# Patient Record
Sex: MALE | Race: WHITE | NOT HISPANIC OR LATINO | Employment: OTHER | URBAN - METROPOLITAN AREA
[De-identification: names, ages, dates, MRNs, and addresses within clinical notes are randomized per-mention and may not be internally consistent; named-entity substitution may affect disease eponyms.]

---

## 2017-01-18 ENCOUNTER — HOSPITAL ENCOUNTER (OUTPATIENT)
Dept: ULTRASOUND IMAGING | Facility: CLINIC | Age: 77
Discharge: HOME | End: 2017-01-18
Attending: RADIOLOGY | Admitting: RADIOLOGY
Payer: COMMERCIAL

## 2017-01-18 DIAGNOSIS — I71.40 ABDOMINAL AORTIC ANEURYSM (H): ICD-10-CM

## 2017-01-18 PROCEDURE — 93978 VASCULAR STUDY: CPT

## 2017-01-23 DIAGNOSIS — E11.42 TYPE 2 DIABETES MELLITUS WITH DIABETIC POLYNEUROPATHY (H): Primary | ICD-10-CM

## 2017-01-23 DIAGNOSIS — E78.5 HYPERLIPIDEMIA WITH TARGET LDL LESS THAN 70: Primary | ICD-10-CM

## 2017-01-23 RX ORDER — ATORVASTATIN CALCIUM 80 MG/1
80 TABLET, FILM COATED ORAL DAILY
Qty: 90 TABLET | Refills: 1 | Status: SHIPPED | OUTPATIENT
Start: 2017-01-23 | End: 2017-03-23

## 2017-01-23 NOTE — TELEPHONE ENCOUNTER
atorvastatin     Last Written Prescription Date: 3/10/16  Last Fill Quantity: 90, # refills: 1  Last Office Visit with [facility], P or [facility] prescribing provider: 12/22/16   Next 5 appointments (look out 90 days)     Mar 22, 2017  8:40 AM   Office Visit with Beatriz Betancur MD   [facility] ([facility])    [address] [place] [address] [phone]                   CHOL      135   9/22/2016  HDL       56   9/22/2016  LDL       62   9/22/2016  TRIG       83   9/22/2016  CHOLHDLRATIO      2.4   9/17/2015

## 2017-01-23 NOTE — TELEPHONE ENCOUNTER
metformin         Last Written Prescription Date: 9/22/16  Last Fill Quantity: 180, # refills: 1  Last Office Visit with [facility], P or [facility] prescribing provider:  12/22/16   Next 5 appointments (look out 90 days)     Mar 22, 2017  8:40 AM   Office Visit with Beatriz Betancur MD   [facility] ([facility])    [address] [place] [address] [phone]                   BP Readings from Last 3 Encounters:   12/22/16 126/74   11/01/16 156/78   09/22/16 108/68     MICROL       15   3/10/2016  No results found for this basename: microalbumin  CREATININE   Date Value Ref Range Status   11/01/2016 1.00 0.66 - 1.25 mg/dL Final   ]  GFR ESTIMATE   Date Value Ref Range Status   11/01/2016 72 >60 mL/min/1.7m2 Final     Comment:     Non  GFR Calc   09/22/2016 >90  Non  GFR Calc   >60 mL/min/1.7m2 Final   09/17/2015 81 >60 mL/min/1.7m2 Final     Comment:     Non  GFR Calc     GFR ESTIMATE IF BLACK   Date Value Ref Range Status   11/01/2016 88 >60 mL/min/1.7m2 Final     Comment:      GFR Calc   09/22/2016 >90   GFR Calc   >60 mL/min/1.7m2 Final   09/17/2015 >90   GFR Calc   >60 mL/min/1.7m2 Final     CHOL      135   9/22/2016  HDL       56   9/22/2016  LDL       62   9/22/2016  TRIG       83   9/22/2016  CHOLHDLRATIO      2.4   9/17/2015  AST       14   9/22/2016  ALT       28   9/22/2016  A1C      5.2   9/22/2016  A1C      6.4   3/10/2016  A1C      6.6   9/17/2015  A1C      6.7   3/26/2015  A1C      6.5   10/6/2014  POTASSIUM   Date Value Ref Range Status   11/01/2016 4.5 3.4 - 5.3 mmol/L Final

## 2017-01-25 NOTE — TELEPHONE ENCOUNTER
metformin         Last Written Prescription Date: 9/22/16  Last Fill Quantity: 180, # refills: 1  Last Office Visit with [facility] or [facility] prescribing provider:  12/22/16   Next 5 appointments (look out 90 days)     Mar 22, 2017  8:40 AM   Office Visit with Beatriz Betancur MD   [facility]    [address] [facility] [address] [phone]            Apr 04, 2017  8:15 AM   Return Visit with Nadia Gutierrez MD   [facility] [place] ([facility])    [address]  [facility] [address] [phone]                   BP Readings from Last 3 Encounters:   12/22/16 126/74   11/01/16 156/78   09/22/16 108/68     MICROL       15   3/10/2016  No results found for this basename: microalbumin  CREATININE   Date Value Ref Range Status   11/01/2016 1.00 0.66 - 1.25 mg/dL Final   ]  GFR ESTIMATE   Date Value Ref Range Status   11/01/2016 72 >60 mL/min/1.7m2 Final     Comment:     Non  GFR Calc   09/22/2016 >90  Non  GFR Calc   >60 mL/min/1.7m2 Final   09/17/2015 81 >60 mL/min/1.7m2 Final     Comment:     Non  GFR Calc     GFR ESTIMATE IF BLACK   Date Value Ref Range Status   11/01/2016 88 >60 mL/min/1.7m2 Final     Comment:      GFR Calc   09/22/2016 >90   GFR Calc   >60 mL/min/1.7m2 Final   09/17/2015 >90   GFR Calc   >60 mL/min/1.7m2 Final     CHOL      135   9/22/2016  HDL       56   9/22/2016  LDL       62   9/22/2016  TRIG       83   9/22/2016  CHOLHDLRATIO      2.4   9/17/2015  AST       14   9/22/2016  ALT       28   9/22/2016  A1C      5.2   9/22/2016  A1C      6.4   3/10/2016  A1C      6.6   9/17/2015  A1C      6.7   3/26/2015  A1C      6.5   10/6/2014  POTASSIUM   Date Value Ref Range Status   11/01/2016 4.5 3.4 - 5.3 mmol/L Final

## 2017-01-26 NOTE — TELEPHONE ENCOUNTER
One refill only as patient due for diabetes follow up in March with PCP.    Office visit already scheduled.    KEY FariaN, RN

## 2017-02-27 ENCOUNTER — TELEPHONE (OUTPATIENT)
Dept: OTHER | Facility: CLINIC | Age: 77
End: 2017-02-27

## 2017-02-27 DIAGNOSIS — I71.40 ABDOMINAL AORTIC ANEURYSM (H): Primary | ICD-10-CM

## 2017-02-27 NOTE — TELEPHONE ENCOUNTER
Called patient with results, unfortunately was just notified that patient had the exam done in January.  Recommend annual follow up. Pt has no concerns at this time.  [facility] will call patient to schedule.  Meena Cheung RN  Exam Information   Exam Date Exam Time Accession # Performing Department Results    1/18/17  8:53 AM [id number] [facility]    [employer] Interactive Report and InfoRx   View the interactive report   PACS Images   Show images for US Aorta/Ivc/Iliac Duplex Complete   Study Result   ULTRASOUND AORTA/IVC/ILIAC DUPLEX COMPLETE January 18, 2017 8:53 AM      HISTORY: Follow-up endovascular abdominal aortic aneurysm repair  12/13/2007.     COMPARISON: 3/9/2015.     FINDINGS: Duplex ultrasound with waveform spectral analysis and color  flow imaging was performed.     Aortobiiliac endograft remains patent without evidence of stenosis.  Abdominal aortic aneurysm sac is thrombosed with maximal AP diameter  3.6 cm and transverse diameter of 4.1 cm. This compares to previous  maximum measured diameter and is relatively stable in size and within  normal limits of measurement variation. There is no ultrasound  evidence to suggest endoleak.         IMPRESSION: Widely patent stable appearing aortoiliac endograft.  Aneurysm sac is thrombosed with maximal diameter of 3.6 x 4.1 cm.  Annual follow-up recommended.     HEIDI GREEN MD

## 2017-03-03 ENCOUNTER — TRANSFERRED RECORDS (OUTPATIENT)
Dept: HEALTH INFORMATION MANAGEMENT | Facility: CLINIC | Age: 77
End: 2017-03-03

## 2017-03-23 ENCOUNTER — OFFICE VISIT (OUTPATIENT)
Dept: FAMILY MEDICINE | Facility: CLINIC | Age: 77
End: 2017-03-23
Payer: COMMERCIAL

## 2017-03-23 VITALS
DIASTOLIC BLOOD PRESSURE: 64 MMHG | HEIGHT: 73 IN | TEMPERATURE: 97.5 F | WEIGHT: 232.25 LBS | OXYGEN SATURATION: 98 % | HEART RATE: 66 BPM | SYSTOLIC BLOOD PRESSURE: 118 MMHG | BODY MASS INDEX: 30.78 KG/M2

## 2017-03-23 DIAGNOSIS — I25.5 ISCHEMIC CARDIOMYOPATHY: ICD-10-CM

## 2017-03-23 DIAGNOSIS — I25.10 ASCVD (ARTERIOSCLEROTIC CARDIOVASCULAR DISEASE): ICD-10-CM

## 2017-03-23 DIAGNOSIS — E78.5 HYPERLIPIDEMIA WITH TARGET LDL LESS THAN 70: ICD-10-CM

## 2017-03-23 DIAGNOSIS — E11.42 TYPE 2 DIABETES MELLITUS WITH DIABETIC POLYNEUROPATHY, WITHOUT LONG-TERM CURRENT USE OF INSULIN (H): ICD-10-CM

## 2017-03-23 DIAGNOSIS — I10 HYPERTENSION GOAL BP (BLOOD PRESSURE) < 140/90: ICD-10-CM

## 2017-03-23 LAB — HBA1C MFR BLD: 5.8 % (ref 4.3–6)

## 2017-03-23 PROCEDURE — 83036 HEMOGLOBIN GLYCOSYLATED A1C: CPT | Performed by: FAMILY MEDICINE

## 2017-03-23 PROCEDURE — 36415 COLL VENOUS BLD VENIPUNCTURE: CPT | Performed by: FAMILY MEDICINE

## 2017-03-23 PROCEDURE — 82043 UR ALBUMIN QUANTITATIVE: CPT | Performed by: FAMILY MEDICINE

## 2017-03-23 PROCEDURE — 99214 OFFICE O/P EST MOD 30 MIN: CPT | Performed by: FAMILY MEDICINE

## 2017-03-23 RX ORDER — ATORVASTATIN CALCIUM 80 MG/1
80 TABLET, FILM COATED ORAL DAILY
Qty: 90 TABLET | Refills: 1 | Status: SHIPPED | OUTPATIENT
Start: 2017-03-23 | End: 2017-09-26

## 2017-03-23 RX ORDER — METOPROLOL TARTRATE 50 MG
25 TABLET ORAL 2 TIMES DAILY
Qty: 90 TABLET | Refills: 3 | Status: SHIPPED | OUTPATIENT
Start: 2017-03-23 | End: 2018-04-01

## 2017-03-23 RX ORDER — LISINOPRIL 20 MG/1
20 TABLET ORAL DAILY
Qty: 90 TABLET | Refills: 3 | Status: SHIPPED | OUTPATIENT
Start: 2017-03-23 | End: 2018-04-29

## 2017-03-23 RX ORDER — NITROGLYCERIN 0.4 MG/1
0.4 TABLET SUBLINGUAL EVERY 5 MIN PRN
Qty: 25 TABLET | Refills: 1 | Status: SHIPPED | OUTPATIENT
Start: 2017-03-23 | End: 2018-05-02

## 2017-03-23 NOTE — MR AVS SNAPSHOT
After Visit Summary   3/23/2017    David Medina    MRN: [id number]           Patient Information     Date Of Birth          1940        Visit Information        Provider Department      3/23/2017 11:40 AM Beatriz Betancur MD [facility]        Today's Diagnoses     Type 2 diabetes mellitus with diabetic polyneuropathy, without long-term current use of insulin (H)        Hypertension goal BP (blood pressure) < 140/90        ASCVD (arteriosclerotic cardiovascular disease)        Ischemic cardiomyopathy        Hyperlipidemia with target LDL less than 70           Follow-ups after your visit        Follow-up notes from your care team     Return in about 6 months (around 9/23/2017).      Your next 10 appointments already scheduled     Apr 04, 2017  8:15 AM CDT   Return Visit with Nadia Gutierrez MD   [facility] [place] ([facility]    [address] [place] [address] [phone]              Who to contact     If you have questions or need follow up information about today's clinic visit or your schedule please contact [facility] directly at [phone].  Normal or non-critical lab and imaging results will be communicated to you by MyChart, letter or phone within 4 business days after the clinic has received the results. If you do not hear from us within 7 days, please contact the clinic through [email or web address] or phone. If you have a critical or abnormal lab result, we will notify you by phone as soon as possible.  Submit refill requests through [employer] or call your pharmacy and they will forward the refill request to us. Please allow 3 business days for your refill to be completed.          Additional Information About Your Visit        MyChart Information     [employer] lets you send messages to your doctor, view your test results, renew your prescriptions, schedule appointments and more. To sign up, go to  [email or web address] [email or web address] . Click on \"Log in\" on the left side of the screen, which will take you to the Welcome page. Then click on \"Sign up Now\" on the right side of the page.     You will be asked to enter the access code listed below, as well as some personal information. Please follow the directions to create your username and password.     Your access code is: [email or web address]  Expires: 2017 12:33 PM     Your access code will  in 90 days. If you need help or a new code, please call your [place] clinic or [phone].        Care EveryWhere ID     This is your Care EveryWhere ID. This could be used by other organizations to access your [place] medical records  [id number]        Your Vitals Were     Pulse Temperature Height Pulse Oximetry BMI (Body Mass Index)       66 97.5  F (36.4  C) (Oral) 6' 1\" (1.854 m) 98% 30.64 kg/m2        Blood Pressure from Last 3 Encounters:   17 118/64   16 126/74   16 156/78    Weight from Last 3 Encounters:   17 232 lb 4 oz (105.3 kg)   16 233 lb (105.7 kg)   16 229 lb (103.9 kg)              We Performed the Following     Albumin Random Urine Quantitative     Hemoglobin A1c          Today's Medication Changes          These changes are accurate as of: 3/23/17 12:33 PM.  If you have any questions, ask your nurse or doctor.               These medicines have changed or have updated prescriptions.        Dose/Directions    metoprolol 50 MG tablet   Commonly known as:  LOPRESSOR   This may have changed:    - medication strength  - additional instructions   Used for:  ASCVD (arteriosclerotic cardiovascular disease), Ischemic cardiomyopathy, Hypertension goal BP (blood pressure) < 140/90   Changed by:  Beatriz Betancur MD        Dose:  25 mg   Take 0.5 tablets (25 mg) by mouth 2 times daily   Quantity:  90 tablet   Refills:  3         Stop taking these medicines if you haven't already. Please contact your care team if you have questions.  "    senna-docusate 8.6-50 MG per tablet   Commonly known as:  SENOKOT-S;PERICOLACE   Stopped by:  Beatriz Betancur MD                Where to get your medicines      These medications were sent to [id number] - [place], [place] - [address]  [address], [place] [address]     Phone:  [phone]     atorvastatin 80 MG tablet    lisinopril 20 MG tablet    metFORMIN 500 MG tablet    metoprolol 50 MG tablet    nitroglycerin 0.4 MG sublingual tablet                Primary Care Provider Office Phone # Fax #    Beatriz Betancur [phone]       [facility] [address]  [facility] [address]        Thank you!     Thank you for choosing [facility]note 2[facility]  for your care. Our goal is always to provide you with excellent care. Hearing back from our patients is one way we can continue to improve our services. Please take a few minutes to complete the written survey that you may receive in the mail after your visit with us. Thank you!             Your Updated Medication List - Protect others around you: Learn how to safely use, store and throw away your medicines at www.disposemymeds.org.          This list is accurate as of: 3/23/17 12:33 PM.  Always use your most recent med list.                   Brand Name Dispense Instructions for use    aspirin EC 81 MG EC tablet     90 tablet    Take 1 tablet (81 mg) by mouth daily       atorvastatin 80 MG tablet    LIPITOR    90 tablet    Take 1 tablet (80 mg) by mouth daily       blood glucose monitoring meter device kit     1 kit    Use to test blood sugars FOUR times daily or as directed.   This order is for whatever meter patient's insurance will cover.       blood glucose monitoring test strip    no brand specified    200 each    Use to test blood sugars 1 time daily or as directed.  Please dispense strips compatible with new meter       Cyanocobalamin 1000 MCG Caps     100 capsule    Take 1 capsule by mouth daily        isosorbide mononitrate 30 MG 24 hr tablet    IMDUR    90 tablet    Take 1 tablet (30 mg) by mouth every morning       latanoprost 0.005 % ophthalmic solution    XALATAN     Place 1 drop into both eyes every evening       lisinopril 20 MG tablet    PRINIVIL/ZESTRIL    90 tablet    Take 1 tablet (20 mg) by mouth daily       metFORMIN 500 MG tablet    GLUCOPHAGE    180 tablet    Take 1 tablet (500 mg) by mouth 2 times daily (with meals)       metoprolol 50 MG tablet    LOPRESSOR    90 tablet    Take 0.5 tablets (25 mg) by mouth 2 times daily       multivitamin, therapeutic with minerals Tabs tablet      Take 1 tablet by mouth daily       nitroglycerin 0.4 MG sublingual tablet    NITROSTAT    25 tablet    Place 1 tablet (0.4 mg) under the tongue every 5 minutes as needed for chest pain Up to 3 doses max.

## 2017-03-23 NOTE — PROGRESS NOTES
SUBJECTIVE:                                                    David Medina is a 77 year old male who presents to clinic today for the following health issues:      Diabetes Follow-up    Patient is checking blood sugars: once daily.  Results are as follows:         am - 121    Diabetic concerns: None     Symptoms of hypoglycemia (low blood sugar): none     Paresthesias (numbness or burning in feet) or sores: No     Date of last diabetic eye exam: 2 months ago      Hypertension Follow-up      Outpatient blood pressures are not being checked.    Low Salt Diet: not monitoring salt       Amount of exercise or physical activity: 6-7 days/week for an average of 30-45 minutes - home treadmill, bike, and step machine    Problems taking medications regularly: No    Medication side effects: none    Diet: regular (no restrictions)      PROBLEMS TO ADD ON...none.       Problem list and histories reviewed & adjusted, as indicated.  Additional history: as documented    Patient Active Problem List   Diagnosis     Sciatica     Hyperlipidemia with target LDL less than 70     Ischemic cardiomyopathy     Allergic rhinitis     Cervical radiculopathy     Type 2 diabetes mellitus with diabetic polyneuropathy (H)     Hypertension goal BP (blood pressure) < 140/90     ASCVD (arteriosclerotic cardiovascular disease)     Erectile dysfunction     Lumbosacral plexopathy     NSTEMI (non-ST elevation myocardial infarction) (H)     Nonspecific reaction to tuberculin skin test without active tuberculosis     B12 deficiency     Spinal stenosis of thoracic region     Health Care Home     Diabetic neuropathy (H)     AAA (abdominal aortic aneurysm) without rupture (H)     Tubular adenoma of colon     Past Surgical History:   Procedure Laterality Date     AAA REPAIR  12/13/07    endovascular repair, Dr. Jacob ARCEO APPENDECTOMY  12/06    open     CLOSED RX TIBIA SHAFT FX  6/2013    RT prox tibial fracture     COLONOSCOPY  10/3/05    WNL, rpt  in 10 yrs, Dr. Vicente, [place] [facility]     COLONOSCOPY  9/23/15    advanced adenoma, rpt 3 yrs     coronary angiogram  2/28/11    diffuse disease with vasospasm     EMG  6/10/10    chronic active polyradiculopathy, mild generalized polyneuropathy, r/o lumbar stenosis     ENDOVAS REPAIR, INFRARENL ABDOM AORTIC ANEURYSM/DISSECT; [id number]/AORTO-UNIFEMORAL PROSTHESIS  6/05    one done,one to do     FLEXIBLE SIGMOIDOSCOPY  11/00    WNL     HC PTCA SINGLE VESSEL  6/05    LAD - 100% stenosis     HC UGI ENDOSCOPY, SIMPLE EXAM  8/99    erosive esophagitis, GERD, Dr. Salazar     HEART CATH, ANGIOPLASTY  10/3/14    EMEKA x 1  ostium of the RCA     LAMINECTOMY THORACIC THREE LEVELS  10/31/12    T10, T11, T12 with medial facetectomy     OPEN REDUCTION INTERNAL FIXATION HIP NAILING N/A 11/1/2016    Procedure: OPEN REDUCTION INTERNAL FIXATION HIP NAILING;  Surgeon: Jason Flowers MD;  Location: [facility] OR     SONO ABD RETROPERITNL  11/7/06    AAA 4.2 x 4.8, Dr. James's office     STRESS LEXISCAN TEST  10/5/12    small, fixed, apical defect/apical thinning     SURGICAL HISTORY OF -       tonsillectomy     TONSILLECTOMY  1940       Social History   Substance Use Topics     Smoking status: Former Smoker     Packs/day: 3.50     Years: 19.00     Types: Cigarettes     Quit date: 5/11/1980     Smokeless tobacco: Never Used     Alcohol use Yes      Comment: beer 1-2x/week     Family History   Problem Relation Age of Onset     Respiratory Father      TB     Other - See Comments Mother      FALL         BP Readings from Last 3 Encounters:   03/23/17 118/64   12/22/16 126/74   11/01/16 156/78    Wt Readings from Last 3 Encounters:   03/23/17 232 lb 4 oz (105.3 kg)   12/22/16 233 lb (105.7 kg)   11/01/16 229 lb (103.9 kg)             Reviewed and updated as needed this visit by clinical staff  Tobacco  Allergies  Meds  Problems  Med Hx  Surg Hx  Fam Hx  Soc Hx        Reviewed and updated as needed this visit by Provider  Allergies  Meds   "Problems         ROS:  RESP:  NEGATIVE for shortness of breath  CV:  NEGATIVE for chest pain    MS: NEGATIVE for further falls/injuries    OBJECTIVE:                                                    /64 (BP Location: Left arm, Patient Position: Chair, Cuff Size: Adult Large)  Pulse 66  Temp 97.5  F (36.4  C) (Oral)  Ht 6' 1\" (1.854 m)  Wt 232 lb 4 oz (105.3 kg)  SpO2 98%  BMI 30.64 kg/m2  Body mass index is 30.64 kg/(m^2).  GEN:  no apparent distress  LUNGS:  normal respiratory effort, and lungs clear to auscultation bilaterally - no rales, rhonchi or wheezes  CV: regular rate and rhythm, normal S1 S2, no S3 or S4 and no murmur, click or rub     Diagnostic Test Results:  Results for orders placed or performed in visit on 03/23/17   Hemoglobin A1c   Result Value Ref Range    Hemoglobin A1C 5.8 4.3 - 6.0 %        ASSESSMENT/PLAN:                                                        1. Type 2 diabetes mellitus with diabetic polyneuropathy, without long-term current use of insulin (H)  stable/well controlled   - Hemoglobin A1c  - Albumin Random Urine Quantitative  - metFORMIN (GLUCOPHAGE) 500 MG tablet; Take 1 tablet (500 mg) by mouth 2 times daily (with meals)  Dispense: 180 tablet; Refill: 1    2. Hypertension goal BP (blood pressure) < 140/90  stable/well controlled   - lisinopril (PRINIVIL/ZESTRIL) 20 MG tablet; Take 1 tablet (20 mg) by mouth daily  Dispense: 90 tablet; Refill: 3  - metoprolol (LOPRESSOR) 50 MG tablet; Take 0.5 tablets (25 mg) by mouth 2 times daily  Dispense: 90 tablet; Refill: 3    3. ASCVD (arteriosclerotic cardiovascular disease)  4. Ischemic cardiomyopathy  Has upcoming Follow-Up with Dr. Gutierrez/cardiology  - nitroglycerin (NITROSTAT) 0.4 MG sublingual tablet; Place 1 tablet (0.4 mg) under the tongue every 5 minutes as needed for chest pain Up to 3 doses max.  Dispense: 25 tablet; Refill: 1  - metoprolol (LOPRESSOR) 50 MG tablet; Take 0.5 tablets (25 mg) by mouth 2 times daily  " Dispense: 90 tablet; Refill: 3    5. Hyperlipidemia with target LDL less than 70  stable/well controlled   - atorvastatin (LIPITOR) 80 MG tablet; Take 1 tablet (80 mg) by mouth daily  Dispense: 90 tablet; Refill: 1    FUTURE APPOINTMENTS:       - Follow-up visit in 6 months.      Beatriz Betancur MD  [facility]

## 2017-03-23 NOTE — NURSING NOTE
"Chief Complaint   Patient presents with     RECHECK     3 month follow up       Initial /64 (BP Location: Left arm, Patient Position: Chair, Cuff Size: Adult Large)  Pulse 66  Temp 97.5  F (36.4  C) (Oral)  Ht 6' 1\" (1.854 m)  Wt 232 lb 4 oz (105.3 kg)  SpO2 98%  BMI 30.64 kg/m2 Estimated body mass index is 30.64 kg/(m^2) as calculated from the following:    Height as of this encounter: 6' 1\" (1.854 m).    Weight as of this encounter: 232 lb 4 oz (105.3 kg).  Medication Reconciliation: complete     Vicki Sahu MA      "

## 2017-03-23 NOTE — LETTER
[facility]   [address] [place] [address]  [phone]      March 27, 2017      David Medina  [address] [facility] [address]          Dear Rachel Medina,    The results of your recent lab tests were within normal limits. Enclosed is a copy of these results.  If you have any further questions or problems, please contact our office.    Results for orders placed or performed in visit on 03/23/17   Hemoglobin A1c   Result Value Ref Range    Hemoglobin A1C 5.8 4.3 - 6.0 %   Albumin Random Urine Quantitative   Result Value Ref Range    Creatinine Urine 89 mg/dL    Albumin Urine mg/L 6 mg/L    Albumin Urine mg/g Cr 6.18 0 - 17 mg/g Cr         Sincerely,      Beatriz Betancur MD/nr

## 2017-03-24 LAB
CREAT UR-MCNC: 89 MG/DL
MICROALBUMIN UR-MCNC: 6 MG/L
MICROALBUMIN/CREAT UR: 6.18 MG/G CR (ref 0–17)

## 2017-04-04 ENCOUNTER — OFFICE VISIT (OUTPATIENT)
Dept: CARDIOLOGY | Facility: CLINIC | Age: 77
End: 2017-04-04
Attending: INTERNAL MEDICINE
Payer: COMMERCIAL

## 2017-04-04 VITALS
SYSTOLIC BLOOD PRESSURE: 134 MMHG | DIASTOLIC BLOOD PRESSURE: 66 MMHG | HEIGHT: 74 IN | HEART RATE: 64 BPM | BODY MASS INDEX: 30.58 KG/M2 | WEIGHT: 238.3 LBS

## 2017-04-04 DIAGNOSIS — I21.4 NSTEMI (NON-ST ELEVATION MYOCARDIAL INFARCTION) (H): ICD-10-CM

## 2017-04-04 PROCEDURE — 99213 OFFICE O/P EST LOW 20 MIN: CPT | Performed by: INTERNAL MEDICINE
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
## 2017-04-04 NOTE — MR AVS SNAPSHOT
After Visit Summary   4/4/2017    David Medina    MRN: [id number]           Patient Information     Date Of Birth          1940        Visit Information        Provider Department      4/4/2017 8:15 AM Nadia Gutierrez MD [facility]        Today's Diagnoses     NSTEMI (non-ST elevation myocardial infarction)           Follow-ups after your visit        Additional Services     Follow-Up with Cardiologist                 Your next 10 appointments already scheduled     Sep 25, 2017  8:40 AM CDT   Office Visit with Beatriz Betancur MD   [facility] ([facility])    [address] [place] [id number] [phone]           Bring a current list of meds and any records pertaining to this visit.  For Physicals, please bring immunization records and any forms needing to be filled out.  Please arrive 10 minutes early to complete paperwork.              Future tests that were ordered for you today     Open Future Orders        Priority Expected Expires Ordered    Follow-Up with Cardiologist Routine 4/4/2018 8/17/2018 4/4/2017            Who to contact     If you have questions or need follow up information about today's clinic visit or your schedule please contact [facility] directly at [phone].  Normal or non-critical lab and imaging results will be communicated to you by MyChart, letter or phone within 4 business days after the clinic has received the results. If you do not hear from us within 7 days, please contact the clinic through MyChart or phone. If you have a critical or abnormal lab result, we will notify you by phone as soon as possible.  Submit refill requests through [employer] or call your pharmacy and they will forward the refill request to us. Please allow 3 business days for your refill to be completed.          Additional Information About Your Visit       "  MyChart Information     [employer] lets you send messages to your doctor, view your test results, renew your prescriptions, schedule appointments and more. To sign up, go to [email or web address] . Click on \"Log in\" on the left side of the screen, which will take you to the Welcome page. Then click on \"Sign up Now\" on the right side of the page.     You will be asked to enter the access code listed below, as well as some personal information. Please follow the directions to create your username and password.     Your access code is: [email or web address]  Expires: 2017 12:33 PM     Your access code will  in 90 days. If you need help or a new code, please call your [facility] clinic or [phone].        Care EveryWhere ID     This is your Care EveryWhere ID. This could be used by other organizations to access your [facility] medical records  [id number]        Your Vitals Were     Pulse Height BMI (Body Mass Index)             64 1.88 m (6' 2\") 30.6 kg/m2          Blood Pressure from Last 3 Encounters:   17 134/66   17 118/64   16 126/74    Weight from Last 3 Encounters:   17 108.1 kg (238 lb 4.8 oz)   17 105.3 kg (232 lb 4 oz)   16 105.7 kg (233 lb)              We Performed the Following     Follow-Up with Cardiologist        Primary Care Provider Office Phone # Fax #    Beatriz Betancur [phone]       [facility] [address]  [facility] [address]        Thank you!     Thank you for choosing [facility] [place]  for your care. Our goal is always to provide you with excellent care. Hearing back from our patients is one way we can continue to improve our services. Please take a few minutes to complete the written survey that you may receive in the mail after your visit with us. Thank you!             Your Updated Medication List - Protect others around you: Learn how to safely use, store and throw away your " medicines at www.disposemymeds.org.          This list is accurate as of: 4/4/17  9:02 AM.  Always use your most recent med list.                   Brand Name Dispense Instructions for use    aspirin EC 81 MG EC tablet     90 tablet    Take 1 tablet (81 mg) by mouth daily       atorvastatin 80 MG tablet    LIPITOR    90 tablet    Take 1 tablet (80 mg) by mouth daily       blood glucose monitoring meter device kit     1 kit    Use to test blood sugars FOUR times daily or as directed.   This order is for whatever meter patient's insurance will cover.       blood glucose monitoring test strip    no brand specified    200 each    Use to test blood sugars 1 time daily or as directed.  Please dispense strips compatible with new meter       Cyanocobalamin 1000 MCG Caps     100 capsule    Take 1 capsule by mouth daily       isosorbide mononitrate 30 MG 24 hr tablet    IMDUR    90 tablet    Take 1 tablet (30 mg) by mouth every morning       latanoprost 0.005 % ophthalmic solution    XALATAN     Place 1 drop into both eyes every evening       lisinopril 20 MG tablet    PRINIVIL/ZESTRIL    90 tablet    Take 1 tablet (20 mg) by mouth daily       metFORMIN 500 MG tablet    GLUCOPHAGE    180 tablet    Take 1 tablet (500 mg) by mouth 2 times daily (with meals)       metoprolol 50 MG tablet    LOPRESSOR    90 tablet    Take 0.5 tablets (25 mg) by mouth 2 times daily       multivitamin, therapeutic with minerals Tabs tablet      Take 1 tablet by mouth daily       nitroglycerin 0.4 MG sublingual tablet    NITROSTAT    25 tablet    Place 1 tablet (0.4 mg) under the tongue every 5 minutes as needed for chest pain Up to 3 doses max.

## 2017-04-05 NOTE — PROGRESS NOTES
HPI and Plan:   See dictation    Orders Placed This Encounter   Procedures     Follow-Up with Cardiologist       No orders of the defined types were placed in this encounter.      There are no discontinued medications.      Encounter Diagnosis   Name Primary?     NSTEMI (non-ST elevation myocardial infarction)        CURRENT MEDICATIONS:  Current Outpatient Prescriptions   Medication Sig Dispense Refill     lisinopril (PRINIVIL/ZESTRIL) 20 MG tablet Take 1 tablet (20 mg) by mouth daily 90 tablet 3     metFORMIN (GLUCOPHAGE) 500 MG tablet Take 1 tablet (500 mg) by mouth 2 times daily (with meals) 180 tablet 1     nitroglycerin (NITROSTAT) 0.4 MG sublingual tablet Place 1 tablet (0.4 mg) under the tongue every 5 minutes as needed for chest pain Up to 3 doses max. 25 tablet 1     atorvastatin (LIPITOR) 80 MG tablet Take 1 tablet (80 mg) by mouth daily 90 tablet 1     metoprolol (LOPRESSOR) 50 MG tablet Take 0.5 tablets (25 mg) by mouth 2 times daily 90 tablet 3     multivitamin, therapeutic with minerals (THERA-VIT-M) TABS Take 1 tablet by mouth daily       latanoprost (XALATAN) 0.005 % ophthalmic solution Place 1 drop into both eyes every evening       Cyanocobalamin 1000 MCG CAPS Take 1 capsule by mouth daily 100 capsule 3     blood glucose monitoring (NO BRAND SPECIFIED) test strip Use to test blood sugars 1 time daily or as directed.  Please dispense strips compatible with new meter 200 each 3     blood glucose monitoring ([employer] CONTOUR MONITOR) meter device kit Use to test blood sugars FOUR times daily or as directed.   This order is for whatever meter patient's insurance will cover. 1 kit 0     isosorbide mononitrate (IMDUR) 30 MG 24 hr tablet Take 1 tablet (30 mg) by mouth every morning 90 tablet 3     aspirin EC 81 MG tablet Take 1 tablet (81 mg) by mouth daily 90 tablet 3       ALLERGIES     Allergies   Allergen Reactions     No Known Allergies        PAST MEDICAL HISTORY:  Past Medical History:   Diagnosis  Date     AAA (abdominal aortic aneurysm) without rupture (H)     stent 2005, s/p endovascular repair 12/07     Allergic rhinitis      ASCVD (arteriosclerotic cardiovascular disease) 6/05    Cardiac cath Oct 2014: EMEKA to RCA, cath 2011: medical management, cath Aug 2005: EMEKA to OM; cath June 2005: EMEKA to LAD     B12 deficiency      Benign paroxysmal positional vertigo      Cervical radiculopathy 10/30/2008    Injected through [facility] 10/07     Closed fracture of patella 2/07    left     DM type 2 (diabetes mellitus, type 2) (H)      Hyperlipidemia LDL goal < 70      Hypertension goal BP (blood pressure) < 130/80      Ischemic cardiomyopathy 6/05    ischemic cardiomyopathy, EF 45%     Lumbosacral plexopathy     diabetic radiculoplexopathy causing proximal muscle weakness, Dr. 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 Villa (neuro) and Dr. Monterroso (neurosurg)     Thoracic or lumbosacral neuritis or radiculitis, unspecified      Tubular adenoma of colon     advanced adenoma (15 mm) 9/23/15 - rpt 3 yrs       PAST SURGICAL HISTORY:  Past Surgical History:   Procedure Laterality Date     AAA REPAIR  12/13/07    endovascular repair, Dr. Jacob ARCEO APPENDECTOMY  12/06    open     CLOSED RX TIBIA SHAFT FX  6/2013    RT prox tibial fracture     COLONOSCOPY  10/3/05    WNL, rpt in 10 yrs, Dr. Vicente, [place] [facility]     COLONOSCOPY  9/23/15    advanced adenoma, rpt 3 yrs     coronary angiogram  2/28/11    diffuse disease with vasospasm     EMG  6/10/10    chronic active polyradiculopathy, mild  generalized polyneuropathy, r/o lumbar stenosis     ENDOVAS REPAIR, INFRARENL ABDOM AORTIC ANEURYSM/DISSECT; [id number]/AORTO-UNIFEMORAL PROSTHESIS  6/05    one done,one to do     FLEXIBLE SIGMOIDOSCOPY  11/00    WNL     HC PTCA SINGLE VESSEL  6/05    LAD - 100% stenosis     HC UGI ENDOSCOPY, SIMPLE EXAM  8/99    erosive esophagitis, GERD, Dr. Salazar     HEART CATH, ANGIOPLASTY  10/3/14    EMEKA x 1  ostium of the RCA     LAMINECTOMY THORACIC THREE LEVELS  10/31/12    T10, T11, T12 with medial facetectomy     OPEN REDUCTION INTERNAL FIXATION HIP NAILING N/A 11/1/2016    Procedure: OPEN REDUCTION INTERNAL FIXATION HIP NAILING;  Surgeon: Jason Flowers MD;  Location: [facility] OR     SONO ABD RETROPERITNL  11/7/06    AAA 4.2 x 4.8, Dr. James's office     STRESS LEXISCAN TEST  10/5/12    small, fixed, apical defect/apical thinning     SURGICAL HISTORY OF -       tonsillectomy     TONSILLECTOMY  1940       FAMILY HISTORY:  Family History   Problem Relation Age of Onset     Respiratory Father      TB     Other - See Comments Mother      FALL       SOCIAL HISTORY:  Social History     Social History     Marital status:      Spouse name: Sarika     Number of children: 4     Years of education: N/A     Occupational History      Retired     Social History Main Topics     Smoking status: Former Smoker     Packs/day: 3.50     Years: 19.00     Types: Cigarettes     Quit date: 5/11/1980     Smokeless tobacco: Never Used     Alcohol use Yes      Comment: beer 1-2x/week     Drug use: No     Sexual activity: Yes     Partners: Female     Other Topics Concern     Parent/Sibling W/ Cabg, Mi Or Angioplasty Before 65f 55m? No     Caffeine Concern Yes     2 cups of coffee a day.       Sleep Concern No     Stress Concern No     Weight Concern No     Special Diet No     Exercise Yes     Cardiac rehab 1 x week, treadmill daily     Seat Belt Yes     Social History Narrative       Review of Systems:  Skin:  Negative       Eyes:   "Positive for glasses    ENT:  Negative      Respiratory:  Negative       Cardiovascular:  Negative      Gastroenterology: Negative      Genitourinary:  Positive for nocturia;urinary frequency    Musculoskeletal:  Positive for arthritis;joint pain;joint stiffness right pinky stiff, unable to straigthten   Neurologic:  Positive for numbness or tingling of hands;numbness or tingling of feet;incoordination (walks with cane) numbness in right hand/arm for \"years\"   Psychiatric:  Positive for sleep disturbances falls alseep easily, wakes up often r/t nocturia - difficult to get back to sleep   Heme/Lymph/Imm:  Negative      Endocrine:  Positive for diabetes      Physical Exam:  Vitals: /66  Pulse 64  Ht 1.88 m (6' 2\")  Wt 108.1 kg (238 lb 4.8 oz)  BMI 30.6 kg/m2    Constitutional:  cooperative, alert and oriented, well developed, well nourished, in no acute distress        Skin:  warm and dry to the touch        Head:  normocephalic        Eyes:  sclera white        ENT:           Neck:           Chest:  normal breath sounds, clear to auscultation, normal A-P diameter, normal symmetry, normal respiratory excursion, no use of accessory muscles          Cardiac: regular rhythm, normal S1/S2, no S3 or S4, apical impulse not displaced, no murmurs, gallops or rubs                  Abdomen:           Vascular:                                          Extremities and Back:  no edema;no deformities, clubbing, cyanosis, erythema observed              Neurological:  affect appropriate, oriented to time, person and place;no gross motor deficits   uses a cane          CC  Nadia Gutierrez MD   [facility]  [address]  [place] [address]                  "
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
## 2017-04-17 ENCOUNTER — TRANSFERRED RECORDS (OUTPATIENT)
Dept: HEALTH INFORMATION MANAGEMENT | Facility: CLINIC | Age: 77
End: 2017-04-17

## 2017-06-15 DIAGNOSIS — I25.10 ASCVD (ARTERIOSCLEROTIC CARDIOVASCULAR DISEASE): ICD-10-CM

## 2017-06-15 RX ORDER — ISOSORBIDE MONONITRATE 30 MG/1
30 TABLET, EXTENDED RELEASE ORAL EVERY MORNING
Qty: 90 TABLET | Refills: 3 | Status: SHIPPED | OUTPATIENT
Start: 2017-06-15 | End: 2018-07-03

## 2017-08-31 DIAGNOSIS — K21.9 GASTROESOPHAGEAL REFLUX DISEASE WITHOUT ESOPHAGITIS: ICD-10-CM

## 2017-09-01 NOTE — TELEPHONE ENCOUNTER
ranitidine (ZANTAC) 150 MG tablet (Discontinued) 180 tablet 3 3/10/2016 3/23/2017 No      Sig: Take 1 tablet (150 mg) by mouth 2 times daily     Class: E-Prescribe     Route: Oral     Reason for Discontinue: Stopped by Patient     Order: [id number]     E-Prescribing Status: Receipt confirmed by pharmacy (3/10/2016  9:24 AM CST)       Last Office Visit with [facility] or [facility] prescribing provider: 3/23/2017                                         Next 5 appointments (look out 90 days)     Sep 25, 2017  8:40 AM CDT   Office Visit with Beatriz Betancur MD   [facility]    [address] [place] [id number] [phone]

## 2017-09-24 NOTE — PROGRESS NOTES
SUBJECTIVE:  David Medina, a 77 year old male, is here to discuss the following issues:     DIABETES FOLLOW-UP   Diabetes Type 2.    Treatment plan: metformin   Medication side effects: None  Patient does check blood sugars and they have been running 120-150.    Symptoms of hypoglycemia (low blood sugar): none.    Paresthesias (numbness or burning in feet) or sores: Yes, chronic and progressive  Patient counting carbs: No.    Patient exercise: HEP for lower extremity weakness      HYPERLIPIDEMIA FOLLOW-UP    Patient with history of significant hyperlipidemia requiring prescription medication for management.      Current medication regimen includes atorvastatin.   Patient reports no problems or side effects with the medication, specifically no muscle aches.     He saw Dr. Gutierrze earlier this summer for follow-up of his CAD - no changes to his regimen and she doesn't plan to see him for another year.    He has recovered from his hip fracture last fall but still has significant gait instability due to his lumbar spinal stenosis and diabetic peripheral neuropathy.    Has had recent change in bowel habits - looser stools.  Wondering if he should repeat colonoscopy this year (is due next year for advanced adenoma found Sept 2015).      Problem list and histories reviewed & updated, as indicated.  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     B12 deficiency     Spinal stenosis of thoracic region     Health Care Home      "Diabetic neuropathy (H)     AAA (abdominal aortic aneurysm) without rupture (H)     Tubular adenoma of colon       BP Readings from Last 3 Encounters:   09/25/17 118/80   04/04/17 134/66   03/23/17 118/64    Wt Readings from Last 3 Encounters:   09/25/17 236 lb (107 kg)   04/04/17 238 lb 4.8 oz (108.1 kg)   03/23/17 232 lb 4 oz (105.3 kg)                  Recent Labs   Lab Test  09/25/17   0850  03/23/17   1141  11/01/16   1211  09/22/16   0821   09/17/15   0905   10/06/14   1433   09/22/14   0751   10/21/13   0904   A1C  6.2*  5.8   --   5.2   < >  6.6*   < >  6.5*   --    --    < >  6.2*   LDL   --    --    --   62   --   42   --    --    --   58   --   69   HDL   --    --    --   56   --   51   --    --    --   54   --   50   TRIG   --    --    --   83   --   135   --    --    --   127   --   164*   ALT   --    --    --   28   --   37   --   40   --   36   --   36   CR   --    --   1.00  0.82   --   0.90   --   0.98   < >  0.98   --   0.86   GFRESTIMATED   --    --   72  >90  Non  GFR Calc     --   81   --   74   < >  75   --   87   GFRESTBLACK   --    --   88  >90   GFR Calc     --   >90   GFR Calc     --   90   < >  >90   GFR Calc     --   >90   POTASSIUM   --    --   4.5  4.1   < >  4.2   --   4.5   < >  4.6   --   4.7   TSH   --    --    --    --    --   2.72   --    --    --    --    --   2.51    < > = values in this interval not displayed.            ROS:  RESP: NEGATIVE for shortness of breath  CV: NEGATIVE for chest pain    OBJECTIVE:  /80  Pulse 58  Temp 98  F (36.7  C) (Oral)  Resp 20  Ht 6' 2\" (1.88 m)  Wt 236 lb (107 kg)  SpO2 97%  BMI 30.3 kg/m2  GEN:  no apparent distress  LUNGS:  normal respiratory effort, and lungs clear to auscultation bilaterally - no rales, rhonchi or wheezes  CV: regular rate and rhythm, normal S1 S2, no S3 or S4 and no murmur, click or rub     ASSESSMENT/PLAN:  1. Type 2 diabetes mellitus with " diabetic polyneuropathy, without long-term current use of insulin (H)  stable/well controlled - continue metformin  - TSH with free T4 reflex  - Comprehensive metabolic panel  - Hemoglobin A1c  - Lipid panel reflex to direct LDL  - metFORMIN (GLUCOPHAGE) 500 MG tablet; Take 1 tablet (500 mg) by mouth 2 times daily (with meals)  Dispense: 180 tablet; Refill: 1  - blood glucose monitoring (NO BRAND SPECIFIED) test strip; Use to test blood sugars 1 time daily or as directed.  Please dispense strips compatible with meter  Dispense: 100 each; Refill: 3    2. Benign essential hypertension  stable/well controlled   - Comprehensive metabolic panel    3. Pure hypercholesterolemia  Continue high-intensity statin due to history of T2DM and CAD  - TSH with free T4 reflex  - Comprehensive metabolic panel  - Lipid panel reflex to direct LDL    4. B12 deficiency  - Vitamin B12  - Cyanocobalamin 1000 MCG CAPS; Take 1 capsule by mouth daily  Dispense: 100 capsule; Refill: 3    5. History of hip fracture  With ongoing gait instability for multiple reasons.  I think we should check DEXA and consider bisphosphonate.  - DX Hip/Pelvis/Spine; Future      6. Loose stools  7. Tubular adenoma of colon  History of advanced adenoma 2 years ago and now with change in bowel habits.  Referred to repeat colonoscopy.  - GASTROENTEROLOGY ADULT REF PROCEDURE ONLY      8. Need for prophylactic vaccination and inoculation against influenza  - Vaccine Administration, Initial [88704]  - FLU VACCINE, INCREASED ANTIGEN, PRESV FREE, AGE 65+ [76911]       Return to Clinic in 6 months        Beatriz Betancur MD   [facility]

## 2017-09-25 ENCOUNTER — TELEPHONE (OUTPATIENT)
Dept: FAMILY MEDICINE | Facility: CLINIC | Age: 77
End: 2017-09-25

## 2017-09-25 ENCOUNTER — OFFICE VISIT (OUTPATIENT)
Dept: FAMILY MEDICINE | Facility: CLINIC | Age: 77
End: 2017-09-25
Payer: COMMERCIAL

## 2017-09-25 VITALS
RESPIRATION RATE: 20 BRPM | TEMPERATURE: 98 F | OXYGEN SATURATION: 97 % | HEART RATE: 58 BPM | HEIGHT: 74 IN | DIASTOLIC BLOOD PRESSURE: 80 MMHG | SYSTOLIC BLOOD PRESSURE: 118 MMHG | BODY MASS INDEX: 30.29 KG/M2 | WEIGHT: 236 LBS

## 2017-09-25 DIAGNOSIS — D12.6 TUBULAR ADENOMA OF COLON: ICD-10-CM

## 2017-09-25 DIAGNOSIS — E11.42 TYPE 2 DIABETES MELLITUS WITH DIABETIC POLYNEUROPATHY, WITHOUT LONG-TERM CURRENT USE OF INSULIN (H): Primary | ICD-10-CM

## 2017-09-25 DIAGNOSIS — Z23 NEED FOR PROPHYLACTIC VACCINATION AND INOCULATION AGAINST INFLUENZA: ICD-10-CM

## 2017-09-25 DIAGNOSIS — R19.5 LOOSE STOOLS: ICD-10-CM

## 2017-09-25 DIAGNOSIS — E78.00 PURE HYPERCHOLESTEROLEMIA: ICD-10-CM

## 2017-09-25 DIAGNOSIS — I10 BENIGN ESSENTIAL HYPERTENSION: ICD-10-CM

## 2017-09-25 DIAGNOSIS — E53.8 B12 DEFICIENCY: ICD-10-CM

## 2017-09-25 DIAGNOSIS — Z87.81 HISTORY OF HIP FRACTURE: ICD-10-CM

## 2017-09-25 DIAGNOSIS — E78.5 HYPERLIPIDEMIA WITH TARGET LDL LESS THAN 70: ICD-10-CM

## 2017-09-25 LAB
ALBUMIN SERPL-MCNC: 4.1 G/DL (ref 3.4–5)
ALP SERPL-CCNC: 70 U/L (ref 40–150)
ALT SERPL W P-5'-P-CCNC: 38 U/L (ref 0–70)
ANION GAP SERPL CALCULATED.3IONS-SCNC: 9 MMOL/L (ref 3–14)
AST SERPL W P-5'-P-CCNC: 23 U/L (ref 0–45)
BILIRUB SERPL-MCNC: 1 MG/DL (ref 0.2–1.3)
BUN SERPL-MCNC: 33 MG/DL (ref 7–30)
CALCIUM SERPL-MCNC: 9.4 MG/DL (ref 8.5–10.1)
CHLORIDE SERPL-SCNC: 104 MMOL/L (ref 94–109)
CHOLEST SERPL-MCNC: 145 MG/DL
CO2 SERPL-SCNC: 23 MMOL/L (ref 20–32)
CREAT SERPL-MCNC: 1.07 MG/DL (ref 0.66–1.25)
GFR SERPL CREATININE-BSD FRML MDRD: 67 ML/MIN/1.7M2
GLUCOSE SERPL-MCNC: 142 MG/DL (ref 70–99)
HBA1C MFR BLD: 6.2 % (ref 4.3–6)
HDLC SERPL-MCNC: 56 MG/DL
LDLC SERPL CALC-MCNC: 66 MG/DL
NONHDLC SERPL-MCNC: 89 MG/DL
POTASSIUM SERPL-SCNC: 4.4 MMOL/L (ref 3.4–5.3)
PROT SERPL-MCNC: 7.3 G/DL (ref 6.8–8.8)
SODIUM SERPL-SCNC: 136 MMOL/L (ref 133–144)
TRIGL SERPL-MCNC: 114 MG/DL
TSH SERPL DL<=0.005 MIU/L-ACNC: 2.26 MU/L (ref 0.4–4)
VIT B12 SERPL-MCNC: 859 PG/ML (ref 193–986)

## 2017-09-25 PROCEDURE — 99214 OFFICE O/P EST MOD 30 MIN: CPT | Mod: 25 | Performed by: FAMILY MEDICINE

## 2017-09-25 PROCEDURE — 83036 HEMOGLOBIN GLYCOSYLATED A1C: CPT | Performed by: FAMILY MEDICINE

## 2017-09-25 PROCEDURE — 90662 IIV NO PRSV INCREASED AG IM: CPT | Performed by: FAMILY MEDICINE

## 2017-09-25 PROCEDURE — 84443 ASSAY THYROID STIM HORMONE: CPT | Performed by: FAMILY MEDICINE

## 2017-09-25 PROCEDURE — 36415 COLL VENOUS BLD VENIPUNCTURE: CPT | Performed by: FAMILY MEDICINE

## 2017-09-25 PROCEDURE — 80061 LIPID PANEL: CPT | Performed by: FAMILY MEDICINE

## 2017-09-25 PROCEDURE — 82607 VITAMIN B-12: CPT | Performed by: FAMILY MEDICINE

## 2017-09-25 PROCEDURE — 99207 C PAF COMPLETED  NO CHARGE: CPT | Performed by: FAMILY MEDICINE

## 2017-09-25 PROCEDURE — G0008 ADMIN INFLUENZA VIRUS VAC: HCPCS | Performed by: FAMILY MEDICINE

## 2017-09-25 PROCEDURE — 80053 COMPREHEN METABOLIC PANEL: CPT | Performed by: FAMILY MEDICINE

## 2017-09-25 NOTE — TELEPHONE ENCOUNTER
Writer faxed referral to [facility] 9/25/2017 3:11 PM    Return call to patient and discussed referral    Closing encounter - no further actions needed at this time    Uriah Rodriguez RN

## 2017-09-25 NOTE — NURSING NOTE
"Chief Complaint   Patient presents with     Diabetes       Initial /80  Pulse 58  Temp 98  F (36.7  C) (Oral)  Resp 20  Ht 6' 2\" (1.88 m)  Wt 236 lb (107 kg)  SpO2 97%  BMI 30.3 kg/m2 Estimated body mass index is 30.3 kg/(m^2) as calculated from the following:    Height as of this encounter: 6' 2\" (1.88 m).    Weight as of this encounter: 236 lb (107 kg).  Medication Reconciliation: complete   Andie Galaviz MA      "

## 2017-09-25 NOTE — MR AVS SNAPSHOT
"              After Visit Summary   9/25/2017    David Medina    MRN: [id number]           Patient Information     Date Of Birth          1940        Visit Information        Provider Department      9/25/2017 8:40 AM Beatriz Betancur MD [facility]        Today's Diagnoses     Type 2 diabetes mellitus with diabetic polyneuropathy, without long-term current use of insulin (H)    -  1    Benign essential hypertension        Pure hypercholesterolemia        B12 deficiency        History of hip fracture        Need for prophylactic vaccination and inoculation against influenza           Follow-ups after your visit        Follow-up notes from your care team     Return in about 6 months (around 3/25/2018).      Future tests that were ordered for you today     Open Future Orders        Priority Expected Expires Ordered    DX Hip/Pelvis/Spine Routine  9/25/2018 9/25/2017            Who to contact     If you have questions or need follow up information about today's clinic visit or your schedule please contact [facility] directly at [phone].  Normal or non-critical lab and imaging results will be communicated to you by Qraniohart, letter or phone within 4 business days after the clinic has received the results. If you do not hear from us within 7 days, please contact the clinic through Greystripet or phone. If you have a critical or abnormal lab result, we will notify you by phone as soon as possible.  Submit refill requests through Backchat or call your pharmacy and they will forward the refill request to us. Please allow 3 business days for your refill to be completed.          Additional Information About Your Visit        MyChart Information     Backchat lets you send messages to your doctor, view your test results, renew your prescriptions, schedule appointments and more. To sign up, go to [email or web address] . Click on \"Log in\" on the left side of the screen, which will take " "you to the Welcome page. Then click on \"Sign up Now\" on the right side of the page.     You will be asked to enter the access code listed below, as well as some personal information. Please follow the directions to create your username and password.     Your access code is: [email or web address]  Expires: 2017  9:20 AM     Your access code will  in 90 days. If you need help or a new code, please call your [place] clinic or [phone].        Care EveryWhere ID     This is your Care EveryWhere ID. This could be used by other organizations to access your [place] medical records  [id number]        Your Vitals Were     Pulse Temperature Respirations Height Pulse Oximetry BMI (Body Mass Index)    58 98  F (36.7  C) (Oral) 20 6' 2\" (1.88 m) 97% 30.3 kg/m2       Blood Pressure from Last 3 Encounters:   17 118/80   17 134/66   17 118/64    Weight from Last 3 Encounters:   17 236 lb (107 kg)   17 238 lb 4.8 oz (108.1 kg)   17 232 lb 4 oz (105.3 kg)              We Performed the Following     Comprehensive metabolic panel     Hemoglobin A1c     Lipid panel reflex to direct LDL     PAF COMPLETED     TSH with free T4 reflex     Vitamin B12          Today's Medication Changes          These changes are accurate as of: 17  9:20 AM.  If you have any questions, ask your nurse or doctor.               These medicines have changed or have updated prescriptions.        Dose/Directions    blood glucose monitoring test strip   Commonly known as:  no brand specified   This may have changed:  additional instructions   Used for:  Type 2 diabetes mellitus with diabetic polyneuropathy, without long-term current use of insulin (H)   Changed by:  Beatriz Betancur MD        Use to test blood sugars 1 time daily or as directed.  Please dispense strips compatible with meter   Quantity:  100 each   Refills:  3            Where to get your medicines      These medications were sent to [id number] - [place], [place] - [address]  [address], [facility] [address]     Phone:  [phone]     blood glucose monitoring test strip    Cyanocobalamin 1000 MCG Caps    metFORMIN 500 MG tablet                Primary Care Provider Office Phone # Fax #    Beatriz Betancur [phone]       [address]  [facility] [address]        Equal Access to Services     AYUSH OLSON : Hadii aad ku hadasho Soomaali, waaxda luqadaha, qaybta kaalmada adeegyada, waxay idiin hayaan adeeg janemelinanorberto lashira . So [facility] [phone].    ATENCIÓN: Si darci boyer, tiene a delgadillo disposición servicios gratuitos de asistencia lingüística. [facility] [phone].    We comply with applicable federal civil rights laws and [place] laws. We do not discriminate on the basis of race, color, national origin, age, disability sex, sexual orientation or gender identity.            Thank you!     Thank you for choosing [facility]  for your care. Our goal is always to provide you with excellent care. Hearing back from our patients is one way we can continue to improve our services. Please take a few minutes to complete the written survey that you may receive in the mail after your visit with us. Thank you!             Your Updated Medication List - Protect others around you: Learn how to safely use, store and throw away your medicines at www.disposemymeds.org.          This list is accurate as of: 9/25/17  9:20 AM.  Always use your most recent med list.                   Brand Name Dispense Instructions for use Diagnosis    aspirin EC 81 MG EC tablet     90 tablet    Take 1 tablet (81 mg) by mouth daily    CAD (coronary artery disease)       atorvastatin 80 MG tablet    LIPITOR    90 tablet    Take 1 tablet (80 mg) by mouth daily    Hyperlipidemia with target LDL less than 70       blood glucose monitoring meter device kit     1 kit    Use to test blood sugars FOUR times daily or as directed.   This order  is for whatever meter patient's insurance will cover.    Type 2 diabetes mellitus with diabetic polyneuropathy (H)       blood glucose monitoring test strip    no brand specified    100 each    Use to test blood sugars 1 time daily or as directed.  Please dispense strips compatible with meter    Type 2 diabetes mellitus with diabetic polyneuropathy, without long-term current use of insulin (H)       Cyanocobalamin 1000 MCG Caps     100 capsule    Take 1 capsule by mouth daily    B12 deficiency       isosorbide mononitrate 30 MG 24 hr tablet    IMDUR    90 tablet    Take 1 tablet (30 mg) by mouth every morning    ASCVD (arteriosclerotic cardiovascular disease)       latanoprost 0.005 % ophthalmic solution    XALATAN     Place 1 drop into both eyes every evening        lisinopril 20 MG tablet    PRINIVIL/ZESTRIL    90 tablet    Take 1 tablet (20 mg) by mouth daily    Hypertension goal BP (blood pressure) < 140/90       metFORMIN 500 MG tablet    GLUCOPHAGE    180 tablet    Take 1 tablet (500 mg) by mouth 2 times daily (with meals)    Type 2 diabetes mellitus with diabetic polyneuropathy, without long-term current use of insulin (H)       metoprolol 50 MG tablet    LOPRESSOR    90 tablet    Take 0.5 tablets (25 mg) by mouth 2 times daily    ASCVD (arteriosclerotic cardiovascular disease), Ischemic cardiomyopathy, Hypertension goal BP (blood pressure) < 140/90       multivitamin, therapeutic with minerals Tabs tablet      Take 1 tablet by mouth daily        nitroGLYcerin 0.4 MG sublingual tablet    NITROSTAT    25 tablet    Place 1 tablet (0.4 mg) under the tongue every 5 minutes as needed for chest pain Up to 3 doses max.    ASCVD (arteriosclerotic cardiovascular disease), Ischemic cardiomyopathy       ranitidine 150 MG tablet    ZANTAC    180 tablet    TAKE ONE TABLET BY MOUTH TWO TIMES DAILY    Gastroesophageal reflux disease without esophagitis

## 2017-09-25 NOTE — TELEPHONE ENCOUNTER
Reason for Call:  Other referral    Detailed comments: Patient states he called to schedule an appointment with Gastroenterology however they state they have not received the referral. Please send so patient can move forward with scheduling. Informed patient they typically call from the facility for scheduling once they process the order. Thanks!    Phone Number Patient can be reached at: Home number on file [phone] (home)    Best Time: Any    Can we leave a detailed message on this number? YES    Call taken on 9/25/2017 at 11:30 AM by Dasha Rayo

## 2017-09-25 NOTE — LETTER
September 27, 2017      David Medina  [address] [facility] [address]        Dear ,    We are writing to inform you of your test results.    It was good to see you.  Your results are all excellent!    Resulted Orders   TSH with free T4 reflex   Result Value Ref Range    TSH 2.26 0.40 - 4.00 mU/L   Comprehensive metabolic panel   Result Value Ref Range    Sodium 136 133 - 144 mmol/L    Potassium 4.4 3.4 - 5.3 mmol/L    Chloride 104 94 - 109 mmol/L    Carbon Dioxide 23 20 - 32 mmol/L    Anion Gap 9 3 - 14 mmol/L    Glucose 142 (H) 70 - 99 mg/dL    Urea Nitrogen 33 (H) 7 - 30 mg/dL    Creatinine 1.07 0.66 - 1.25 mg/dL    GFR Estimate 67 >60 mL/min/1.7m2      Comment:      Non  GFR Calc    GFR Estimate If Black 81 >60 mL/min/1.7m2      Comment:       GFR Calc    Calcium 9.4 8.5 - 10.1 mg/dL    Bilirubin Total 1.0 0.2 - 1.3 mg/dL    Albumin 4.1 3.4 - 5.0 g/dL    Protein Total 7.3 6.8 - 8.8 g/dL    Alkaline Phosphatase 70 40 - 150 U/L    ALT 38 0 - 70 U/L    AST 23 0 - 45 U/L   Hemoglobin A1c   Result Value Ref Range    Hemoglobin A1C 6.2 (H) 4.3 - 6.0 %   Lipid panel reflex to direct LDL   Result Value Ref Range    Cholesterol 145 <200 mg/dL    Triglycerides 114 <150 mg/dL    HDL Cholesterol 56 >39 mg/dL    LDL Cholesterol Calculated 66 <100 mg/dL      Comment:      Desirable:       <100 mg/dl    Non HDL Cholesterol 89 <130 mg/dL   Vitamin B12   Result Value Ref Range    Vitamin B12 859 193 - 986 pg/mL       If you have any questions or concerns, please call the clinic at the number listed above.       Sincerely,        Beatriz Betancur MD/nr

## 2017-09-25 NOTE — PROGRESS NOTES
Injectable Influenza Immunization Documentation    1.  Is the person to be vaccinated sick today?   No    2. Does the person to be vaccinated have an allergy to a component   of the vaccine?   No    3. Has the person to be vaccinated ever had a serious reaction   to influenza vaccine in the past?   No    4. Has the person to be vaccinated ever had Guillain-Barré syndrome?   No    Form completed by Andie Galaviz MA    Prior to injection verified patient identity using patient's name and date of birth.

## 2017-09-26 ENCOUNTER — RADIANT APPOINTMENT (OUTPATIENT)
Dept: BONE DENSITY | Facility: CLINIC | Age: 77
End: 2017-09-26
Attending: FAMILY MEDICINE
Payer: COMMERCIAL

## 2017-09-26 DIAGNOSIS — Z87.81 HISTORY OF HIP FRACTURE: ICD-10-CM

## 2017-09-26 PROCEDURE — 77085 DXA BONE DENSITY AXL VRT FX: CPT | Performed by: INTERNAL MEDICINE

## 2017-09-26 PROCEDURE — 77081 DXA BONE DENSITY APPENDICULR: CPT | Mod: 59 | Performed by: INTERNAL MEDICINE

## 2017-09-26 RX ORDER — ATORVASTATIN CALCIUM 80 MG/1
80 TABLET, FILM COATED ORAL DAILY
Qty: 90 TABLET | Refills: 3 | Status: SHIPPED | OUTPATIENT
Start: 2017-09-26 | End: 2018-11-04

## 2017-09-26 NOTE — LETTER
2017      David Medina  [address] [facility] [address]        Dear ,    We are writing to inform you of your test results.    The results of your recent bone density scan were normal.      Try to get 3 servings of dairy products in your diet per day and take 1000 international units of Vitamin D per day.  (Each serving of dairy in your diet is about 300 mg calcium.  If you do not routinely get at least 2-3 servings of dairy daily you may need to take calcium supplements.)    Excercise daily to keep your bones strong. Thirty minutes of moderate walking or other weight-bearing activity is recommended at least 5 times per week.    Avoid smoking as this can increase your risk for decreased bone density (osteopenia and osteoporosis).  If you'd like help with smoking cessation please let me know.      If you are currently taking medication to prevent bone loss, please make an appointment to see me to discuss the need for continued medication therapy.     If you have any questions or concerns, please call the clinic.     Resulted Orders   DX Hip/Pelvis/Spine w Lat Fraction Dora    Narrative    BONE DENSITOMETRY  [place] [address] [place] [address]  2017      PATIENT: David Medina  CHART: [id number]   :  1940  AGE:  77 year old  SEX:  male   REFERRING PROVIDER:  Beatriz Betancur MD        PROCEDURE:  Bone density scanning was performed using DXA technology of   the lumbar spine and hip.  Scanning was performed on a Lunar Prodigy   scanner.  Reporting is completed in the form of a T-score.  The T-score   represents the standard deviation from peak bone mass based on a young   healthy adult.     REFERENCE T-SCORES:       Normal                -1.0 and greater                                 Osteopenia         Between -1.0 and -2.5                                             Osteoporosis     -2.5 and less                                 "       RISK FACTORS:  Height loss of 1.75 inches, Fracture of Knee age 74 and hip   age 76  CURRENT TREATMENT:  none listed     FINDINGS:               Lumbar Spine (L1-L4)      T-score:  2.4               Left Femoral Neck           T-score:  -0.6               Right Femoral Neck         T-score:  NA metal               Forearm (radius 33%)      T-score:  -0.1                            Lumbar (L1-L4) BMD: 1.538                                                             Total Hip Mean BMD: 1.059                LATERAL VERTEBRAL ASSESSMENT  Procedure:  Vertebral fracture assessment was performed in the lateral   decubitus position using a Lunar Prodigy  densitometer.  Indications for VFA: T-score of -1.0 or worse and height loss > 1.5\"  Confounding factors for VFA: None.  The LVA scan is interpretable from T7   to L4.    VFA Findings: Using the semi-quantitative analysis of David there was   evidence of no spinal deformity  VFA Impression: David Medina has no vertebral fractures identified on   the VFA.        IMPRESSION  Normal bone mineral density study  Recommendations include ensuring adequate daily Calcium and Vitamin D   intake      Nathan Falcon MD      If you have any questions or concerns, please call the clinic at the number listed above.     Sincerely,    Beatriz Betancur MD/nr          "

## 2017-09-26 NOTE — PROGRESS NOTES
Please send results letter:  It was good to see you.  Your results are all excellent!  Beatriz Betancur MD

## 2017-10-09 NOTE — PROGRESS NOTES
Please send results letter:  The results of your recent bone density scan were normal.      Try to get 3 servings of dairy products in your diet per day and take 1000 international units of Vitamin D per day.  (Each serving of dairy in your diet is about 300 mg calcium.  If you do not routinely get at least 2-3 servings of dairy daily you may need to take calcium supplements.)    Excercise daily to keep your bones strong. Thirty minutes of moderate walking or other weight-bearing activity is recommended at least 5 times per week.    Avoid smoking as this can increase your risk for decreased bone density (osteopenia and osteoporosis).  If you'd like help with smoking cessation please let me know.      If you are currently taking medication to prevent bone loss, please make an appointment to see me to discuss the need for continued medication therapy.     If you have any questions or concerns, please call the clinic.     Beatriz Betancur MD

## 2017-10-16 ENCOUNTER — OFFICE VISIT (OUTPATIENT)
Dept: FAMILY MEDICINE | Facility: CLINIC | Age: 77
End: 2017-10-16
Payer: COMMERCIAL

## 2017-10-16 VITALS
HEIGHT: 74 IN | WEIGHT: 245.25 LBS | SYSTOLIC BLOOD PRESSURE: 121 MMHG | BODY MASS INDEX: 31.47 KG/M2 | TEMPERATURE: 98.1 F | HEART RATE: 54 BPM | DIASTOLIC BLOOD PRESSURE: 73 MMHG | RESPIRATION RATE: 16 BRPM

## 2017-10-16 DIAGNOSIS — I25.5 ISCHEMIC CARDIOMYOPATHY: ICD-10-CM

## 2017-10-16 DIAGNOSIS — H25.9 AGE-RELATED CATARACT OF BOTH EYES, UNSPECIFIED AGE-RELATED CATARACT TYPE: ICD-10-CM

## 2017-10-16 DIAGNOSIS — I71.40 AAA (ABDOMINAL AORTIC ANEURYSM) WITHOUT RUPTURE (H): ICD-10-CM

## 2017-10-16 DIAGNOSIS — Z01.818 PREOP GENERAL PHYSICAL EXAM: Primary | ICD-10-CM

## 2017-10-16 DIAGNOSIS — M48.04 SPINAL STENOSIS OF THORACIC REGION: ICD-10-CM

## 2017-10-16 DIAGNOSIS — I25.10 CORONARY ARTERY DISEASE INVOLVING NATIVE CORONARY ARTERY OF NATIVE HEART WITHOUT ANGINA PECTORIS: ICD-10-CM

## 2017-10-16 DIAGNOSIS — I10 BENIGN ESSENTIAL HYPERTENSION: ICD-10-CM

## 2017-10-16 PROCEDURE — 93000 ELECTROCARDIOGRAM COMPLETE: CPT | Performed by: FAMILY MEDICINE

## 2017-10-16 PROCEDURE — 99215 OFFICE O/P EST HI 40 MIN: CPT | Performed by: FAMILY MEDICINE

## 2017-10-16 NOTE — NURSING NOTE
"Chief Complaint   Patient presents with     Pre-Op Exam       Initial /73 (BP Location: Right arm, Patient Position: Chair, Cuff Size: Adult Large)  Pulse 54  Temp 98.1  F (36.7  C) (Oral)  Resp 16  Ht 6' 2\" (1.88 m)  Wt 245 lb 4 oz (111.2 kg)  BMI 31.49 kg/m2 Estimated body mass index is 31.49 kg/(m^2) as calculated from the following:    Height as of this encounter: 6' 2\" (1.88 m).    Weight as of this encounter: 245 lb 4 oz (111.2 kg).  Medication Reconciliation: complete   Vicki Sahu MA      "

## 2017-10-16 NOTE — PATIENT INSTRUCTIONS
Clarify with your eye doctor whether you should hold the aspirin at all prior to cataract surgery.  Most eye surgeons do not require a hold of aspirin for cataract surgery.    On the morning of surgery take all prescription medications except metformin with a small sip of water.  Hold the morning metformin.  Also hold your vitamins on the day of surgery.        Before Your Surgery      Call your surgeon if there is any change in your health. This includes signs of a cold or flu (such as a sore throat, runny nose, cough, rash or fever).    Do not smoke, drink alcohol or take over the counter medicine (unless your surgeon or primary care doctor tells you to) for the 24 hours before and after surgery.    If you take prescribed drugs: Follow your doctor s orders about which medicines to take and which to stop until after surgery.    Eating and drinking prior to surgery: follow the instructions from your surgeon    Take a shower or bath the night before surgery. Use the soap your surgeon gave you to gently clean your skin. If you do not have soap from your surgeon, use your regular soap. Do not shave or scrub the surgery site.  Wear clean pajamas and have clean sheets on your bed.

## 2017-10-16 NOTE — MR AVS SNAPSHOT
After Visit Summary   10/16/2017    David Medina    MRN: [id number]           Patient Information     Date Of Birth          1940        Visit Information        Provider Department      10/16/2017 11:20 AM Beatriz Betancur MD [facility]        Today's Diagnoses     Preop general physical exam    -  1    Age-related cataract of both eyes, unspecified age-related cataract type        Coronary artery disease involving native coronary artery of native heart without angina pectoris        AAA (abdominal aortic aneurysm) without rupture (H)        Spinal stenosis of thoracic region        Ischemic cardiomyopathy        Benign essential hypertension          Care Instructions    Clarify with your eye doctor whether you should hold the aspirin at all prior to cataract surgery.  Most eye surgeons do not require a hold of aspirin for cataract surgery.    On the morning of surgery take all prescription medications except metformin with a small sip of water.  Hold the morning metformin.  Also hold your vitamins on the day of surgery.        Before Your Surgery      Call your surgeon if there is any change in your health. This includes signs of a cold or flu (such as a sore throat, runny nose, cough, rash or fever).    Do not smoke, drink alcohol or take over the counter medicine (unless your surgeon or primary care doctor tells you to) for the 24 hours before and after surgery.    If you take prescribed drugs: Follow your doctor s orders about which medicines to take and which to stop until after surgery.    Eating and drinking prior to surgery: follow the instructions from your surgeon    Take a shower or bath the night before surgery. Use the soap your surgeon gave you to gently clean your skin. If you do not have soap from your surgeon, use your regular soap. Do not shave or scrub the surgery site.  Wear clean pajamas and have clean sheets on your bed.           Follow-ups after your  "visit        Your next 10 appointments already scheduled     Mar 26, 2018  8:40 AM CDT   Office Visit with Beatriz Betancur MD   [facility] ([facility])    [address] [place] [id number] [phone]           Bring a current list of meds and any records pertaining to this visit. For Physicals, please bring immunization records and any forms needing to be filled out. Please arrive 10 minutes early to complete paperwork.              Who to contact     If you have questions or need follow up information about today's clinic visit or your schedule please contact [facility] directly at [phone].  Normal or non-critical lab and imaging results will be communicated to you by MyChart, letter or phone within 4 business days after the clinic has received the results. If you do not hear from us within 7 days, please contact the clinic through [employer] or phone. If you have a critical or abnormal lab result, we will notify you by phone as soon as possible.  Submit refill requests through [employer] or call your pharmacy and they will forward the refill request to us. Please allow 3 business days for your refill to be completed.          Additional Information About Your Visit        [employer] Information     [employer] lets you send messages to your doctor, view your test results, renew your prescriptions, schedule appointments and more. To sign up, go to [email or web address] . Click on \"Log in\" on the left side of the screen, which will take you to the Welcome page. Then click on \"Sign up Now\" on the right side of the page.     You will be asked to enter the access code listed below, as well as some personal information. Please follow the directions to create your username and password.     Your access code is: [email or web address]  Expires: 2017  9:20 AM     Your access code will  in 90 days. If you need help or a new code, please call your [facility] " "or [phone].        Care EveryWhere ID     This is your Care EveryWhere ID. This could be used by other organizations to access your [facility] medical records  [id number]        Your Vitals Were     Pulse Temperature Respirations Height BMI (Body Mass Index)       54 98.1  F (36.7  C) (Oral) 16 6' 2\" (1.88 m) 31.49 kg/m2        Blood Pressure from Last 3 Encounters:   10/16/17 121/73   09/25/17 118/80   04/04/17 134/66    Weight from Last 3 Encounters:   10/16/17 245 lb 4 oz (111.2 kg)   09/25/17 236 lb (107 kg)   04/04/17 238 lb 4.8 oz (108.1 kg)              Today, you had the following     No orders found for display       Primary Care Provider Office Phone # Fax #    Beatriz Betancur [phone]       [address]  [facility] [address]        Equal Access to Services     [facility] : Hadii aad ku hadasho Soomaali, waaxda luqadaha, qaybta kaalmada adeegyada, waxay baoin hayharsh bower . So [facility] [phone].    ATENCIÓN: Si habla español, tiene a delgadillo disposición servicios gratuitos de asistencia lingüística. [facility] [phone].    We comply with applicable federal civil rights laws and [place] laws. We do not discriminate on the basis of race, color, national origin, age, disability, sex, sexual orientation, or gender identity.            Thank you!     Thank you for choosing [facility]  for your care. Our goal is always to provide you with excellent care. Hearing back from our patients is one way we can continue to improve our services. Please take a few minutes to complete the written survey that you may receive in the mail after your visit with us. Thank you!             Your Updated Medication List - Protect others around you: Learn how to safely use, store and throw away your medicines at www.disposemymeds.org.          This list is accurate as of: 10/16/17 12:01 PM.  Always use your most recent med list.                   Brand Name Dispense " Instructions for use Diagnosis    aspirin EC 81 MG EC tablet     90 tablet    Take 1 tablet (81 mg) by mouth daily    CAD (coronary artery disease)       atorvastatin 80 MG tablet    LIPITOR    90 tablet    Take 1 tablet (80 mg) by mouth daily    Hyperlipidemia with target LDL less than 70       blood glucose monitoring meter device kit     1 kit    Use to test blood sugars FOUR times daily or as directed.   This order is for whatever meter patient's insurance will cover.    Type 2 diabetes mellitus with diabetic polyneuropathy (H)       blood glucose monitoring test strip    no brand specified    100 each    Use to test blood sugars 1 time daily or as directed.  Please dispense strips compatible with meter    Type 2 diabetes mellitus with diabetic polyneuropathy, without long-term current use of insulin (H)       Cyanocobalamin 1000 MCG Caps     100 capsule    Take 1 capsule by mouth daily    B12 deficiency       isosorbide mononitrate 30 MG 24 hr tablet    IMDUR    90 tablet    Take 1 tablet (30 mg) by mouth every morning    ASCVD (arteriosclerotic cardiovascular disease)       latanoprost 0.005 % ophthalmic solution    XALATAN     Place 1 drop into both eyes every evening        lisinopril 20 MG tablet    PRINIVIL/ZESTRIL    90 tablet    Take 1 tablet (20 mg) by mouth daily    Hypertension goal BP (blood pressure) < 140/90       metFORMIN 500 MG tablet    GLUCOPHAGE    180 tablet    Take 1 tablet (500 mg) by mouth 2 times daily (with meals)    Type 2 diabetes mellitus with diabetic polyneuropathy, without long-term current use of insulin (H)       metoprolol 50 MG tablet    LOPRESSOR    90 tablet    Take 0.5 tablets (25 mg) by mouth 2 times daily    ASCVD (arteriosclerotic cardiovascular disease), Ischemic cardiomyopathy, Hypertension goal BP (blood pressure) < 140/90       multivitamin, therapeutic with minerals Tabs tablet      Take 1 tablet by mouth daily        nitroGLYcerin 0.4 MG sublingual tablet     NITROSTAT    25 tablet    Place 1 tablet (0.4 mg) under the tongue every 5 minutes as needed for chest pain Up to 3 doses max.    ASCVD (arteriosclerotic cardiovascular disease), Ischemic cardiomyopathy       ranitidine 150 MG tablet    ZANTAC    180 tablet    TAKE ONE TABLET BY MOUTH TWO TIMES DAILY    Gastroesophageal reflux disease without esophagitis

## 2017-10-16 NOTE — PROGRESS NOTES
[facility]  [address] [place] [id number] [phone]  Dept: [phone]    PRE-OP EVALUATION:  Today's date: 10/16/2017    David Medina (: 1940) presents for pre-operative evaluation assessment as requested by Dr. Bustos.  He requires evaluation and anesthesia risk assessment prior to undergoing surgery/procedure for treatment of cataract Left eye/Right eye.  Proposed procedure: Cataract Extraction.    Date of Surgery/ Procedure: 10/23/17 - left (right eye a week later)   Time of Surgery/ Procedure:8:45am  [facility]/Surgical Facility: [facility] ([facility])  Fax number for surgical facility: [phone]  Primary Physician: Beatriz Betancur  Type of Anesthesia Anticipated: IV,sedations    Patient has a Health Care Directive or Living Will:  NO    1. YES - DO YOU HAVE A HISTORY OF HEART ATTACK, STROKE, STENT, BYPASS OR SURGERY ON AN ARTERY IN THE HEAD, NECK, HEART OR LEG? Coronary stents   2. NO - Do you ever have any pain or discomfort in your chest?  3. NO - Do you have a history of  Heart Failure?  4. NO - Are you troubled by shortness of breath when: walking on the level, up a slight hill or at night?  5. NO - Do you currently have a cold, bronchitis or other respiratory infection?  6. NO - Do you have a cough, shortness of breath or wheezing?  7. NO - Do you sometimes get pains in the calves of your legs when you walk?  8. NO - Do you or anyone in your family have previous history of blood clots?  9. NO - Do you or does anyone in your family have a serious bleeding problem such as prolonged bleeding following surgeries or cuts?  10. NO - Have you ever had problems with anemia or been told to take iron pills?  11. NO - Have you had any abnormal blood loss such as black, tarry or bloody stools, or abnormal vaginal bleeding?  12. NO - Have you ever had a blood transfusion?  13. NO - Have you or any of your relatives ever had problems with  anesthesia?  14. NO - Do you have sleep apnea, excessive snoring or daytime drowsiness?  15. NO - Do you have any prosthetic heart valves?  16. NO - Do you have prosthetic joints?  17. NO - Is there any chance that you may be pregnant?        HPI:                                                      Brief HPI related to upcoming procedure: needing routine cataract surgery      See problem list for active medical problems.  Problems all longstanding and stable, except as noted/documented.  See ROS for pertinent symptoms related to these conditions.           DIABETES - Patient has a longstanding history of DiabetesType Type II . Patient is being treated with oral agents and denies significant side effects. Control has been good. Complicating factors include but are not limited to: cardiac and neuropathy.         HYPERLIPIDEMIA - Patient has a long history of significant Hyperlipidemia requiring medication for treatment with recent good control. Patient reports no problems or side effects with the medication.           CAD - Patient has a longstanding history of mod-severe CAD. Patient denies recent chest pain or NTG use, denies exercise induced dyspnea or PND. Last Stress test 9/2014.  Last cath 10/2014.  Last cardiology visit (Dr. Gutierrez) 6/2017.    SPINAL STENOSIS - Longstanding history of spinal stenosis s/p with lower extremity weakness (especially of the proximal muscles) and associated gait instability.       MEDICAL HISTORY:                                                    Patient Active Problem List    Diagnosis Date Noted     Coronary artery disease involving native coronary artery of native heart without angina pectoris      Priority: High     Cardiac cath Oct 2014: EMEKA to RCA.  Plavix x 1 year  cath 2011: medical management  cath Aug 2005: EMEKA to OM  cath June 2005: EMEKA to LAD         Type 2 diabetes mellitus with diabetic polyneuropathy (H) 05/02/2010     Priority: High     Tubular adenoma of colon       Priority: Medium     advanced adenoma (15 mm) 9/23/15 - rpt 3 yrs       AAA (abdominal aortic aneurysm) without rupture (H)      Priority: Medium     stent 2005, s/p endovascular repair 12/07       Diabetic neuropathy (H) 06/20/2014     Priority: Medium     Health Care Home 11/02/2012     Priority: Medium     Pily Rivers RN-BSN, [facility]  [phone].  FPA / [facility]      DX V65.8 REPLACED WITH [id number] [facility] C[facility] (04/08/2013)       Spinal stenosis of thoracic region      Priority: Medium     T10-T12, Dr. Villa (neuro) and Dr. Monterroso (neurosurg)  S/p decompressive thoracic laminectomy (T10-12) with medial facetectomy 10/31/12       B12 deficiency      Priority: Medium     Nonspecific reaction to tuberculin skin test without active tuberculosis      Priority: Medium     + PPD as a child, dad with TB       NSTEMI (non-ST elevation myocardial infarction) (H)      Priority: Medium     2/11 - diffuse stenoses not amenable to stenting, decision to pursue medical management       Lumbosacral plexopathy      Priority: Medium     diabetic radiculoplexopathy causing proximal muscle weakness, Dr. Pardo       Erectile dysfunction 09/26/2010     Priority: Medium     Cervical radiculopathy 10/30/2008     Priority: Medium     Injected through [facility] 10/07       Ischemic cardiomyopathy      Priority: Medium     ischemic cardiomyopathy, EF 45%       Allergic rhinitis      Priority: Medium     Sciatica 09/29/2005     Priority: Medium     Benign essential hypertension 06/09/2005     Priority: Medium     Problem list name updated by automated process. Provider to review       Pure hypercholesterolemia 06/09/2005     Priority: Medium      Past Medical History:   Diagnosis Date     AAA (abdominal aortic aneurysm) without rupture (H)     stent 2005, s/p endovascular repair 12/07     Allergic rhinitis      ASCVD (arteriosclerotic cardiovascular disease) 6/05    Cardiac cath Oct 2014: EMEKA to RCA, cath  2011: medical management, cath Aug 2005: EMEKA to OM; cath June 2005: EMEKA to LAD     B12 deficiency      Benign paroxysmal positional vertigo      Cervical radiculopathy 10/30/2008    Injected through [facility] 10/07     Closed fracture of patella 2/07    left     DM type 2 (diabetes mellitus, type 2) (H)      Hyperlipidemia LDL goal < 70      Hypertension goal BP (blood pressure) < 130/80      Ischemic cardiomyopathy 6/05    ischemic cardiomyopathy, EF 45%     Lumbosacral plexopathy     diabetic radiculoplexopathy causing proximal muscle weakness, Dr. 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 Villa (neuro) and Dr. 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, dad with TB     Tubular adenoma of colon     advanced adenoma (15 mm) 9/23/15 - rpt 3 yrs     Past Surgical History:   Procedure Laterality Date     AAA REPAIR  12/13/07    endovascular repair, Dr. Jacob ARCEO APPENDECTOMY  12/06    open     CLOSED RX TIBIA SHAFT FX  6/2013    RT prox tibial fracture     COLONOSCOPY  10/3/05    WNL, rpt in 10 yrs, Dr. Vicente, [facility]     COLONOSCOPY  9/23/15    advanced adenoma, rpt 3 yrs     coronary angiogram  2/28/11    diffuse disease with vasospasm     EMG  6/10/10    chronic active polyradiculopathy, mild generalized polyneuropathy, r/o lumbar stenosis     ENDOVAS REPAIR, INFRARENL ABDOM AORTIC ANEURYSM/DISSECT; [id number]/AORTO-UNIFEMORAL PROSTHESIS  6/05    one done,one to do     FLEXIBLE SIGMOIDOSCOPY  11/00    WNL     HC PTCA SINGLE VESSEL  6/05    LAD - 100% stenosis     [facility] UGI ENDOSCOPY, SIMPLE  EXAM  8/99    erosive esophagitis, GERD, Dr. Salazar     HEART CATH, ANGIOPLASTY  10/3/14    EMEKA x 1  ostium of the RCA     LAMINECTOMY THORACIC THREE LEVELS  10/31/12    T10, T11, T12 with medial facetectomy     OPEN REDUCTION INTERNAL FIXATION HIP NAILING N/A 11/1/2016    Procedure: OPEN REDUCTION INTERNAL FIXATION HIP NAILING;  Surgeon: Jason Flowers MD;  Location: [facility] OR     SONO ABD RETROPERITNL  11/7/06    AAA 4.2 x 4.8, Dr. James's office     STRESS LEXISCAN TEST  10/5/12    small, fixed, apical defect/apical thinning     SURGICAL HISTORY OF -       tonsillectomy     TONSILLECTOMY  1940     Current Outpatient Prescriptions   Medication Sig Dispense Refill     atorvastatin (LIPITOR) 80 MG tablet Take 1 tablet (80 mg) by mouth daily 90 tablet 3     metFORMIN (GLUCOPHAGE) 500 MG tablet Take 1 tablet (500 mg) by mouth 2 times daily (with meals) 180 tablet 1     Cyanocobalamin 1000 MCG CAPS Take 1 capsule by mouth daily 100 capsule 3     blood glucose monitoring (NO BRAND SPECIFIED) test strip Use to test blood sugars 1 time daily or as directed.  Please dispense strips compatible with meter 100 each 3     ranitidine (ZANTAC) 150 MG tablet TAKE ONE TABLET BY MOUTH TWO TIMES DAILY 180 tablet 2     isosorbide mononitrate (IMDUR) 30 MG 24 hr tablet Take 1 tablet (30 mg) by mouth every morning 90 tablet 3     lisinopril (PRINIVIL/ZESTRIL) 20 MG tablet Take 1 tablet (20 mg) by mouth daily 90 tablet 3     nitroglycerin (NITROSTAT) 0.4 MG sublingual tablet Place 1 tablet (0.4 mg) under the tongue every 5 minutes as needed for chest pain Up to 3 doses max. 25 tablet 1     metoprolol (LOPRESSOR) 50 MG tablet Take 0.5 tablets (25 mg) by mouth 2 times daily 90 tablet 3     multivitamin, therapeutic with minerals (THERA-VIT-M) TABS Take 1 tablet by mouth daily       latanoprost (XALATAN) 0.005 % ophthalmic solution Place 1 drop into both eyes every evening       blood glucose monitoring (ooma CONTOUR MONITOR) meter  "device kit Use to test blood sugars FOUR times daily or as directed.   This order is for whatever meter patient's insurance will cover. 1 kit 0     aspirin EC 81 MG tablet Take 1 tablet (81 mg) by mouth daily 90 tablet 3     OTC products: None, except as noted above    Allergies   Allergen Reactions     No Known Allergies       Latex Allergy: NO    Social History   Substance Use Topics     Smoking status: Former Smoker     Packs/day: 3.50     Years: 19.00     Types: Cigarettes     Quit date: 5/11/1980     Smokeless tobacco: Never Used     Alcohol use Yes      Comment: beer 1-2x/week     History   Drug Use No       REVIEW OF SYSTEMS:                                                    ROS:  CONST: NEGATIVE for fevers/chills/sweats, unexplained weight loss/gain, and fatigue  EYES: NEGATIVE for change in vision  ENT: NEGATIVE for difficulty hearing/tinnitus, and problems with teeth/gums  BREAST: NEGATIVE for breast lump/discharge  CV: NEGATIVE for chest pain/discomfort, leg pain with exercise, and palpitations  RESP: NEGATIVE for cough/wheeze, and difficulty breathing  GI: NEGATIVE for abdominal pain, blood in bowel movement, and nausea/vomiting/diarrhea  : NEGATIVE for nighttime urination, leaking urine, unusual vaginal bleeding, penile/vaginal discharge, and concerns about sexual function  MS: NEGATIVE for muscle/joint pain  SKIN: NEGATIVE for rash or mole change  NEURO: NEGATIVE for headache, dizziness/lightheadedness, numbness, memory loss, and loss of coordination  PSYCH: NEGATIVE for anxiety/stress, problems with sleep, and depression  HEME: NEGATIVE for unexplained lumps, and easy bruising/bleeding  ENDO: NEGATIVE for excessive thirst or urination  ALL: NEGATIVE for hay fever/allergies     EXAM:                                                    /73 (BP Location: Right arm, Patient Position: Chair, Cuff Size: Adult Large)  Pulse 54  Temp 98.1  F (36.7  C) (Oral)  Resp 16  Ht 6' 2\" (1.88 m)  Wt 245 lb " 4 oz (111.2 kg)  BMI 31.49 kg/m2  GEN:  no apparent distress  EYES: PERRL, conjunctivae and sclerae clear   ENT: external ears and nose without lesions or scars, TM's and canals clear bilaterally, oropharynx clear with moist mucus membranes and normal landmarks and lips, teeth, and gums with no abnormalities noted   NECK:  Supple without adenopathy, mass, or thyromegaly   LUNGS:  normal respiratory effort, and lungs clear to auscultation bilaterally - no rales, rhonchi or wheezes  CV: regular rate and rhythm, normal S1 S2, no S3 or S4, no murmur, click or rub and bilateral lower extremities without edema   ABD:  soft, nontender, no mass, no hepatosplenomegaly, no hernias  SKIN:  normal to inspection and palpation, no rashes or abnormal-appearing lesions   PSYCH: mood/affect appear normal/bright     DIAGNOSTICS:                                                    No labs required for low risk surgery (cataract, skin procedure, breast biopsy, etc)  EKG: sinus bradycardia, RC, left axis, normal intervals, no acute ST/T changes c/w ischemia, no LVH by voltage criteria by my interpretation     Recent Labs   Lab Test  09/25/17   0850  03/23/17   1141  11/01/16   1211  09/22/16   0821  05/10/16   1107   10/03/14   0701   10/21/13   0904   02/26/11   0905   HGB   --    --   13.2*   --   14.6   --   14.6   --   14.0   < >  16.5   PLT   --    --    --    --    --    --   181   --   194   < >  159   INR   --    --    --    --    --    --   0.95   --    --    --   0.96   NA  136   --    --   138   --    < >  136   < >  136   < >  136   POTASSIUM  4.4   --   4.5  4.1  4.4   < >  4.0   < >  4.7   < >  4.1   CR  1.07   --   1.00  0.82   --    < >  0.89   < >  0.86   < >  1.07   A1C  6.2*  5.8   --   5.2   --    < >   --    < >  6.2*   < >   --     < > = values in this interval not displayed.        IMPRESSION:                                                    Reason for surgery/procedure: cataracts  Diagnosis/reason for consult:  preoperative assessment    The proposed surgical procedure is considered LOW risk.    REVISED CARDIAC RISK INDEX  The patient has the following serious cardiovascular risks for perioperative complications such as (MI, PE, VFib and 3  AV Block):  Coronary Artery Disease (MI, positive stress test, angina, Qs on EKG)  INTERPRETATION: 1 risks: Class II (low risk - 0.9% complication rate)    The patient has the following additional risks for perioperative complications:  No identified additional risks      ICD-10-CM    1. Preop general physical exam Z01.818 EKG 12-lead complete w/read - Clinics   2. Age-related cataract of both eyes, unspecified age-related cataract type H25.9    3. Coronary artery disease involving native coronary artery of native heart without angina pectoris I25.10 EKG 12-lead complete w/read - Clinics   4. AAA (abdominal aortic aneurysm) without rupture (H) I71.4    5. Spinal stenosis of thoracic region M48.04    6. Ischemic cardiomyopathy I25.5    7. Benign essential hypertension I10        RECOMMENDATIONS:                                                        --Patient is to take all scheduled medications on the day of surgery EXCEPT for modifications listed below.    Diabetes Medication Use  -----Hold usual  oral diabetic meds (e.g. Metformin, Actos, Glipizide) while NPO.         APPROVAL GIVEN to proceed with proposed procedure, without further diagnostic evaluation       Signed Electronically by: Beatriz Betancur MD    Copy of this evaluation report is provided to requesting physician.    [facility] Preop Guidelines    Patient Instructions   Clarify with your eye doctor whether you should hold the aspirin at all prior to cataract surgery.  Most eye surgeons do not require a hold of aspirin for cataract surgery.    On the morning of surgery take all prescription medications except metformin with a small sip of water.  Hold the morning metformin.  Also hold your vitamins on the day of  surgery.        Before Your Surgery      Call your surgeon if there is any change in your health. This includes signs of a cold or flu (such as a sore throat, runny nose, cough, rash or fever).    Do not smoke, drink alcohol or take over the counter medicine (unless your surgeon or primary care doctor tells you to) for the 24 hours before and after surgery.    If you take prescribed drugs: Follow your doctor s orders about which medicines to take and which to stop until after surgery.    Eating and drinking prior to surgery: follow the instructions from your surgeon    Take a shower or bath the night before surgery. Use the soap your surgeon gave you to gently clean your skin. If you do not have soap from your surgeon, use your regular soap. Do not shave or scrub the surgery site.  Wear clean pajamas and have clean sheets on your bed.

## 2017-11-09 ENCOUNTER — TRANSFERRED RECORDS (OUTPATIENT)
Dept: HEALTH INFORMATION MANAGEMENT | Facility: CLINIC | Age: 77
End: 2017-11-09

## 2018-02-07 ENCOUNTER — HOSPITAL ENCOUNTER (OUTPATIENT)
Dept: ULTRASOUND IMAGING | Facility: CLINIC | Age: 78
Discharge: HOME | End: 2018-02-07
Attending: RADIOLOGY | Admitting: RADIOLOGY
Payer: COMMERCIAL

## 2018-02-07 ENCOUNTER — TELEPHONE (OUTPATIENT)
Dept: OTHER | Facility: CLINIC | Age: 78
End: 2018-02-07

## 2018-02-07 DIAGNOSIS — I71.40 ABDOMINAL AORTIC ANEURYSM (H): Primary | ICD-10-CM

## 2018-02-07 DIAGNOSIS — I71.40 ABDOMINAL AORTIC ANEURYSM (H): ICD-10-CM

## 2018-02-07 PROCEDURE — 93979 VASCULAR STUDY: CPT | Mod: TC

## 2018-02-07 NOTE — TELEPHONE ENCOUNTER
Pt returned call.  Notified him of results.  Recommend 2 year f/u.  Pt agreed to plan.  Meena Cheung RN  IR nurse clinician  [phone]  ULTRASOUND  OF THE ABDOMINAL AORTA  2/7/2018 8:22 AM      HISTORY: Endovascular repair of an abdominal aortic aneurysm on  12/13/2007.     COMPARISON: Ultrasound dated 1/18/2017     FINDINGS:  Again identified is an abdominal aortic endograft. Maximum  diameter of the excluded aneurysm sac is approximately 3.4 x 3.8 cm  versus 3.6 x 4.1 cm on the previous exam. The main body and limbs are  patent without significant stenoses. There is no evidence for an  endoleak.            IMPRESSION:  Stable abdominal aortic endograft. No significant change  in size of excluded aneurysm sac. No evidence for an endoleak. Suggest  annual follow-up.       JO CORONA MD

## 2018-03-07 ENCOUNTER — OFFICE VISIT (OUTPATIENT)
Dept: FAMILY MEDICINE | Facility: CLINIC | Age: 78
End: 2018-03-07
Payer: COMMERCIAL

## 2018-03-07 ENCOUNTER — RADIANT APPOINTMENT (OUTPATIENT)
Dept: GENERAL RADIOLOGY | Facility: CLINIC | Age: 78
End: 2018-03-07
Attending: NURSE PRACTITIONER
Payer: COMMERCIAL

## 2018-03-07 VITALS
RESPIRATION RATE: 12 BRPM | OXYGEN SATURATION: 98 % | TEMPERATURE: 97.4 F | SYSTOLIC BLOOD PRESSURE: 113 MMHG | WEIGHT: 250.5 LBS | DIASTOLIC BLOOD PRESSURE: 69 MMHG | HEART RATE: 75 BPM | BODY MASS INDEX: 32.16 KG/M2

## 2018-03-07 DIAGNOSIS — M25.521 RIGHT ELBOW PAIN: Primary | ICD-10-CM

## 2018-03-07 DIAGNOSIS — M25.521 RIGHT ELBOW PAIN: ICD-10-CM

## 2018-03-07 PROCEDURE — 73080 X-RAY EXAM OF ELBOW: CPT | Mod: RT

## 2018-03-07 PROCEDURE — 99213 OFFICE O/P EST LOW 20 MIN: CPT | Performed by: NURSE PRACTITIONER

## 2018-03-07 NOTE — MR AVS SNAPSHOT
After Visit Summary   3/7/2018    David Medina    MRN: [id number]           Patient Information     Date Of Birth          1940        Visit Information        Provider Department      3/7/2018 2:20 PM Renetta Zuniga APRN CNP [facility]        Today's Diagnoses     Right elbow pain    -  1       Follow-ups after your visit        Your next 10 appointments already scheduled     Mar 26, 2018  8:40 AM CDT   Office Visit with Beatriz Betancur MD   [facility] ([facility])    [address] [place] [address] [phone]           Bring a current list of meds and any records pertaining to this visit. For Physicals, please bring immunization records and any forms needing to be filled out. Please arrive 10 minutes early to complete paperwork.            May 02, 2018  7:45 AM CDT   Return Visit with Nadia Gutierrez MD   [facility] ([facility])    [address] [address] [phone]              Who to contact     If you have questions or need follow up information about today's clinic visit or your schedule please contact [facility] directly at [phone].  Normal or non-critical lab and imaging results will be communicated to you by VocalZoomhart, letter or phone within 4 business days after the clinic has received the results. If you do not hear from us within 7 days, please contact the clinic through VocalZoomhart or phone. If you have a critical or abnormal lab result, we will notify you by phone as soon as possible.  Submit refill requests through MOWGLI or call your pharmacy and they will forward the refill request to us. Please allow 3 business days for your refill to be completed.          Additional Information About Your Visit        VocalZoomharLiveLeaf Information     MOWGLI lets you send messages to your doctor, view your test results, renew  "your prescriptions, schedule appointments and more. To sign up, go to [email or web address] . Click on \"Log in\" on the left side of the screen, which will take you to the Welcome page. Then click on \"Sign up Now\" on the right side of the page.     You will be asked to enter the access code listed below, as well as some personal information. Please follow the directions to create your username and password.     Your access code is: [email or web address]  Expires: 2018  3:49 PM     Your access code will  in 90 days. If you need help or a new code, please call your [place] clinic or [phone].        Care EveryWhere ID     This is your Care EveryWhere ID. This could be used by other organizations to access your [place] medical records  [id number]        Your Vitals Were     Pulse Temperature Respirations Pulse Oximetry BMI (Body Mass Index)       75 97.4  F (36.3  C) (Oral) 12 98% 32.16 kg/m2        Blood Pressure from Last 3 Encounters:   18 113/69   10/16/17 121/73   17 118/80    Weight from Last 3 Encounters:   18 250 lb 8 oz (113.6 kg)   10/16/17 245 lb 4 oz (111.2 kg)   17 236 lb (107 kg)              Today, you had the following     No orders found for display       Primary Care Provider Office Phone # Fax #    Beatriz Betancur [phone]       [address]  [facility] [address]        Equal Access to Services     [facility]: Hadii aleksandra grey hadasho Someghan, waaxda luqadaha, qaybta kaalmada soledad monroe . So [facility] [phone].    ATENCIÓN: Si habla español, tiene a delgadillo disposición servicios gratuitos de asistencia lingüística. Llame al [phone].    We comply with applicable federal civil rights laws and [place] laws. We do not discriminate on the basis of race, color, national origin, age, disability, sex, sexual orientation, or gender identity.            Thank you!     Thank you for choosing [facility]  for your care. Our goal is always to provide you with excellent care. Hearing back from our patients is one way we can continue to improve our services. Please take a few minutes to complete the written survey that you may receive in the mail after your visit with us. Thank you!             Your Updated Medication List - Protect others around you: Learn how to safely use, store and throw away your medicines at www.disposemymeds.org.          This list is accurate as of 3/7/18  3:49 PM.  Always use your most recent med list.                   Brand Name Dispense Instructions for use Diagnosis    aspirin EC 81 MG EC tablet     90 tablet    Take 1 tablet (81 mg) by mouth daily    CAD (coronary artery disease)       atorvastatin 80 MG tablet    LIPITOR    90 tablet    Take 1 tablet (80 mg) by mouth daily    Hyperlipidemia with target LDL less than 70       blood glucose monitoring meter device kit     1 kit    Use to test blood sugars FOUR times daily or as directed.   This order is for whatever meter patient's insurance will cover.    Type 2 diabetes mellitus with diabetic polyneuropathy (H)       blood glucose monitoring test strip    no brand specified    100 each    Use to test blood sugars 1 time daily or as directed.  Please dispense strips compatible with meter    Type 2 diabetes mellitus with diabetic polyneuropathy, without long-term current use of insulin (H)       Cyanocobalamin 1000 MCG Caps     100 capsule    Take 1 capsule by mouth daily    B12 deficiency       isosorbide mononitrate 30 MG 24 hr tablet    IMDUR    90 tablet    Take 1 tablet (30 mg) by mouth every morning    ASCVD (arteriosclerotic cardiovascular disease)       latanoprost 0.005 % ophthalmic solution    XALATAN     Place 1 drop into both eyes every evening        lisinopril 20 MG tablet    PRINIVIL/ZESTRIL    90 tablet    Take 1 tablet (20 mg) by mouth daily    Hypertension goal BP (blood pressure) < 140/90       metFORMIN 500 MG  tablet    GLUCOPHAGE    180 tablet    Take 1 tablet (500 mg) by mouth 2 times daily (with meals)    Type 2 diabetes mellitus with diabetic polyneuropathy, without long-term current use of insulin (H)       metoprolol tartrate 50 MG tablet    LOPRESSOR    90 tablet    Take 0.5 tablets (25 mg) by mouth 2 times daily    ASCVD (arteriosclerotic cardiovascular disease), Ischemic cardiomyopathy, Hypertension goal BP (blood pressure) < 140/90       multivitamin, therapeutic with minerals Tabs tablet      Take 1 tablet by mouth daily        nitroGLYcerin 0.4 MG sublingual tablet    NITROSTAT    25 tablet    Place 1 tablet (0.4 mg) under the tongue every 5 minutes as needed for chest pain Up to 3 doses max.    ASCVD (arteriosclerotic cardiovascular disease), Ischemic cardiomyopathy       ranitidine 150 MG tablet    ZANTAC    180 tablet    TAKE ONE TABLET BY MOUTH TWO TIMES DAILY    Gastroesophageal reflux disease without esophagitis

## 2018-03-07 NOTE — PROGRESS NOTES
SUBJECTIVE:   David Medina is a 78 year old male who presents to clinic today for the following health issues:      Musculoskeletal problem/pain      Duration: 2 days    Description  Location: right elbow    Intensity:  moderate    Accompanying signs and symptoms: swelling/ lump    History  Previous similar problem: no   Previous evaluation:  none    Precipitating or alleviating factors:  Trauma or overuse: YES- fell against a wall, hit elbow  Aggravating factors include: movement    Therapies tried and outcome: rest/inactivity      Swelling and pain of right elbow since injury.  He was walking upstairs and fell against handrail.  Pain with movement, no pain at rest.  swelling to medial aspect.  No limited range of motion.  Prior injury several months ago, unsure what cause was, likely fell.      Does have numbness to ulnar aspect of lower arm which is ongoing issue for several years.      Hx of DM2, A1C 6.2% 9/2017.  Hx CAD s/p angioplasty 2014.    Hx of frequent falls, has been seen at [facility] center, [facility].  Nerves are dead.  Just has to live with it.      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 Jacob ARCEO  APPENDECTOMY  12/06    open     CLOSED RX TIBIA SHAFT FX  6/2013    RT prox tibial fracture     COLONOSCOPY  10/3/05    WNL, rpt in 10 yrs, Dr. Vicente, [place] [facility]     COLONOSCOPY  9/23/15    advanced adenoma, rpt 3 yrs     coronary angiogram  2/28/11    diffuse disease with vasospasm     EMG  6/10/10    chronic active polyradiculopathy, mild generalized polyneuropathy, r/o lumbar stenosis     ENDOVAS REPAIR, INFRARENL ABDOM AORTIC ANEURYSM/DISSECT; [id number]-OTI-[id number]/AORTO-UNIFEMORAL PROSTHESIS  6/05    one done,one to do     FLEXIBLE SIGMOIDOSCOPY  11/00    WNL     HC PTCA SINGLE VESSEL  6/05    LAD - 100% stenosis     HC UGI ENDOSCOPY, SIMPLE EXAM  8/99    erosive esophagitis, GERD, Dr. Salazar     HEART CATH, ANGIOPLASTY  10/3/14    EMEKA x 1  ostium of the RCA     LAMINECTOMY THORACIC THREE LEVELS  10/31/12    T10, T11, T12 with medial facetectomy     OPEN REDUCTION INTERNAL FIXATION HIP NAILING N/A 11/1/2016    Procedure: OPEN REDUCTION INTERNAL FIXATION HIP NAILING;  Surgeon: Jason Flowers MD;  Location: [facility] OR     SONO ABD RETROPERITNL  11/7/06    AAA 4.2 x 4.8, Dr. James's office     STRESS LEXISCAN TEST  10/5/12    small, fixed, apical defect/apical thinning     SURGICAL HISTORY OF -       tonsillectomy     TONSILLECTOMY  1940       Social History   Substance Use Topics     Smoking status: Former Smoker     Packs/day: 3.50     Years: 19.00     Types: Cigarettes     Quit date: 5/11/1980     Smokeless tobacco: Never Used     Alcohol use Yes      Comment: beer 1-2x/week     Family History   Problem Relation Age of Onset     Respiratory Father      TB     Other - See Comments Mother      FALL         Current Outpatient Prescriptions   Medication Sig Dispense Refill     atorvastatin (LIPITOR) 80 MG tablet Take 1 tablet (80 mg) by mouth daily 90 tablet 3     metFORMIN (GLUCOPHAGE) 500 MG tablet Take 1 tablet (500 mg) by mouth 2 times daily (with meals) 180 tablet 1     Cyanocobalamin 1000 MCG CAPS Take 1 capsule by  mouth daily 100 capsule 3     blood glucose monitoring (NO BRAND SPECIFIED) test strip Use to test blood sugars 1 time daily or as directed.  Please dispense strips compatible with meter 100 each 3     ranitidine (ZANTAC) 150 MG tablet TAKE ONE TABLET BY MOUTH TWO TIMES DAILY 180 tablet 2     isosorbide mononitrate (IMDUR) 30 MG 24 hr tablet Take 1 tablet (30 mg) by mouth every morning 90 tablet 3     lisinopril (PRINIVIL/ZESTRIL) 20 MG tablet Take 1 tablet (20 mg) by mouth daily 90 tablet 3     nitroglycerin (NITROSTAT) 0.4 MG sublingual tablet Place 1 tablet (0.4 mg) under the tongue every 5 minutes as needed for chest pain Up to 3 doses max. 25 tablet 1     metoprolol (LOPRESSOR) 50 MG tablet Take 0.5 tablets (25 mg) by mouth 2 times daily 90 tablet 3     multivitamin, therapeutic with minerals (THERA-VIT-M) TABS Take 1 tablet by mouth daily       latanoprost (XALATAN) 0.005 % ophthalmic solution Place 1 drop into both eyes every evening       blood glucose monitoring ([employer] CONTOUR MONITOR) meter device kit Use to test blood sugars FOUR times daily or as directed.   This order is for whatever meter patient's insurance will cover. 1 kit 0     aspirin EC 81 MG tablet Take 1 tablet (81 mg) by mouth daily 90 tablet 3       ROS:  MSK, Neuro as above, otherwise negative       OBJECTIVE:                                                    /69 (BP Location: Left arm, Patient Position: Chair, Cuff Size: Adult Large)  Pulse 75  Temp 97.4  F (36.3  C) (Oral)  Resp 12  Wt 250 lb 8 oz (113.6 kg)  SpO2 98%  BMI 32.16 kg/m2   GENERAL APPEARANCE: healthy, alert and no distress  EYES: Eyes grossly normal to inspection and conjunctivae and sclerae normal  RESP: respirations nonlabored   ORTHO: RIGHT Elbow Exam: Inspection: swelling to medial aspect of elbow  Tender: medial epicondyle  Non-tender: lateral epicondyle and olecranon bursa  Range of Motion: all normal    PSYCH: mentation appears normal and affect  normal/bright     Xray right elbow with significant arthritic changes, no acute fracture noted.       ASSESSMENT/PLAN:                                                    (M25.521) Right elbow pain  (primary encounter diagnosis)  Comment: xray neg for fracture on my interpretation, final radiology pending  Plan: CANCELED: XR Elbow Right 2 Views        Recommend tylenol, ice, offered sling but pt declines.  Follow up if not improving in 1 week         See Patient Instructions    Renetta Zuniga, CNP  [facility]    There are no Patient Instructions on file for this visit.

## 2018-03-07 NOTE — LETTER
March 8, 2018      David Medina  [address] [facility] [address]        Dear ,    We are writing to inform you of your test results.    The radiologist agrees no fracture of the elbow.    Resulted Orders   XR Elbow Right G/E 3 Views    Narrative    ELBOW THREE VIEWS RIGHT  3/7/2018 2:52 PM     HISTORY: Right elbow pain    COMPARISON: None.    FINDINGS: There are marked degenerative changes of the elbow with  marginal osteophytic spurring. No posterior fat-pad is seen. No  convincing anterior sail sign is seen. No evidence of acute fracture  or dislocation. There are no worrisome bony lesions.      Impression    IMPRESSION: Marked degenerative changes in the elbow. No acute osseous  abnormality demonstrated.    TING CARRILLO MD       If you have any questions or concerns, please call the clinic at the number listed above.     Sincerely,    GIANNA Uriarte CNP/nr

## 2018-03-08 NOTE — PROGRESS NOTES
Please send out result letter with the following note, thanks.    The radiologist agrees no fracture of the elbow.    -Renetta Zuniga, CNP

## 2018-03-27 ENCOUNTER — TRANSFERRED RECORDS (OUTPATIENT)
Dept: HEALTH INFORMATION MANAGEMENT | Facility: CLINIC | Age: 78
End: 2018-03-27

## 2018-04-01 DIAGNOSIS — I25.10 ASCVD (ARTERIOSCLEROTIC CARDIOVASCULAR DISEASE): ICD-10-CM

## 2018-04-01 DIAGNOSIS — I10 HYPERTENSION GOAL BP (BLOOD PRESSURE) < 140/90: ICD-10-CM

## 2018-04-01 DIAGNOSIS — I25.5 ISCHEMIC CARDIOMYOPATHY: ICD-10-CM

## 2018-04-02 NOTE — TELEPHONE ENCOUNTER
"Requested Prescriptions   Pending Prescriptions Disp Refills     metoprolol tartrate (LOPRESSOR) 50 MG tablet [Pharmacy Med Name: Metoprolol Tartrate Oral Tablet 50 MG]  Last Written Prescription Date:  3/23/2017  Last Fill Quantity: 90 tablet,  # refills: 3   Last Office Visit: 3/26/2018   Future Office Visit:    Next 5 appointments (look out 90 days)     May 02, 2018  7:45 AM CDT   Return Visit with Nadia Gutierrez MD   [facility] ([facility])    [address] [place] [id number] [phone] OPT 2                90 tablet 2     Sig: take one-half tablet by mouth twice daily    Beta-Blockers Protocol Passed    4/1/2018  9:11 PM       Passed - Blood pressure under 140/90 in past 12 months    BP Readings from Last 3 Encounters:   03/07/18 113/69   10/16/17 121/73   09/25/17 118/80          Passed - Patient is age 6 or older       Passed - Recent (12 mo) or future (30 days) visit within the authorizing provider's specialty    Patient had office visit in the last 12 months or has a visit in the next 30 days with authorizing provider or within the authorizing provider's specialty.  See \"Patient Info\" tab in inbasket, or \"Choose Columns\" in Meds & Orders section of the refill encounter.              "

## 2018-04-05 RX ORDER — METOPROLOL TARTRATE 50 MG
TABLET ORAL
Qty: 90 TABLET | Refills: 3 | Status: SHIPPED | OUTPATIENT
Start: 2018-04-05 | End: 2019-04-08

## 2018-04-29 DIAGNOSIS — I10 HYPERTENSION GOAL BP (BLOOD PRESSURE) < 140/90: ICD-10-CM

## 2018-04-30 NOTE — TELEPHONE ENCOUNTER
"Requested Prescriptions   Pending Prescriptions Disp Refills     lisinopril (PRINIVIL/ZESTRIL) 20 MG tablet [Pharmacy Med Name: Lisinopril Oral Tablet 20 MG]  Last Written Prescription Date:  3/23/2017  Last Fill Quantity: 90 tablet,  # refills: 3   Last Office Visit: 3/7/2018   Future Office Visit:    Next 5 appointments (look out 90 days)     May 02, 2018  7:45 AM CDT   Return Visit with Nadia Gutierrez MD   [facility] ([facility])    [address] [place] [address] [phone] OPT 2                90 tablet 2     Sig: TAKE ONE TABLET BY MOUTH ONE TIME DAILY    ACE Inhibitors (Including Combos) Protocol Passed    4/29/2018 10:03 PM       Passed - Blood pressure under 140/90 in past 12 months    BP Readings from Last 3 Encounters:   03/07/18 113/69   10/16/17 121/73   09/25/17 118/80          Passed - Recent (12 mo) or future (30 days) visit within the authorizing provider's specialty    Patient had office visit in the last 12 months or has a visit in the next 30 days with authorizing provider or within the authorizing provider's specialty.  See \"Patient Info\" tab in inbasket, or \"Choose Columns\" in Meds & Orders section of the refill encounter.           Passed - Patient is age 18 or older       Passed - Normal serum creatinine on file in past 12 months    Recent Labs   Lab Test  09/25/17   0850   CR  1.07          Passed - Normal serum potassium on file in past 12 months    Recent Labs   Lab Test  09/25/17   0850   POTASSIUM  4.4               "

## 2018-05-02 ENCOUNTER — OFFICE VISIT (OUTPATIENT)
Dept: CARDIOLOGY | Facility: CLINIC | Age: 78
End: 2018-05-02
Attending: INTERNAL MEDICINE
Payer: COMMERCIAL

## 2018-05-02 VITALS
BODY MASS INDEX: 31.83 KG/M2 | HEIGHT: 74 IN | HEART RATE: 62 BPM | SYSTOLIC BLOOD PRESSURE: 123 MMHG | DIASTOLIC BLOOD PRESSURE: 78 MMHG | WEIGHT: 248 LBS

## 2018-05-02 DIAGNOSIS — I25.10 ASCVD (ARTERIOSCLEROTIC CARDIOVASCULAR DISEASE): ICD-10-CM

## 2018-05-02 DIAGNOSIS — R07.9 ACUTE CHEST PAIN: Primary | ICD-10-CM

## 2018-05-02 DIAGNOSIS — I21.4 NSTEMI (NON-ST ELEVATION MYOCARDIAL INFARCTION) (H): ICD-10-CM

## 2018-05-02 DIAGNOSIS — I25.5 ISCHEMIC CARDIOMYOPATHY: ICD-10-CM

## 2018-05-02 PROCEDURE — 99213 OFFICE O/P EST LOW 20 MIN: CPT | Performed by: INTERNAL MEDICINE

## 2018-05-02 RX ORDER — NITROGLYCERIN 0.4 MG/1
0.4 TABLET SUBLINGUAL EVERY 5 MIN PRN
Qty: 25 TABLET | Refills: 3 | Status: SHIPPED | OUTPATIENT
Start: 2018-05-02 | End: 2019-05-20

## 2018-05-02 NOTE — MR AVS SNAPSHOT
"              After Visit Summary   2018    David Medina    MRN: [id number]           Patient Information     Date Of Birth          1940        Visit Information        Provider Department      2018 7:45 AM Nadia Gutierrez MD [facility]        Today's Diagnoses     NSTEMI (non-ST elevation myocardial infarction)           Follow-ups after your visit        Who to contact     If you have questions or need follow up information about today's clinic visit or your schedule please contact [facility] directly at [phone].  Normal or non-critical lab and imaging results will be communicated to you by Skipjumphart, letter or phone within 4 business days after the clinic has received the results. If you do not hear from us within 7 days, please contact the clinic through Skipjumphart or phone. If you have a critical or abnormal lab result, we will notify you by phone as soon as possible.  Submit refill requests through [email or web address] or call your pharmacy and they will forward the refill request to us. Please allow 3 business days for your refill to be completed.          Additional Information About Your Visit        MyChart Information     [email or web address] lets you send messages to your doctor, view your test results, renew your prescriptions, schedule appointments and more. To sign up, go to [email or web address] . Click on \"Log in\" on the left side of the screen, which will take you to the Welcome page. Then click on \"Sign up Now\" on the right side of the page.     You will be asked to enter the access code listed below, as well as some personal information. Please follow the directions to create your username and password.     Your access code is: [email or web address]  Expires: 2018  4:49 PM     Your access code will  in 90 days. If you need help or a new code, please call your [place] clinic or [phone].        Care EveryWhere ID     " "This is your Care EveryWhere ID. This could be used by other organizations to access your [place] medical records  [id number]        Your Vitals Were     Pulse Height BMI (Body Mass Index)             62 1.88 m (6' 2\") 31.84 kg/m2          Blood Pressure from Last 3 Encounters:   05/02/18 123/78   03/07/18 113/69   10/16/17 121/73    Weight from Last 3 Encounters:   05/02/18 112.5 kg (248 lb)   03/07/18 113.6 kg (250 lb 8 oz)   10/16/17 111.2 kg (245 lb 4 oz)              We Performed the Following     Follow-Up with Cardiologist        Primary Care Provider Office Phone # Fax #    Beatriz Betancur [phone] [address]  [facility] [address]        Equal Access to Services     [facility] : Hadii aad ku hadasho Someghan, waaxda luqadaha, qaybta kaalmada namratayarafia, soledad bower . So [facility] [phone].    ATENCIÓN: Si habla español, tiene a delgadillo disposición servicios gratuitos de asistencia lingüística. Anabelle al [phone].    We comply with applicable federal civil rights laws and [place] laws. We do not discriminate on the basis of race, color, national origin, age, disability, sex, sexual orientation, or gender identity.            Thank you!     Thank you for choosing [facility]  for your care. Our goal is always to provide you with excellent care. Hearing back from our patients is one way we can continue to improve our services. Please take a few minutes to complete the written survey that you may receive in the mail after your visit with us. Thank you!             Your Updated Medication List - Protect others around you: Learn how to safely use, store and throw away your medicines at www.disposemymeds.org.          This list is accurate as of 5/2/18  8:33 AM.  Always use your most recent med list.                   Brand Name Dispense Instructions for use Diagnosis    aspirin EC 81 MG EC tablet     90 tablet    " Take 1 tablet (81 mg) by mouth daily    CAD (coronary artery disease)       atorvastatin 80 MG tablet    LIPITOR    90 tablet    Take 1 tablet (80 mg) by mouth daily    Hyperlipidemia with target LDL less than 70       blood glucose monitoring meter device kit     1 kit    Use to test blood sugars FOUR times daily or as directed.   This order is for whatever meter patient's insurance will cover.    Type 2 diabetes mellitus with diabetic polyneuropathy (H)       blood glucose monitoring test strip    no brand specified    100 each    Use to test blood sugars 1 time daily or as directed.  Please dispense strips compatible with meter    Type 2 diabetes mellitus with diabetic polyneuropathy, without long-term current use of insulin (H)       Cyanocobalamin 1000 MCG Caps     100 capsule    Take 1 capsule by mouth daily    B12 deficiency       isosorbide mononitrate 30 MG 24 hr tablet    IMDUR    90 tablet    Take 1 tablet (30 mg) by mouth every morning    ASCVD (arteriosclerotic cardiovascular disease)       latanoprost 0.005 % ophthalmic solution    XALATAN     Place 1 drop into both eyes every evening        lisinopril 20 MG tablet    PRINIVIL/ZESTRIL    90 tablet    Take 1 tablet (20 mg) by mouth daily    Hypertension goal BP (blood pressure) < 140/90       metFORMIN 500 MG tablet    GLUCOPHAGE    180 tablet    Take 1 tablet (500 mg) by mouth 2 times daily (with meals)    Type 2 diabetes mellitus with diabetic polyneuropathy, without long-term current use of insulin (H)       metoprolol tartrate 50 MG tablet    LOPRESSOR    90 tablet    take one-half tablet by mouth twice daily    ASCVD (arteriosclerotic cardiovascular disease), Ischemic cardiomyopathy, Hypertension goal BP (blood pressure) < 140/90       multivitamin, therapeutic with minerals Tabs tablet      Take 1 tablet by mouth daily        nitroGLYcerin 0.4 MG sublingual tablet    NITROSTAT    25 tablet    Place 1 tablet (0.4 mg) under the tongue every 5  minutes as needed for chest pain Up to 3 doses max.    ASCVD (arteriosclerotic cardiovascular disease), Ischemic cardiomyopathy       ranitidine 150 MG tablet    ZANTAC    180 tablet    TAKE ONE TABLET BY MOUTH TWO TIMES DAILY    Gastroesophageal reflux disease without esophagitis

## 2018-05-02 NOTE — PROGRESS NOTES
"HPI and Plan:     Mr. David Medina was seen in followup at [facility] in [place].  He is followed for his history of coronary disease and at 78 years of age, he is doing well with the continued exception of his chronic back pain.      Mr. Medina initially denied any chest, neck, arm or upper back discomfort.  Then he remembered 2 weeks ago having some transient chest tightness and dyspnea on the treadmill.  It has not recurred.    He continues to experience lower back discomfort and is walking with a cane but getting around.  Last year, he fell and broke his hip on a cruise, completing the cruise on a cycle and having hip surgery in the [facility].      His most recent intervention was  2014 years ago when Dr. Barger performed an intervention on the right coronary artery with stent placement.  That procedure was preceded by exertional angina and his exertional tolerance has improved markedly since that time.      His blood pressure is controlled, he is on good risk factor intervention and his last lipids done about 7 months ago indicated an LDL of 66, [id number] and HDL 56 mg/dL.  He continues on atorvastatin 80 mg daily, ACE inhibition, beta blockade and low-dose aspirin.  He has normal left ventricular systolic performance.  He also continues on a low dose of isosorbide mononitrate.      PHYSICAL EXAMINATION:   GENERAL:  This is a man in no apparent distress.  He was alert and oriented to person, place and time.   VITAL SIGNS:Blood pressure 123/78, pulse 62, height 1.88 m (6' 2\"), weight 112.5 kg (248 lb).   and the respiratory rate was 14-18 per minute.   CHEST:  Clear to auscultation.   CARDIAC:  On cardiac auscultation, there was an S1 and S2 without extra sounds or a murmur.  The rhythm was regular.      ASSESSMENT/PLAN:    Mr. Medina is overall doing  well from a Cardiology standpoint.  He h asymptomatic and his risk factors are under good control.  As I explained to him, the episode with " his hip fracture during the 2-week cruise was certainly a good physiologic stressor of his heart and he did well.      I have recommended a return at 1 year unless he has earlier problems.        Orders Placed This Encounter   Procedures     NM Lexiscan stress test (nuc card)       Orders Placed This Encounter   Medications     nitroGLYcerin (NITROSTAT) 0.4 MG sublingual tablet     Sig: Place 1 tablet (0.4 mg) under the tongue every 5 minutes as needed for chest pain Up to 3 doses max.     Dispense:  25 tablet     Refill:  3       Medications Discontinued During This Encounter   Medication Reason     nitroglycerin (NITROSTAT) 0.4 MG sublingual tablet Reorder         Encounter Diagnoses   Name Primary?     NSTEMI (non-ST elevation myocardial infarction)      Acute chest pain Yes     ASCVD (arteriosclerotic cardiovascular disease)      Ischemic cardiomyopathy        CURRENT MEDICATIONS:  Current Outpatient Prescriptions   Medication Sig Dispense Refill     aspirin EC 81 MG tablet Take 1 tablet (81 mg) by mouth daily 90 tablet 3     atorvastatin (LIPITOR) 80 MG tablet Take 1 tablet (80 mg) by mouth daily 90 tablet 3     blood glucose monitoring ([employer] CONTOUR MONITOR) meter device kit Use to test blood sugars FOUR times daily or as directed.   This order is for whatever meter patient's insurance will cover. 1 kit 0     blood glucose monitoring (NO BRAND SPECIFIED) test strip Use to test blood sugars 1 time daily or as directed.  Please dispense strips compatible with meter 100 each 3     Cyanocobalamin 1000 MCG CAPS Take 1 capsule by mouth daily 100 capsule 3     isosorbide mononitrate (IMDUR) 30 MG 24 hr tablet Take 1 tablet (30 mg) by mouth every morning 90 tablet 3     latanoprost (XALATAN) 0.005 % ophthalmic solution Place 1 drop into both eyes every evening       lisinopril (PRINIVIL/ZESTRIL) 20 MG tablet Take 1 tablet (20 mg) by mouth daily 90 tablet 3     metFORMIN (GLUCOPHAGE) 500 MG tablet Take 1 tablet (500  mg) by mouth 2 times daily (with meals) 180 tablet 1     metoprolol tartrate (LOPRESSOR) 50 MG tablet take one-half tablet by mouth twice daily 90 tablet 3     multivitamin, therapeutic with minerals (THERA-VIT-M) TABS Take 1 tablet by mouth daily       nitroGLYcerin (NITROSTAT) 0.4 MG sublingual tablet Place 1 tablet (0.4 mg) under the tongue every 5 minutes as needed for chest pain Up to 3 doses max. 25 tablet 3     ranitidine (ZANTAC) 150 MG tablet TAKE ONE TABLET BY MOUTH TWO TIMES DAILY 180 tablet 2     [DISCONTINUED] nitroglycerin (NITROSTAT) 0.4 MG sublingual tablet Place 1 tablet (0.4 mg) under the tongue every 5 minutes as needed for chest pain Up to 3 doses max. 25 tablet 1       ALLERGIES     Allergies   Allergen Reactions     No Known Allergies        PAST MEDICAL HISTORY:  Past Medical History:   Diagnosis Date     AAA (abdominal aortic aneurysm) without rupture (H)     stent 2005, s/p endovascular repair 12/07     Allergic rhinitis      ASCVD (arteriosclerotic cardiovascular disease) 6/05    Cardiac cath Oct 2014: EMEKA to RCA, cath 2011: medical management, cath Aug 2005: EMEKA to OM; cath June 2005: EMEKA to LAD     B12 deficiency      Benign paroxysmal positional vertigo      Cervical radiculopathy 10/30/2008    Injected through [facility] 10/07     Closed fracture of patella 2/07    left     DM type 2 (diabetes mellitus, type 2) (H)      Hyperlipidemia LDL goal < 70      Hypertension goal BP (blood pressure) < 130/80      Ischemic cardiomyopathy 6/05    ischemic cardiomyopathy, EF 45%     Lumbosacral plexopathy     diabetic radiculoplexopathy causing proximal muscle weakness, Dr. Pardo     Major depression 5/10/2006     NSTEMI (non-ST elevation myocardial infarction) (H) 2/11    diffuse stenoses not amenable to stenting, decision to pursue medical management     Other specified congenital anomaly of genital organs     absent left testis (has had workup for undescended testis - none found)     Spinal  stenosis of thoracic region     T10-T12, Dr. Villa (neuro) and Dr. 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 Jacob ARCEO APPENDECTOMY  12/06    open     CLOSED RX TIBIA SHAFT FX  6/2013    RT prox tibial fracture     COLONOSCOPY  10/3/05    WNL, rpt in 10 yrs, Dr. Vicente, [facility]     COLONOSCOPY  9/23/15    advanced adenoma, rpt 3 yrs     coronary angiogram  2/28/11    diffuse disease with vasospasm     EMG  6/10/10    chronic active polyradiculopathy, mild generalized polyneuropathy, r/o lumbar stenosis     ENDOVAS REPAIR, INFRARENL ABDOM AORTIC ANEURYSM/DISSECT; [id number]/AORTO-UNIFEMORAL PROSTHESIS  6/05    one done,one to do     FLEXIBLE SIGMOIDOSCOPY  11/00    WNL     HC PTCA SINGLE VESSEL  6/05    LAD - 100% stenosis     HC UGI ENDOSCOPY, SIMPLE EXAM  8/99    erosive esophagitis, GERD, Dr. Salazar     HEART CATH, ANGIOPLASTY  10/3/14    EMEKA x 1  ostium of the RCA     LAMINECTOMY THORACIC THREE LEVELS  10/31/12    T10, T11, T12 with medial facetectomy     OPEN REDUCTION INTERNAL FIXATION HIP NAILING N/A 11/1/2016    Procedure: OPEN REDUCTION INTERNAL FIXATION HIP NAILING;  Surgeon: Jason Flowers MD;  Location: [facility] OR     SONO ABD RETROPERITNL  11/7/06    AAA 4.2 x 4.8, Dr. James's office     STRESS LEXISCAN TEST  10/5/12    small, fixed, apical defect/apical thinning     SURGICAL HISTORY OF -       tonsillectomy     TONSILLECTOMY  1939       FAMILY HISTORY:  Family History   Problem Relation Age of Onset     Respiratory Father      TB     Other - See Comments Mother      FALL       SOCIAL HISTORY:  Social History     Social History     Marital status:      Spouse name: Sarika      "Number of children: 4     Years of education: N/A     Occupational History      Retired     Social History Main Topics     Smoking status: Former Smoker     Packs/day: 3.50     Years: 19.00     Types: Cigarettes     Quit date: 5/11/1980     Smokeless tobacco: Never Used     Alcohol use Yes      Comment: beer 1-2x/week     Drug use: No     Sexual activity: Yes     Partners: Female     Other Topics Concern     Parent/Sibling W/ Cabg, Mi Or Angioplasty Before 65f 55m? No     Caffeine Concern Yes     2 cups of coffee a day.       Sleep Concern No     Stress Concern No     Weight Concern No     Special Diet No     Exercise Yes     Cardiac rehab 1 x week, treadmill daily     Seat Belt Yes     Social History Narrative       Review of Systems:  Skin:  Negative       Eyes:  Positive for glasses    ENT:  Negative      Respiratory:  Negative       Cardiovascular:  Negative      Gastroenterology: Negative      Genitourinary:  Positive for nocturia;urinary frequency    Musculoskeletal:  Positive for arthritis;joint pain;joint stiffness right pinky stiff, unable to straigthten   Neurologic:  Positive for numbness or tingling of hands;numbness or tingling of feet;incoordination (walks with cane) numbness in right hand/arm for \"years\"   Psychiatric:  Positive for sleep disturbances falls alseep easily, wakes up often r/t nocturia - difficult to get back to sleep   Heme/Lymph/Imm:  Negative      Endocrine:  Positive for diabetes      Physical Exam:  Vitals: /78  Pulse 62  Ht 1.88 m (6' 2\")  Wt 112.5 kg (248 lb)  BMI 31.84 kg/m2    Constitutional:  cooperative, alert and oriented, well developed, well nourished, in no acute distress        Skin:  warm and dry to the touch          Head:  normocephalic        Eyes:  sclera white        Lymph:      ENT:           Neck:           Respiratory:  normal breath sounds, clear to auscultation, normal A-P diameter, normal symmetry, normal respiratory excursion, no use of accessory " muscles         Cardiac: regular rhythm, normal S1/S2, no S3 or S4, apical impulse not displaced, no murmurs, gallops or rubs                                                         GI:           Extremities and Muscular Skeletal:                 Neurological:  affect appropriate   uses a cane, moves slowly supporting back    Psych:  Alert and Oriented x 3;affect appropriate, oriented to time, person and place          CC  Nadia Gutierrez MD  [address]  [place] [id number]

## 2018-05-02 NOTE — LETTER
"5/2/2018    Beatriz Betancur MD  [address]  [facility] [address]    RE: David Medina       Dear Colleague,    I had the pleasure of seeing David Medina in the [facility].    HPI and Plan:     Mr. David Medina was seen in followup at [facility] in [place].  He is followed for his history of coronary disease and at 78 years of age, he is doing well with the continued exception of his chronic back pain.      Mr. Medina initially denied any chest, neck, arm or upper back discomfort.  Then he remembered 2 weeks ago having some transient chest tightness and dyspnea on the treadmill.  It has not recurred.    He continues to experience lower back discomfort and is walking with a cane but getting around.  Last year, he fell and broke his hip on a cruise, completing the cruise on a cycle and having hip surgery in the [facility].      His most recent intervention was  2014 years ago when Dr. Barger performed an intervention on the right coronary artery with stent placement.  That procedure was preceded by exertional angina and his exertional tolerance has improved markedly since that time.      His blood pressure is controlled, he is on good risk factor intervention and his last lipids done about 7 months ago indicated an LDL of 66, [id number] and HDL 56 mg/dL.  He continues on atorvastatin 80 mg daily, ACE inhibition, beta blockade and low-dose aspirin.  He has normal left ventricular systolic performance.  He also continues on a low dose of isosorbide mononitrate.      PHYSICAL EXAMINATION:   GENERAL:  This is a man in no apparent distress.  He was alert and oriented to person, place and time.   VITAL SIGNS:Blood pressure 123/78, pulse 62, height 1.88 m (6' 2\"), weight 112.5 kg (248 lb).   and the respiratory rate was 14-18 per minute.   CHEST:  Clear to auscultation.   CARDIAC:  On cardiac auscultation, there was an S1 and S2 without extra sounds or a murmur.  " The rhythm was regular.      ASSESSMENT/PLAN:    Mr. Medina is overall doing  well from a Cardiology standpoint.  He h asymptomatic and his risk factors are under good control.  As I explained to him, the episode with his hip fracture during the 2-week cruise was certainly a good physiologic stressor of his heart and he did well.      I have recommended a return at 1 year unless he has earlier problems.        Orders Placed This Encounter   Procedures     NM Lexiscan stress test (nuc card)       Orders Placed This Encounter   Medications     nitroGLYcerin (NITROSTAT) 0.4 MG sublingual tablet     Sig: Place 1 tablet (0.4 mg) under the tongue every 5 minutes as needed for chest pain Up to 3 doses max.     Dispense:  25 tablet     Refill:  3       Medications Discontinued During This Encounter   Medication Reason     nitroglycerin (NITROSTAT) 0.4 MG sublingual tablet Reorder         Encounter Diagnoses   Name Primary?     NSTEMI (non-ST elevation myocardial infarction)      Acute chest pain Yes     ASCVD (arteriosclerotic cardiovascular disease)      Ischemic cardiomyopathy        CURRENT MEDICATIONS:  Current Outpatient Prescriptions   Medication Sig Dispense Refill     aspirin EC 81 MG tablet Take 1 tablet (81 mg) by mouth daily 90 tablet 3     atorvastatin (LIPITOR) 80 MG tablet Take 1 tablet (80 mg) by mouth daily 90 tablet 3     blood glucose monitoring ([employer] CONTOUR MONITOR) meter device kit Use to test blood sugars FOUR times daily or as directed.   This order is for whatever meter patient's insurance will cover. 1 kit 0     blood glucose monitoring (NO BRAND SPECIFIED) test strip Use to test blood sugars 1 time daily or as directed.  Please dispense strips compatible with meter 100 each 3     Cyanocobalamin 1000 MCG CAPS Take 1 capsule by mouth daily 100 capsule 3     isosorbide mononitrate (IMDUR) 30 MG 24 hr tablet Take 1 tablet (30 mg) by mouth every morning 90 tablet 3     latanoprost (XALATAN) 0.005 %  ophthalmic solution Place 1 drop into both eyes every evening       lisinopril (PRINIVIL/ZESTRIL) 20 MG tablet Take 1 tablet (20 mg) by mouth daily 90 tablet 3     metFORMIN (GLUCOPHAGE) 500 MG tablet Take 1 tablet (500 mg) by mouth 2 times daily (with meals) 180 tablet 1     metoprolol tartrate (LOPRESSOR) 50 MG tablet take one-half tablet by mouth twice daily 90 tablet 3     multivitamin, therapeutic with minerals (THERA-VIT-M) TABS Take 1 tablet by mouth daily       nitroGLYcerin (NITROSTAT) 0.4 MG sublingual tablet Place 1 tablet (0.4 mg) under the tongue every 5 minutes as needed for chest pain Up to 3 doses max. 25 tablet 3     ranitidine (ZANTAC) 150 MG tablet TAKE ONE TABLET BY MOUTH TWO TIMES DAILY 180 tablet 2     [DISCONTINUED] nitroglycerin (NITROSTAT) 0.4 MG sublingual tablet Place 1 tablet (0.4 mg) under the tongue every 5 minutes as needed for chest pain Up to 3 doses max. 25 tablet 1       ALLERGIES     Allergies   Allergen Reactions     No Known Allergies        PAST MEDICAL HISTORY:  Past Medical History:   Diagnosis Date     AAA (abdominal aortic aneurysm) without rupture (H)     stent 2005, s/p endovascular repair 12/07     Allergic rhinitis      ASCVD (arteriosclerotic cardiovascular disease) 6/05    Cardiac cath Oct 2014: EMEKA to RCA, cath 2011: medical management, cath Aug 2005: EMEKA to OM; cath June 2005: EMEKA to LAD     B12 deficiency      Benign paroxysmal positional vertigo      Cervical radiculopathy 10/30/2008    Injected through [facility] 10/07     Closed fracture of patella 2/07    left     DM type 2 (diabetes mellitus, type 2) (H)      Hyperlipidemia LDL goal < 70      Hypertension goal BP (blood pressure) < 130/80      Ischemic cardiomyopathy 6/05    ischemic cardiomyopathy, EF 45%     Lumbosacral plexopathy     diabetic radiculoplexopathy causing proximal muscle weakness, Dr. 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 Villa (neuro) and Dr. 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 Jacob ARCEO APPENDECTOMY  12/06    open     CLOSED RX TIBIA SHAFT FX  6/2013    RT prox tibial fracture     COLONOSCOPY  10/3/05    WNL, rpt in 10 yrs, Dr. Vicente, [facility]     COLONOSCOPY  9/23/15    advanced adenoma, rpt 3 yrs     coronary angiogram  2/28/11    diffuse disease with vasospasm     EMG  6/10/10    chronic active polyradiculopathy, mild generalized polyneuropathy, r/o lumbar stenosis     ENDOVAS REPAIR, INFRARENL ABDOM AORTIC ANEURYSM/DISSECT; [id number]/AORTO-UNIFEMORAL PROSTHESIS  6/05    one done,one to do     FLEXIBLE SIGMOIDOSCOPY  11/00    WNL     HC PTCA SINGLE VESSEL  6/05    LAD - 100% stenosis     HC UGI ENDOSCOPY, SIMPLE EXAM  8/99    erosive esophagitis, GERD, Dr. Salazar     HEART CATH, ANGIOPLASTY  10/3/14    EMEKA x 1  ostium of the RCA     LAMINECTOMY THORACIC THREE LEVELS  10/31/12    T10, T11, T12 with medial facetectomy     OPEN REDUCTION INTERNAL FIXATION HIP NAILING N/A 11/1/2016    Procedure: OPEN REDUCTION INTERNAL FIXATION HIP NAILING;  Surgeon: Jason Flowers MD;  Location:  OR     SONO ABD RETROPERITNL  11/7/06    AAA 4.2 x 4.8, Dr. James's office     STRESS LEXISCAN TEST  10/5/12    small, fixed, apical defect/apical thinning     SURGICAL HISTORY OF -       tonsillectomy     TONSILLECTOMY  1939       FAMILY HISTORY:  Family  "History   Problem Relation Age of Onset     Respiratory Father      TB     Other - See Comments Mother      FALL       SOCIAL HISTORY:  Social History     Social History     Marital status:      Spouse name: Sarika     Number of children: 4     Years of education: N/A     Occupational History      Retired     Social History Main Topics     Smoking status: Former Smoker     Packs/day: 3.50     Years: 19.00     Types: Cigarettes     Quit date: 5/11/1980     Smokeless tobacco: Never Used     Alcohol use Yes      Comment: beer 1-2x/week     Drug use: No     Sexual activity: Yes     Partners: Female     Other Topics Concern     Parent/Sibling W/ Cabg, Mi Or Angioplasty Before 65f 55m? No     Caffeine Concern Yes     2 cups of coffee a day.       Sleep Concern No     Stress Concern No     Weight Concern No     Special Diet No     Exercise Yes     Cardiac rehab 1 x week, treadmill daily     Seat Belt Yes     Social History Narrative       Review of Systems:  Skin:  Negative       Eyes:  Positive for glasses    ENT:  Negative      Respiratory:  Negative       Cardiovascular:  Negative      Gastroenterology: Negative      Genitourinary:  Positive for nocturia;urinary frequency    Musculoskeletal:  Positive for arthritis;joint pain;joint stiffness right pinky stiff, unable to straigthten   Neurologic:  Positive for numbness or tingling of hands;numbness or tingling of feet;incoordination (walks with cane) numbness in right hand/arm for \"years\"   Psychiatric:  Positive for sleep disturbances falls alseep easily, wakes up often r/t nocturia - difficult to get back to sleep   Heme/Lymph/Imm:  Negative      Endocrine:  Positive for diabetes      Physical Exam:  Vitals: /78  Pulse 62  Ht 1.88 m (6' 2\")  Wt 112.5 kg (248 lb)  BMI 31.84 kg/m2    Constitutional:  cooperative, alert and oriented, well developed, well nourished, in no acute distress        Skin:  warm and dry to the touch          Head:  normocephalic "        Eyes:  sclera white        Lymph:      ENT:           Neck:           Respiratory:  normal breath sounds, clear to auscultation, normal A-P diameter, normal symmetry, normal respiratory excursion, no use of accessory muscles         Cardiac: regular rhythm, normal S1/S2, no S3 or S4, apical impulse not displaced, no murmurs, gallops or rubs                                                         GI:           Extremities and Muscular Skeletal:                 Neurological:  affect appropriate   uses a cane, moves slowly supporting back    Psych:  Alert and Oriented x 3;affect appropriate, oriented to time, person and place        Thank you for allowing me to participate in the care of your patient.    Sincerely,     Nadia Gutierrez MD     [facility]

## 2018-05-03 RX ORDER — LISINOPRIL 20 MG/1
TABLET ORAL
Qty: 90 TABLET | Refills: 1 | Status: SHIPPED | OUTPATIENT
Start: 2018-05-03 | End: 2018-11-18

## 2018-05-03 NOTE — TELEPHONE ENCOUNTER
",    Please call pt. His BP med was refilled but he needs f/u diabetic exam with Dr Betancur . He \"no showed\" his appt on 3/26 with her            Thank you,  Cesilia Key RN    "

## 2018-05-06 NOTE — PROGRESS NOTES
SUBJECTIVE:  David Medina, a 78 year old male, is here to discuss the following issues:     DIABETES FOLLOW-UP   Diabetes Type 2.    Treatment plan: metformin   Medication side effects: None  Patient does check blood sugars and they have been running 150.    Symptoms of hypoglycemia (low blood sugar): dizzy.    Patient exercise: 6-7 days/week for an average of 15-30 minutes - walking and biking     HYPERTENSION FOLLOW-UP    Current medication regimen includes lisinopril 20 mg, metoprolol 25 mg BID   Patient reports no problems or side effects with the medication.  Patient does not check blood pressure outside of clinic.       Continues to have concern about leg weakness.  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  They were not able to offer any further treatment.    He has done Physical Therapy in the past but it has been several years.    Problem list and histories reviewed & updated, as indicated.  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     B12 deficiency     Spinal stenosis of thoracic region     Health Care Home     Diabetic neuropathy (H)     AAA (abdominal aortic aneurysm) without rupture (H)     Tubular adenoma of colon       BP Readings from Last 3 Encounters:   05/10/18 116/80   05/02/18 123/78   03/07/18 113/69    Wt Readings from Last 3 Encounters:   05/10/18 253 lb (114.8 kg)    05/02/18 248 lb (112.5 kg)   03/07/18 250 lb 8 oz (113.6 kg)            Recent Labs   Lab Test  05/10/18   1005  09/25/17   0850  03/23/17   1141  11/01/16   1211  09/22/16   0821   09/17/15   0905   A1C  6.7*  6.2*  5.8   --   5.2   < >  6.6*   LDL   --   66   --    --   62   --   42   HDL   --   56   --    --   56   --   51   TRIG   --   114   --    --   83   --   135   ALT   --   38   --    --   28   --   37   CR   --   1.07   --   1.00  0.82   --   0.90   GFRESTIMATED   --   67   --   72  >90  Non  GFR Calc     --   81   GFRESTBLACK   --   81   --   88  >90   GFR Calc     --   >90   GFR Calc     POTASSIUM   --   4.4   --   4.5  4.1   < >  4.2   TSH   --   2.26   --    --    --    --   2.72    < > = values in this interval not displayed.        ROS:  RESP: NEGATIVE for shortness of breath  CV: POSITIVE for chest pain - one episode while on treadmill.  Dr. Gutierrez has ordered stress test.        OBJECTIVE:    /80  Pulse 53  Temp 98  F (36.7  C) (Oral)  Resp 16  Wt 253 lb (114.8 kg)  SpO2 98%  BMI 32.48 kg/m2  GEN:  no apparent distress  EYES: PERRL, conjunctivae and sclerae clear   SKIN: Clear. No significant rash, abnormal pigmentation or lesions   LUNGS:  normal respiratory effort, and lungs clear to auscultation bilaterally - no rales, rhonchi or wheezes  CV: regular rate and rhythm, normal S1 S2, no S3 or S4 and no murmur, click or rub     ASSESSMENT/PLAN:  1. Type 2 diabetes mellitus with diabetic polyneuropathy, without long-term current use of insulin (H)  stable/well controlled   - Hemoglobin A1c  - Albumin Random Urine Quantitative with Creat Ratio  - metFORMIN (GLUCOPHAGE) 500 MG tablet; Take 1 tablet (500 mg) by mouth 2 times daily (with meals)  Dispense: 180 tablet; Refill: 1    2. Benign essential hypertension  stable/well controlled     3. Weakness of both lower extremities  4. Multifactorial gait disorder  I encouraged him to try another  course of Physical Therapy.    - [facility], HAND, AND CHIROPRACTIC REFERRAL     Return to Clinic in 6 months.    Patient Instructions   Check with your insurance on the coverage of Shingrix (new shingles vaccine).         Beatriz Betancur MD   [facility]

## 2018-05-10 ENCOUNTER — OFFICE VISIT (OUTPATIENT)
Dept: FAMILY MEDICINE | Facility: CLINIC | Age: 78
End: 2018-05-10
Payer: COMMERCIAL

## 2018-05-10 VITALS
TEMPERATURE: 98 F | RESPIRATION RATE: 16 BRPM | HEART RATE: 53 BPM | SYSTOLIC BLOOD PRESSURE: 116 MMHG | WEIGHT: 253 LBS | BODY MASS INDEX: 32.48 KG/M2 | OXYGEN SATURATION: 98 % | DIASTOLIC BLOOD PRESSURE: 80 MMHG

## 2018-05-10 DIAGNOSIS — R26.89 MULTIFACTORIAL GAIT DISORDER: ICD-10-CM

## 2018-05-10 DIAGNOSIS — E11.42 TYPE 2 DIABETES MELLITUS WITH DIABETIC POLYNEUROPATHY, WITHOUT LONG-TERM CURRENT USE OF INSULIN (H): Primary | ICD-10-CM

## 2018-05-10 DIAGNOSIS — I10 BENIGN ESSENTIAL HYPERTENSION: ICD-10-CM

## 2018-05-10 DIAGNOSIS — R29.898 WEAKNESS OF BOTH LOWER EXTREMITIES: ICD-10-CM

## 2018-05-10 LAB
CREAT UR-MCNC: 158 MG/DL
HBA1C MFR BLD: 6.7 % (ref 0–5.6)
MICROALBUMIN UR-MCNC: 15 MG/L
MICROALBUMIN/CREAT UR: 9.43 MG/G CR (ref 0–17)

## 2018-05-10 PROCEDURE — 99207 C PAF COMPLETED  NO CHARGE: CPT | Performed by: FAMILY MEDICINE

## 2018-05-10 PROCEDURE — 83036 HEMOGLOBIN GLYCOSYLATED A1C: CPT | Performed by: FAMILY MEDICINE

## 2018-05-10 PROCEDURE — 99214 OFFICE O/P EST MOD 30 MIN: CPT | Performed by: FAMILY MEDICINE

## 2018-05-10 PROCEDURE — 82043 UR ALBUMIN QUANTITATIVE: CPT | Performed by: FAMILY MEDICINE

## 2018-05-10 PROCEDURE — 36415 COLL VENOUS BLD VENIPUNCTURE: CPT | Performed by: FAMILY MEDICINE

## 2018-05-10 NOTE — LETTER
May 11, 2018      David ECHEVERRIA Carol  [address] [facility] [address]        Dear ,    We are writing to inform you of your test results.    Your Hemoglobin A1C (a test assessing overall blood sugar control for the last 3 months) and your urine albumin (protein) level look good!    Resulted Orders   Hemoglobin A1c   Result Value Ref Range    Hemoglobin A1C 6.7 (H) 0 - 5.6 %      Comment:      Normal <5.7% Prediabetes 5.7-6.4%  Diabetes 6.5% or higher - adopted from ADA   consensus guidelines.     Albumin Random Urine Quantitative with Creat Ratio   Result Value Ref Range    Creatinine Urine 158 mg/dL    Albumin Urine mg/L 15 mg/L    Albumin Urine mg/g Cr 9.43 0 - 17 mg/g Cr       If you have any questions or concerns, please call the clinic at the number listed above.       Sincerely,        Beatriz Betancur MD/nr

## 2018-05-10 NOTE — MR AVS SNAPSHOT
After Visit Summary   5/10/2018    David Medina    MRN: [id number]           Patient Information     Date Of Birth          1940        Visit Information        Provider Department      5/10/2018 10:00 AM Beatriz Betancur MD [facility]        Today's Diagnoses     Type 2 diabetes mellitus with diabetic polyneuropathy, without long-term current use of insulin (H)    -  1    Benign essential hypertension        Weakness of both lower extremities        Multifactorial gait disorder          Care Instructions    Check with your insurance on the coverage of Shingrix (new shingles vaccine).             Follow-ups after your visit        Additional Services     [facility], AND CHIR[facility]RACTIC REFERRAL       **This order will print in the [facility] Scheduling Office**    Physical Therapy, Hand Therapy and Chiropractic Care are available through:    *[facility]  *[facility]  *[facility]    Call one number to schedule at any of the above locations: [phone].    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  Call member services at your health plan with any benefit or coverage questions.      Please bring the following to your appointment:    *Your personal calendar for scheduling future appointments  *Comfortable clothing                  Follow-up notes from your care team     Return in about 6 months (around 11/10/2018).      Your next 10  appointments already scheduled     Jun 05, 2018  8:00 AM CDT   NM SH CV MPI WITH JOAN 1 DAY with SCINM1   [facility] CV Nuclear Medicine (Cardiovascular Imaging at [facility])    [address]  [address]  [place] [address] [phone]           For a ONE day exam: Allow 3-4 hours for test. For a TWO day exam: Allow 2 hours PER day for test.  You may need to stop some medicines before the test. Follow your doctor s orders. - If you take a beta blocker: Follow your doctor s specific instructions on taking it prior to and on the day of your exam. - If you take Aggrenox or dipyridamole (Persantine, Permole), stop taking it 48 hours before your test. - If you take Viagra, Cialis or Levitra, stop taking it 48 hours before your test. - If you take theophylline or aminophylline, stop taking it 12 hours before your test.  For patients with diabetes: - If you take insulin, call your diabetes care team. Ask if you should take a 1/2 dose the morning of your test. - If you take diabetes medicine by mouth, don t take it on the morning of your test. Bring it with you to take after the test. (If you have questions, call your diabetes care team.)  Do not take nitrates on the day of your test. Do not wear your Nitro-Patch.  Stop all caffeine 12 hours before the test. This includes coffee, tea, soda pop, chocolate and certain medicines (such as Anacin, Excedrin and NoDoz). Also avoid decaf coffee and tea, as these contain small amounts of caffeine.  No alcohol, smoking or other tobacco for 12 hours before the test.  Stop eating 3 hours before the test. You may drink water.  Please wear a loose two-piece outfit. If you will have an exercise test, bring rubber-soled walking shoes.  When you arrive, please tell us if you: - Have diabetes - Are breastfeeding - May be pregnant - Have a pacemaker of ICD (implantable defibrillator).  Please call your Imaging Department at your exam site with any  "questions.              Who to contact     If you have questions or need follow up information about today's clinic visit or your schedule please contact [facility] directly at [phone].  Normal or non-critical lab and imaging results will be communicated to you by MyChart, letter or phone within 4 business days after the clinic has received the results. If you do not hear from us within 7 days, please contact the clinic through MyChart or phone. If you have a critical or abnormal lab result, we will notify you by phone as soon as possible.  Submit refill requests through [employer] or call your pharmacy and they will forward the refill request to us. Please allow 3 business days for your refill to be completed.          Additional Information About Your Visit        [employer] Information     [employer] lets you send messages to your doctor, view your test results, renew your prescriptions, schedule appointments and more. To sign up, go to [email or web address] . Click on \"Log in\" on the left side of the screen, which will take you to the Welcome page. Then click on \"Sign up Now\" on the right side of the page.     You will be asked to enter the access code listed below, as well as some personal information. Please follow the directions to create your username and password.     Your access code is: [email or web address]  Expires: 2018  4:49 PM     Your access code will  in 90 days. If you need help or a new code, please call your [facility] clinic or [phone].        Care EveryWhere ID     This is your Care EveryWhere ID. This could be used by other organizations to access your [facility] medical records  [id number]        Your Vitals Were     Pulse Temperature Respirations Pulse Oximetry BMI (Body Mass Index)       53 98  F (36.7  C) (Oral) 16 98% 32.48 kg/m2        Blood Pressure from Last 3 Encounters:   05/10/18 144/80   18 123/78   18 113/69    Weight from Last 3 Encounters: "   05/10/18 253 lb (114.8 kg)   05/02/18 248 lb (112.5 kg)   03/07/18 250 lb 8 oz (113.6 kg)              We Performed the Following     Albumin Random Urine Quantitative with Creat Ratio     Hemoglobin A1c     [facility] HAND, AND CHIROPRACTIC REFERRAL        Primary Care Provider Office Phone # Fax #    Beatriz Betancur [phone]       [address]  [facility] [address]        Equal Access to Services     AYUSH OLSON : Hadii aad ku hadasho Soomaali, waaxda luqadaha, qaybta kaalmada adeegyada, waxay idiin hayaan adeeg janearanorberto lashira . So [facility] [phone].    ATENCIÓN: Si habla español, tiene a delgadillo disposición servicios gratuitos de asistencia lingüística. [facility] [phone].    We comply with applicable federal civil rights laws and [place] laws. We do not discriminate on the basis of race, color, national origin, age, disability, sex, sexual orientation, or gender identity.            Thank you!     Thank you for choosing [facility]  for your care. Our goal is always to provide you with excellent care. Hearing back from our patients is one way we can continue to improve our services. Please take a few minutes to complete the written survey that you may receive in the mail after your visit with us. Thank you!             Your Updated Medication List - Protect others around you: Learn how to safely use, store and throw away your medicines at www.disposemymeds.org.          This list is accurate as of 5/10/18 10:58 AM.  Always use your most recent med list.                   Brand Name Dispense Instructions for use Diagnosis    aspirin 81 MG EC tablet     90 tablet    Take 1 tablet (81 mg) by mouth daily    CAD (coronary artery disease)       atorvastatin 80 MG tablet    LIPITOR    90 tablet    Take 1 tablet (80 mg) by mouth daily    Hyperlipidemia with target LDL less than 70       blood glucose monitoring meter device kit     1 kit    Use to test blood sugars FOUR times  daily or as directed.   This order is for whatever meter patient's insurance will cover.    Type 2 diabetes mellitus with diabetic polyneuropathy (H)       blood glucose monitoring test strip    no brand specified    100 each    Use to test blood sugars 1 time daily or as directed.  Please dispense strips compatible with meter    Type 2 diabetes mellitus with diabetic polyneuropathy, without long-term current use of insulin (H)       Cyanocobalamin 1000 MCG Caps     100 capsule    Take 1 capsule by mouth daily    B12 deficiency       isosorbide mononitrate 30 MG 24 hr tablet    IMDUR    90 tablet    Take 1 tablet (30 mg) by mouth every morning    ASCVD (arteriosclerotic cardiovascular disease)       latanoprost 0.005 % ophthalmic solution    XALATAN     Place 1 drop into both eyes every evening        lisinopril 20 MG tablet    PRINIVIL/ZESTRIL    90 tablet    TAKE ONE TABLET BY MOUTH ONE TIME DAILY    Hypertension goal BP (blood pressure) < 140/90       metFORMIN 500 MG tablet    GLUCOPHAGE    180 tablet    Take 1 tablet (500 mg) by mouth 2 times daily (with meals)    Type 2 diabetes mellitus with diabetic polyneuropathy, without long-term current use of insulin (H)       metoprolol tartrate 50 MG tablet    LOPRESSOR    90 tablet    take one-half tablet by mouth twice daily    ASCVD (arteriosclerotic cardiovascular disease), Ischemic cardiomyopathy, Hypertension goal BP (blood pressure) < 140/90       multivitamin, therapeutic with minerals Tabs tablet      Take 1 tablet by mouth daily        nitroGLYcerin 0.4 MG sublingual tablet    NITROSTAT    25 tablet    Place 1 tablet (0.4 mg) under the tongue every 5 minutes as needed for chest pain Up to 3 doses max.    ASCVD (arteriosclerotic cardiovascular disease), Ischemic cardiomyopathy       ranitidine 150 MG tablet    ZANTAC    180 tablet    TAKE ONE TABLET BY MOUTH TWO TIMES DAILY    Gastroesophageal reflux disease without esophagitis

## 2018-05-11 NOTE — PROGRESS NOTES
Please send results letter:  Your Hemoglobin A1C (a test assessing overall blood sugar control for the last 3 months) and your urine albumin (protein) level look good!  Beatriz Betancur MD

## 2018-06-05 ENCOUNTER — TELEPHONE (OUTPATIENT)
Dept: CARDIOLOGY | Facility: CLINIC | Age: 78
End: 2018-06-05

## 2018-06-05 ENCOUNTER — HOSPITAL ENCOUNTER (OUTPATIENT)
Dept: CARDIOLOGY | Facility: CLINIC | Age: 78
Discharge: HOME | End: 2018-06-05
Attending: INTERNAL MEDICINE | Admitting: INTERNAL MEDICINE
Payer: COMMERCIAL

## 2018-06-05 VITALS — SYSTOLIC BLOOD PRESSURE: 128 MMHG | HEART RATE: 58 BPM | DIASTOLIC BLOOD PRESSURE: 68 MMHG

## 2018-06-05 DIAGNOSIS — I25.10 CORONARY ARTERY DISEASE INVOLVING NATIVE CORONARY ARTERY OF NATIVE HEART WITHOUT ANGINA PECTORIS: Primary | ICD-10-CM

## 2018-06-05 DIAGNOSIS — R07.9 ACUTE CHEST PAIN: ICD-10-CM

## 2018-06-05 PROCEDURE — A9502 TC99M TETROFOSMIN: HCPCS | Performed by: INTERNAL MEDICINE

## 2018-06-05 PROCEDURE — 78452 HT MUSCLE IMAGE SPECT MULT: CPT | Mod: 26 | Performed by: INTERNAL MEDICINE

## 2018-06-05 PROCEDURE — 34300033 ZZH RX 343: Performed by: INTERNAL MEDICINE

## 2018-06-05 PROCEDURE — 25000128 H RX IP 250 OP 636: Performed by: INTERNAL MEDICINE

## 2018-06-05 PROCEDURE — 93017 CV STRESS TEST TRACING ONLY: CPT

## 2018-06-05 PROCEDURE — 93018 CV STRESS TEST I&R ONLY: CPT | Performed by: INTERNAL MEDICINE

## 2018-06-05 PROCEDURE — 93016 CV STRESS TEST SUPVJ ONLY: CPT | Performed by: INTERNAL MEDICINE

## 2018-06-05 RX ORDER — AMINOPHYLLINE 25 MG/ML
50-100 INJECTION, SOLUTION INTRAVENOUS
Status: DISCONTINUED | OUTPATIENT
Start: 2018-06-05 | End: 2018-06-06 | Stop reason: HOSPADM

## 2018-06-05 RX ORDER — CAFFEINE 200 MG
200 TABLET ORAL
Status: DISCONTINUED | OUTPATIENT
Start: 2018-06-05 | End: 2018-06-06 | Stop reason: HOSPADM

## 2018-06-05 RX ORDER — ALBUTEROL SULFATE 90 UG/1
2 AEROSOL, METERED RESPIRATORY (INHALATION) EVERY 5 MIN PRN
Status: DISCONTINUED | OUTPATIENT
Start: 2018-06-05 | End: 2018-06-06 | Stop reason: HOSPADM

## 2018-06-05 RX ORDER — CAFFEINE CITRATE 20 MG/ML
60 SOLUTION INTRAVENOUS
Status: DISCONTINUED | OUTPATIENT
Start: 2018-06-05 | End: 2018-06-06 | Stop reason: HOSPADM

## 2018-06-05 RX ORDER — REGADENOSON 0.08 MG/ML
0.4 INJECTION, SOLUTION INTRAVENOUS ONCE
Status: COMPLETED | OUTPATIENT
Start: 2018-06-05 | End: 2018-06-05

## 2018-06-05 RX ORDER — ACYCLOVIR 200 MG/1
0-1 CAPSULE ORAL
Status: DISCONTINUED | OUTPATIENT
Start: 2018-06-05 | End: 2018-06-06 | Stop reason: HOSPADM

## 2018-06-05 RX ADMIN — TETROFOSMIN 28.3 MCI.: 1.38 INJECTION, POWDER, LYOPHILIZED, FOR SOLUTION INTRAVENOUS at 09:44

## 2018-06-05 RX ADMIN — TETROFOSMIN 9.14 MCI.: 1.38 INJECTION, POWDER, LYOPHILIZED, FOR SOLUTION INTRAVENOUS at 08:22

## 2018-06-05 RX ADMIN — REGADENOSON 0.4 MG: 0.08 INJECTION, SOLUTION INTRAVENOUS at 09:37

## 2018-06-05 NOTE — TELEPHONE ENCOUNTER
Nuclear stress test 6-5-18 Per OV 5-2-18 with Dr. Gutierrez - Mr. Medina initially denied any chest, neck, arm or upper back discomfort.  Then he remembered 2 weeks ago having some transient chest tightness and dyspnea on the treadmill.  It has not recurred.  1.  Myocardial perfusion imaging using single isotope technique  demonstrated small to medium size partially reversible inferior defect  consistent with mild ischemia in the mid to basal inferior portion in  the RCA distribution. A small basal nontransmural scar is not entirely  excluded. Presence of visceral tracer artifact decreases specificity.  2. Gated images demonstrated normal size left ventricle with mild  basal inferior hypokinesis.  The left ventricular systolic function is  48%, mildly reduced.  3. Compared to the prior study from 2014, this study again reveals a  partially reversible inferior defect although slightly more reversible  than prior study . EF slightly lower compared to 2014.  [place]: Dr. Clark.   Dr. Matt lin.

## 2018-06-09 NOTE — TELEPHONE ENCOUNTER
Recommend LITZY follow-up:  1. If he has had or in future has recurrent exertional or rest angina, recommend coronary angiogram.  2. If he is asymptomatic, he should understand that placing a stent if the RCA has re-stenosed will not prevent a heart attack but if he has more symptoms, the situation would be changed and then coronary angiogram recommended.  3. Recommend echo to assess LV function.  CD

## 2018-06-11 NOTE — TELEPHONE ENCOUNTER
Order placed for LITZY visit and echo.   Spoke with patient to review Dr. Gutierrez's recommendation for echo and LITZY visit to discuss results of echo and nuclear study. Reviewed with patient to call the clinic if any further episodes of chest discomfort. Patient verbalized understanding and agreed with plan.  Message to scheduling to contact patient to set up appointments.

## 2018-06-12 DIAGNOSIS — K21.9 GASTROESOPHAGEAL REFLUX DISEASE WITHOUT ESOPHAGITIS: ICD-10-CM

## 2018-06-13 NOTE — TELEPHONE ENCOUNTER
"Requested Prescriptions   Pending Prescriptions Disp Refills     ranitidine (ZANTAC) 150 MG tablet [Pharmacy Med Name: RaNITidine HCl Oral Tablet 150 MG]  Last Written Prescription Date:  9/5/17  Last Fill Quantity: 180,  # refills: 2   Last office visit: 5/10/2018 with prescribing provider:     Future Office Visit:   Next 5 appointments (look out 90 days)     Jul 19, 2018  8:00 AM CDT   Return Visit with Chuck Ayers PA-C   [facility] ([facility])    [address] [place] [id number] [phone] OPT 2                180 tablet 1     Sig: TAKE ONE TABLET BY MOUTH TWO TIMES DAILY    H2 Blockers Protocol Passed    6/12/2018  7:00 PM       Passed - Patient is age 12 or older       Passed - Recent (12 mo) or future (30 days) visit within the authorizing provider's specialty    Patient had office visit in the last 12 months or has a visit in the next 30 days with authorizing provider or within the authorizing provider's specialty.  See \"Patient Info\" tab in inbasket, or \"Choose Columns\" in Meds & Orders section of the refill encounter.            "

## 2018-06-14 NOTE — TELEPHONE ENCOUNTER
Prescription approved per [facility] Refill Protocol.    Signed Prescriptions:                        Disp   Refills    ranitidine (ZANTAC) 150 MG tablet          180 ta*3        Sig: TAKE ONE TABLET BY MOUTH TWO TIMES DAILY  Authorizing Provider: BLANCA KING  Ordering User: COURTNEY CHUNG      Closing encounter - no further actions needed at this time    Courtney Chung RN

## 2018-06-21 ENCOUNTER — HOSPITAL ENCOUNTER (OUTPATIENT)
Dept: CARDIOLOGY | Facility: CLINIC | Age: 78
Discharge: HOME | End: 2018-06-21
Attending: INTERNAL MEDICINE | Admitting: INTERNAL MEDICINE
Payer: COMMERCIAL

## 2018-06-21 DIAGNOSIS — I25.10 CORONARY ARTERY DISEASE INVOLVING NATIVE CORONARY ARTERY OF NATIVE HEART WITHOUT ANGINA PECTORIS: ICD-10-CM

## 2018-06-21 PROCEDURE — 93306 TTE W/DOPPLER COMPLETE: CPT | Mod: 26 | Performed by: INTERNAL MEDICINE

## 2018-06-21 PROCEDURE — 25500064 ZZH RX 255 OP 636: Performed by: INTERNAL MEDICINE

## 2018-06-21 PROCEDURE — 93306 TTE W/DOPPLER COMPLETE: CPT

## 2018-06-21 RX ADMIN — HUMAN ALBUMIN MICROSPHERES AND PERFLUTREN 9 ML: 10; .22 INJECTION, SOLUTION INTRAVENOUS at 09:45

## 2018-07-03 DIAGNOSIS — I25.10 ASCVD (ARTERIOSCLEROTIC CARDIOVASCULAR DISEASE): ICD-10-CM

## 2018-07-03 RX ORDER — ISOSORBIDE MONONITRATE 30 MG/1
30 TABLET, EXTENDED RELEASE ORAL EVERY MORNING
Qty: 90 TABLET | Refills: 3 | Status: SHIPPED | OUTPATIENT
Start: 2018-07-03 | End: 2019-05-20

## 2018-07-19 ENCOUNTER — OFFICE VISIT (OUTPATIENT)
Dept: CARDIOLOGY | Facility: CLINIC | Age: 78
End: 2018-07-19
Attending: INTERNAL MEDICINE
Payer: COMMERCIAL

## 2018-07-19 VITALS
HEART RATE: 72 BPM | DIASTOLIC BLOOD PRESSURE: 76 MMHG | BODY MASS INDEX: 32.47 KG/M2 | WEIGHT: 253 LBS | HEIGHT: 74 IN | SYSTOLIC BLOOD PRESSURE: 130 MMHG

## 2018-07-19 DIAGNOSIS — I77.810 AORTIC ROOT DILATATION (H): ICD-10-CM

## 2018-07-19 DIAGNOSIS — I10 BENIGN ESSENTIAL HYPERTENSION: ICD-10-CM

## 2018-07-19 DIAGNOSIS — I71.40 AAA (ABDOMINAL AORTIC ANEURYSM) WITHOUT RUPTURE (H): ICD-10-CM

## 2018-07-19 DIAGNOSIS — E78.5 HYPERLIPIDEMIA LDL GOAL <70: ICD-10-CM

## 2018-07-19 DIAGNOSIS — I25.10 CORONARY ARTERY DISEASE INVOLVING NATIVE CORONARY ARTERY OF NATIVE HEART WITHOUT ANGINA PECTORIS: Primary | ICD-10-CM

## 2018-07-19 PROCEDURE — 99214 OFFICE O/P EST MOD 30 MIN: CPT | Performed by: PHYSICIAN ASSISTANT

## 2018-07-19 NOTE — LETTER
7/19/2018    Beatriz Betancur MD  [address]  [facility] [address]    RE: David Medina       Dear Colleague,    I had the pleasure of seeing David Medina in the [facility].    Primary Cardiologist: Dr. Gutierrez    History of Present Illness:   This is a pleasant 78 year old male who presents to cardiology clinic for discussion of his recent nuclear stress test and echocardiogram.     His past medical history is notable for CAD (RCA stenting in 2014 and OM and LAD stenting in 2005), HTN, HLD, chronic back pain, and osteoarthritis.     He saw Dr. Gutierrez recently for routine follow up at which time he described one episode of chest tightness after walking on treadmill for 15-20 min. This only occurred once and he had no other symptoms. He was set up for nuclear stress test which showed mild ischemia in the RCA territory. This was reviewed by Dr. Gutierrez who recommended coronary angiogram if patient continued to have chest discomfort. He was also set up for echocardiogram which showed preserved LVEF with inferior hypokinesis similar to previous study in 2016.     He returns to clinic, stating that he is overall doing well. He denies any recurrent chest discomfort. He continues to be active with daily exercises such as walking or biking. He denies SOB, nausea, dizziness, or palpitations. At this time he is very reluctant to proceed with coronary angiogram as he feels fine. He prefers to continue to monitor for any recurrence. He post have nitroglycerin tablets with him.     Assessment and Plan:   This is a pleasant 78 year old male who presents to cardiology clinic for discussion of his recent nuclear stress test and echocardiogram.     His past medical history is notable for CAD (RCA stenting in 2014 and OM and LAD stenting in 2005), HTN, HLD, chronic back pain, and osteoarthritis.     His nuclear stress test does show mild ischemia in the inferior area. His echocardiogram showed  preserved LVEF with unchanged RWMA in the inferior region. Dr. Gutierrez recommended proceeding with coronary angiogram if he continued to have symptoms. He denies any recurrence. He prefers to monitor further. He is aware of the risks.     We did go over all of the risks and benefits for coronary angiogram. This includes but is not limited to death, heart attack, stroke, blood clots, bleeding including the need for blood transfusion and the risk thereof, allergic reaction to x-ray dye, arrhythmia necessitating cardioversion, dye nephropathy.  We talked about intracoronary stenting and the risks thereof and bypass surgery. He verbalized understanding and will contact us if he has any recurrent chest tightness.     No history of bleeding problems or current bleeding, and no scheduled surgeries or procedures in the next year. Patient understands and wishes to proceed with it.    Thank you for allowing me to participate in the care of this patient today.    This note was completed in part using Dragon voice recognition software. Although reviewed after completion, some word and grammatical errors may occur.    Orders this Visit:  No orders of the defined types were placed in this encounter.    No orders of the defined types were placed in this encounter.    There are no discontinued medications.      Encounter Diagnoses   Name Primary?     Coronary artery disease involving native coronary artery of native heart without angina pectoris      Aortic root dilatation (H) Yes     AAA (abdominal aortic aneurysm) without rupture (H)        CURRENT MEDICATIONS:  Current Outpatient Prescriptions   Medication Sig Dispense Refill     aspirin EC 81 MG tablet Take 1 tablet (81 mg) by mouth daily 90 tablet 3     atorvastatin (LIPITOR) 80 MG tablet Take 1 tablet (80 mg) by mouth daily 90 tablet 3     Cyanocobalamin 1000 MCG CAPS Take 1 capsule by mouth daily 100 capsule 3     isosorbide mononitrate (IMDUR) 30 MG 24 hr tablet Take 1 tablet  (30 mg) by mouth every morning 90 tablet 3     latanoprost (XALATAN) 0.005 % ophthalmic solution Place 1 drop into both eyes every evening       lisinopril (PRINIVIL/ZESTRIL) 20 MG tablet TAKE ONE TABLET BY MOUTH ONE TIME DAILY  90 tablet 1     metFORMIN (GLUCOPHAGE) 500 MG tablet Take 1 tablet (500 mg) by mouth 2 times daily (with meals) 180 tablet 1     metoprolol tartrate (LOPRESSOR) 50 MG tablet take one-half tablet by mouth twice daily 90 tablet 3     multivitamin, therapeutic with minerals (THERA-VIT-M) TABS Take 1 tablet by mouth daily       nitroGLYcerin (NITROSTAT) 0.4 MG sublingual tablet Place 1 tablet (0.4 mg) under the tongue every 5 minutes as needed for chest pain Up to 3 doses max. 25 tablet 3     ranitidine (ZANTAC) 150 MG tablet TAKE ONE TABLET BY MOUTH TWO TIMES DAILY 180 tablet 3     blood glucose monitoring ([employer] CONTOUR MONITOR) meter device kit Use to test blood sugars FOUR times daily or as directed.   This order is for whatever meter patient's insurance will cover. 1 kit 0     blood glucose monitoring (NO BRAND SPECIFIED) test strip Use to test blood sugars 1 time daily or as directed.  Please dispense strips compatible with meter 100 each 3       ALLERGIES     Allergies   Allergen Reactions     No Known Allergies        PAST MEDICAL HISTORY:  Past Medical History:   Diagnosis Date     AAA (abdominal aortic aneurysm) without rupture (H)     stent 2005, s/p endovascular repair 12/07     Allergic rhinitis      Aortic root dilatation (H)      ASCVD (arteriosclerotic cardiovascular disease) 6/05    Cardiac cath Oct 2014: EMEKA to RCA, cath 2011: medical management, cath Aug 2005: EMEKA to OM; cath June 2005: EMEKA to LAD     B12 deficiency      Benign paroxysmal positional vertigo      Cervical radiculopathy 10/30/2008    Injected through [facility] 10/07     Closed fracture of patella 2/07    left     DM type 2 (diabetes mellitus, type 2) (H)      Hyperlipidemia LDL goal < 70      Hypertension goal  BP (blood pressure) < 130/80      Ischemic cardiomyopathy 6/05    ischemic cardiomyopathy, EF 45%     Lumbosacral plexopathy     diabetic radiculoplexopathy causing proximal muscle weakness, Dr. 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 Villa (neuro) and Dr. 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 Jacob ARCEO APPENDECTOMY  12/06    open     CLOSED RX TIBIA SHAFT FX  6/2013    RT prox tibial fracture     COLONOSCOPY  10/3/05    WNL, rpt in 10 yrs, Dr. Vicente, [place] [facility]     COLONOSCOPY  9/23/15    advanced adenoma, rpt 3 yrs     coronary angiogram  2/28/11    diffuse disease with vasospasm     EMG  6/10/10    chronic active polyradiculopathy, mild generalized polyneuropathy, r/o lumbar stenosis     ENDOVAS REPAIR, INFRARENL ABDOM AORTIC ANEURYSM/DISSECT; [id number]/AORTO-UNIFEMORAL PROSTHESIS  6/05    one done,one to do     FLEXIBLE SIGMOIDOSCOPY  11/00    WNL     HC PTCA SINGLE VESSEL  6/05    LAD - 100% stenosis     HC UGI ENDOSCOPY, SIMPLE EXAM  8/99    erosive esophagitis, GERD, Dr. Salazar     HEART CATH, ANGIOPLASTY  10/3/14    EMEKA x 1  ostium of the RCA     LAMINECTOMY THORACIC THREE LEVELS  10/31/12    T10, T11, T12 with medial facetectomy     OPEN REDUCTION INTERNAL FIXATION HIP NAILING N/A 11/1/2016    Procedure: OPEN REDUCTION INTERNAL FIXATION HIP NAILING;  Surgeon:  "Jason Flowers MD;  Location:  OR     [facility] ABD RETROPERITNL  11/7/06    AAA 4.2 x 4.8, Dr. James's office     STRESS LEXISCAN TEST  10/5/12    small, fixed, apical defect/apical thinning     SURGICAL HISTORY OF -       tonsillectomy     TONSILLECTOMY  1939       FAMILY HISTORY:  Family History   Problem Relation Age of Onset     Respiratory Father      TB     Other - See Comments Mother      FALL       SOCIAL HISTORY:  Social History     Social History     Marital status:      Spouse name: Sarika     Number of children: 4     Years of education: N/A     Occupational History      Retired     Social History Main Topics     Smoking status: Former Smoker     Packs/day: 3.50     Years: 19.00     Types: Cigarettes     Quit date: 5/11/1980     Smokeless tobacco: Never Used     Alcohol use Yes      Comment: beer 1-2x/week     Drug use: No     Sexual activity: Yes     Partners: Female     Other Topics Concern     Parent/Sibling W/ Cabg, Mi Or Angioplasty Before 65f 55m? No     Caffeine Concern Yes     2 cups of coffee a day.       Sleep Concern No     Stress Concern No     Weight Concern No     Special Diet No     Exercise Yes     Cardiac rehab 1 x week, treadmill daily     Seat Belt Yes     Social History Narrative       Review of Systems:  Skin:  Negative     Eyes:  Positive for glasses  ENT:  Negative    Respiratory:  Negative    Cardiovascular:  Negative    Gastroenterology: Positive for heartburn  Genitourinary:  not assessed    Musculoskeletal:  Positive for arthritis;joint pain;joint stiffness  Neurologic:  Positive for numbness or tingling of hands;numbness or tingling of feet;incoordination  Psychiatric:  Positive for sleep disturbances  Heme/Lymph/Imm:  Negative    Endocrine:  Positive for diabetes    Physical Exam:  Vitals: /76  Pulse 72  Ht 1.88 m (6' 2\")  Wt 114.8 kg (253 lb)  BMI 32.48 kg/m2     GEN:  NAD  NECK: No JVD  C/V:  Regular rate and rhythm, no murmur, rub or gallop.  RESP: " Clear to auscultation bilaterally without wheezing, rales, or rhonchi.  GI: Abdomen soft, nontender, nondistended. No HSM appreciated.   EXTREM: No LE edema.   NEURO: Alert and oriented, cooperative. No obvious focal deficits.   PSYCH: Normal affect.  SKIN: Warm and dry.       Recent Lab Results:  LIPID RESULTS:  Lab Results   Component Value Date    CHOL 145 09/25/2017    HDL 56 09/25/2017    LDL 66 09/25/2017    TRIG 114 09/25/2017    CHOLHDLRATIO 2.4 09/17/2015       LIVER ENZYME RESULTS:  Lab Results   Component Value Date    AST 23 09/25/2017    ALT 38 09/25/2017       CBC RESULTS:  Lab Results   Component Value Date    WBC 7.5 10/03/2014    RBC 4.56 10/03/2014    HGB 13.2 (L) 11/01/2016    HCT 42.0 10/03/2014    MCV 92 10/03/2014    MCH 32.0 10/03/2014    MCHC 34.8 10/03/2014    RDW 13.7 10/03/2014     10/03/2014       BMP RESULTS:  Lab Results   Component Value Date     09/25/2017    POTASSIUM 4.4 09/25/2017    CHLORIDE 104 09/25/2017    CO2 23 09/25/2017    ANIONGAP 9 09/25/2017     (H) 09/25/2017    BUN 33 (H) 09/25/2017    CR 1.07 09/25/2017    GFRESTIMATED 67 09/25/2017    GFRESTBLACK 81 09/25/2017    GENEVIEVE 9.4 09/25/2017        A1C RESULTS:  Lab Results   Component Value Date    A1C 6.7 (H) 05/10/2018       INR RESULTS:  Lab Results   Component Value Date    INR 0.95 10/03/2014    INR 0.96 02/26/2011             Thank you for allowing me to participate in the care of your patient.    Sincerely,     Chuck Ayers PA-C     [facility]

## 2018-07-19 NOTE — PROGRESS NOTES
Primary Cardiologist: Dr. Gutierrez    History of Present Illness:   This is a pleasant 78 year old male who presents to cardiology clinic for discussion of his recent nuclear stress test and echocardiogram.     His past medical history is notable for CAD (RCA stenting in 2014 and OM and LAD stenting in 2005), HTN, HLD, chronic back pain, and osteoarthritis.     He saw Dr. Gutierrez recently for routine follow up at which time he described one episode of chest tightness after walking on treadmill for 15-20 min. This only occurred once and he had no other symptoms. He was set up for nuclear stress test which showed mild ischemia in the RCA territory. This was reviewed by Dr. Gutierrez who recommended coronary angiogram if patient continued to have chest discomfort. He was also set up for echocardiogram which showed preserved LVEF with inferior hypokinesis similar to previous study in 2016.     He returns to clinic, stating that he is overall doing well. He denies any recurrent chest discomfort. He continues to be active with daily exercises such as walking or biking. He denies SOB, nausea, dizziness, or palpitations. At this time he is very reluctant to proceed with coronary angiogram as he feels fine. He prefers to continue to monitor for any recurrence. He post have nitroglycerin tablets with him.     Assessment and Plan:   This is a pleasant 78 year old male who presents to cardiology clinic for discussion of his recent nuclear stress test and echocardiogram.     His past medical history is notable for CAD (RCA stenting in 2014 and OM and LAD stenting in 2005), HTN, HLD, chronic back pain, and osteoarthritis.     His nuclear stress test does show mild ischemia in the inferior area. His echocardiogram showed preserved LVEF with unchanged RWMA in the inferior region. Dr. Gutierrez recommended proceeding with coronary angiogram if he continued to have symptoms. He denies any recurrence. He prefers to monitor further. He is aware of the  risks.     We did go over all of the risks and benefits for coronary angiogram. This includes but is not limited to death, heart attack, stroke, blood clots, bleeding including the need for blood transfusion and the risk thereof, allergic reaction to x-ray dye, arrhythmia necessitating cardioversion, dye nephropathy.  We talked about intracoronary stenting and the risks thereof and bypass surgery. He verbalized understanding and will contact us if he has any recurrent chest tightness.     No history of bleeding problems or current bleeding, and no scheduled surgeries or procedures in the next year. Patient understands and wishes to proceed with it.    Thank you for allowing me to participate in the care of this patient today.    This note was completed in part using Dragon voice recognition software. Although reviewed after completion, some word and grammatical errors may occur.    Orders this Visit:  No orders of the defined types were placed in this encounter.    No orders of the defined types were placed in this encounter.    There are no discontinued medications.      Encounter Diagnoses   Name Primary?     Coronary artery disease involving native coronary artery of native heart without angina pectoris      Aortic root dilatation (H) Yes     AAA (abdominal aortic aneurysm) without rupture (H)        CURRENT MEDICATIONS:  Current Outpatient Prescriptions   Medication Sig Dispense Refill     aspirin EC 81 MG tablet Take 1 tablet (81 mg) by mouth daily 90 tablet 3     atorvastatin (LIPITOR) 80 MG tablet Take 1 tablet (80 mg) by mouth daily 90 tablet 3     Cyanocobalamin 1000 MCG CAPS Take 1 capsule by mouth daily 100 capsule 3     isosorbide mononitrate (IMDUR) 30 MG 24 hr tablet Take 1 tablet (30 mg) by mouth every morning 90 tablet 3     latanoprost (XALATAN) 0.005 % ophthalmic solution Place 1 drop into both eyes every evening       lisinopril (PRINIVIL/ZESTRIL) 20 MG tablet TAKE ONE TABLET BY MOUTH ONE TIME  DAILY  90 tablet 1     metFORMIN (GLUCOPHAGE) 500 MG tablet Take 1 tablet (500 mg) by mouth 2 times daily (with meals) 180 tablet 1     metoprolol tartrate (LOPRESSOR) 50 MG tablet take one-half tablet by mouth twice daily 90 tablet 3     multivitamin, therapeutic with minerals (THERA-VIT-M) TABS Take 1 tablet by mouth daily       nitroGLYcerin (NITROSTAT) 0.4 MG sublingual tablet Place 1 tablet (0.4 mg) under the tongue every 5 minutes as needed for chest pain Up to 3 doses max. 25 tablet 3     ranitidine (ZANTAC) 150 MG tablet TAKE ONE TABLET BY MOUTH TWO TIMES DAILY 180 tablet 3     blood glucose monitoring ([employer] CONTOUR MONITOR) meter device kit Use to test blood sugars FOUR times daily or as directed.   This order is for whatever meter patient's insurance will cover. 1 kit 0     blood glucose monitoring (NO BRAND SPECIFIED) test strip Use to test blood sugars 1 time daily or as directed.  Please dispense strips compatible with meter 100 each 3       ALLERGIES     Allergies   Allergen Reactions     No Known Allergies        PAST MEDICAL HISTORY:  Past Medical History:   Diagnosis Date     AAA (abdominal aortic aneurysm) without rupture (H)     stent 2005, s/p endovascular repair 12/07     Allergic rhinitis      Aortic root dilatation (H)      ASCVD (arteriosclerotic cardiovascular disease) 6/05    Cardiac cath Oct 2014: EMEKA to RCA, cath 2011: medical management, cath Aug 2005: EMEKA to OM; cath June 2005: EMEKA to LAD     B12 deficiency      Benign paroxysmal positional vertigo      Cervical radiculopathy 10/30/2008    Injected through [facility] 10/07     Closed fracture of patella 2/07    left     DM type 2 (diabetes mellitus, type 2) (H)      Hyperlipidemia LDL goal < 70      Hypertension goal BP (blood pressure) < 130/80      Ischemic cardiomyopathy 6/05    ischemic cardiomyopathy, EF 45%     Lumbosacral plexopathy     diabetic radiculoplexopathy causing proximal muscle weakness, Dr. Edvin Martinez  depression 5/10/2006     NSTEMI (non-ST elevation myocardial infarction) (H) 2/11    diffuse stenoses not amenable to stenting, decision to pursue medical management     Other specified congenital anomaly of genital organs     absent left testis (has had workup for undescended testis - none found)     Spinal stenosis of thoracic region     T10-T12, Dr. Villa (neuro) and Dr. 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 Jacob ARCEO APPENDECTOMY  12/06    open     CLOSED RX TIBIA SHAFT FX  6/2013    RT prox tibial fracture     COLONOSCOPY  10/3/05    WNL, rpt in 10 yrs, Dr. Vicente, [place] [facility]     COLONOSCOPY  9/23/15    advanced adenoma, rpt 3 yrs     coronary angiogram  2/28/11    diffuse disease with vasospasm     EMG  6/10/10    chronic active polyradiculopathy, mild generalized polyneuropathy, r/o lumbar stenosis     ENDOVAS REPAIR, INFRARENL ABDOM AORTIC ANEURYSM/DISSECT; [id number]/AORTO-UNIFEMORAL PROSTHESIS  6/05    one done,one to do     FLEXIBLE SIGMOIDOSCOPY  11/00    WNL     HC PTCA SINGLE VESSEL  6/05    LAD - 100% stenosis     HC UGI ENDOSCOPY, SIMPLE EXAM  8/99    erosive esophagitis, GERD, Dr. Salazar     HEART CATH, ANGIOPLASTY  10/3/14    EMEKA x 1  ostium of the RCA     LAMINECTOMY THORACIC THREE LEVELS  10/31/12    T10, T11, T12 with medial facetectomy     OPEN REDUCTION INTERNAL FIXATION HIP NAILING N/A 11/1/2016    Procedure: OPEN REDUCTION INTERNAL FIXATION HIP NAILING;  Surgeon: Jason Flowers MD;  Location: [facility] OR     [facility] ABD RETROPERITNL  11/7/06    AAA 4.2 x 4.8, Dr. James's office     STRESS LEXISCAN TEST  10/5/12    small, fixed, apical defect/apical thinning     SURGICAL  "HISTORY OF -       tonsillectomy     TONSILLECTOMY  1939       FAMILY HISTORY:  Family History   Problem Relation Age of Onset     Respiratory Father      TB     Other - See Comments Mother      FALL       SOCIAL HISTORY:  Social History     Social History     Marital status:      Spouse name: Sarika     Number of children: 4     Years of education: N/A     Occupational History      Retired     Social History Main Topics     Smoking status: Former Smoker     Packs/day: 3.50     Years: 19.00     Types: Cigarettes     Quit date: 5/11/1980     Smokeless tobacco: Never Used     Alcohol use Yes      Comment: beer 1-2x/week     Drug use: No     Sexual activity: Yes     Partners: Female     Other Topics Concern     Parent/Sibling W/ Cabg, Mi Or Angioplasty Before 65f 55m? No     Caffeine Concern Yes     2 cups of coffee a day.       Sleep Concern No     Stress Concern No     Weight Concern No     Special Diet No     Exercise Yes     Cardiac rehab 1 x week, treadmill daily     Seat Belt Yes     Social History Narrative       Review of Systems:  Skin:  Negative     Eyes:  Positive for glasses  ENT:  Negative    Respiratory:  Negative    Cardiovascular:  Negative    Gastroenterology: Positive for heartburn  Genitourinary:  not assessed    Musculoskeletal:  Positive for arthritis;joint pain;joint stiffness  Neurologic:  Positive for numbness or tingling of hands;numbness or tingling of feet;incoordination  Psychiatric:  Positive for sleep disturbances  Heme/Lymph/Imm:  Negative    Endocrine:  Positive for diabetes    Physical Exam:  Vitals: /76  Pulse 72  Ht 1.88 m (6' 2\")  Wt 114.8 kg (253 lb)  BMI 32.48 kg/m2     GEN:  NAD  NECK: No JVD  C/V:  Regular rate and rhythm, no murmur, rub or gallop.  RESP: Clear to auscultation bilaterally without wheezing, rales, or rhonchi.  GI: Abdomen soft, nontender, nondistended. No HSM appreciated.   EXTREM: No LE edema.   NEURO: Alert and oriented, cooperative. No obvious " focal deficits.   PSYCH: Normal affect.  SKIN: Warm and dry.       Recent Lab Results:  LIPID RESULTS:  Lab Results   Component Value Date    CHOL 145 09/25/2017    HDL 56 09/25/2017    LDL 66 09/25/2017    TRIG 114 09/25/2017    CHOLHDLRATIO 2.4 09/17/2015       LIVER ENZYME RESULTS:  Lab Results   Component Value Date    AST 23 09/25/2017    ALT 38 09/25/2017       CBC RESULTS:  Lab Results   Component Value Date    WBC 7.5 10/03/2014    RBC 4.56 10/03/2014    HGB 13.2 (L) 11/01/2016    HCT 42.0 10/03/2014    MCV 92 10/03/2014    MCH 32.0 10/03/2014    MCHC 34.8 10/03/2014    RDW 13.7 10/03/2014     10/03/2014       BMP RESULTS:  Lab Results   Component Value Date     09/25/2017    POTASSIUM 4.4 09/25/2017    CHLORIDE 104 09/25/2017    CO2 23 09/25/2017    ANIONGAP 9 09/25/2017     (H) 09/25/2017    BUN 33 (H) 09/25/2017    CR 1.07 09/25/2017    GFRESTIMATED 67 09/25/2017    GFRESTBLACK 81 09/25/2017    GENEVIEVE 9.4 09/25/2017        A1C RESULTS:  Lab Results   Component Value Date    A1C 6.7 (H) 05/10/2018       INR RESULTS:  Lab Results   Component Value Date    INR 0.95 10/03/2014    INR 0.96 02/26/2011           Chuck Ayers PA-C   July 19, 2018

## 2018-07-19 NOTE — MR AVS SNAPSHOT
"              After Visit Summary   7/19/2018    David Medina    MRN: [id number]           Patient Information     Date Of Birth          1940        Visit Information        Provider Department      7/19/2018 8:00 AM Chuck Ayers PA-C [facility]        Today's Diagnoses     Coronary artery disease involving native coronary artery of native heart without angina pectoris    -  1    Aortic root dilatation (H)        AAA (abdominal aortic aneurysm) without rupture (H)        Hyperlipidemia LDL goal <70        Benign essential hypertension           Follow-ups after your visit        Who to contact     If you have questions or need follow up information about today's clinic visit or your schedule please contact [facility] directly at [phone].  Normal or non-critical lab and imaging results will be communicated to you by BRIKAhart, letter or phone within 4 business days after the clinic has received the results. If you do not hear from us within 7 days, please contact the clinic through BRIKAhart or phone. If you have a critical or abnormal lab result, we will notify you by phone as soon as possible.  Submit refill requests through [employer] or call your pharmacy and they will forward the refill request to us. Please allow 3 business days for your refill to be completed.          Additional Information About Your Visit        BRIKAharStoke Information     [facility] [facility] [employer] lets you send messages to your doctor, view your test results, renew your prescriptions, schedule appointments and more. To sign up, go to [email or web address] . Click on \"Log in\" on the left side of the screen, which will take you to the Welcome page. Then click on \"Sign up Now\" on the right side of the page.     You will be asked to enter the access code listed below, as well as some personal information. Please follow the directions to create your username and " "password.     Your access code is: [email or web address]  Expires: 9/3/2018  7:50 AM     Your access code will  in 90 days. If you need help or a new code, please call your [facility] or [phone].        Care EveryWhere ID     This is your Care EveryWhere ID. This could be used by other organizations to access your [facility] medical records  [id number]        Your Vitals Were     Pulse Height BMI (Body Mass Index)             72 1.88 m (6' 2\") 32.48 kg/m2          Blood Pressure from Last 3 Encounters:   18 130/76   18 128/68   05/10/18 116/80    Weight from Last 3 Encounters:   18 114.8 kg (253 lb)   05/10/18 114.8 kg (253 lb)   18 112.5 kg (248 lb)              We Performed the Following     Follow-Up with Cardiac Advanced Practice Provider        Primary Care Provider Office Phone # Fax #    Beatriz Betancur [phone] [address] [place] [id number]        Equal Access to Services     : Hadii aleksandra ku hadasho Somosesali, waaxda luqadaha, qaybta kaalmada adedonavan, soledad bower . So [facility] [place] [phone].    ATENCIÓN: Si habla español, tiene a delgadillo disposición servicios gratuitos de asistencia lingüística. [facility] [phone].    We comply with applicable federal civil rights laws and [place] laws. We do not discriminate on the basis of race, color, national origin, age, disability, sex, sexual orientation, or gender identity.            Thank you!     Thank you for choosing [facility]  for your care. Our goal is always to provide you with excellent care. Hearing back from our patients is one way we can continue to improve our services. Please take a few minutes to complete the written survey that you may receive in the mail after your visit with us. Thank you!             Your Updated Medication List - Protect others around you: Learn how to safely use, store and throw away your " medicines at www.disposemymeds.org.          This list is accurate as of 7/19/18  9:21 AM.  Always use your most recent med list.                   Brand Name Dispense Instructions for use Diagnosis    aspirin 81 MG EC tablet     90 tablet    Take 1 tablet (81 mg) by mouth daily    CAD (coronary artery disease)       atorvastatin 80 MG tablet    LIPITOR    90 tablet    Take 1 tablet (80 mg) by mouth daily    Hyperlipidemia with target LDL less than 70       blood glucose monitoring meter device kit     1 kit    Use to test blood sugars FOUR times daily or as directed.   This order is for whatever meter patient's insurance will cover.    Type 2 diabetes mellitus with diabetic polyneuropathy (H)       blood glucose monitoring test strip    no brand specified    100 each    Use to test blood sugars 1 time daily or as directed.  Please dispense strips compatible with meter    Type 2 diabetes mellitus with diabetic polyneuropathy, without long-term current use of insulin (H)       Cyanocobalamin 1000 MCG Caps     100 capsule    Take 1 capsule by mouth daily    B12 deficiency       isosorbide mononitrate 30 MG 24 hr tablet    IMDUR    90 tablet    Take 1 tablet (30 mg) by mouth every morning    ASCVD (arteriosclerotic cardiovascular disease)       latanoprost 0.005 % ophthalmic solution    XALATAN     Place 1 drop into both eyes every evening        lisinopril 20 MG tablet    PRINIVIL/ZESTRIL    90 tablet    TAKE ONE TABLET BY MOUTH ONE TIME DAILY    Hypertension goal BP (blood pressure) < 140/90       metFORMIN 500 MG tablet    GLUCOPHAGE    180 tablet    Take 1 tablet (500 mg) by mouth 2 times daily (with meals)    Type 2 diabetes mellitus with diabetic polyneuropathy, without long-term current use of insulin (H)       metoprolol tartrate 50 MG tablet    LOPRESSOR    90 tablet    take one-half tablet by mouth twice daily    ASCVD (arteriosclerotic cardiovascular disease), Ischemic cardiomyopathy, Hypertension goal BP  (blood pressure) < 140/90       multivitamin, therapeutic with minerals Tabs tablet      Take 1 tablet by mouth daily        nitroGLYcerin 0.4 MG sublingual tablet    NITROSTAT    25 tablet    Place 1 tablet (0.4 mg) under the tongue every 5 minutes as needed for chest pain Up to 3 doses max.    ASCVD (arteriosclerotic cardiovascular disease), Ischemic cardiomyopathy       ranitidine 150 MG tablet    ZANTAC    180 tablet    TAKE ONE TABLET BY MOUTH TWO TIMES DAILY    Gastroesophageal reflux disease without esophagitis

## 2018-08-21 ENCOUNTER — TELEPHONE (OUTPATIENT)
Dept: FAMILY MEDICINE | Facility: CLINIC | Age: 78
End: 2018-08-21

## 2018-08-21 DIAGNOSIS — E11.42 TYPE 2 DIABETES MELLITUS WITH DIABETIC POLYNEUROPATHY, WITHOUT LONG-TERM CURRENT USE OF INSULIN (H): Primary | ICD-10-CM

## 2018-08-21 NOTE — TELEPHONE ENCOUNTER
"Requested Prescriptions   Pending Prescriptions Disp Refills     blood glucose monitoring (CHRISTIANNE CONTOUR MONITOR) meter device kit  Last Written Prescription Date:  8/23/2016  Last Fill Quantity: 1,  # refills: 0   Last Office Visit: 5/10/2018   Future Office Visit:      1 kit 0     Sig: Use to test blood sugars FOUR times daily or as directed.   This order is for whatever meter patient's insurance will cover.    Diabetic Supplies Protocol Passed    8/21/2018 12:16 PM       Passed - Patient is 18 years of age or older       Passed - Recent (6 mo) or future (30 days) visit within the authorizing provider's specialty    Patient had office visit in the last 6 months or has a visit in the next 30 days with authorizing provider.  See \"Patient Info\" tab in inbasket, or \"Choose Columns\" in Meds & Orders section of the refill encounter.              "

## 2018-08-21 NOTE — TELEPHONE ENCOUNTER
Meter and strips sent to pharmacy, specific brand listed in refill encounter sent. LOV 5/10/18.  Elodia Mcfadden, RN, BSN

## 2018-09-05 ENCOUNTER — TELEPHONE (OUTPATIENT)
Dept: FAMILY MEDICINE | Facility: CLINIC | Age: 78
End: 2018-09-05

## 2018-09-05 DIAGNOSIS — M72.0 DUPUYTREN'S CONTRACTURE: Primary | ICD-10-CM

## 2018-09-05 NOTE — TELEPHONE ENCOUNTER
"Dr. Betancur-Please review and advise.  Writer pended neurology and ortho  referrals.    Writer called patient and reviewed message per below from Dr. Betancur.    Patient verbalized understanding and stated:  1. Read online a prescription: Makenna-Flex can be prescribed when a \"cord can be fixed\"   A. Patient stated he \"definitely has a cord problem\"   B. Difficulty grasping objects  2. Would Dr. Betancur know of any provider patient could see regarding this type of treatment?   A. Should he see neurology or another specialist?    Thank you!  EVONNE Del Cid, KEYN, RN    "

## 2018-09-05 NOTE — TELEPHONE ENCOUNTER
Reason for Call:  Other call back    Detailed comments: Pt is requesting a call back regarding he's dupuytren's contracture. He is wondering if there is a non surgical procedure that can be done for this.    Phone Number Patient can be reached at: Home number on file [phone] (home)    Best Time: anytime    Can we leave a detailed message on this number? YES    Call taken on 9/5/2018 at 3:57 PM by Michelle Conway

## 2018-09-05 NOTE — TELEPHONE ENCOUNTER
I believe they can try steroid injections for mild contractures but surgery is the usual treatment.

## 2018-09-06 NOTE — TELEPHONE ENCOUNTER
I recommend Dr. Kandice Lyons, Orth[facility] Hand at [facility] in [place].  I put in referral for that.    Beatriz Betancur MD

## 2018-09-07 ENCOUNTER — TELEPHONE (OUTPATIENT)
Dept: FAMILY MEDICINE | Facility: CLINIC | Age: 78
End: 2018-09-07

## 2018-09-07 DIAGNOSIS — E11.42 TYPE 2 DIABETES MELLITUS WITH DIABETIC POLYNEUROPATHY (H): ICD-10-CM

## 2018-09-07 NOTE — TELEPHONE ENCOUNTER
blood glucose monitoring (ONE TOUCH DELICA) lancets (Discontinued) 4 Box 3 3/30/2016 9/22/2016 --      Sig: Use to test blood sugars four times daily or as directed.     Class: E-Prescribe     Order: [id number]     E-Prescribing Status: Receipt confirmed by pharmacy (3/30/2016 11:13 AM CDT)

## 2018-09-09 NOTE — TELEPHONE ENCOUNTER
Yes, he's diabetic.  Please approve his lancet refill request.  (Nothing loaded for me to sign)    Beatriz Betancur MD

## 2018-09-10 RX ORDER — LANCING DEVICE/LANCETS
KIT MISCELLANEOUS
Qty: 1 EACH | Refills: 1 | Status: SHIPPED | OUTPATIENT
Start: 2018-09-10 | End: 2022-07-11

## 2018-09-11 DIAGNOSIS — E11.42 TYPE 2 DIABETES MELLITUS WITH DIABETIC POLYNEUROPATHY (H): ICD-10-CM

## 2018-09-13 NOTE — TELEPHONE ENCOUNTER
blood glucose (ONE TOUCH DELICA) lancing device        Last Written Prescription Date:  9/10/2018  Last Fill Quantity: 1,   # refills: 1  Last Office Visit: 5/10/2018  Future Office visit:       Routing refill request to provider for review/approval because:  Drug not on the [facility], [facility] or  Health refill protocol or controlled substance

## 2018-09-14 RX ORDER — LANCING DEVICE/LANCETS
KIT MISCELLANEOUS
Qty: 1 EACH | Refills: 1 | OUTPATIENT
Start: 2018-09-14

## 2018-09-17 ENCOUNTER — TRANSFERRED RECORDS (OUTPATIENT)
Dept: HEALTH INFORMATION MANAGEMENT | Facility: CLINIC | Age: 78
End: 2018-09-17

## 2018-10-01 ENCOUNTER — TELEPHONE (OUTPATIENT)
Dept: FAMILY MEDICINE | Facility: CLINIC | Age: 78
End: 2018-10-01

## 2018-10-01 DIAGNOSIS — E11.42 TYPE 2 DIABETES MELLITUS WITH DIABETIC POLYNEUROPATHY (H): Primary | ICD-10-CM

## 2018-11-04 DIAGNOSIS — E78.5 HYPERLIPIDEMIA WITH TARGET LDL LESS THAN 70: ICD-10-CM

## 2018-11-04 RX ORDER — ATORVASTATIN CALCIUM 80 MG/1
TABLET, FILM COATED ORAL
Qty: 30 TABLET | Refills: 0 | Status: SHIPPED | OUTPATIENT
Start: 2018-11-04 | End: 2018-11-26

## 2018-11-05 ENCOUNTER — TRANSFERRED RECORDS (OUTPATIENT)
Dept: HEALTH INFORMATION MANAGEMENT | Facility: CLINIC | Age: 78
End: 2018-11-05

## 2018-11-05 NOTE — TELEPHONE ENCOUNTER
"Requested Prescriptions   Pending Prescriptions Disp Refills     atorvastatin (LIPITOR) 80 MG tablet [Pharmacy Med Name: Atorvastatin Calcium Oral Tablet 80 MG] 90 tablet 2     Sig: TAKE ONE TABLET BY MOUTH ONE TIME DAILY    Statins Protocol Failed    11/4/2018  9:37 AM       Failed - LDL on file in past 12 months    Recent Labs   Lab Test  09/25/17   0850   LDL  66            Passed - No abnormal creatine kinase in past 12 months    No lab results found.            Passed - Recent (12 mo) or future (30 days) visit within the authorizing provider's specialty    Patient had office visit in the last 12 months or has a visit in the next 30 days with authorizing provider or within the authorizing provider's specialty.  See \"Patient Info\" tab in inbasket, or \"Choose Columns\" in Meds & Orders section of the refill encounter.             Passed - Patient is age 18 or older        Medication is being filled for 1 time refill only due to:  Patient needs labs FLP. Future labs ordered FLP.     Signed Prescriptions:                        Disp   Refills    atorvastatin (LIPITOR) 80 MG tablet        30 tab*0        Sig: TAKE ONE TABLET BY MOUTH ONE TIME DAILY   Authorizing Provider: BLANCA KING  Ordering User: COURTNEY CHUNG      Routing to  Reception - One month - due for fasting lab work please    Thank you,  Courtney Chung RN      "

## 2018-11-06 ENCOUNTER — TRANSFERRED RECORDS (OUTPATIENT)
Dept: HEALTH INFORMATION MANAGEMENT | Facility: CLINIC | Age: 78
End: 2018-11-06

## 2018-11-07 DIAGNOSIS — E78.5 HYPERLIPIDEMIA WITH TARGET LDL LESS THAN 70: ICD-10-CM

## 2018-11-07 LAB
CHOLEST SERPL-MCNC: 116 MG/DL
HDLC SERPL-MCNC: 52 MG/DL
LDLC SERPL CALC-MCNC: 42 MG/DL
NONHDLC SERPL-MCNC: 64 MG/DL
TRIGL SERPL-MCNC: 112 MG/DL

## 2018-11-07 PROCEDURE — 80061 LIPID PANEL: CPT | Performed by: FAMILY MEDICINE

## 2018-11-07 PROCEDURE — 36415 COLL VENOUS BLD VENIPUNCTURE: CPT | Performed by: FAMILY MEDICINE

## 2018-11-07 NOTE — LETTER
November 12, 2018      David JUWAN Medina  [address] [facility] [address]        Dear ,    We are writing to inform you of your test results.    Thanks for coming in for fasting labs.  Your lipid panel (cholesterol) results look great!    Resulted Orders   Lipid panel reflex to direct LDL Fasting   Result Value Ref Range    Cholesterol 116 <200 mg/dL    Triglycerides 112 <150 mg/dL      Comment:      Fasting specimen    HDL Cholesterol 52 >39 mg/dL    LDL Cholesterol Calculated 42 <100 mg/dL      Comment:      Desirable:       <100 mg/dl    Non HDL Cholesterol 64 <130 mg/dL       If you have any questions or concerns, please call the clinic at the number listed above.       Sincerely,      Beatriz Betancur MD/nr

## 2018-11-10 NOTE — PROGRESS NOTES
Please send results letter:  Thanks for coming in for fasting labs.  Your lipid panel (cholesterol) results look great!  Beatriz Betancur MD

## 2018-11-18 DIAGNOSIS — E11.42 TYPE 2 DIABETES MELLITUS WITH DIABETIC POLYNEUROPATHY, WITHOUT LONG-TERM CURRENT USE OF INSULIN (H): ICD-10-CM

## 2018-11-18 DIAGNOSIS — I10 HYPERTENSION GOAL BP (BLOOD PRESSURE) < 140/90: ICD-10-CM

## 2018-11-19 NOTE — TELEPHONE ENCOUNTER
"Requested Prescriptions   Pending Prescriptions Disp Refills     lisinopril (PRINIVIL/ZESTRIL) 20 MG tablet [Pharmacy Med Name: Lisinopril Oral Tablet 20 MG]  Last Written Prescription Date:  5/3/2018  Last Fill Quantity: 90 tablet,  # refills: 1   Last Office Visit: 5/10/2018   Future Office Visit:      90 tablet 0     Sig: TAKE ONE TABLET BY MOUTH ONE TIME DAILY    ACE Inhibitors (Including Combos) Protocol Failed    11/18/2018  8:23 PM       Failed - Normal serum creatinine on file in past 12 months    Recent Labs   Lab Test  09/25/17   0850   CR  1.07          Failed - Normal serum potassium on file in past 12 months    Recent Labs   Lab Test  09/25/17   0850   POTASSIUM  4.4          Passed - Blood pressure under 140/90 in past 12 months    BP Readings from Last 3 Encounters:   07/19/18 130/76   06/05/18 128/68   05/10/18 116/80          Passed - Recent (12 mo) or future (30 days) visit within the authorizing provider's specialty    Patient had office visit in the last 12 months or has a visit in the next 30 days with authorizing provider or within the authorizing provider's specialty.  See \"Patient Info\" tab in inbasket, or \"Choose Columns\" in Meds & Orders section of the refill encounter.           Passed - Patient is age 18 or older     _________________________________________________________________________________________________________________________       metFORMIN (GLUCOPHAGE) 500 MG tablet [Pharmacy Med Name: MetFORMIN HCl Oral Tablet 500 MG]  Last Written Prescription Date:  5/10/2018  Last Fill Quantity: 180 tablet,  # refills: 1   Last Office Visit: 5/10/2018   Future Office Visit:        180 tablet 0     Sig: TAKE ONE TABLET BY MOUTH TWICE A DAY WITH MEALS    Biguanide Agents Failed    11/18/2018  8:23 PM       Failed - Patient has documented A1c within the specified period of time.    If HgbA1C is 8 or greater, it needs to be on file within the past 3 months.  If less than 8, must be on file " "within the past 6 months.     Recent Labs   Lab Test  05/10/18   1005   A1C  6.7*          Failed - Recent (6 mo) or future (30 days) visit within the authorizing provider's specialty    Patient had office visit in the last 6 months or has a visit in the next 30 days with authorizing provider or within the authorizing provider's specialty.  See \"Patient Info\" tab in inbasket, or \"Choose Columns\" in Meds & Orders section of the refill encounter.           Passed - Blood pressure less than 140/90 in past 6 months    BP Readings from Last 3 Encounters:   07/19/18 130/76   06/05/18 128/68   05/10/18 116/80          Passed - Patient has documented LDL within the past 12 mos.    Recent Labs   Lab Test  11/07/18   0933   LDL  42          Passed - Patient has had a Microalbumin in the past 15 mos.    Recent Labs   Lab Test  05/10/18   1017   MICROL  15   UMALCR  9.43          Passed - Patient is age 10 or older       Passed - Patient's CR is NOT>1.4 OR Patient's EGFR is NOT<45 within past 12 mos.    Recent Labs   Lab Test  09/25/17   0850   GFRESTIMATED  67   GFRESTBLACK  81     Recent Labs   Lab Test  09/25/17   0850   CR  1.07          Passed - Patient does NOT have a diagnosis of CHF.          "

## 2018-11-20 RX ORDER — LISINOPRIL 20 MG/1
TABLET ORAL
Qty: 30 TABLET | Refills: 0 | Status: SHIPPED | OUTPATIENT
Start: 2018-11-20 | End: 2018-11-26

## 2018-11-21 NOTE — TELEPHONE ENCOUNTER
One month refill of each medication as patient overdue for 6 month, diabetes/BP follow up appt and labs, per review of 5/10/18 office visit note.    Team coordinators-Please contact patient to schedule.    Thank you!  KEY SmallwoodN, RN

## 2018-11-25 NOTE — PROGRESS NOTES
SUBJECTIVE:  David Medina, a 78 year old male, is here to discuss the following issues:     DIABETES FOLLOW-UP   Diabetes Type 2.    Treatment plan: metformin   Medication side effects: None  Patient does check blood sugars and they have been running .    Symptoms of hypoglycemia (low blood sugar): none.    Paresthesias (numbness or burning in feet) or sores: Yes - numbness  Patient counting carbs: Yes.    Patient exercise: stationary bicycle every day for 30 minutes.  Has quit the step machine and the treadmill due to falls.    HYPERTENSION FOLLOW-UP    Current medication regimen includes lisinopril, metoprolol tartrate, imdur.     Patient reports no problems or side effects with the medication.  Headaches:  No  Dizziness: YES-instability on his feet due to neuropathy  Patient does not check blood pressure outside of clinic.          HYPERLIPIDEMIA FOLLOW-UP    Patient with history of significant hyperlipidemia requiring prescription medication for management.      Current medication regimen includes atorvastatin 80 mg.   Patient reports no problems or side effects with the medication, specifically no muscle aches.     Recent Labs   Lab Test  11/07/18   0933  09/25/17   0850   CHOL  116  145   HDL  52  56   LDL  42  66   TRIG  112  114              Problem list and histories reviewed & updated, as indicated.  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     B12 deficiency     Spinal stenosis of thoracic region      Health Care Home     Diabetic neuropathy (H)     AAA (abdominal aortic aneurysm) without rupture (H)     Tubular adenoma of colon     Aortic root dilatation (H)       BP Readings from Last 3 Encounters:   11/26/18 123/68   07/19/18 130/76   06/05/18 128/68    Wt Readings from Last 3 Encounters:   11/26/18 239 lb 12 oz (108.7 kg)   07/19/18 253 lb (114.8 kg)   05/10/18 253 lb (114.8 kg)                  Recent Labs   Lab Test  11/26/18   1006  11/07/18   0933  05/10/18   1005  09/25/17   0850   11/01/16   1211  09/22/16   0821   09/17/15   0905   A1C  5.9*   --   6.7*  6.2*   < >   --   5.2   < >  6.6*   LDL   --   42   --   66   --    --   62   --   42   HDL   --   52   --   56   --    --   56   --   51   TRIG   --   112   --   114   --    --   83   --   135   ALT   --    --    --   38   --    --   28   --   37   CR   --    --    --   1.07   --   1.00  0.82   --   0.90   GFRESTIMATED   --    --    --   67   --   72  >90  Non  GFR Calc     --   81   GFRESTBLACK   --    --    --   81   --   88  >90   GFR Calc     --   >90   GFR Calc     POTASSIUM   --    --    --   4.4   --   4.5  4.1   < >  4.2   TSH   --    --    --   2.26   --    --    --    --   2.72    < > = values in this interval not displayed.        ROS:  RESP: NEGATIVE for shortness of breath  CV: NEGATIVE for chest pain    OBJECTIVE:    /68 (BP Location: Left arm, Patient Position: Chair, Cuff Size: Adult Regular)  Pulse 59  Temp 98.4  F (36.9  C) (Oral)  Resp 16  Wt 239 lb 12 oz (108.7 kg)  SpO2 97%  BMI 30.78 kg/m2  GENERAL: No apparent distress   HEAD: Normocephalic.   NOSE: Normal without discharge.  LUNGS: Normal respiratory effort.  Clear to auscultation - no rales, rhonchi or wheezing.  HEART: Regular rhythm. Normal S1/S2. No murmurs.  SKIN: Clear. No significant rash, abnormal pigmentation or lesions.  Superficial abrasions of knees..      ASSESSMENT/PLAN:  1. Type 2 diabetes mellitus with  diabetic polyneuropathy, without long-term current use of insulin (H)  stable/well controlled.  He is on a low-carb diet and has lost weight.  A1c is < 6 so I think he can discontinue the metformin.    - Hemoglobin A1c  - Comprehensive metabolic panel  - blood glucose monitoring (ONETOUCH VERIO IQ) test strip; Use to test blood sugar 4 times daily or as directed.  Dispense: 100 strip; Refill: 6    2. Hyperlipidemia with target LDL less than 70  stable/well controlled   - atorvastatin (LIPITOR) 80 MG tablet; Take 1 tablet (80 mg) by mouth daily  Dispense: 90 tablet; Refill: 3    3. Hypertension goal BP (blood pressure) < 140/90  stable/well controlled   - lisinopril (PRINIVIL/ZESTRIL) 20 MG tablet; Take 1 tablet (20 mg) by mouth daily  Dispense: 90 tablet; Refill: 3    4. ASCVD (arteriosclerotic cardiovascular disease)  5. Ischemic cardiomyopathy  No new symptoms.  He'll follow-up with Dr. Gutierrez in the spring.        Return to Clinic in 6 months.    Patient Instructions     You can discontinue the metformin as your Hemoglobin A1C level (a test assessing overall blood sugar control for the last 3 months) is excellent today.      Results for orders placed or performed in visit on 11/26/18   Hemoglobin A1c   Result Value Ref Range    Hemoglobin A1C 5.9 (H) 0 - 5.6 %        Check with your insurance on the coverage of Shingrix (new shingles vaccine) or check at the pharmacy.  The Shingrix vaccination is a 2-shot series.  The second dose is given 6 months after the first.  (It cannot be given sooner than 2 months after the first dose - at the earliest.)  You should be aware that patients are reporting feeling a bit under the weather after the injection, including fatigue, malaise, and low-grade fever that may last a couple days.  Rarely patients can get a mild, shingles-like rash.   But these symptoms are much better than the Shingles disease.         Beatriz Betancur MD   [facility]

## 2018-11-26 ENCOUNTER — OFFICE VISIT (OUTPATIENT)
Dept: FAMILY MEDICINE | Facility: CLINIC | Age: 78
End: 2018-11-26
Payer: COMMERCIAL

## 2018-11-26 VITALS
BODY MASS INDEX: 30.78 KG/M2 | RESPIRATION RATE: 16 BRPM | SYSTOLIC BLOOD PRESSURE: 123 MMHG | DIASTOLIC BLOOD PRESSURE: 68 MMHG | WEIGHT: 239.75 LBS | OXYGEN SATURATION: 97 % | HEART RATE: 59 BPM | TEMPERATURE: 98.4 F

## 2018-11-26 DIAGNOSIS — I10 HYPERTENSION GOAL BP (BLOOD PRESSURE) < 140/90: ICD-10-CM

## 2018-11-26 DIAGNOSIS — I25.10 ASCVD (ARTERIOSCLEROTIC CARDIOVASCULAR DISEASE): ICD-10-CM

## 2018-11-26 DIAGNOSIS — E78.5 HYPERLIPIDEMIA WITH TARGET LDL LESS THAN 70: ICD-10-CM

## 2018-11-26 DIAGNOSIS — E11.42 TYPE 2 DIABETES MELLITUS WITH DIABETIC POLYNEUROPATHY, WITHOUT LONG-TERM CURRENT USE OF INSULIN (H): Primary | ICD-10-CM

## 2018-11-26 DIAGNOSIS — I25.5 ISCHEMIC CARDIOMYOPATHY: ICD-10-CM

## 2018-11-26 LAB
ALBUMIN SERPL-MCNC: 4.4 G/DL (ref 3.4–5)
ALP SERPL-CCNC: 65 U/L (ref 40–150)
ALT SERPL W P-5'-P-CCNC: 42 U/L (ref 0–70)
ANION GAP SERPL CALCULATED.3IONS-SCNC: 10 MMOL/L (ref 3–14)
AST SERPL W P-5'-P-CCNC: 37 U/L (ref 0–45)
BILIRUB SERPL-MCNC: 1 MG/DL (ref 0.2–1.3)
BUN SERPL-MCNC: 27 MG/DL (ref 7–30)
CALCIUM SERPL-MCNC: 9.7 MG/DL (ref 8.5–10.1)
CHLORIDE SERPL-SCNC: 105 MMOL/L (ref 94–109)
CO2 SERPL-SCNC: 23 MMOL/L (ref 20–32)
CREAT SERPL-MCNC: 1 MG/DL (ref 0.66–1.25)
GFR SERPL CREATININE-BSD FRML MDRD: 72 ML/MIN/1.7M2
GLUCOSE SERPL-MCNC: 137 MG/DL (ref 70–99)
HBA1C MFR BLD: 5.9 % (ref 0–5.6)
POTASSIUM SERPL-SCNC: 4.4 MMOL/L (ref 3.4–5.3)
PROT SERPL-MCNC: 7.6 G/DL (ref 6.8–8.8)
SODIUM SERPL-SCNC: 138 MMOL/L (ref 133–144)

## 2018-11-26 PROCEDURE — 36415 COLL VENOUS BLD VENIPUNCTURE: CPT | Performed by: FAMILY MEDICINE

## 2018-11-26 PROCEDURE — 80053 COMPREHEN METABOLIC PANEL: CPT | Performed by: FAMILY MEDICINE

## 2018-11-26 PROCEDURE — 99214 OFFICE O/P EST MOD 30 MIN: CPT | Performed by: FAMILY MEDICINE

## 2018-11-26 PROCEDURE — 83036 HEMOGLOBIN GLYCOSYLATED A1C: CPT | Performed by: FAMILY MEDICINE

## 2018-11-26 RX ORDER — LISINOPRIL 20 MG/1
20 TABLET ORAL DAILY
Qty: 90 TABLET | Refills: 3 | Status: SHIPPED | OUTPATIENT
Start: 2018-11-26 | End: 2019-11-20

## 2018-11-26 RX ORDER — NITROGLYCERIN 0.4 MG/1
0.4 TABLET SUBLINGUAL EVERY 5 MIN PRN
Qty: 25 TABLET | Refills: 3 | Status: CANCELLED | OUTPATIENT
Start: 2018-11-26

## 2018-11-26 RX ORDER — LATANOPROST 50 UG/ML
1 SOLUTION/ DROPS OPHTHALMIC EVERY EVENING
Qty: 1 BOTTLE | Status: CANCELLED | OUTPATIENT
Start: 2018-11-26

## 2018-11-26 RX ORDER — ATORVASTATIN CALCIUM 80 MG/1
80 TABLET, FILM COATED ORAL DAILY
Qty: 90 TABLET | Refills: 3 | Status: SHIPPED | OUTPATIENT
Start: 2018-11-26 | End: 2019-11-22

## 2018-11-26 NOTE — LETTER
November 28, 2018      David ECHEVERRIA Carol  [address] [facility] [address]        Dear ,    We are writing to inform you of your test results.    Your lab results look great!  As we discussed, with your weight loss and low A1c I think you can go ahead and stop the metformin for now.      Resulted Orders   Hemoglobin A1c   Result Value Ref Range    Hemoglobin A1C 5.9 (H) 0 - 5.6 %      Comment:      Normal <5.7% Prediabetes 5.7-6.4%  Diabetes 6.5% or higher - adopted from ADA   consensus guidelines.     Comprehensive metabolic panel   Result Value Ref Range    Sodium 138 133 - 144 mmol/L    Potassium 4.4 3.4 - 5.3 mmol/L    Chloride 105 94 - 109 mmol/L    Carbon Dioxide 23 20 - 32 mmol/L    Anion Gap 10 3 - 14 mmol/L    Glucose 137 (H) 70 - 99 mg/dL      Comment:      Fasting specimen    Urea Nitrogen 27 7 - 30 mg/dL    Creatinine 1.00 0.66 - 1.25 mg/dL    GFR Estimate 72 >60 mL/min/1.7m2      Comment:      Non  GFR Calc    GFR Estimate If Black 87 >60 mL/min/1.7m2      Comment:       GFR Calc    Calcium 9.7 8.5 - 10.1 mg/dL    Bilirubin Total 1.0 0.2 - 1.3 mg/dL    Albumin 4.4 3.4 - 5.0 g/dL    Protein Total 7.6 6.8 - 8.8 g/dL    Alkaline Phosphatase 65 40 - 150 U/L    ALT 42 0 - 70 U/L    AST 37 0 - 45 U/L       If you have any questions or concerns, please call the clinic at the number listed above.       Sincerely,        Beatriz Betancur MD/nr

## 2018-11-26 NOTE — PATIENT INSTRUCTIONS
You can discontinue the metformin as your Hemoglobin A1C level (a test assessing overall blood sugar control for the last 3 months) is excellent today.      Results for orders placed or performed in visit on 11/26/18   Hemoglobin A1c   Result Value Ref Range    Hemoglobin A1C 5.9 (H) 0 - 5.6 %        Check with your insurance on the coverage of Shingrix (new shingles vaccine) or check at the pharmacy.  The Shingrix vaccination is a 2-shot series.  The second dose is given 6 months after the first.  (It cannot be given sooner than 2 months after the first dose - at the earliest.)  You should be aware that patients are reporting feeling a bit under the weather after the injection, including fatigue, malaise, and low-grade fever that may last a couple days.  Rarely patients can get a mild, shingles-like rash.   But these symptoms are much better than the Shingles disease.

## 2018-11-27 NOTE — PROGRESS NOTES
Please send results letter:  Your lab results look great!  As we discussed, with your weight loss and low A1c I think you can go ahead and stop the metformin for now.    Beatriz Betancur MD

## 2019-01-18 ENCOUNTER — OFFICE VISIT (OUTPATIENT)
Dept: FAMILY MEDICINE | Facility: CLINIC | Age: 79
End: 2019-01-18
Payer: COMMERCIAL

## 2019-01-18 VITALS
WEIGHT: 237 LBS | RESPIRATION RATE: 17 BRPM | OXYGEN SATURATION: 96 % | TEMPERATURE: 98.2 F | DIASTOLIC BLOOD PRESSURE: 77 MMHG | HEART RATE: 64 BPM | SYSTOLIC BLOOD PRESSURE: 118 MMHG | BODY MASS INDEX: 30.43 KG/M2

## 2019-01-18 DIAGNOSIS — J01.90 ACUTE SINUSITIS WITH SYMPTOMS > 10 DAYS: Primary | ICD-10-CM

## 2019-01-18 DIAGNOSIS — J20.9 ACUTE BRONCHITIS WITH SYMPTOMS > 10 DAYS: ICD-10-CM

## 2019-01-18 PROCEDURE — 99213 OFFICE O/P EST LOW 20 MIN: CPT | Performed by: NURSE PRACTITIONER

## 2019-01-18 RX ORDER — ALBUTEROL SULFATE 90 UG/1
2 AEROSOL, METERED RESPIRATORY (INHALATION) EVERY 6 HOURS
Qty: 1 INHALER | Refills: 0 | Status: SHIPPED | OUTPATIENT
Start: 2019-01-18 | End: 2019-11-20

## 2019-01-18 RX ORDER — AZITHROMYCIN 250 MG/1
TABLET, FILM COATED ORAL
Qty: 6 TABLET | Refills: 0 | Status: SHIPPED | OUTPATIENT
Start: 2019-01-18 | End: 2019-01-29

## 2019-01-18 NOTE — PATIENT INSTRUCTIONS
For bronchitis and sinusitis start azithromycin antibiotic.    Albuterol inhaler every 4h as needed for cough.    For nasal congestion start afrin nasal spray 2-3 sprays to each nostril twice a day for 3 days.      Start sinus rinse like neti pot 2-3 times a day.  Use distilled or boiled water, mix with salt,    to 1 teaspoon of salt to each 16 ounces (two cups) of warm water.      Lots of fluids and rest, prop up head at night, humidifier may help.    Follow up if not improving in 1 week.

## 2019-01-18 NOTE — PROGRESS NOTES
SUBJECTIVE:   David Medina is a 78 year old male who presents to clinic today for the following health issues:      RESPIRATORY SYMPTOMS      Duration: 2 weeks    Description  nasal congestion, cough, sore throat, facial pain/pressure, ear pain both, headache and fatigue/malaise    Severity: mild to moderate    Accompanying signs and symptoms: coughing up green mucus    History (predisposing factors):  none    Precipitating or alleviating factors: None    Therapies tried and outcome:  oral decongestant-does not help     symptoms started 21 weeks ago with sore throat and now cough that is worse at night.  Sore throat now resolved.  Cough mostly dry, some sputum.  No shortness of breath, no fever.  Does have some mild intermittent wheezing.  Does have congestion and rhinorrhea with green drainage.  Some frontal and maxillary sinus pain.  Left ear is plugged.  No throat tickle or post nasal drainage.      Was improving sat/sunday then worsened.    No hx of lung disease.  Quit smoking 40 years ago.  No sick contacts.      DM2- diet controlled, A1C 5.9% 11/2018    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     B12 deficiency     Spinal stenosis of thoracic region     Health Care Home     Diabetic neuropathy (H)     AAA (abdominal aortic aneurysm) without rupture (H)     Tubular adenoma of colon     Aortic root dilatation (H)     Past Surgical History:   Procedure Laterality Date     AAA REPAIR  12/13/07    endovascular  repair, Dr. Jacob ARCEO APPENDECTOMY      open     CLOSED RX TIBIA SHAFT FX  2013    RT prox tibial fracture     COLONOSCOPY  10/3/05    WNL, rpt in 10 yrs, Dr. Vicente, [facility]     COLONOSCOPY  9/23/15    advanced adenoma, rpt 3 yrs     coronary angiogram  11    diffuse disease with vasospasm     EMG  6/10/10    chronic active polyradiculopathy, mild generalized polyneuropathy, r/o lumbar stenosis     ENDOVAS REPAIR, INFRARENL ABDOM AORTIC ANEURYSM/DISSECT; [id number]/AORTO-UNIFEMORAL PROSTHESIS      one done,one to do     FLEXIBLE SIGMOIDOSCOPY      WNL     [facility] PTCA SINGLE VESSEL      LAD - 100% stenosis     [facility] UGI ENDOSCOPY, SIMPLE EXAM      erosive esophagitis, GERD, Dr. Salazar     HEART CATH, ANGIOPLASTY  10/3/14    EMEKA x 1  ostium of the RCA     LAMINECTOMY THORACIC THREE LEVELS  10/31/12    T10, T11, T12 with medial facetectomy     OPEN REDUCTION INTERNAL FIXATION HIP NAILING N/A 2016    Procedure: OPEN REDUCTION INTERNAL FIXATION HIP NAILING;  Surgeon: Jason Flowers MD;  Location: [facility]     SONO ABD RETROPERITNL  06    AAA 4.2 x 4.8, Dr. James's office     STRESS LEXISCAN TEST  10/5/12    small, fixed, apical defect/apical thinning     SURGICAL HISTORY OF -       tonsillectomy     TONSILLECTOMY  193       Social History     Tobacco Use     Smoking status: Former Smoker     Packs/day: 3.50     Years: 19.00     Pack years: 66.50     Types: Cigarettes     Last attempt to quit: 1980     Years since quittin.7     Smokeless tobacco: Never Used   Substance Use Topics     Alcohol use: Yes     Comment: beer 1-2x/week     Family History   Problem Relation Age of Onset     Respiratory Father         TB     Other - See Comments Mother         FALL         Current Outpatient Medications   Medication Sig Dispense Refill     albuterol (PROAIR HFA/PROVENTIL HFA/VENTOLIN HFA) 108 (90 Base) MCG/ACT inhaler Inhale 2 puffs into the lungs every 6 hours 1 Inhaler 0      aspirin EC 81 MG tablet Take 1 tablet (81 mg) by mouth daily 90 tablet 3     atorvastatin (LIPITOR) 80 MG tablet Take 1 tablet (80 mg) by mouth daily 90 tablet 3     azithromycin (ZITHROMAX) 250 MG tablet Two tablets first day, then one tablet daily for four days. 6 tablet 0     blood glucose (ONE TOUCH DELICA) lancing device Use to test blood sugars four times daily or as directed. 1 each 1     blood glucose monitoring (ONE TOUCH DELICA) lancets Use to test blood sugars four times daily or as directed. 100 each 13     blood glucose monitoring (ONETOUCH VERIO IQ SYSTEM) meter device kit Use to test blood sugar 4 times daily. 1 kit 0     blood glucose monitoring (ONETOUCH VERIO IQ) test strip Use to test blood sugar 4 times daily or as directed. 100 strip 6     Cyanocobalamin 1000 MCG CAPS Take 1 capsule by mouth daily 100 capsule 3     isosorbide mononitrate (IMDUR) 30 MG 24 hr tablet Take 1 tablet (30 mg) by mouth every morning 90 tablet 3     latanoprost (XALATAN) 0.005 % ophthalmic solution Place 1 drop into both eyes every evening       lisinopril (PRINIVIL/ZESTRIL) 20 MG tablet Take 1 tablet (20 mg) by mouth daily 90 tablet 3     metoprolol tartrate (LOPRESSOR) 50 MG tablet take one-half tablet by mouth twice daily 90 tablet 3     multivitamin, therapeutic with minerals (THERA-VIT-M) TABS Take 1 tablet by mouth daily       nitroGLYcerin (NITROSTAT) 0.4 MG sublingual tablet Place 1 tablet (0.4 mg) under the tongue every 5 minutes as needed for chest pain Up to 3 doses max. 25 tablet 3     ranitidine (ZANTAC) 150 MG tablet TAKE ONE TABLET BY MOUTH TWO TIMES DAILY 180 tablet 3       ROS:  Const, HEENT, Resp as above, otherwise negative       OBJECTIVE:                                                    /77 (BP Location: Right arm, Patient Position: Sitting, Cuff Size: Adult Large)   Pulse 64   Temp 98.2  F (36.8  C) (Oral)   Resp 17   Wt 107.5 kg (237 lb)   SpO2 96%   BMI 30.43 kg/m     GENERAL  APPEARANCE: healthy, alert and no distress  EYES: Eyes grossly normal to inspection and conjunctivae and sclerae normal  HENT: ear canals and TM's normal and nose and mouth without ulcers or lesions.  Tonsils not visible bilaterally.  No pain with sinus palpation.  NECK: no adenopathy  RESP: lungs clear to auscultation - no rales, rhonchi or wheezes  CV: regular rates and rhythm, normal S1 S2, no S3 or S4 and no murmur, click or rub  PSYCH: mentation appears normal and affect normal/bright        ASSESSMENT/PLAN:                                                    (J01.90) Acute sinusitis with symptoms > 10 days  (primary encounter diagnosis)  (J20.9) Acute bronchitis with symptoms > 10 days  Comment: given symptom duration and double sickening pattern will treat with abx  Plan: azithromycin (ZITHROMAX) 250 MG tablet,         albuterol (PROAIR HFA/PROVENTIL HFA/VENTOLIN         HFA) 108 (90 Base) MCG/ACT inhaler        See pt instructions        See Patient Instructions    Renetta Zuniga, CNP  [facility]    Patient Instructions   For bronchitis and sinusitis start azithromycin antibiotic.    Albuterol inhaler every 4h as needed for cough.    For nasal congestion start afrin nasal spray 2-3 sprays to each nostril twice a day for 3 days.      Start sinus rinse like neti pot 2-3 times a day.  Use distilled or boiled water, mix with salt,    to 1 teaspoon of salt to each 16 ounces (two cups) of warm water.      Lots of fluids and rest, prop up head at night, humidifier may help.    Follow up if not improving in 1 week.

## 2019-01-29 ENCOUNTER — TELEPHONE (OUTPATIENT)
Dept: FAMILY MEDICINE | Facility: CLINIC | Age: 79
End: 2019-01-29

## 2019-01-29 DIAGNOSIS — J01.90 ACUTE SINUSITIS WITH SYMPTOMS > 10 DAYS: ICD-10-CM

## 2019-01-29 DIAGNOSIS — J20.9 ACUTE BRONCHITIS WITH SYMPTOMS > 10 DAYS: ICD-10-CM

## 2019-01-29 RX ORDER — AZITHROMYCIN 250 MG/1
TABLET, FILM COATED ORAL
Qty: 6 TABLET | Refills: 0 | Status: SHIPPED | OUTPATIENT
Start: 2019-01-29 | End: 2019-05-20

## 2019-01-29 NOTE — TELEPHONE ENCOUNTER
I really would not recommend additional antibiotic treatment.  I will send in another zpack but I would only advise this if severe symptoms or fever.     Recommend afirin nasal spray x 3d, netti pot rinse daily.    Renetta Zuniga, CNP

## 2019-01-29 NOTE — TELEPHONE ENCOUNTER
"Routing to provider - Efrain - please review and advise as appropriate    Medication request:  Azithromycin or antibiotic    Patient leaving for country 1/31/19  gone for two weeks - concerned about sinus & bronchitis infection returning and requesting an antibiotic \"just in case\"    He says overall his symptoms have improved and almost completely resolved - some sinus congestion continues and he does cough some on the phone - he denies SOB, Wheezing, fever, ear pain, blood in sputum    Encouraged fluids, warm liquids, steam showers, humidity, mucinex, avoiding dairy to help break up congestion sometimes this can linger     He has not followed up with travel clinic regarding his plans at this time    He also requested a refill of metformin - I reviewed office visit 11/26/18 - Pipe's notes who advised patient he can stop metformin at this time- gave patient the message he agrees with plan      Thank you,  Uriah Rodriguez RN  "

## 2019-01-29 NOTE — TELEPHONE ENCOUNTER
Return call to patient discussed provider response - he verbalized understanding and agrees with plan    Closing encounter - no further actions needed at this time    Uriah Rodriguez RN

## 2019-03-04 ENCOUNTER — TELEPHONE (OUTPATIENT)
Dept: FAMILY MEDICINE | Facility: CLINIC | Age: 79
End: 2019-03-04

## 2019-03-04 DIAGNOSIS — E11.42 TYPE 2 DIABETES MELLITUS WITH DIABETIC POLYNEUROPATHY, WITHOUT LONG-TERM CURRENT USE OF INSULIN (H): ICD-10-CM

## 2019-03-04 NOTE — TELEPHONE ENCOUNTER
From 11/26/18 OV- ASSESSMENT/PLAN:  1. Type 2 diabetes mellitus with diabetic polyneuropathy, without long-term current use of insulin (H)  stable/well controlled.  He is on a low-carb diet and has lost weight.  A1c is < 6 so I think he can discontinue the metformin.    - Hemoglobin A1c  - Comprehensive metabolic panel  - blood glucose monitoring (ONETOUCH VERIO IQ) test strip; Use to test blood sugar 4 times daily or as directed.  Dispense: 100 strip; Refill: 6      PT called to say his BG have been running around 200 for weeks.  He feels he needs to get back on the med.  Pended from  with pharmacy.  SUMIT De Los Santos

## 2019-04-08 DIAGNOSIS — I10 HYPERTENSION GOAL BP (BLOOD PRESSURE) < 140/90: ICD-10-CM

## 2019-04-08 DIAGNOSIS — I25.5 ISCHEMIC CARDIOMYOPATHY: ICD-10-CM

## 2019-04-08 DIAGNOSIS — I25.10 ASCVD (ARTERIOSCLEROTIC CARDIOVASCULAR DISEASE): ICD-10-CM

## 2019-04-09 RX ORDER — METOPROLOL TARTRATE 50 MG
TABLET ORAL
Qty: 90 TABLET | Refills: 2 | Status: SHIPPED | OUTPATIENT
Start: 2019-04-09 | End: 2019-11-20

## 2019-04-09 NOTE — TELEPHONE ENCOUNTER
"Requested Prescriptions   Pending Prescriptions Disp Refills     metoprolol tartrate (LOPRESSOR) 50 MG tablet [Pharmacy Med Name: Metoprolol Tartrate Oral Tablet 50 MG] 90 tablet 2     Sig: TAKE ONE HALF TABLET BY MOUTH TWICE DAILY  Last Written Prescription Date:  4/5/2018  Last Fill Quantity: 90 tablet,  # refills: 3   Last Office Visit: 1/18/2019   Future Office Visit:    Next 5 appointments (look out 90 days)    May 20, 2019  8:40 AM CDT  SHORT with Beatriz Betancur MD  [facility] ([facility]) [facility] [place] [id number] [phone]              Beta-Blockers Protocol Passed - 4/8/2019  8:57 PM        Passed - Blood pressure under 140/90 in past 12 months     BP Readings from Last 3 Encounters:   01/18/19 118/77   11/26/18 123/68   07/19/18 130/76           Passed - Patient is age 6 or older        Passed - Recent (12 mo) or future (30 days) visit within the authorizing provider's specialty     Patient had office visit in the last 12 months or has a visit in the next 30 days with authorizing provider or within the authorizing provider's specialty.  See \"Patient Info\" tab in inbasket, or \"Choose Columns\" in Meds & Orders section of the refill encounter.            Passed - Medication is active on med list          "

## 2019-04-10 NOTE — TELEPHONE ENCOUNTER
Prescription approved per [facility].    Signed Prescriptions:                        Disp   Refills    metoprolol tartrate (LOPRESSOR) 50 MG tabl*90 tab*2        Sig: TAKE ONE HALF TABLET BY MOUTH TWICE DAILY  Authorizing Provider: BLANCA KING  Ordering User: COURTNEY CHUNG      Closing encounter - no further actions needed at this time    Courtney Chung RN

## 2019-05-19 NOTE — PROGRESS NOTES
"SUBJECTIVE:  David Medina, a 79 year old male, is here to discuss the following issues:     DIABETES FOLLOW-UP   Diabetes Type 2.    Treatment plan: metformin.  We tried discontinuing his metformin last fall but he noted that his weight increased and blood sugars increased to 200 so he resumed it in March.  Medication side effects: None  Patient does check blood sugars and they have been running 153, 137 in the AM AC.    Symptoms of hypoglycemia (low blood sugar): none.    Patient counting carbs: No.  He lost weight on a low-carb diet and is considering resuming that.      GERD FOLLOW-UP  Current/recent symptoms:    Heartburn?: Yes - worse since he gained weight    Acid taste in throat?: Yes - occasionally wakes up \"choking.\"  He has always snored loudly.    Difficult or painful swallowing?: No    Hoarseness?: No  Medication: ranitidine 150 mg BID  Have you ever had an upper endoscopy (EGD)?: No           Problem list and histories reviewed & updated, as indicated.  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     B12 deficiency     Spinal stenosis of thoracic region     Health Care Home     Diabetic neuropathy (H)     AAA (abdominal aortic aneurysm) without rupture (H)     Tubular adenoma of colon     Aortic root dilatation (H)       BP Readings from Last 3 Encounters:   05/20/19 129/77   01/18/19 118/77   11/26/18 123/68    Wt Readings from Last 3 Encounters:   05/20/19 114.3 kg (252 lb)   01/18/19 107.5 kg (237 lb) " "  11/26/18 108.7 kg (239 lb 12 oz)                  Recent Labs   Lab Test 05/20/19  0844 11/26/18  1006 11/07/18  0933 05/10/18  1005 09/25/17  0850  09/22/16  0821  09/17/15  0905   A1C 6.9* 5.9*  --  6.7* 6.2*   < > 5.2   < > 6.6*   LDL  --   --  42  --  66  --  62  --  42   HDL  --   --  52  --  56  --  56  --  51   TRIG  --   --  112  --  114  --  83  --  135   ALT  --  42  --   --  38  --  28  --  37   CR  --  1.00  --   --  1.07   < > 0.82  --  0.90   GFRESTIMATED  --  72  --   --  67   < > >90  Non  GFR Calc    --  81   GFRESTBLACK  --  87  --   --  81   < > >90   GFR Calc    --  >90   GFR Calc     POTASSIUM  --  4.4  --   --  4.4   < > 4.1   < > 4.2   TSH  --   --   --   --  2.26  --   --   --  2.72    < > = values in this interval not displayed.        ROS:  RESP: NEGATIVE for shortness of breath  CV: NEGATIVE for chest pain    OBJECTIVE:    /77 (BP Location: Right arm, Patient Position: Sitting, Cuff Size: Adult Large)   Pulse 58   Temp 98.3  F (36.8  C) (Tympanic)   Resp 14   Ht 1.848 m (6' 0.75\")   Wt 114.3 kg (252 lb)   SpO2 98%   BMI 33.48 kg/m    GEN:  no apparent distress  EYES: sclerae and conjunctivae clear with no discharge  LUNGS:  normal respiratory effort, and lungs clear to auscultation bilaterally - no rales, rhonchi or wheezes  CV: regular rate and rhythm, normal S1 S2, no S3 or S4 and no murmur, click or rub  SKIN: Clear. No significant rash, abnormal pigmentation or lesions  PSYCH:  Appearance/Behavior: patient is appropriately and casually dressed.  Speech:  normal  rate, rhythm, and tone.  Mood/Affect: Bright/congruent.  Insight: good     ASSESSMENT/PLAN:  1. Type 2 diabetes mellitus with diabetic polyneuropathy, without long-term current use of insulin (H)  stable/well controlled - he'll continue on metformin and work on limiting carbs in his diet  - Hemoglobin A1c    2. ASCVD (arteriosclerotic cardiovascular disease)  3. " Ischemic cardiomyopathy  stable/well controlled - due to see Cardiology this summer  - isosorbide mononitrate (IMDUR) 30 MG 24 hr tablet; Take 1 tablet (30 mg) by mouth every morning  Dispense: 90 tablet; Refill: 3  - nitroGLYcerin (NITROSTAT) 0.4 MG sublingual tablet; Place 1 tablet (0.4 mg) under the tongue every 5 minutes as needed for chest pain Up to 3 doses max.  Dispense: 25 tablet; Refill: 3    4. Gastroesophageal reflux disease without esophagitis  Worsened with weight gain.  - ranitidine (ZANTAC) 150 MG tablet; TAKE ONE TABLET BY MOUTH TWO TIMES DAILY  Dispense: 180 tablet; Refill: 3    5. Snoring  High likelihood of GISELL.  I recommended sleep eval.  - SLEEP EVALUATION & MANAGEMENT REFERRAL - ADULT -[facility] [phone]  (Age 18 and up); Future     Return to Clinic in 6 months    Patient Instructions   Check with your insurance or the pharmacy on coverage of Shingrix (the new shingles vaccine).  For medicare patients it is usually best (cheapest) to get this at the pharmacy.  The pharmacist can administer the injection.    The Shingrix vaccination is a 2-shot series.  The second dose is given 6 months after the first.  (It cannot be given sooner than 2 months after the first dose - at the earliest.)  You should be aware that patients are reporting feeling a bit under the weather after the injection, including fatigue, malaise, and low-grade fever that may last a couple days.  Rarely patients can get a mild, shingles-like rash.   But these symptoms are much better than the Shingles disease.          Beatriz Betancur MD   [facility]

## 2019-05-20 ENCOUNTER — OFFICE VISIT (OUTPATIENT)
Dept: FAMILY MEDICINE | Facility: CLINIC | Age: 79
End: 2019-05-20
Payer: COMMERCIAL

## 2019-05-20 VITALS
OXYGEN SATURATION: 98 % | DIASTOLIC BLOOD PRESSURE: 77 MMHG | HEART RATE: 58 BPM | WEIGHT: 252 LBS | SYSTOLIC BLOOD PRESSURE: 129 MMHG | BODY MASS INDEX: 33.4 KG/M2 | RESPIRATION RATE: 14 BRPM | HEIGHT: 73 IN | TEMPERATURE: 98.3 F

## 2019-05-20 DIAGNOSIS — I25.5 ISCHEMIC CARDIOMYOPATHY: ICD-10-CM

## 2019-05-20 DIAGNOSIS — R06.83 SNORING: ICD-10-CM

## 2019-05-20 DIAGNOSIS — E11.42 TYPE 2 DIABETES MELLITUS WITH DIABETIC POLYNEUROPATHY, WITHOUT LONG-TERM CURRENT USE OF INSULIN (H): Primary | ICD-10-CM

## 2019-05-20 DIAGNOSIS — I25.10 ASCVD (ARTERIOSCLEROTIC CARDIOVASCULAR DISEASE): ICD-10-CM

## 2019-05-20 DIAGNOSIS — K21.9 GASTROESOPHAGEAL REFLUX DISEASE WITHOUT ESOPHAGITIS: ICD-10-CM

## 2019-05-20 LAB — HBA1C MFR BLD: 6.9 % (ref 0–5.6)

## 2019-05-20 PROCEDURE — 99214 OFFICE O/P EST MOD 30 MIN: CPT | Performed by: FAMILY MEDICINE

## 2019-05-20 PROCEDURE — 36415 COLL VENOUS BLD VENIPUNCTURE: CPT | Performed by: FAMILY MEDICINE

## 2019-05-20 PROCEDURE — 83036 HEMOGLOBIN GLYCOSYLATED A1C: CPT | Performed by: FAMILY MEDICINE

## 2019-05-20 RX ORDER — NITROGLYCERIN 0.4 MG/1
0.4 TABLET SUBLINGUAL EVERY 5 MIN PRN
Qty: 25 TABLET | Refills: 3 | Status: SHIPPED | OUTPATIENT
Start: 2019-05-20 | End: 2022-07-11

## 2019-05-20 RX ORDER — ISOSORBIDE MONONITRATE 30 MG/1
30 TABLET, EXTENDED RELEASE ORAL EVERY MORNING
Qty: 90 TABLET | Refills: 3 | Status: SHIPPED | OUTPATIENT
Start: 2019-05-20 | End: 2020-07-15

## 2019-05-20 ASSESSMENT — MIFFLIN-ST. JEOR: SCORE: 1902.97

## 2019-05-20 NOTE — PATIENT INSTRUCTIONS
Check with your insurance or the pharmacy on coverage of Shingrix (the new shingles vaccine).  For medicare patients it is usually best (cheapest) to get this at the pharmacy.  The pharmacist can administer the injection.    The Shingrix vaccination is a 2-shot series.  The second dose is given 6 months after the first.  (It cannot be given sooner than 2 months after the first dose - at the earliest.)  You should be aware that patients are reporting feeling a bit under the weather after the injection, including fatigue, malaise, and low-grade fever that may last a couple days.  Rarely patients can get a mild, shingles-like rash.   But these symptoms are much better than the Shingles disease.

## 2019-07-26 ENCOUNTER — OFFICE VISIT (OUTPATIENT)
Dept: URGENT CARE | Facility: URGENT CARE | Age: 79
End: 2019-07-26
Payer: COMMERCIAL

## 2019-07-26 VITALS
DIASTOLIC BLOOD PRESSURE: 61 MMHG | BODY MASS INDEX: 32.41 KG/M2 | SYSTOLIC BLOOD PRESSURE: 98 MMHG | HEART RATE: 69 BPM | TEMPERATURE: 97.4 F | WEIGHT: 244 LBS

## 2019-07-26 DIAGNOSIS — L03.113 CELLULITIS OF RIGHT UPPER EXTREMITY: Primary | ICD-10-CM

## 2019-07-26 PROCEDURE — 96372 THER/PROPH/DIAG INJ SC/IM: CPT | Performed by: FAMILY MEDICINE

## 2019-07-26 PROCEDURE — 99214 OFFICE O/P EST MOD 30 MIN: CPT | Mod: 25 | Performed by: FAMILY MEDICINE

## 2019-07-26 RX ORDER — CEFTRIAXONE SODIUM 1 G
1 VIAL (EA) INJECTION ONCE
Status: COMPLETED | OUTPATIENT
Start: 2019-07-26 | End: 2019-07-26

## 2019-07-26 RX ORDER — CEPHALEXIN 500 MG/1
500 CAPSULE ORAL 4 TIMES DAILY
Qty: 40 CAPSULE | Refills: 0 | Status: SHIPPED | OUTPATIENT
Start: 2019-07-26 | End: 2019-11-18

## 2019-07-26 RX ADMIN — Medication 1 G: at 14:16

## 2019-08-01 NOTE — PROGRESS NOTES
SUBJECTIVE:David Medina is a 79 year old male who presents to the clinic today for a rash.  Onset of rash was day(s) ago.   Rash is worsening.   Location of the rash: shoulder.  Associated symptoms include: redness.    Symptoms are moderate and rash seems to be worsening.  Therapies tried to improve the rash: otc.  Previous history of a similar rash? No  Recent exposure history: none known    Past Medical History:   Diagnosis Date     AAA (abdominal aortic aneurysm) without rupture (H)     stent 2005, s/p endovascular repair 12/07     Allergic rhinitis      Aortic root dilatation (H)      ASCVD (arteriosclerotic cardiovascular disease) 6/05    Cardiac cath Oct 2014: EMEKA to RCA, cath 2011: medical management, cath Aug 2005: EMEKA to OM; cath June 2005: EMEKA to LAD     B12 deficiency      Benign paroxysmal positional vertigo      Cervical radiculopathy 10/30/2008    Injected through [facility] 10/07     Closed fracture of patella 2/07    left     DM type 2 (diabetes mellitus, type 2) (H)      Hyperlipidemia LDL goal < 70      Hypertension goal BP (blood pressure) < 130/80      Ischemic cardiomyopathy 6/05    ischemic cardiomyopathy, EF 45%     Lumbosacral plexopathy     diabetic radiculoplexopathy causing proximal muscle weakness, Dr. 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 Villa (neuro) and Dr. 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7 kg (244 lb)   BMI 32.41 kg/m    General:healthy,alert,no distress    Location: shoulder     Distribution: localized     Lesion grouping: single patch and unilateral     Lesion type: macular     Color: red with warmthPERTINENT EXAM: GENERAL APPEARANCE: healthy, alert and no distress      ICD-10-CM    1. Cellulitis of right upper extremity L03.113 cefTRIAXone (ROCEPHIN) injection 1 g     cephALEXin (KEFLEX) 500 MG capsule     CEFTRIAXONE NA INJ /250MG     INJECTION INTRAMUSCULAR OR SUB-Q       Follow-up with primary clinic if not improving

## 2019-08-09 DIAGNOSIS — I25.5 ISCHEMIC CARDIOMYOPATHY: ICD-10-CM

## 2019-08-09 DIAGNOSIS — I25.10 ASCVD (ARTERIOSCLEROTIC CARDIOVASCULAR DISEASE): ICD-10-CM

## 2019-08-09 DIAGNOSIS — I10 HYPERTENSION GOAL BP (BLOOD PRESSURE) < 140/90: ICD-10-CM

## 2019-08-09 DIAGNOSIS — E78.5 HYPERLIPIDEMIA WITH TARGET LDL LESS THAN 70: ICD-10-CM

## 2019-08-09 DIAGNOSIS — K21.9 GASTROESOPHAGEAL REFLUX DISEASE WITHOUT ESOPHAGITIS: ICD-10-CM

## 2019-08-09 DIAGNOSIS — E11.42 TYPE 2 DIABETES MELLITUS WITH DIABETIC POLYNEUROPATHY, WITHOUT LONG-TERM CURRENT USE OF INSULIN (H): ICD-10-CM

## 2019-08-09 RX ORDER — LISINOPRIL 20 MG/1
20 TABLET ORAL DAILY
Qty: 90 TABLET | Refills: 0 | OUTPATIENT
Start: 2019-08-09

## 2019-08-09 RX ORDER — METOPROLOL TARTRATE 50 MG
TABLET ORAL
Qty: 90 TABLET | Refills: 0 | OUTPATIENT
Start: 2019-08-09

## 2019-08-09 RX ORDER — ATORVASTATIN CALCIUM 80 MG/1
80 TABLET, FILM COATED ORAL DAILY
Qty: 90 TABLET | Refills: 0 | OUTPATIENT
Start: 2019-08-09

## 2019-08-09 NOTE — TELEPHONE ENCOUNTER
"Requested Prescriptions   Pending Prescriptions Disp Refills     lisinopril (PRINIVIL/ZESTRIL) 20 MG tablet  Last Written Prescription Date:  11/26/2018  Last Fill Quantity: 90 tabs,  # refills: 3   Last office visit: 5/20/2019 with prescribing provider:  Pipe   Future Office Visit:     90 tablet 3     Sig: Take 1 tablet (20 mg) by mouth daily       ACE Inhibitors (Including Combos) Protocol Passed - 8/9/2019  8:59 AM        Passed - Blood pressure under 140/90 in past 12 months     BP Readings from Last 3 Encounters:   07/26/19 98/61   05/20/19 129/77   01/18/19 118/77                 Passed - Recent (12 mo) or future (30 days) visit within the authorizing provider's specialty     Patient had office visit in the last 12 months or has a visit in the next 30 days with authorizing provider or within the authorizing provider's specialty.  See \"Patient Info\" tab in inbasket, or \"Choose Columns\" in Meds & Orders section of the refill encounter.              Passed - Medication is active on med list        Passed - Patient is age 18 or older        Passed - Normal serum creatinine on file in past 12 months     Recent Labs   Lab Test 11/26/18  1006   CR 1.00             Passed - Normal serum potassium on file in past 12 months     Recent Labs   Lab Test 11/26/18  1006   POTASSIUM 4.4             atorvastatin (LIPITOR) 80 MG tablet  Last Written Prescription Date:  11/26/2018  Last Fill Quantity: 90 tabs,  # refills: 3   Last office visit: 5/20/2019 with prescribing provider:  Pipe   Future Office Visit:     90 tablet 3     Sig: Take 1 tablet (80 mg) by mouth daily       Statins Protocol Passed - 8/9/2019  8:59 AM        Passed - LDL on file in past 12 months     Recent Labs   Lab Test 11/07/18  0933   LDL 42             Passed - No abnormal creatine kinase in past 12 months     No lab results found.             Passed - Recent (12 mo) or future (30 days) visit within the authorizing provider's specialty     Patient " "had office visit in the last 12 months or has a visit in the next 30 days with authorizing provider or within the authorizing provider's specialty.  See \"Patient Info\" tab in inbasket, or \"Choose Columns\" in Meds & Orders section of the refill encounter.              Passed - Medication is active on med list        Passed - Patient is age 18 or older        metFORMIN (GLUCOPHAGE) 500 MG tablet  Last Written Prescription Date:  3/4/2019  Last Fill Quantity: 180 tabs,  # refills: 1   Last office visit: 5/20/2019 with prescribing provider:  Pipe   Future Office Visit:     180 tablet 1     Sig: Take 1 tablet (500 mg) by mouth 2 times daily (with meals)       Biguanide Agents Failed - 8/9/2019  8:59 AM        Failed - Patient has had a Microalbumin in the past 15 mos.     Recent Labs   Lab Test 05/10/18  1017   MICROL 15   UMALCR 9.43             Passed - Blood pressure less than 140/90 in past 6 months     BP Readings from Last 3 Encounters:   07/26/19 98/61   05/20/19 129/77   01/18/19 118/77                 Passed - Patient has documented LDL within the past 12 mos.     Recent Labs   Lab Test 11/07/18  0933   LDL 42             Passed - Patient is age 10 or older        Passed - Patient has documented A1c within the specified period of time.     If HgbA1C is 8 or greater, it needs to be on file within the past 3 months.  If less than 8, must be on file within the past 6 months.     Recent Labs   Lab Test 05/20/19  0844   A1C 6.9*             Passed - Patient's CR is NOT>1.4 OR Patient's EGFR is NOT<45 within past 12 mos.     Recent Labs   Lab Test 11/26/18  1006   GFRESTIMATED 72   GFRESTBLACK 87       Recent Labs   Lab Test 11/26/18  1006   CR 1.00             Passed - Patient does NOT have a diagnosis of CHF.        Passed - Medication is active on med list        Passed - Recent (6 mo) or future (30 days) visit within the authorizing provider's specialty     Patient had office visit in the last 6 months or has a " "visit in the next 30 days with authorizing provider or within the authorizing provider's specialty.  See \"Patient Info\" tab in inbasket, or \"Choose Columns\" in Meds & Orders section of the refill encounter.            metoprolol tartrate (LOPRESSOR) 50 MG tablet  Last Written Prescription Date:  4/9/2019  Last Fill Quantity: 90 tabs,  # refills: 2   Last office visit: 5/20/2019 with prescribing provider:  Ppie   Future Office Visit:     90 tablet 2       Beta-Blockers Protocol Passed - 8/9/2019  8:59 AM        Passed - Blood pressure under 140/90 in past 12 months     BP Readings from Last 3 Encounters:   07/26/19 98/61   05/20/19 129/77   01/18/19 118/77                 Passed - Patient is age 6 or older        Passed - Recent (12 mo) or future (30 days) visit within the authorizing provider's specialty     Patient had office visit in the last 12 months or has a visit in the next 30 days with authorizing provider or within the authorizing provider's specialty.  See \"Patient Info\" tab in inbasket, or \"Choose Columns\" in Meds & Orders section of the refill encounter.              Passed - Medication is active on med list        ranitidine (ZANTAC) 150 MG tablet  Last Written Prescription Date:  5/20/2019  Last Fill Quantity: 180 tabs,  # refills: 3   Last office visit: 5/20/2019 with prescribing provider:  Pipe   Future Office Visit:     180 tablet 3     Sig: TAKE ONE TABLET BY MOUTH TWO TIMES DAILY       H2 Blockers Protocol Passed - 8/9/2019  8:59 AM        Passed - Patient is age 12 or older        Passed - Recent (12 mo) or future (30 days) visit within the authorizing provider's specialty     Patient had office visit in the last 12 months or has a visit in the next 30 days with authorizing provider or within the authorizing provider's specialty.  See \"Patient Info\" tab in inbasket, or \"Choose Columns\" in Meds & Orders section of the refill encounter.              Passed - Medication is active on med list "

## 2019-08-09 NOTE — TELEPHONE ENCOUNTER
Writer notes the following medications were sent to patient's pharmacy    - lisinopril (PRINIVIL/ZESTRIL) 20 MG tablet sent 11/26/2018 #90/3 refills  - atorvastatin (LIPITOR) 80 MG tablet sent 11/26/2018 #90/3 refills  - metFORMIN (GLUCOPHAGE) 500 MG tablet sent 3/4/2019 #180/1 refill  - metoprolol tartrate (LOPRESSOR) 50 MG tablet sent 04/09/2019 #90/2 refills  - ranitidine (ZANTAC) 150 MG tablet sent 05/20/2019 #90/3 refills    All medications refused with note to pharmacy    Thank You!  Makayla Feldman, RN  Triage Nurse

## 2019-09-09 DIAGNOSIS — E11.42 TYPE 2 DIABETES MELLITUS WITH DIABETIC POLYNEUROPATHY, WITHOUT LONG-TERM CURRENT USE OF INSULIN (H): ICD-10-CM

## 2019-09-09 NOTE — TELEPHONE ENCOUNTER
Requested Prescriptions   Pending Prescriptions Disp Refills     metFORMIN (GLUCOPHAGE) 500 MG tablet 180 tablet 1     Sig: Take 1 tablet (500 mg) by mouth 2 times daily (with meals)  Last Written Prescription Date:  3/4/2019  Last Fill Quantity: 180 tablet,  # refills: 1   Last Office Visit: 5/20/2019   Future Office Visit:    Next 5 appointments (look out 90 days)    Nov 20, 2019  8:20 AM CST  Office Visit with Beatriz Betancur MD  [facility] ([facility] [address] [place] [id number] [phone]              Biguanide Agents Failed - 9/9/2019 10:31 AM        Failed - Patient has had a Microalbumin in the past 15 mos.     Recent Labs   Lab Test 05/10/18  1017   MICROL 15   UMALCR 9.43           Passed - Blood pressure less than 140/90 in past 6 months     BP Readings from Last 3 Encounters:   07/26/19 98/61   05/20/19 129/77   01/18/19 118/77           Passed - Patient has documented LDL within the past 12 mos.     Recent Labs   Lab Test 11/07/18  0933   LDL 42           Passed - Patient is age 10 or older        Passed - Patient has documented A1c within the specified period of time.     If HgbA1C is 8 or greater, it needs to be on file within the past 3 months.  If less than 8, must be on file within the past 6 months.     Recent Labs   Lab Test 05/20/19  0844   A1C 6.9*           Passed - Patient's CR is NOT>1.4 OR Patient's EGFR is NOT<45 within past 12 mos.     Recent Labs   Lab Test 11/26/18  1006   GFRESTIMATED 72   GFRESTBLACK 87     Recent Labs   Lab Test 11/26/18  1006   CR 1.00           Passed - Patient does NOT have a diagnosis of CHF.        Passed - Medication is active on med list        Passed - Recent (6 mo) or future (30 days) visit within the authorizing provider's specialty     Patient had office visit in the last 6 months or has a visit in the next 30 days with authorizing provider or within the authorizing provider's specialty.  See  "\"Patient Info\" tab in inbasket, or \"Choose Columns\" in Meds & Orders section of the refill encounter.              "

## 2019-09-11 NOTE — TELEPHONE ENCOUNTER
Prescription approved per [facility] Refill Protocol.  Tiffany Nixon RN

## 2019-09-16 ENCOUNTER — TELEPHONE (OUTPATIENT)
Dept: FAMILY MEDICINE | Facility: CLINIC | Age: 79
End: 2019-09-16

## 2019-09-16 NOTE — TELEPHONE ENCOUNTER
Called pharmacy and they do have the refill and will get it ready for  later today.  Patient informed.  Cassidy Petersen RN

## 2019-09-16 NOTE — TELEPHONE ENCOUNTER
Reason for Call:  Other call back    Detailed comments: pt states [facility] has still not gotten the [facility]ill and wondering if it can be re sent    Phone Number Patient can be reached at: Home number on file [phone] (home)    Best Time: asap    Can we leave a detailed message on this number? YES    Call taken on 9/16/2019 at 8:24 AM by Staci Marshall

## 2019-11-08 ENCOUNTER — TRANSFERRED RECORDS (OUTPATIENT)
Dept: HEALTH INFORMATION MANAGEMENT | Facility: CLINIC | Age: 79
End: 2019-11-08

## 2019-11-18 NOTE — PROGRESS NOTES
Subjective     David Medina is a 79 year old male who presents to clinic today for the following health issues:    HPI   Diabetes Follow-up    How often are you checking your blood sugar? A few times a week  What time of day are you checking your blood sugars (select all that apply)?  Before meals  Have you had any blood sugars above 200?  No  Have you had any blood sugars below 70?  No    What symptoms do you notice when your blood sugar is low?  None    What concerns do you have today about your diabetes? None     Do you have any of these symptoms? (Select all that apply)  Numbness in feet     Have you had a diabetic eye exam in the last 12 months? Yes- Date of last eye exam: unsure    Hyperlipidemia Follow-Up      Are you having any of the following symptoms? (Select all that apply)  No complaints of shortness of breath, chest pain or pressure.  No increased sweating or nausea with activity.  No left-sided neck or arm pain.  No complaints of pain in calves when walking 1-2 blocks.    Are you regularly taking any medication or supplement to lower your cholesterol?   Yes- statin    Are you having muscle aches or other side effects that you think could be caused by your cholesterol lowering medication?  No    Hypertension Follow-up      Do you check your blood pressure regularly outside of the clinic? Yes     Are you following a low salt diet? No    Are your blood pressures ever more than 140 on the top number (systolic) OR more   than 90 on the bottom number (diastolic), for example 140/90? No    BP Readings from Last 2 Encounters:   11/20/19 122/64   07/26/19 98/61     Hemoglobin A1C (%)   Date Value   11/20/2019 6.5 (H)   05/20/2019 6.9 (H)     LDL Cholesterol Calculated (mg/dL)   Date Value   11/07/2018 42   09/25/2017 66         How many servings of fruits and vegetables do you eat daily?  2-3    On average, how many sweetened beverages do you drink each day (soda, juice, sweet tea, etc)?   0    How many  days per week do you miss taking your medication? 0        Recent Labs   Lab Test 11/20/19  0818 05/20/19  0844 11/26/18  1006 11/07/18  0933  09/25/17  0850  09/22/16  0821  09/17/15  0905   A1C 6.5* 6.9* 5.9*  --    < > 6.2*   < > 5.2   < > 6.6*   LDL  --   --   --  42  --  66  --  62  --  42   HDL  --   --   --  52  --  56  --  56  --  51   TRIG  --   --   --  112  --  114  --  83  --  135   ALT  --   --  42  --   --  38  --  28  --  37   CR  --   --  1.00  --   --  1.07   < > 0.82  --  0.90   GFRESTIMATED  --   --  72  --   --  67   < > >90  Non  GFR Calc    --  81   GFRESTBLACK  --   --  87  --   --  81   < > >90   GFR Calc    --  >90   GFR Calc     POTASSIUM  --   --  4.4  --   --  4.4   < > 4.1   < > 4.2   TSH  --   --   --   --   --  2.26  --   --   --  2.72    < > = values in this interval not displayed.      BP Readings from Last 3 Encounters:   11/20/19 122/64   07/26/19 98/61   05/20/19 129/77    Wt Readings from Last 3 Encounters:   11/20/19 112.9 kg (249 lb)   07/26/19 110.7 kg (244 lb)   05/20/19 114.3 kg (252 lb)            Reviewed and updated as needed this visit by Provider  Meds  Problems         Review of Systems   RESP:  NEGATIVE for shortness of breath  CV:  NEGATIVE for chest pain        Objective    /64 (BP Location: Right arm, Patient Position: Sitting, Cuff Size: Adult Large)   Pulse (!) 46   Temp 97.3  F (36.3  C) (Tympanic)   Resp 16   Wt 112.9 kg (249 lb)   SpO2 100%   BMI 33.08 kg/m    Body mass index is 33.08 kg/m .  Physical Exam   GEN:  no apparent distress  EYES: sclerae and conjunctivae clear with no discharge  LUNGS:  normal respiratory effort, and lungs clear to auscultation bilaterally - no rales, rhonchi or wheezes  CV: regular rate and rhythm, normal S1 S2, no S3 or S4 and no murmur, click or rub  SKIN: Clear. No significant rash, abnormal pigmentation or lesions     Diagnostic Test Results:  Labs reviewed in  Epic        Assessment & Plan     1. Type 2 diabetes mellitus with diabetic polyneuropathy, without long-term current use of insulin (H)  stable/well controlled   - Albumin Random Urine Quantitative with Creat Ratio  - TSH WITH FREE T4 REFLEX  - Lipid panel reflex to direct LDL Fasting  - HEMOGLOBIN A1C  - COMPREHENSIVE METABOLIC PANEL  - OPHTHALMOLOGY ADULT REFERRAL  - metFORMIN (GLUCOPHAGE) 500 MG tablet; Take 1 tablet (500 mg) by mouth 2 times daily (with meals)  Dispense: 180 tablet; Refill: 1  - PAF COMPLETED    2. Pure hypercholesterolemia  Continue statin.  Awaiting lipid results to determine if dose change is indicated.     - TSH WITH FREE T4 REFLEX  - Lipid panel reflex to direct LDL Fasting    3. Hypertension goal BP (blood pressure) < 140/90  stable/well controlled   - Albumin Random Urine Quantitative with Creat Ratio  - COMPREHENSIVE METABOLIC PANEL  - lisinopril (PRINIVIL/ZESTRIL) 20 MG tablet; Take 1 tablet (20 mg) by mouth daily  Dispense: 90 tablet; Refill: 3  - metoprolol tartrate (LOPRESSOR) 50 MG tablet; TAKE ONE HALF TABLET BY MOUTH TWICE DAILY  Dispense: 90 tablet; Refill: 3    4. ASCVD (arteriosclerotic cardiovascular disease)  5. Ischemic cardiomyopathy  Reminded patient to schedule annual follow-up with cardiology  - CARDIOLOGY EVAL ADULT REFERRAL  - metoprolol tartrate (LOPRESSOR) 50 MG tablet; TAKE ONE HALF TABLET BY MOUTH TWICE DAILY  Dispense: 90 tablet; Refill: 3    6. Gastroesophageal reflux disease without esophagitis  We discussed current FDA warning about potential impurities in the ranitidine supply and I recommended switching [id number] to famotidine.     - famotidine (PEPCID) 20 MG tablet; Take 1 tablet (20 mg) by mouth 2 times daily  Dispense: 180 tablet; Refill: 3        Patient Instructions   You are overdue for your annual Cardiology follow-up.  Please schedule that.      Check with your insurance or the pharmacy on coverage of Shingrix (the new shingles vaccine).  For  medicare patients it is usually best (cheapest) to get this at the pharmacy.  The pharmacist can administer the injection.    The Shingrix vaccination is a 2-shot series.  The second dose is given 6 months after the first.  (It cannot be given sooner than 2 months after the first dose - at the earliest.)  You should be aware that patients are reporting feeling a bit under the weather after the injection, including fatigue, malaise, and low-grade fever that may last a couple days.  Rarely patients can get a mild, shingles-like rash.   But these symptoms are much better than the Shingles disease.          Return in about 6 months (around 5/20/2020) for follow-up medical condition(s).    Beatriz Betancur MD  [facility]

## 2019-11-19 NOTE — PATIENT INSTRUCTIONS
You are overdue for your annual Cardiology follow-up.  Please schedule that.      Check with your insurance or the pharmacy on coverage of Shingrix (the new shingles vaccine).  For medicare patients it is usually best (cheapest) to get this at the pharmacy.  The pharmacist can administer the injection.    The Shingrix vaccination is a 2-shot series.  The second dose is given 6 months after the first.  (It cannot be given sooner than 2 months after the first dose - at the earliest.)  You should be aware that patients are reporting feeling a bit under the weather after the injection, including fatigue, malaise, and low-grade fever that may last a couple days.  Rarely patients can get a mild, shingles-like rash.   But these symptoms are much better than the Shingles disease.

## 2019-11-20 ENCOUNTER — OFFICE VISIT (OUTPATIENT)
Dept: FAMILY MEDICINE | Facility: CLINIC | Age: 79
End: 2019-11-20
Payer: COMMERCIAL

## 2019-11-20 VITALS
OXYGEN SATURATION: 100 % | HEART RATE: 46 BPM | TEMPERATURE: 97.3 F | RESPIRATION RATE: 16 BRPM | WEIGHT: 249 LBS | BODY MASS INDEX: 33.08 KG/M2 | DIASTOLIC BLOOD PRESSURE: 64 MMHG | SYSTOLIC BLOOD PRESSURE: 122 MMHG

## 2019-11-20 DIAGNOSIS — E78.00 PURE HYPERCHOLESTEROLEMIA: ICD-10-CM

## 2019-11-20 DIAGNOSIS — I25.5 ISCHEMIC CARDIOMYOPATHY: ICD-10-CM

## 2019-11-20 DIAGNOSIS — K21.9 GASTROESOPHAGEAL REFLUX DISEASE WITHOUT ESOPHAGITIS: ICD-10-CM

## 2019-11-20 DIAGNOSIS — E11.42 TYPE 2 DIABETES MELLITUS WITH DIABETIC POLYNEUROPATHY, WITHOUT LONG-TERM CURRENT USE OF INSULIN (H): Primary | ICD-10-CM

## 2019-11-20 DIAGNOSIS — I25.10 ASCVD (ARTERIOSCLEROTIC CARDIOVASCULAR DISEASE): ICD-10-CM

## 2019-11-20 DIAGNOSIS — I10 HYPERTENSION GOAL BP (BLOOD PRESSURE) < 140/90: ICD-10-CM

## 2019-11-20 DIAGNOSIS — E78.5 HYPERLIPIDEMIA WITH TARGET LDL LESS THAN 70: ICD-10-CM

## 2019-11-20 LAB
ALBUMIN SERPL-MCNC: 4.1 G/DL (ref 3.4–5)
ALP SERPL-CCNC: 71 U/L (ref 40–150)
ALT SERPL W P-5'-P-CCNC: 37 U/L (ref 0–70)
ANION GAP SERPL CALCULATED.3IONS-SCNC: 6 MMOL/L (ref 3–14)
AST SERPL W P-5'-P-CCNC: 19 U/L (ref 0–45)
BILIRUB SERPL-MCNC: 0.8 MG/DL (ref 0.2–1.3)
BUN SERPL-MCNC: 25 MG/DL (ref 7–30)
CALCIUM SERPL-MCNC: 9.5 MG/DL (ref 8.5–10.1)
CHLORIDE SERPL-SCNC: 106 MMOL/L (ref 94–109)
CHOLEST SERPL-MCNC: 142 MG/DL
CO2 SERPL-SCNC: 24 MMOL/L (ref 20–32)
CREAT SERPL-MCNC: 0.93 MG/DL (ref 0.66–1.25)
CREAT UR-MCNC: 84 MG/DL
GFR SERPL CREATININE-BSD FRML MDRD: 77 ML/MIN/{1.73_M2}
GLUCOSE SERPL-MCNC: 144 MG/DL (ref 70–99)
HBA1C MFR BLD: 6.5 % (ref 0–5.6)
HDLC SERPL-MCNC: 54 MG/DL
LDLC SERPL CALC-MCNC: 66 MG/DL
MICROALBUMIN UR-MCNC: 9 MG/L
MICROALBUMIN/CREAT UR: 10.23 MG/G CR (ref 0–17)
NONHDLC SERPL-MCNC: 88 MG/DL
POTASSIUM SERPL-SCNC: 4.8 MMOL/L (ref 3.4–5.3)
PROT SERPL-MCNC: 7 G/DL (ref 6.8–8.8)
SODIUM SERPL-SCNC: 136 MMOL/L (ref 133–144)
TRIGL SERPL-MCNC: 111 MG/DL
TSH SERPL DL<=0.005 MIU/L-ACNC: 2.92 MU/L (ref 0.4–4)

## 2019-11-20 PROCEDURE — 36415 COLL VENOUS BLD VENIPUNCTURE: CPT | Performed by: FAMILY MEDICINE

## 2019-11-20 PROCEDURE — 82043 UR ALBUMIN QUANTITATIVE: CPT | Performed by: FAMILY MEDICINE

## 2019-11-20 PROCEDURE — 83036 HEMOGLOBIN GLYCOSYLATED A1C: CPT | Performed by: FAMILY MEDICINE

## 2019-11-20 PROCEDURE — 84443 ASSAY THYROID STIM HORMONE: CPT | Performed by: FAMILY MEDICINE

## 2019-11-20 PROCEDURE — 99214 OFFICE O/P EST MOD 30 MIN: CPT | Performed by: FAMILY MEDICINE

## 2019-11-20 PROCEDURE — 80053 COMPREHEN METABOLIC PANEL: CPT | Performed by: FAMILY MEDICINE

## 2019-11-20 PROCEDURE — 99207 C PAF COMPLETED  NO CHARGE: CPT | Performed by: FAMILY MEDICINE

## 2019-11-20 PROCEDURE — 80061 LIPID PANEL: CPT | Performed by: FAMILY MEDICINE

## 2019-11-20 RX ORDER — METOPROLOL TARTRATE 50 MG
TABLET ORAL
Qty: 90 TABLET | Refills: 3 | Status: SHIPPED | OUTPATIENT
Start: 2019-11-20 | End: 2020-12-29

## 2019-11-20 RX ORDER — FAMOTIDINE 20 MG/1
20 TABLET, FILM COATED ORAL 2 TIMES DAILY
Qty: 180 TABLET | Refills: 3 | Status: SHIPPED | OUTPATIENT
Start: 2019-11-20 | End: 2020-12-29

## 2019-11-20 RX ORDER — LISINOPRIL 20 MG/1
20 TABLET ORAL DAILY
Qty: 90 TABLET | Refills: 3 | Status: SHIPPED | OUTPATIENT
Start: 2019-11-20 | End: 2020-12-29

## 2019-11-22 ENCOUNTER — TRANSFERRED RECORDS (OUTPATIENT)
Dept: HEALTH INFORMATION MANAGEMENT | Facility: CLINIC | Age: 79
End: 2019-11-22

## 2019-11-22 RX ORDER — ATORVASTATIN CALCIUM 80 MG/1
80 TABLET, FILM COATED ORAL DAILY
Qty: 90 TABLET | Refills: 3 | Status: SHIPPED | OUTPATIENT
Start: 2019-11-22 | End: 2020-12-29

## 2019-11-22 NOTE — RESULT ENCOUNTER NOTE
Please send results letter:  Your lab results all look good!  I sent authorization to your pharmacy to fill the atorvastatin at the same dose for another year.   Beatriz Betancur MD

## 2020-02-07 ENCOUNTER — HOSPITAL ENCOUNTER (OUTPATIENT)
Dept: ULTRASOUND IMAGING | Facility: CLINIC | Age: 80
Discharge: HOME | End: 2020-02-07
Attending: RADIOLOGY | Admitting: RADIOLOGY
Payer: COMMERCIAL

## 2020-02-07 DIAGNOSIS — I71.40 ABDOMINAL AORTIC ANEURYSM (H): ICD-10-CM

## 2020-02-07 PROCEDURE — 93978 VASCULAR STUDY: CPT

## 2020-02-07 NOTE — LETTER
February 10, 2020      David Medina  [address] [facility] [address]        Dear ,    We are writing to inform you of your test results.    Your aortic ultrasound looks good - everything is stable.       Resulted Orders   US Aorta/Ivc/Iliac Duplex Complete    Narrative    ULTRASOUND AORTA/IVC/ILIAC DUPLEX COMPLETE  2/7/2020 8:26 AM    HISTORY: History of EVAR done on 12/13/2007.  Comparsion study done  2/7/2018 recommend two- year followup. Abdominal aortic aneurysm (H).    COMPARISON: 1/18/2017, 2/7/2018    FINDINGS: Changes of endovascular aortobiiliac aneurysm repair. Mild  calcification and atherosclerotic changes are noted throughout the  abdominal aorta and iliac arteries. The abdominal aortic measurements  are as follows:    Proximal (suprarenal) aorta: 2.2 cm AP x 2.0 cm transverse.  Mid (infrarenal) aorta:  2.3 cm AP x 2.7 cm transverse.  Distal aorta: 3.4 cm AP x 4.0 cm transverse, not significantly  changed.   Right distal fixation: 2.0 cm.  Left distal fixation: 1.5 cm.   Right external iliac artery: 1.4 cm  Left external iliac artery: 1.1 cm      Impression    IMPRESSION: Changes of endovascular aortobiiliac aneurysm repair.  Stent graft is patent. No evidence of endoleak. The aneurysm sac size  is unchanged.    LILA ELLIS, DO       If you have any questions or concerns, please call the clinic at the number listed above.       Sincerely,    Beatriz Betancur MD/nr

## 2020-02-07 NOTE — RESULT ENCOUNTER NOTE
Please send results letter:  Your aortic ultrasound looks good - everything is stable.    Beatriz Betancur MD

## 2020-02-10 ENCOUNTER — TELEPHONE (OUTPATIENT)
Dept: OTHER | Facility: CLINIC | Age: 80
End: 2020-02-10

## 2020-02-10 NOTE — TELEPHONE ENCOUNTER
Called patient with results.  No change in aneurysm sac.  Recommend 2 year f/u.  Meena Cheung RN  IR nurse clinician  [phone]  Narrative & Impression     ULTRASOUND AORTA/IVC/ILIAC DUPLEX COMPLETE  2/7/2020 8:26 AM     HISTORY: History of EVAR done on 12/13/2007.  Comparsion study done  2/7/2018 recommend two- year followup. Abdominal aortic aneurysm (H).     COMPARISON: 1/18/2017, 2/7/2018     FINDINGS: Changes of endovascular aortobiiliac aneurysm repair. Mild  calcification and atherosclerotic changes are noted throughout the  abdominal aorta and iliac arteries. The abdominal aortic measurements  are as follows:     Proximal (suprarenal) aorta: 2.2 cm AP x 2.0 cm transverse.  Mid (infrarenal) aorta:  2.3 cm AP x 2.7 cm transverse.  Distal aorta: 3.4 cm AP x 4.0 cm transverse, not significantly  changed.   Right distal fixation: 2.0 cm.  Left distal fixation: 1.5 cm.   Right external iliac artery: 1.4 cm  Left external iliac artery: 1.1 cm                                                                      IMPRESSION: Changes of endovascular aortobiiliac aneurysm repair.  Stent graft is patent. No evidence of endoleak. The aneurysm sac size  is unchanged.     LILA ELLIS DO

## 2020-02-21 ENCOUNTER — OFFICE VISIT (OUTPATIENT)
Dept: CARDIOLOGY | Facility: CLINIC | Age: 80
End: 2020-02-21
Attending: FAMILY MEDICINE
Payer: COMMERCIAL

## 2020-02-21 VITALS
HEART RATE: 58 BPM | DIASTOLIC BLOOD PRESSURE: 81 MMHG | SYSTOLIC BLOOD PRESSURE: 132 MMHG | WEIGHT: 250 LBS | HEIGHT: 72 IN | BODY MASS INDEX: 33.86 KG/M2

## 2020-02-21 DIAGNOSIS — I25.10 CORONARY ARTERY DISEASE INVOLVING NATIVE CORONARY ARTERY OF NATIVE HEART WITHOUT ANGINA PECTORIS: Primary | ICD-10-CM

## 2020-02-21 PROCEDURE — 99214 OFFICE O/P EST MOD 30 MIN: CPT | Performed by: INTERNAL MEDICINE

## 2020-02-21 ASSESSMENT — MIFFLIN-ST. JEOR: SCORE: 1881.99

## 2020-02-21 NOTE — PROGRESS NOTES
"HPI and Plan:     Mr. David Medina was seen in followup at [facility] in [place].  He is followed for his history of coronary disease and at 78 years of age, he is doing well with the continued exception of his chronic back pain.      Mr. Medina initially denied any chest, neck, arm or upper back discomfort.  Then he remembered 2 weeks ago having some transient chest tightness and dyspnea on the treadmill.  It has not recurred.    He continues to experience lower back discomfort and is walking with a cane but getting around.  Last year, he fell and broke his hip on a cruise, completing the cruise on a cycle and having hip surgery in the [facility].      His most recent intervention was  2014 years ago when Dr. Barger performed an intervention on the right coronary artery with stent placement.  That procedure was preceded by exertional angina and his exertional tolerance has improved markedly since that time.      His blood pressure is controlled, he is on good risk factor intervention and his last lipids done about 7 months ago indicated an LDL of 66, [id number] and HDL 56 mg/dL.  He continues on atorvastatin 80 mg daily, ACE inhibition, beta blockade and low-dose aspirin.  He has normal left ventricular systolic performance.  He also continues on a low dose of isosorbide mononitrate.      Exam:  This is a man in no apparent distress.  He was alert and oriented to person, place and time.   Blood pressure 123/78, pulse 62, height 1.88 m (6' 2\"), weight 112.5 kg (248 lb).   and the respiratory rate was 14-18 per minute.   CHEST:  Clear to auscultation.   CARDIAC:  On cardiac auscultation, there was an S1 and S2 without extra sounds or a murmur.  The rhythm was regular.      Assessment/Plan:  Mr. Medina is overall doing  well from a Cardiology standpoint.  He h asymptomatic and his risk factors are under good control.  As I explained to him, the episode with his hip fracture during the 2-week cruise " was certainly a good physiologic stressor of his heart and he did well.      I have recommended a return at 1 year u    Medications Discontinued During This Encounter   Medication Reason     latanoprost (XALATAN) 0.005 % ophthalmic solution Stopped by Patient         No diagnosis found.    CURRENT MEDICATIONS:  Current Outpatient Medications   Medication Sig Dispense Refill     aspirin EC 81 MG tablet Take 1 tablet (81 mg) by mouth daily 90 tablet 3     atorvastatin (LIPITOR) 80 MG tablet Take 1 tablet (80 mg) by mouth daily 90 tablet 3     blood glucose (ONE TOUCH DELICA) lancing device Use to test blood sugars four times daily or as directed. 1 each 1     blood glucose monitoring (ONE TOUCH DELICA) lancets Use to test blood sugars four times daily or as directed. 100 each 13     blood glucose monitoring (ONETOUCH VERIO IQ SYSTEM) meter device kit Use to test blood sugar 4 times daily. 1 kit 0     blood glucose monitoring (ONETOUCH VERIO IQ) test strip Use to test blood sugar 4 times daily or as directed. 100 strip 6     Cyanocobalamin 1000 MCG CAPS Take 1 capsule by mouth daily 100 capsule 3     famotidine (PEPCID) 20 MG tablet Take 1 tablet (20 mg) by mouth 2 times daily 180 tablet 3     isosorbide mononitrate (IMDUR) 30 MG 24 hr tablet Take 1 tablet (30 mg) by mouth every morning 90 tablet 3     lisinopril (PRINIVIL/ZESTRIL) 20 MG tablet Take 1 tablet (20 mg) by mouth daily 90 tablet 3     metFORMIN (GLUCOPHAGE) 500 MG tablet Take 1 tablet (500 mg) by mouth 2 times daily (with meals) 180 tablet 1     metoprolol tartrate (LOPRESSOR) 50 MG tablet TAKE ONE HALF TABLET BY MOUTH TWICE DAILY 90 tablet 3     multivitamin, therapeutic with minerals (THERA-VIT-M) TABS Take 1 tablet by mouth daily       nitroGLYcerin (NITROSTAT) 0.4 MG sublingual tablet Place 1 tablet (0.4 mg) under the tongue every 5 minutes as needed for chest pain Up to 3 doses max. 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    Cardiac cath Oct 2014: EMEKA to RCA, cath 2011: medical management, cath Aug 2005: EMEKA to OM; cath June 2005: EMEKA to LAD     B12 deficiency      Benign paroxysmal positional vertigo      Cervical radiculopathy 10/30/2008    Injected through [facility] 10/07     Closed fracture of patella 2/07    left     DM type 2 (diabetes mellitus, type 2) (H)      Hyperlipidemia LDL goal < 70      Hypertension goal BP (blood pressure) < 130/80      Ischemic cardiomyopathy 6/05    ischemic cardiomyopathy, EF 45%     Lumbosacral plexopathy     diabetic radiculoplexopathy causing proximal muscle weakness, Dr. 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 Villa (neuro) and Dr. 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 Jacob ARCEO APPENDECTOMY  12/06    open     CLOSED RX TIBIA SHAFT FX  6/2013    RT prox tibial fracture     COLONOSCOPY  10/3/05    WNL, rpt in 10 yrs, Dr. Vicente, [place] [facility]     COLONOSCOPY  9/23/15    advanced adenoma, rpt 3 yrs     coronary angiogram  2/28/11    diffuse disease with vasospasm      EMG  6/10/10    chronic active polyradiculopathy, mild generalized polyneuropathy, r/o lumbar stenosis     ENDOVAS REPAIR, INFRARENL ABDOM AORTIC ANEURYSM/DISSECT; [id number]/AORTO-UNIFEMORAL PROSTHESIS      one done,one to do     FLEXIBLE SIGMOIDOSCOPY      WNL     HC PTCA SINGLE VESSEL      LAD - 100% stenosis     HC UGI ENDOSCOPY, SIMPLE EXAM      erosive esophagitis, GERD, Dr. Salazar     HEART CATH, ANGIOPLASTY  10/3/14    EMEKA x 1  ostium of the RCA     LAMINECTOMY THORACIC THREE LEVELS  10/31/12    T10, T11, T12 with medial facetectomy     OPEN REDUCTION INTERNAL FIXATION HIP NAILING N/A 2016    Procedure: OPEN REDUCTION INTERNAL FIXATION HIP NAILING;  Surgeon: Jason Flowers MD;  Location: [facility] OR     SONO ABD RETROPERITNL  06    AAA 4.2 x 4.8, Dr. James's office     STRESS LEXISCAN TEST  10/5/12    small, fixed, apical defect/apical thinning     SURGICAL HISTORY OF -       tonsillectomy     TONSILLECTOMY         FAMILY HISTORY:  Family History   Problem Relation Age of Onset     Respiratory Father         TB     Other - See Comments Mother         FALL       SOCIAL HISTORY:  Social History     Socioeconomic History     Marital status:      Spouse name: Sarika     Number of children: 4     Years of education: None     Highest education level: None   Occupational History     Employer: RETIRED   Social Needs     Financial resource strain: None     Food insecurity:     Worry: None     Inability: None     Transportation needs:     Medical: None     Non-medical: None   Tobacco Use     Smoking status: Former Smoker     Packs/day: 3.50     Years: 19.00     Pack years: 66.50     Types: Cigarettes     Last attempt to quit: 1980     Years since quittin.8     Smokeless tobacco: Never Used   Substance and Sexual Activity     Alcohol use: Yes     Comment: beer 1-2x/week     Drug use: No     Sexual activity: Yes     Partners: Female   Lifestyle     Physical  "activity:     Days per week: None     Minutes per session: None     Stress: None   Relationships     Social connections:     Talks on phone: None     Gets together: None     Attends Yazdanism service: None     Active member of club or organization: None     Attends meetings of clubs or organizations: None     Relationship status: None     Intimate partner violence:     Fear of current or ex partner: None     Emotionally abused: None     Physically abused: None     Forced sexual activity: None   Other Topics Concern     Parent/sibling w/ CABG, MI or angioplasty before 65F 55M? No      Service Not Asked     Blood Transfusions Not Asked     Caffeine Concern Yes     Comment: 2 cups of coffee a day.       Occupational Exposure Not Asked     Hobby Hazards Not Asked     Sleep Concern No     Stress Concern No     Weight Concern No     Special Diet No     Back Care Not Asked     Exercise Yes     Comment: Cardiac rehab 1 x week, treadmill daily     Bike Helmet Not Asked     Seat Belt Yes     Self-Exams Not Asked   Social History Narrative     None       Review of Systems:  Skin:  Negative       Eyes:  Positive for glasses readers   ENT:  Negative      Respiratory:  Negative       Cardiovascular:  Negative Positive for chest discomfort resolved since angiogram  Gastroenterology: Positive for heartburn    Genitourinary:  not assessed nocturia;urinary frequency    Musculoskeletal:  Positive for arthritis;joint pain;joint stiffness right pinky stiff, unable to straigthten   Neurologic:  Positive for numbness or tingling of hands;numbness or tingling of feet;incoordination numbness in right hand/arm for \"years\"   Psychiatric:  Positive for sleep disturbances falls alseep easily, wakes up often r/t nocturia - difficult to get back to sleep   Heme/Lymph/Imm:  Negative      Endocrine:  Positive for diabetes      Physical Exam:  Vitals: /81   Pulse 58   Ht 1.829 m (6')   Wt 113.4 kg (250 lb)   BMI 33.91 kg/m  "     Constitutional:  cooperative, alert and oriented, well developed, well nourished, in no acute distress        Skin:  warm and dry to the touch          Head:           Eyes:  sclera white        Lymph:      ENT:           Neck:           Respiratory:  normal breath sounds, clear to auscultation, normal A-P diameter, normal symmetry, normal respiratory excursion, no use of accessory muscles         Cardiac: regular rhythm, normal S1/S2, no S3 or S4, apical impulse not displaced, no murmurs, gallops or rubs                                                         GI:           Extremities and Muscular Skeletal:                 Neurological:  no gross motor deficits;affect appropriate        Psych:  Alert and Oriented x 3;affect appropriate, oriented to time, person and place          CC  Beatriz Betancur MD  [address] [place] [address]

## 2020-02-21 NOTE — LETTER
"2/21/2020    Beatriz Betancur MD  [address]  [facility] [address]    RE: David Medina       Dear Colleague,    I had the pleasure of seeing David Medina in the [facility].    HPI and Plan:     Mr. David Medina was seen in followup at [facility] in [place].  He is followed for his history of coronary disease and at 78 years of age, he is doing well with the continued exception of his chronic back pain.      Mr. Medina initially denied any chest, neck, arm or upper back discomfort.  Then he remembered 2 weeks ago having some transient chest tightness and dyspnea on the treadmill.  It has not recurred.    He continues to experience lower back discomfort and is walking with a cane but getting around.  Last year, he fell and broke his hip on a cruise, completing the cruise on a cycle and having hip surgery in the [facility].      His most recent intervention was  2014 years ago when Dr. Barger performed an intervention on the right coronary artery with stent placement.  That procedure was preceded by exertional angina and his exertional tolerance has improved markedly since that time.      His blood pressure is controlled, he is on good risk factor intervention and his last lipids done about 7 months ago indicated an LDL of 66, [id number] and HDL 56 mg/dL.  He continues on atorvastatin 80 mg daily, ACE inhibition, beta blockade and low-dose aspirin.  He has normal left ventricular systolic performance.  He also continues on a low dose of isosorbide mononitrate.      Exam:  This is a man in no apparent distress.  He was alert and oriented to person, place and time.   Blood pressure 123/78, pulse 62, height 1.88 m (6' 2\"), weight 112.5 kg (248 lb).   and the respiratory rate was 14-18 per minute.   CHEST:  Clear to auscultation.   CARDIAC:  On cardiac auscultation, there was an S1 and S2 without extra sounds or a murmur.  The rhythm was regular.    "   Assessment/Plan:  Mr. Medina is overall doing  well from a Cardiology standpoint.  He h asymptomatic and his risk factors are under good control.  As I explained to him, the episode with his hip fracture during the 2-week cruise was certainly a good physiologic stressor of his heart and he did well.      I have recommended a return at 1 year u    Medications Discontinued During This Encounter   Medication Reason     latanoprost (XALATAN) 0.005 % ophthalmic solution Stopped by Patient         No diagnosis found.    CURRENT MEDICATIONS:  Current Outpatient Medications   Medication Sig Dispense Refill     aspirin EC 81 MG tablet Take 1 tablet (81 mg) by mouth daily 90 tablet 3     atorvastatin (LIPITOR) 80 MG tablet Take 1 tablet (80 mg) by mouth daily 90 tablet 3     blood glucose (ONE TOUCH DELICA) lancing device Use to test blood sugars four times daily or as directed. 1 each 1     blood glucose monitoring (ONE TOUCH DELICA) lancets Use to test blood sugars four times daily or as directed. 100 each 13     blood glucose monitoring (ONETOUCH VERIO IQ SYSTEM) meter device kit Use to test blood sugar 4 times daily. 1 kit 0     blood glucose monitoring (ONETOUCH VERIO IQ) test strip Use to test blood sugar 4 times daily or as directed. 100 strip 6     Cyanocobalamin 1000 MCG CAPS Take 1 capsule by mouth daily 100 capsule 3     famotidine (PEPCID) 20 MG tablet Take 1 tablet (20 mg) by mouth 2 times daily 180 tablet 3     isosorbide mononitrate (IMDUR) 30 MG 24 hr tablet Take 1 tablet (30 mg) by mouth every morning 90 tablet 3     lisinopril (PRINIVIL/ZESTRIL) 20 MG tablet Take 1 tablet (20 mg) by mouth daily 90 tablet 3     metFORMIN (GLUCOPHAGE) 500 MG tablet Take 1 tablet (500 mg) by mouth 2 times daily (with meals) 180 tablet 1     metoprolol tartrate (LOPRESSOR) 50 MG tablet TAKE ONE HALF TABLET BY MOUTH TWICE DAILY 90 tablet 3     multivitamin, therapeutic with minerals (THERA-VIT-M) TABS Take 1 tablet by mouth  daily       nitroGLYcerin (NITROSTAT) 0.4 MG sublingual tablet Place 1 tablet (0.4 mg) under the tongue every 5 minutes as needed for chest pain Up to 3 doses max. 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    Cardiac cath Oct 2014: EMEKA to RCA, cath 2011: medical management, cath Aug 2005: EMEKA to OM; cath June 2005: EMEKA to LAD     B12 deficiency      Benign paroxysmal positional vertigo      Cervical radiculopathy 10/30/2008    Injected through Ortho Spine 10/07     Closed fracture of patella 2/07    left     DM type 2 (diabetes mellitus, type 2) (H)      Hyperlipidemia LDL goal < 70      Hypertension goal BP (blood pressure) < 130/80      Ischemic cardiomyopathy 6/05    ischemic cardiomyopathy, EF 45%     Lumbosacral plexopathy     diabetic radiculoplexopathy causing proximal muscle weakness, Dr. 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 Villa (neuro) and Dr. 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 Jacob ARCEO APPENDECTOMY  12/06    open     CLOSED RX  TIBIA SHAFT FX  6/2013    RT prox tibial fracture     COLONOSCOPY  10/3/05    WNL, rpt in 10 yrs, Dr. Vicente, [place] [facility]     COLONOSCOPY  9/23/15    advanced adenoma, rpt 3 yrs     coronary angiogram  2/28/11    diffuse disease with vasospasm     EMG  6/10/10    chronic active polyradiculopathy, mild generalized polyneuropathy, r/o lumbar stenosis     ENDOVAS REPAIR, INFRARENL ABDOM AORTIC ANEURYSM/DISSECT; [id number]/AORTO-UNIFEMORAL PROSTHESIS  6/05    one done,one to do     FLEXIBLE SIGMOIDOSCOPY  11/00    WNL     HC PTCA SINGLE VESSEL  6/05    LAD - 100% stenosis     HC UGI ENDOSCOPY, SIMPLE EXAM  8/99    erosive esophagitis, GERD, Dr. Salazar     HEART CATH, ANGIOPLASTY  10/3/14    EMEKA x 1  ostium of the RCA     LAMINECTOMY THORACIC THREE LEVELS  10/31/12    T10, T11, T12 with medial facetectomy     OPEN REDUCTION INTERNAL FIXATION HIP NAILING N/A 11/1/2016    Procedure: OPEN REDUCTION INTERNAL FIXATION HIP NAILING;  Surgeon: Jason Flowers MD;  Location: [facility] OR     SONO ABD RETROPERITNL  11/7/06    AAA 4.2 x 4.8, Dr. James's office     STRESS LEXISCAN TEST  10/5/12    small, fixed, apical defect/apical thinning     SURGICAL HISTORY OF -       tonsillectomy     TONSILLECTOMY  1939       FAMILY HISTORY:  Family History   Problem Relation Age of Onset     Respiratory Father         TB     Other - See Comments Mother         FALL       SOCIAL HISTORY:  Social History     Socioeconomic History     Marital status:      Spouse name: Sarika     Number of children: 4     Years of education: None     Highest education level: None   Occupational History     Employer: RETIRED   Social Needs     Financial resource strain: None     Food insecurity:     Worry: None     Inability: None     Transportation needs:     Medical: None     Non-medical: None   Tobacco Use     Smoking status: Former Smoker     Packs/day: 3.50     Years: 19.00     Pack years: 66.50     Types: Cigarettes     Last attempt to quit: 5/11/1980      "Years since quittin.8     Smokeless tobacco: Never Used   Substance and Sexual Activity     Alcohol use: Yes     Comment: beer 1-2x/week     Drug use: No     Sexual activity: Yes     Partners: Female   Lifestyle     Physical activity:     Days per week: None     Minutes per session: None     Stress: None   Relationships     Social connections:     Talks on phone: None     Gets together: None     Attends Church service: None     Active member of club or organization: None     Attends meetings of clubs or organizations: None     Relationship status: None     Intimate partner violence:     Fear of current or ex partner: None     Emotionally abused: None     Physically abused: None     Forced sexual activity: None   Other Topics Concern     Parent/sibling w/ CABG, MI or angioplasty before 65F 55M? No      Service Not Asked     Blood Transfusions Not Asked     Caffeine Concern Yes     Comment: 2 cups of coffee a day.       Occupational Exposure Not Asked     Hobby Hazards Not Asked     Sleep Concern No     Stress Concern No     Weight Concern No     Special Diet No     Back Care Not Asked     Exercise Yes     Comment: Cardiac rehab 1 x week, treadmill daily     Bike Helmet Not Asked     Seat Belt Yes     Self-Exams Not Asked   Social History Narrative     None       Review of Systems:  Skin:  Negative       Eyes:  Positive for glasses readers   ENT:  Negative      Respiratory:  Negative       Cardiovascular:  Negative Positive for chest discomfort resolved since angiogram  Gastroenterology: Positive for heartburn    Genitourinary:  not assessed nocturia;urinary frequency    Musculoskeletal:  Positive for arthritis;joint pain;joint stiffness right pinky stiff, unable to straigthten   Neurologic:  Positive for numbness or tingling of hands;numbness or tingling of feet;incoordination numbness in right hand/arm for \"years\"   Psychiatric:  Positive for sleep disturbances falls alseep easily, wakes up often r/t " nocturia - difficult to get back to sleep   Heme/Lymph/Imm:  Negative      Endocrine:  Positive for diabetes      Physical Exam:  Vitals: /81   Pulse 58   Ht 1.829 m (6')   Wt 113.4 kg (250 lb)   BMI 33.91 kg/m       Constitutional:  cooperative, alert and oriented, well developed, well nourished, in no acute distress        Skin:  warm and dry to the touch          Head:           Eyes:  sclera white        Lymph:      ENT:           Neck:           Respiratory:  normal breath sounds, clear to auscultation, normal A-P diameter, normal symmetry, normal respiratory excursion, no use of accessory muscles         Cardiac: regular rhythm, normal S1/S2, no S3 or S4, apical impulse not displaced, no murmurs, gallops or rubs                                                         GI:           Extremities and Muscular Skeletal:                 Neurological:  no gross motor deficits;affect appropriate        Psych:  Alert and Oriented x 3;affect appropriate, oriented to time, person and place          Thank you for allowing me to participate in the care of your patient.    Sincerely,     Nadia Gutierrez MD     [facility]

## 2020-02-26 DIAGNOSIS — I25.10 CORONARY ARTERY DISEASE INVOLVING NATIVE CORONARY ARTERY OF NATIVE HEART WITHOUT ANGINA PECTORIS: Primary | ICD-10-CM

## 2020-03-15 ENCOUNTER — HEALTH MAINTENANCE LETTER (OUTPATIENT)
Age: 80
End: 2020-03-15

## 2020-03-29 DIAGNOSIS — E11.42 TYPE 2 DIABETES MELLITUS WITH DIABETIC POLYNEUROPATHY, WITHOUT LONG-TERM CURRENT USE OF INSULIN (H): ICD-10-CM

## 2020-03-30 NOTE — TELEPHONE ENCOUNTER
"Requested Prescriptions   Pending Prescriptions Disp Refills     blood glucose (ONETOUCH VERIO IQ) test strip [Pharmacy Med Name: ONE TOUCH VERIO TEST STRIP] 100 strip 4     Sig: USE TO TEST 4 TIMES DAILY OR AS DIRECTED  Last Written Prescription Date:  11/26/2018  Last Fill Quantity: 100,  # refills: 6   Last Office Visit: 11/20/2019   Future Office Visit:            Diabetic Supplies Protocol Passed - 3/29/2020  1:47 PM        Passed - Medication is active on med list        Passed - Patient is 18 years of age or older        Passed - Recent (6 mo) or future (30 days) visit within the authorizing provider's specialty     Patient had office visit in the last 6 months or has a visit in the next 30 days with authorizing provider.  See \"Patient Info\" tab in inbasket, or \"Choose Columns\" in Meds & Orders section of the refill encounter.                 "

## 2020-03-31 RX ORDER — BLOOD SUGAR DIAGNOSTIC
STRIP MISCELLANEOUS
Qty: 100 STRIP | Refills: 4 | Status: SHIPPED | OUTPATIENT
Start: 2020-03-31 | End: 2022-07-11

## 2020-06-01 DIAGNOSIS — R21 RASH: Primary | ICD-10-CM

## 2020-06-02 RX ORDER — HYDROCORTISONE 25 MG/G
OINTMENT TOPICAL
Qty: 20 G | Refills: 6 | Status: SHIPPED | OUTPATIENT
Start: 2020-06-02 | End: 2023-02-08

## 2020-06-05 DIAGNOSIS — E11.42 TYPE 2 DIABETES MELLITUS WITH DIABETIC POLYNEUROPATHY, WITHOUT LONG-TERM CURRENT USE OF INSULIN (H): ICD-10-CM

## 2020-06-09 NOTE — TELEPHONE ENCOUNTER
Routing refill request to provider for review/approval because:  Labs not current:  A1c    Willow Hickey, PharmD  Medication Therapy Management Pharmacist  Pager: [phone]

## 2020-06-09 NOTE — TELEPHONE ENCOUNTER
"Requested Prescriptions   Pending Prescriptions Disp Refills     metFORMIN (GLUCOPHAGE) 500 MG tablet [Pharmacy Med Name: METFORMIN  MG TABLET] 180 tablet 1     Sig: TAKE 1 TABLET BY MOUTH TWICE A DAY WITH MEALS   Last Written Prescription Date:  11/20/19  Last Fill Quantity: 180,  # refills: 1   Last office visit: 11/20/2019 with prescribing provider:  Pipe   Future Office Visit:  n/a      Biguanide Agents Failed - 6/5/2020 12:17 AM        Failed - Patient has documented A1c within the specified period of time.     If HgbA1C is 8 or greater, it needs to be on file within the past 3 months.  If less than 8, must be on file within the past 6 months.     Recent Labs   Lab Test 11/20/19 0818   A1C 6.5*             Failed - Recent (6 mo) or future (30 days) visit within the authorizing provider's specialty     Patient had office visit in the last 6 months or has a visit in the next 30 days with authorizing provider or within the authorizing provider's specialty.  See \"Patient Info\" tab in inbasket, or \"Choose Columns\" in Meds & Orders section of the refill encounter.            Passed - Patient is age 10 or older        Passed - Patient's CR is NOT>1.4 OR Patient's EGFR is NOT<45 within past 12 mos.     Recent Labs   Lab Test 11/20/19 0818   GFRESTIMATED 77   GFRESTBLACK 89       Recent Labs   Lab Test 11/20/19 0818   CR 0.93             Passed - Patient does NOT have a diagnosis of CHF.        Passed - Medication is active on med list           "

## 2020-10-05 ENCOUNTER — TRANSFERRED RECORDS (OUTPATIENT)
Dept: HEALTH INFORMATION MANAGEMENT | Facility: CLINIC | Age: 80
End: 2020-10-05

## 2020-10-05 LAB — RETINOPATHY: NORMAL

## 2020-10-07 VITALS — BODY MASS INDEX: 30.8 KG/M2 | HEIGHT: 74 IN | WEIGHT: 240 LBS

## 2020-10-07 ASSESSMENT — MIFFLIN-ST. JEOR: SCORE: 1863.38

## 2020-10-07 NOTE — PROGRESS NOTES
"David Medina is a 80 year old male who is being evaluated via a billable telephone visit.      The patient has been notified of following:     \"This telephone visit will be conducted via a call between you and your physician/provider. We have found that certain health care needs can be provided without the need for a physical exam.  This service lets us provide the care you need with a short phone conversation.  If a prescription is necessary we can send it directly to your pharmacy.  If lab work is needed we can place an order for that and you can then stop by our lab to have the test done at a later time.    Telephone visits are billed at different rates depending on your insurance coverage. During this emergency period, for some insurers they may be billed the same as an in-person visit.  Please reach out to your insurance provider with any questions.    If during the course of the call the physician/provider feels a telephone visit is not appropriate, you will not be charged for this service.\"    Patient has given verbal consent for Telephone visit?  Yes    What phone number would you like to be contacted at? [phone]    How would you like to obtain your AVS? baseclick    Phone call start time: 8:33AM  Phone call end time: 9:10AM  Phone call duration: 36:49 minutes    [facility]AR[facility] Iverson [facility]   Outpatient Sleep Medicine Consultation  October 7, 2020      Name: David Medina MRN# [id number]   Age: 80 year old YOB: 1940     Date of Consultation: October 7, 2020  Consultation is requested by: No referring provider defined for this encounter. No ref. provider found  Primary care provider: Beatriz Betancur       Chief Complaint / Reason for Sleep Consult:     \"Request of eye doctor\"         History of Present Illness:     David Medina is a 80 year old male who presents to the clinic at the request of his ophthalmologist for evaluation of suspected sleep " "apnea. Other past medical history significant for allergies, T2DM with neuropathy, AAA, CAD, HTN, HLD, ischemic cardiomyopathy, depression, chronic back pain, GERD, and new onset glaucoma.     He was seen for initial consult by my colleague Bennett Goltz on 12/6/2013 for concern of GISELL and PSG was ordered at that time but never completed.     Patient presents today with a many year history of snoring that has waxed and waned in intensity over the years. Reports history of very loud snoring, now mild to moderate - \"my fishing and hunting friends used to put me in the back room because I snored so badly but my wife hasn't complained about my snoring in months\". Snoring is obviously worse when sleeping supine. Sleeps on sides and back. Doesn't wake self from sleep due to snoring. Does admit to gasp/choking arousals but he attributes this to GERD - \"it only happens if I eat too much carbohydrates close to bed\". Denies any known witnessed apneas. Reports nocturia 2-3 times per night.  Reports nocturnal GERD as above. Denies morning headaches. Frequent morning dry mouth. Denies nasal/sinus congestion.    Upon waking feels rested - \"it appears that 6 hours is ample for me\". Bedtime typically between 10-10:30PM and wakes between 3:30-4:30AM, rarely will sleep as late as 5:30AM. Falls asleep in 5 minutes or less; denies any difficulty falling asleep. Wakes 2-3 times per night to use the restroom; falls back asleep \"immediately\".     Will lie down in the afternoon to rest for 20-30 minutes most days and may or may not doze off - \"maybe 2 or 3 of the days I lie down I fall asleep\". Otherwise denies any planned napping. Denies any inadvertent/accidental dozing. Denies any personal history of falling asleep or dozing while driving. No accidents or near accidents. Patient was counseled on the importance of driving while alert, to pull over if drowsy, or nap before getting into the vehicle if sleepy. David will have his wife " drive if he is ever feeling less than alert.     Patient denies any abnormal movements or behaviors in their sleep. No dream enactment behavior. No somniloquy.  No somnambulism.  No sleep related eating. Reports history of bruxism but doesn't seem to be an issue anymore. Denies restless legs syndrome symptoms but does have neuropathy, not obviously worse at bedtime. Denies known kicking/leg movements.     SCALES       SLEEP APNEA: Stopbang score: 4-5/8       INSOMNIA:  Insomnia severity score: 5 on sleep questionnaire, 9 when asked by rooming staff [out of 28]       SLEEPINESS: [place] sleepiness scale: 8 on sleep questionnaire, 12 when asked by rooming staff [normal < 11]          Medications:     Current Outpatient Medications   Medication Sig     aspirin EC 81 MG tablet Take 1 tablet (81 mg) by mouth daily     atorvastatin (LIPITOR) 80 MG tablet Take 1 tablet (80 mg) by mouth daily     blood glucose (ONE TOUCH DELICA) lancing device Use to test blood sugars four times daily or as directed.     blood glucose (ONETOUCH VERIO IQ) test strip USE TO TEST 4 TIMES DAILY OR AS DIRECTED     blood glucose monitoring (ONE TOUCH DELICA) lancets Use to test blood sugars four times daily or as directed.     blood glucose monitoring (ONETOUCH VERIO IQ SYSTEM) meter device kit Use to test blood sugar 4 times daily.     Cyanocobalamin 1000 MCG CAPS Take 1 capsule by mouth daily     famotidine (PEPCID) 20 MG tablet Take 1 tablet (20 mg) by mouth 2 times daily     lisinopril (PRINIVIL/ZESTRIL) 20 MG tablet Take 1 tablet (20 mg) by mouth daily     metFORMIN (GLUCOPHAGE) 500 MG tablet TAKE 1 TABLET BY MOUTH TWICE A DAY WITH MEALS     metoprolol tartrate (LOPRESSOR) 50 MG tablet TAKE ONE HALF TABLET BY MOUTH TWICE DAILY     multivitamin, therapeutic with minerals (THERA-VIT-M) TABS Take 1 tablet by mouth daily     nitroGLYcerin (NITROSTAT) 0.4 MG sublingual tablet Place 1 tablet (0.4 mg) under the tongue every 5 minutes as needed for  chest pain Up to 3 doses max.     hydrocortisone 2.5 % ointment APPLY TO AFFECTED AREA ONCE DAILY FOR FACIAL RASH     isosorbide mononitrate (IMDUR) 30 MG 24 hr tablet TAKE 1 TABLET BY MOUTH ONCE DAILY IN THE MORNING     No current facility-administered medications for this visit.              Allergies:     Allergies   Allergen Reactions     No Known Allergies             Past Medical History:     Past Medical History:   Diagnosis Date     AAA (abdominal aortic aneurysm) without rupture (H)     stent 2005, s/p endovascular repair 12/07     Allergic rhinitis      Aortic root dilatation (H)      ASCVD (arteriosclerotic cardiovascular disease) 6/05    Cardiac cath Oct 2014: EMEKA to RCA, cath 2011: medical management, cath Aug 2005: EMEKA to OM; cath June 2005: EMEKA to LAD     B12 deficiency      Benign paroxysmal positional vertigo      Cervical radiculopathy 10/30/2008    Injected through [facility] 10/07     Closed fracture of patella 2/07    left     DM type 2 (diabetes mellitus, type 2) (H)      Hyperlipidemia LDL goal < 70      Hypertension goal BP (blood pressure) < 130/80      Ischemic cardiomyopathy 6/05    ischemic cardiomyopathy, EF 45%     Lumbosacral plexopathy     diabetic radiculoplexopathy causing proximal muscle weakness, Dr. 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 Villa (neuro) and Dr. 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 Jacob ARCEO APPENDECTOMY      open     CLOSED RX TIBIA SHAFT FX  2013    RT prox tibial fracture     COLONOSCOPY  10/3/05    WNL, rpt in 10 yrs, Dr. Vicente, [place] [facility]     COLONOSCOPY  9/23/15    advanced adenoma, rpt 3 yrs     coronary angiogram  11    diffuse disease with vasospasm     EMG  6/10/10    chronic active polyradiculopathy, mild generalized polyneuropathy, r/o lumbar stenosis     ENDOVAS REPAIR, INFRARENL ABDOM AORTIC ANEURYSM/DISSECT; [id number]/AORTO-UNIFEMORAL PROSTHESIS      one done,one to do     FLEXIBLE SIGMOIDOSCOPY      WNL     HC PTCA SINGLE VESSEL      LAD - 100% stenosis     HC UGI ENDOSCOPY, SIMPLE EXAM      erosive esophagitis, GERD, Dr. Salazar     HEART CATH, ANGIOPLASTY  10/3/14    EMEKA x 1  ostium of the RCA     LAMINECTOMY THORACIC THREE LEVELS  10/31/12    T10, T11, T12 with medial facetectomy     OPEN REDUCTION INTERNAL FIXATION HIP NAILING N/A 2016    Procedure: OPEN REDUCTION INTERNAL FIXATION HIP NAILING;  Surgeon: Jason Flowers MD;  Location: [facility] OR     SONO ABD RETROPERITNL  06    AAA 4.2 x 4.8, Dr. James's office     SURGICAL HISTORY OF -       tonsillectomy     TONSILLECTOMY  1940     ZZ STRESS LEXISCAN TEST  10/5/12    small, fixed, apical defect/apical thinning          Social History:     Social History     Tobacco Use     Smoking status: Former Smoker     Packs/day: 3.50     Years: 19.00     Pack years: 66.50     Types: Cigarettes     Quit date: 1980     Years since quittin.4     Smokeless tobacco: Never Used   Substance Use Topics     Alcohol use: Yes     Comment: beer 1-2x/week     Chemical History:  Alcohol use: \"at the most 4-6 beers per week\"       Tobacco use: Denies current use, quit 40 years ago   Illicit substances: Denies   Caffeine intake: Drinks 3-4 cups of coffee per day in the morning.         Family History:     Family " "History   Problem Relation Age of Onset     Respiratory Father         TB     Other - See Comments Mother         FALL      Sleep Family Hx: Patient denies any known family history of sleep apnea, restless legs syndrome, narcolepsy, or parasomnias.          Review of Systems:   A complete 10 point review of systems was negative other than HPI or as commented below: post-nasal drip, runny nose, urinary urgency, muscle pain, weakness and numbness in arms or legs         Physical Examination:   Ht 1.88 m (6' 2\")   Wt 108.9 kg (240 lb)   BMI 30.81 kg/m    General: Very pleasant and cooperative. In no apparent distress.  Pulmonary:  Able to speak easily in full sentences. No cough or wheeze.   Neurologic: Alert, oriented x3.   Psychiatric: Mood euthymic. Affect congruent with full range and intensity.         Data: All pertinent previous laboratory data reviewed     No results found for: PH, PHARTERIAL, PO2, [id number], SAT, PCO2, HCO3, BASEEXCESS, MARICRUZ, BEB  Lab Results   Component Value Date    TSH 2.92 11/20/2019    TSH 2.26 09/25/2017     Lab Results   Component Value Date     (H) 11/20/2019     (H) 11/26/2018     Lab Results   Component Value Date    HGB 13.2 (L) 11/01/2016    HGB 14.6 05/10/2016     Lab Results   Component Value Date    BUN 25 11/20/2019    BUN 27 11/26/2018    CR 0.93 11/20/2019    CR 1.00 11/26/2018     Lab Results   Component Value Date    AST 19 11/20/2019    AST 37 11/26/2018    ALT 37 11/20/2019    ALT 42 11/26/2018    ALKPHOS 71 11/20/2019    ALKPHOS 65 11/26/2018    BILITOTAL 0.8 11/20/2019    BILITOTAL 1.0 11/26/2018    BILICONJ 0.0 09/16/2010    BILICONJ 0.0 12/06/2007     No results found for: UAMP, UBARB, BENZODIAZEUR, UCANN, UCOC, OPIT, UPCP    Echocardiogram 6/21/2018:   Interpretation Summary  Mild aortic root dilatation.  Left ventricular systolic function is normal.  There are regional wall motion abnormalities as specified.  The right ventricle is mild to " moderately dilated.  Findings as outlined below. The study was technically difficult.         Assessment and Plan:   1. Snoring  2. Excessive daytime sleepiness  3. Coronary artery disease involving native coronary artery of native heart without angina pectoris  4. Benign essential hypertension  5. Advanced sleep phase    Patient is being evaluated for Obstructive Sleep Apnea (GISELL).  Patient's risk factors for GISELL include: snoring, excessive daytime sleepiness, high blood pressure, age >50, and male gender. Neck girth unknown due to virtual nature of visit.     We discussed pathophysiology of GISELL and consequences of untreated moderate to severe sleep apnea such as a higher risk of hypertension, cardiovascular disease, cardiac arrhythmias, and stroke with worse outcomes after these events, as well as poor control of hypertension and diabetes. Patient is interested in treatment and willing to undergo overnight sleep testing. An overnight attended polysomnography was ordered today. Information given on potential treatment modalities today, will discuss in detail at next visit pending study results.     Additionally, David has somewhat of an advanced sleep phase. This was noted at his last visit with Bennett Goltz as well, though now more delayed with bedtime 10-10:30PM and wake time between 3:30-4:30AM. He does not appear to be particularly bothered by his sleep schedule.     Follow up 1-2 weeks following his study for review of results and to expedite care. Educational materials provided in instructions.      Copy to: Beatriz Betancur PA-C  Oct 8, 2020     [facility]  [address] [place] [address]     [facility]  [address] [address]    Chart documentation was completed, in part, with [employer] voice-recognition software. Even though reviewed, some grammatical, spelling, and word errors may remain.

## 2020-10-07 NOTE — PATIENT INSTRUCTIONS
"MY TREATMENT INFORMATION FOR SLEEP APNEA-  David Medina    Am I having a sleep study at a sleep center?  We will call you in the next few weeks to schedule!    Frequently asked questions:  1. What is Obstructive Sleep Apnea (GISELL)? GISELL is the most common type of sleep apnea. Apnea means, \"without breath.\"  Apnea is most often caused by narrowing or collapse of the upper airway as muscles relax during sleep.   Almost everyone has occasional apneas. Most people with sleep apnea have had brief interruptions at night frequently for many years.  The severity of sleep apnea is related to how frequent and severe the events are.   2. What are the consequences of GISELL? Symptoms include: feeling sleepy during the day, snoring loudly, gasping or stopping of breathing, trouble sleeping, and occasionally morning headaches or heartburn at night.  Sleepiness can be serious and even increase the risk of falling asleep while driving. Other health consequences may include development of high blood pressure and other cardiovascular disease in persons who are susceptible. Untreated GISELL  can contribute to heart disease, stroke and diabetes.   3. What are the treatment options? In most situations, sleep apnea is a lifelong disease that must be managed with daily therapy. Medications are not effective for sleep apnea and surgery is generally not considered until other therapies have been tried. Your treatment is your choice . Continuous Positive Airway (CPAP) works right away and is the therapy that is effective in nearly everyone. An oral device to hold your jaw forward is usually the next most reliable option. Other options include postioning devices (to keep you off your back), weight loss, and surgery including a tongue pacing device. There is more detail about some of these options below.    Important tips for using CPAP and similar devices   Know your equipment:  CPAP is continuous positive airway pressure that prevents " obstructive sleep apnea by keeping the throat from collapsing while you are sleeping. In most cases, the device is  smart  and can slowly self-adjusts if your throat collapses and keeps a record every day of how well you are treated-this information is available to you and your care team.  BPAP is bilevel positive airway pressure that keeps your throat open and also assists each breath with a pressure boost to maintain adequate breathing.  Special kinds of BPAP are used in patients who have inadequate breathing from lung or heart disease. In most cases, the device is  smart  and can slowly self-adjusts to assist breathing. Like CPAP, the device keeps a record of how well you are treated.  Your mask is your connection to the device. You get to choose what feels most comfortable and the staff will help to make sure if fits. Here: are some examples of the different masks that are available:       Key points to remember on your journey with sleep apnea:  1. Sleep study.  PAP devices often need to be adjusted during a sleep study to show that they are effective and adjusted right.  2. Good tips to remember: Try wearing just the mask during a quiet time during the day so your body adapts to wearing it. A humidifier is recommended for comfort in most cases to prevent drying of your nose and throat. Allergy medication from your provider may help you if you are having nasal congestion.  3. Getting settled-in. It takes more than one night for most of us to get used to wearing a mask. Try wearing just the mask during a quiet time during the day so your body adapts to wearing it. A humidifier is recommended for comfort in most cases. Our team will work with you carefully on the first day and will be in contact within 4 days and again at 2 and 4 weeks for advice and remote device adjustments. Your therapy is evaluated by the device each day.   4. Use it every night. The more you are able to sleep naturally for 7-8 hours, the  more likely you will have good sleep and to prevent health risks or symptoms from sleep apnea. Even if you use it 4 hours it helps. Occasionally all of us are unable to use a medical therapy, in sleep apnea, it is not dangerous to miss one night.   5. Communicate. Call our skilled team on the number provided on the first day if your visit for problems that make it difficult to wear the device. Over 2 out of 3 patients can learn to wear the device long-term with help from our team. Remember to call our team or your sleep providers if you are unable to wear the device as we may have other solutions for those who cannot adapt to mask CPAP therapy. It is recommended that you sleep your sleep provider within the first 3 months and yearly after that if you are not having problems.   6. Use it for your health. We encourage use of CPAP masks during daytime quiet periods to allow your face and brain to adapt to the sensation of CPAP so that it will be a more natural sensation to awaken to at night or during naps. This can be very useful during the first few weeks or months of adapting to CPAP though it does not help medically to wear CPAP during wakefulness and  should not be used as a strategy just to meet guidelines.  7. Take care of your equipment. Make sure you clean your mask and tubing using directions every day and that your filter and mask are replaced as recommended or if they are not working.     BESIDES CPAP, WHAT OTHER THERAPIES ARE THERE?    Positioning Device  Positioning devices are generally used when sleep apnea is mild and only occurs on your back.This example shows a pillow that straps around the waist. It may be appropriate for those whose sleep study shows milder sleep apnea that occurs primarily when lying flat on one's back. Preliminary studies have shown benefit but effectiveness at home may need to be verified by a home sleep test. These devices are generally not covered by medical  insurance.  Examples of devices that maintain sleeping on the back to prevent snoring and mild sleep apnea.    Belt type body positioner  Http://[employer].com/    Electronic reminder  Http://nightshifttherapy.com/  Http://www.[employer]learnpod.[employer].au/      Oral Appliance  What is oral appliance therapy?  An oral appliance device fits on your teeth at night like a retainer used after having braces. The device is made by a specialized dentist and requires several visits over 1-2 months before a manufactured device is made to fit your teeth and is adjusted to prevent your sleep apnea. Once an oral device is working properly, snoring should be improved. A home sleep test may be recommended at that time if to determine whether the sleep apnea is adequately treated.       Some things to remember:  -Oral devices are often, but not always, covered by your medical insurance. Be sure to check with your insurance provider.   -If you are referred for oral therapy, you will be given a list of specialized dentists to consider or you may choose to visit the Web site of the American Academy of Dental Sleep Medicine  -Oral devices are less likely to work if you have severe sleep apnea or are extremely overweight.     More detailed information  An oral appliance is a small acrylic device that fits over the upper and lower teeth  (similar to a retainer or a mouth guard). This device slightly moves jaw forward, which moves the base of the tongue forward, opens the airway, improves breathing for effective treat snoring and obstructive sleep apnea in perhaps 7 out of 10 people .  The best working devices are custom-made by a dental device  after a mold is made of the teeth 1, 2, 3.  When is an oral appliance indicated?  Oral appliance therapy is recommended as a first-line treatment for patients with primary snoring, mild sleep apnea, and for patients with moderate sleep apnea who prefer appliance therapy to use of CPAP4, 5. Severity of  sleep apnea is determined by sleep testing and is based on the number of respiratory events per hour of sleep.   How successful is oral appliance therapy?  The success rate of oral appliance therapy in patients with mild sleep apnea is 75-80% while in patients with moderate sleep apnea it is 50-70%. The chance of success in patients with severe sleep apnea is 40-50%. The research also shows that oral appliances have a beneficial effect on the cardiovascular health of GISELL patients at the same magnitude as CPAP therapy7.  Oral appliances should be a second-line treatment in cases of severe sleep apnea, but if not completely successful then a combination therapy utilizing CPAP plus oral appliance therapy may be effective. Oral appliances tend to be effective in a broad range of patients although studies show that the patients who have the highest success are females, younger patients, those with milder disease, and less severe obesity. 3, 6.   Finding a dentist that practices dental sleep medicine  Specific training is available through the American Academy of Dental Sleep Medicine for dentists interested in working in the field of sleep. To find a dentist who is educated in the field of sleep and the use of oral appliances, near you, visit the Web site of the American Academy of Dental Sleep Medicine.    References  1. Erica, et al. Objectively measured vs self-reported compliance during oral appliance therapy for sleep-disordered breathing. Chest 2013; 144(5): 7604-5824.  2. Dione et al. Objective measurement of compliance during oral appliance therapy for sleep-disordered breathing. Thorax 2013; 68(1): 91-96.  3. Michael, et al. Mandibular advancement devices in 620 men and women with GISELL and snoring: tolerability and predictors of treatment success. Chest 2004; 125: 0424-6692.  4. Fredy et al. Oral appliances for snoring and GISELL: a review. Sleep 2006; 29: 244-262.  5. Minal et al. Oral  appliance treatment for GISELL: an update. J Clin Sleep Med 2014; 10(2): 215-227.  6. Rodolfo et al. Predictors of OSAH treatment outcome. J Dent Res 2007; 86: 7573-4454.      Weight Loss:    Weight loss is a long-term strategy that may improve sleep apnea in some patients.    Weight management is a personal decision and the decision should be based on your interest and the potential benefits.  If you are interested in exploring weight loss strategies, the following discussion covers the impact on weight loss on sleep apnea and the approaches that may be successful.    Being overweight does not necessarily mean you will have health consequences.  Those who have BMI over 35 or over 27 with existing medical conditions carries greater risk.   Weight loss decreases severity of sleep apnea in most people with obesity. For those with mild obesity who have developed snoring with weight gain, even 15-30 pound weight loss can improve and occasionally eliminate sleep apnea.  Structured and life-long dietary and health habits are necessary to lose weight and keep healthier weight levels.     Though there may be significant health benefits from weight loss, long-term weight loss is very difficult to achieve- studies show success with dietary management in less than 10% of people. In addition, substantial weight loss may require years of dietary control and may be difficult if patients have severe obesity. In these cases, surgical management may be considered.  Finally, older individuals who have tolerated obesity without health complications may be less likely to benefit from weight loss strategies.        Your BMI is Body mass index is 30.81 kg/m .  Weight management is a personal decision.  If you are interested in exploring weight loss strategies, the following discussion covers the approaches that may be successful. Body mass index (BMI) is one way to tell whether you are at a healthy weight, overweight, or obese. It measures  your weight in relation to your height.  A BMI of 18.5 to 24.9 is in the healthy range. A person with a BMI of 25 to 29.9 is considered overweight, and someone with a BMI of 30 or greater is considered obese. More than two-thirds of American adults are considered overweight or obese.  Being overweight or obese increases the risk for further weight gain. Excess weight may lead to heart disease and diabetes.  Creating and following plans for healthy eating and physical activity may help you improve your health.  Weight control is part of healthy lifestyle and includes exercise, emotional health, and healthy eating habits. Careful eating habits lifelong are the mainstay of weight control. Though there are significant health benefits from weight loss, long-term weight loss with diet alone may be very difficult to achieve- studies show long-term success with dietary management in less than 10% of people. Attaining a healthy weight may be especially difficult to achieve in those with severe obesity. In some cases, medications, devices and surgical management might be considered.  What can you do?  If you are overweight or obese and are interested in methods for weight loss, you should discuss this with your provider.     Consider reducing daily calorie intake by 500 calories.     Keep a food journal.     Avoiding skipping meals, consider cutting portions instead.    Diet combined with exercise helps maintain muscle while optimizing fat loss. Strength training is particularly important for building and maintaining muscle mass. Exercise helps reduce stress, increase energy, and improves fitness. Increasing exercise without diet control, however, may not burn enough calories to loose weight.       Start walking three days a week 10-20 minutes at a time    Work towards walking thirty minutes five days a week     Eventually, increase the speed of your walking for 1-2 minutes at time    In addition, we recommend that you review  healthy lifestyles and methods for weight loss available through the National Institutes of Health patient information sites:  http://win.niddk.nih.gov/publications/index.htm    And look into health and wellness programs that may be available through your health insurance provider, employer, local community center, or [employer]anna club.    Weight management plan: Patient was referred to their PCP to discuss a diet and exercise plan.      Surgery:    Surgery for obstructive sleep apnea is considered generally only when other therapies fail to work. Surgery may be discussed with you if you are having a difficult time tolerating CPAP and or when there is an abnormal structure that requires surgical correction.  Nose and throat surgeries often enlarge the airway to prevent collapse.  Most of these surgeries create pain for 1-2 weeks and up to half of the most common surgeries are not effective throughout life.  You should carefully discuss the benefits and drawbacks to surgery with your sleep provider and surgeon to determine if it is the best solution for you.   More information  Surgery for GISELL is directed at areas that are responsible for narrowing or complete obstruction of the airway during sleep.  There are a wide range of procedures available to enlarge and/or stabilize the airway to prevent blockage of breathing in the three major areas where it can occur: the palate, tongue, and nasal regions.  Successful surgical treatment depends on the accurate identification of the factors responsible for obstructive sleep apnea in each person.  A personalized approach is required because there is no single treatment that works well for everyone.  Because of anatomic variation, consultation with an examination by a sleep surgeon is a critical first step in determining what surgical options are best for each patient.  In some cases, examination during sedation may be recommended in order to guide the selection of procedures.   Patients will be counseled about risks and benefits as well as the typical recovery course after surgery. Surgery is typically not a cure for a person s GISELL.  However, surgery will often significantly improve one s GISELL severity (termed  success rate ).  Even in the absence of a cure, surgery will decrease the cardiovascular risk associated with OSA7; improve overall quality of life8 (sleepiness, functionality, sleep quality, etc).      Palate Procedures:  Patients with GISELL often have narrowing of their airway in the region of their tonsils and uvula.  The goals of palate procedures are to widen the airway in this region as well as to help the tissues resist collapse.  Modern palate procedure techniques focus on tissue conservation and soft tissue rearrangement, rather than tissue removal.  Often the uvula is preserved in this procedure. Residual sleep apnea is common in patient after pharyngoplasty with an average reduction in sleep apnea events of 33%2.      Tongue Procedures:  ExamWhile patients are awake, the muscles that surround the throat are active and keep this region open for breathing. These muscles relax during sleep, allowing the tongue and other structures to collapse and block breathing.  There are several different tongue procedures available.  Selection of a tongue base procedure depends on characteristics seen on physical exam.  Generally, procedures are aimed at removing bulky tissues in this area or preventing the back of the tongue from falling back during sleep.  Success rates for tongue surgery range from 50-62%3.    Hypoglossal Nerve Stimulation:  Hypoglossal nerve stimulation has recently received approval from the [place] Food and Drug Administration for the treatment of obstructive sleep apnea.  This is based on research showing that the system was safe and effective in treating sleep apnea6.  Results showed that the median AHI score decreased 68%, from 29.3 to 9.0. This therapy uses  an implant system that senses breathing patterns and delivers mild stimulation to airway muscles, which keeps the airway open during sleep.  The system consists of three fully implanted components: a small generator (similar in size to a pacemaker), a breathing sensor, and a stimulation lead.  Using a small handheld remote, a patient turns the therapy on before bed and off upon awakening.    Candidates for this device must be greater than 22 years of age, have moderate to severe GISELL (AHI between 20-65), BMI less than 32, have tried CPAP/oral appliance without success, and have appropriate upper airway anatomy (determined by a sleep endoscopy performed by Dr. Robison).    Hypoglossal Nerve Stimulation Pathway:    The sleep surgeon s office will work with the patient through the insurance prior-authorization process (including communications and appeals).    Nasal Procedures:  Nasal obstruction can interfere with nasal breathing during the day and night.  Studies have shown that relief of nasal obstruction can improve the ability of some patients to tolerate positive airway pressure therapy for obstructive sleep apnea1.  Treatment options include medications such as nasal saline, topical corticosteroid and antihistamine sprays, and oral medications such as antihistamines or decongestants. Non-surgical treatments can include external nasal dilators for selected patients. If these are not successful by themselves, surgery can improve the nasal airway either alone or in combination with these other options.      Combination Procedures:  Combination of surgical procedures and other treatments may be recommended, particularly if patients have more than one area of narrowing or persistent positional disease.  The success rate of combination surgery ranges from 66-80%2,3.    References  1. Kait ARCEO. The Role of the Nose in Snoring and Obstructive Sleep Apnoea: An Update.  Eur Arch Otorhinolaryngol. 2011; 268: 1365-73.  2.   Brenda SM; Brock JA; Earlene JR; Pallanch JF; Kaleb MB; Wai SG; Gil ANDERSON. Surgical modifications of the upper airway for obstructive sleep apnea in adults: a systematic review and meta-analysis. SLEEP 2010;33(10):4760-0127. Slick SEARS. Hypopharyngeal surgery in obstructive sleep apnea: an evidence-based medicine review.  Arch Otolaryngol Head Neck Surg. 2006 Feb;132(2):206-13.  3. José YH1, Kellen Y, Marek CARISSA. The efficacy of anatomically based multilevel surgery for obstructive sleep apnea. Otolaryngol Head Neck Surg. 2003 Oct;129(4):327-35.  4. Slick SEARS, Goldberg A. Hypopharyngeal Surgery in Obstructive Sleep Apnea: An Evidence-Based Medicine Review. Arch Otolaryngol Head Neck Surg. 2006 Feb;132(2):206-13.  5. Nurys STRONG et al. Upper-Airway Stimulation for Obstructive Sleep Apnea.  N Engl J Med. 2014 Jan 9;370(2):139-49.  6. Katey Y et al. Increased Incidence of Cardiovascular Disease in Middle-aged Men with Obstructive Sleep Apnea. Am J Respir Crit Care Med; 2002 166: 159-165  7. Lynn EM et al. Studying Life Effects and Effectiveness of Palatopharyngoplasty (SLEEP) study: Subjective Outcomes of Isolated Uvulopalatopharyngoplasty. Otolaryngol Head Neck Surg. 2011; 144: 623-631.

## 2020-10-08 ENCOUNTER — TELEPHONE (OUTPATIENT)
Dept: SLEEP MEDICINE | Facility: CLINIC | Age: 80
End: 2020-10-08

## 2020-10-08 ENCOUNTER — VIRTUAL VISIT (OUTPATIENT)
Dept: SLEEP MEDICINE | Facility: CLINIC | Age: 80
End: 2020-10-08
Payer: COMMERCIAL

## 2020-10-08 DIAGNOSIS — I25.10 CORONARY ARTERY DISEASE INVOLVING NATIVE CORONARY ARTERY OF NATIVE HEART WITHOUT ANGINA PECTORIS: ICD-10-CM

## 2020-10-08 DIAGNOSIS — I10 BENIGN ESSENTIAL HYPERTENSION: ICD-10-CM

## 2020-10-08 DIAGNOSIS — G47.19 EXCESSIVE DAYTIME SLEEPINESS: ICD-10-CM

## 2020-10-08 DIAGNOSIS — R06.83 SNORING: Primary | ICD-10-CM

## 2020-10-08 DIAGNOSIS — G47.22 ADVANCED SLEEP PHASE SYNDROME: ICD-10-CM

## 2020-10-08 PROCEDURE — 99203 OFFICE O/P NEW LOW 30 MIN: CPT | Mod: 95 | Performed by: PHYSICIAN ASSISTANT

## 2020-10-31 ENCOUNTER — TELEPHONE (OUTPATIENT)
Dept: SCHEDULING | Facility: CLINIC | Age: 80
End: 2020-10-31

## 2020-11-07 ENCOUNTER — OFFICE VISIT (OUTPATIENT)
Dept: URGENT CARE | Facility: URGENT CARE | Age: 80
End: 2020-11-07
Payer: COMMERCIAL

## 2020-11-07 VITALS
SYSTOLIC BLOOD PRESSURE: 142 MMHG | OXYGEN SATURATION: 99 % | RESPIRATION RATE: 18 BRPM | DIASTOLIC BLOOD PRESSURE: 80 MMHG | HEART RATE: 68 BPM | TEMPERATURE: 97.5 F

## 2020-11-07 DIAGNOSIS — L72.3 SEBACEOUS CYST: Primary | ICD-10-CM

## 2020-11-07 PROCEDURE — 99213 OFFICE O/P EST LOW 20 MIN: CPT | Mod: 25 | Performed by: FAMILY MEDICINE

## 2020-11-07 PROCEDURE — 10060 I&D ABSCESS SIMPLE/SINGLE: CPT | Performed by: FAMILY MEDICINE

## 2020-11-07 RX ORDER — CEPHALEXIN 500 MG/1
500 CAPSULE ORAL 3 TIMES DAILY
Qty: 30 CAPSULE | Refills: 0 | Status: SHIPPED | OUTPATIENT
Start: 2020-11-07 | End: 2020-11-17

## 2020-11-07 NOTE — PROGRESS NOTES
Dvaid Medina is a 80 year old male who presents to the clinic today concerned about a mass located bavck.    It has been present for day(s).    Symptoms include pain, tenderness, drainage and swelling.    Past Medical History:   Diagnosis Date     AAA (abdominal aortic aneurysm) without rupture (H)     stent 2005, s/p endovascular repair 12/07     Allergic rhinitis      Aortic root dilatation (H)      ASCVD (arteriosclerotic cardiovascular disease) 6/05    Cardiac cath Oct 2014: EMEKA to RCA, cath 2011: medical management, cath Aug 2005: EMEKA to OM; cath June 2005: EMEKA to LAD     B12 deficiency      Benign paroxysmal positional vertigo      Cervical radiculopathy 10/30/2008    Injected through [facility] 10/07     Closed fracture of patella 2/07    left     DM type 2 (diabetes mellitus, type 2) (H)      Hyperlipidemia LDL goal < 70      Hypertension goal BP (blood pressure) < 130/80      Ischemic cardiomyopathy 6/05    ischemic cardiomyopathy, EF 45%     Lumbosacral plexopathy     diabetic radiculoplexopathy causing proximal muscle weakness, Dr. 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 Villa (neuro) and Dr. 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 Jacob ARCEO APPENDECTOMY  12/06    open     CLOSED RX TIBIA SHAFT FX  6/2013    RT prox tibial fracture     COLONOSCOPY  10/3/05    WNL, rpt in 10 yrs,  Dr. Vicente, [facility]     COLONOSCOPY  9/23/15    advanced adenoma, rpt 3 yrs     coronary angiogram  11    diffuse disease with vasospasm     EMG  6/10/10    chronic active polyradiculopathy, mild generalized polyneuropathy, r/o lumbar stenosis     ENDOVAS REPAIR, INFRARENL ABDOM AORTIC ANEURYSM/DISSECT; [id number]/AORTO-UNIFEMORAL PROSTHESIS      one done,one to do     FLEXIBLE SIGMOIDOSCOPY      WNL     HC PTCA SINGLE VESSEL      LAD - 100% stenosis     [facility] UGI ENDOSCOPY, SIMPLE EXAM      erosive esophagitis, GERD, Dr. Salazar     HEART CATH, ANGIOPLASTY  10/3/14    EMEKA x 1  ostium of the RCA     LAMINECTOMY THORACIC THREE LEVELS  10/31/12    T10, T11, T12 with medial facetectomy     OPEN REDUCTION INTERNAL FIXATION HIP NAILING N/A 2016    Procedure: OPEN REDUCTION INTERNAL FIXATION HIP NAILING;  Surgeon: Jason Flowers MD;  Location: [facility]     SONO ABD RETROPERITNL  06    AAA 4.2 x 4.8, Dr. James's office     SURGICAL HISTORY OF -       tonsillectomy     TONSILLECTOMY       ZZ STRESS LEXISCAN TEST  10/5/12    small, fixed, apical defect/apical thinning       Family History   Problem Relation Age of Onset     Respiratory Father         TB     Other - See Comments Mother         FALL       Social History     Tobacco Use     Smoking status: Former Smoker     Packs/day: 3.50     Years: 19.00     Pack years: 66.50     Types: Cigarettes     Quit date: 1980     Years since quittin.5     Smokeless tobacco: Never Used   Substance Use Topics     Alcohol use: Yes     Comment: beer 1-2x/week        Allergies   Allergen Reactions     No Known Allergies          OBJECTIVE:    Blood pressure (!) 142/80, pulse 68, temperature 97.5  F (36.4  C), temperature source Tympanic, resp. rate 18, SpO2 99 %.    Exam:  indurated and tender mass is seen located back, 3-4 cm.    Preped, 1% lido with epi, I&D with pus    ASSESSMENT:      ICD-10-CM    1. Sebaceous cyst  L72.3 cephALEXin  (KEFLEX) 500 MG capsule     DRAIN SKIN ABSCESS SIMPLE/SINGLE    Warm packs and soaks recommended.  Monitor for drainage.  Follow up in 1-2 weeks if not improving.

## 2020-12-02 ENCOUNTER — TELEPHONE (OUTPATIENT)
Dept: SLEEP MEDICINE | Facility: CLINIC | Age: 80
End: 2020-12-02

## 2020-12-02 DIAGNOSIS — E11.42 TYPE 2 DIABETES MELLITUS WITH DIABETIC POLYNEUROPATHY, WITHOUT LONG-TERM CURRENT USE OF INSULIN (H): ICD-10-CM

## 2020-12-22 DIAGNOSIS — K21.9 GASTROESOPHAGEAL REFLUX DISEASE WITHOUT ESOPHAGITIS: ICD-10-CM

## 2020-12-22 DIAGNOSIS — I10 HYPERTENSION GOAL BP (BLOOD PRESSURE) < 140/90: ICD-10-CM

## 2020-12-22 DIAGNOSIS — E78.5 HYPERLIPIDEMIA WITH TARGET LDL LESS THAN 70: ICD-10-CM

## 2020-12-29 RX ORDER — ATORVASTATIN CALCIUM 80 MG/1
TABLET, FILM COATED ORAL
Qty: 90 TABLET | Refills: 0 | Status: SHIPPED | OUTPATIENT
Start: 2020-12-29 | End: 2021-02-22

## 2020-12-29 RX ORDER — LISINOPRIL 20 MG/1
TABLET ORAL
Qty: 90 TABLET | Refills: 0 | Status: SHIPPED | OUTPATIENT
Start: 2020-12-29 | End: 2021-02-22

## 2020-12-29 RX ORDER — FAMOTIDINE 20 MG/1
TABLET, FILM COATED ORAL
Qty: 180 TABLET | Refills: 0 | Status: SHIPPED | OUTPATIENT
Start: 2020-12-29 | End: 2021-02-22

## 2020-12-29 NOTE — TELEPHONE ENCOUNTER
Routing refill request to provider for review/approval because:  --Last blood pressure not quite at goal in UC visit.         ,  --Please contact patient and ask to schedule an annual office visit with Pipe.  --He is due for fasting labs.  --Future lab orders placed.  --A refill request for medication was sent to the provider.          --Famotidine and atorvastatin --Prescription approved per [facility] [place] [facility] [facility].  Thu Batista RN        --Last visit:  11/20/2019     --Future Visit: NONE      BP Readings from Last 6 Encounters:   11/07/20 (!) 142/80   02/21/20 132/81   11/20/19 122/64   07/26/19 98/61   05/20/19 129/77   01/18/19 118/77

## 2020-12-29 NOTE — TELEPHONE ENCOUNTER
Recent Labs   Lab Test 11/20/19  0818 11/26/18  1006 09/25/17  0850    138 136   POTASSIUM 4.8 4.4 4.4   CHLORIDE 106 105 104   CO2 24 23 23   BUN 25 27 33*   CR 0.93 1.00 1.07   ANIONGAP 6 10 9   GENEVIEVE 9.5 9.7 9.4   * 137* 142*

## 2021-01-14 ENCOUNTER — HEALTH MAINTENANCE LETTER (OUTPATIENT)
Age: 81
End: 2021-01-14

## 2021-02-04 ENCOUNTER — PRE VISIT (OUTPATIENT)
Dept: CARDIOLOGY | Facility: CLINIC | Age: 81
End: 2021-02-04

## 2021-02-04 DIAGNOSIS — I25.10 CORONARY ARTERY DISEASE INVOLVING NATIVE CORONARY ARTERY OF NATIVE HEART WITHOUT ANGINA PECTORIS: Primary | ICD-10-CM

## 2021-02-10 DIAGNOSIS — I25.10 CORONARY ARTERY DISEASE INVOLVING NATIVE CORONARY ARTERY OF NATIVE HEART WITHOUT ANGINA PECTORIS: ICD-10-CM

## 2021-02-10 LAB
ANION GAP SERPL CALCULATED.3IONS-SCNC: 6 MMOL/L (ref 3–14)
BUN SERPL-MCNC: 29 MG/DL (ref 7–30)
CALCIUM SERPL-MCNC: 9.9 MG/DL (ref 8.5–10.1)
CHLORIDE SERPL-SCNC: 105 MMOL/L (ref 94–109)
CHOLEST SERPL-MCNC: 154 MG/DL
CO2 SERPL-SCNC: 26 MMOL/L (ref 20–32)
CREAT SERPL-MCNC: 1.18 MG/DL (ref 0.66–1.25)
GFR SERPL CREATININE-BSD FRML MDRD: 57 ML/MIN/{1.73_M2}
GLUCOSE SERPL-MCNC: 189 MG/DL (ref 70–99)
HDLC SERPL-MCNC: 61 MG/DL
LDLC SERPL CALC-MCNC: 62 MG/DL
NONHDLC SERPL-MCNC: 93 MG/DL
POTASSIUM SERPL-SCNC: 4.4 MMOL/L (ref 3.4–5.3)
SODIUM SERPL-SCNC: 137 MMOL/L (ref 133–144)
TRIGL SERPL-MCNC: 154 MG/DL

## 2021-02-10 PROCEDURE — 36415 COLL VENOUS BLD VENIPUNCTURE: CPT | Performed by: INTERNAL MEDICINE

## 2021-02-10 PROCEDURE — 80048 BASIC METABOLIC PNL TOTAL CA: CPT | Performed by: INTERNAL MEDICINE

## 2021-02-10 PROCEDURE — 80061 LIPID PANEL: CPT | Performed by: INTERNAL MEDICINE

## 2021-02-19 ENCOUNTER — OFFICE VISIT (OUTPATIENT)
Dept: CARDIOLOGY | Facility: CLINIC | Age: 81
End: 2021-02-19
Attending: INTERNAL MEDICINE
Payer: COMMERCIAL

## 2021-02-19 VITALS
SYSTOLIC BLOOD PRESSURE: 135 MMHG | HEART RATE: 61 BPM | HEIGHT: 72 IN | DIASTOLIC BLOOD PRESSURE: 77 MMHG | BODY MASS INDEX: 34.54 KG/M2 | WEIGHT: 255 LBS

## 2021-02-19 DIAGNOSIS — I25.10 CORONARY ARTERY DISEASE INVOLVING NATIVE CORONARY ARTERY OF NATIVE HEART WITHOUT ANGINA PECTORIS: ICD-10-CM

## 2021-02-19 PROCEDURE — 99213 OFFICE O/P EST LOW 20 MIN: CPT | Performed by: INTERNAL MEDICINE

## 2021-02-19 ASSESSMENT — MIFFLIN-ST. JEOR: SCORE: 1899.67

## 2021-02-19 NOTE — PROGRESS NOTES
HPI and Plan:     Mr. David Medina is followed for his history of coronary disease.  He is 80 years old and he is doing well with the continued exception of his chronic back pain.      Mr. Medina initially denied any chest, neck, arm or upper back discomfort.  Then he remembered 2 weeks ago having some transient chest tightness and dyspnea on the treadmill.  It has not recurred.    He continues to experience lower back discomfort and is walking with a cane but getting around. Two years ago, he fell and broke his hip on a cruise, completing the cruise and finally returning for hip surgery in the [facility].      His most recent intervention was  2014 years ago when Dr. Barger performed an intervention on the right coronary artery with stent placement.  That procedure was preceded by exertional angina and his exertional tolerance has improved markedly since that time. A small to medium size partially reversible inferior defect  consistent with mild ischemia in the mid to basal inferior portion in  the RCA distribution. A small basal nontransmural scar is not entirely  excluded. Presence of visceral tracer artifact decreases specificity.      His blood pressure is controlled, he is on good risk factor intervention and his last lipids done about 7 months ago indicated an LDL of 66, [id number] and HDL 56 mg/dL.  He continues on atorvastatin 80 mg daily, ACE inhibition, beta blockade and low-dose aspirin.  He has normal left ventricular systolic performance.  He also continues on a low dose of isosorbide mononitrate.      Exam:  This is a man in no apparent distress.  He was alert and oriented to person, place and time.   Blood pressure   MmHg, respiratory rate was 14-18 per minute.   Chest was clear to auscultation.   On cardiac auscultation, there was an S1 and S2 without extra sounds or a murmur.  The rhythm was regular.      Assessment/Plan:  Mr. Medina is overall doing  well from a Cardiology standpoint.   He h asymptomatic and his risk factors are under good control.  As I explained to him, the episode with his hip fracture during the 2-week cruise was certainly a good physiologic stressor of his heart and he did well.      I have recommended a return at 1 year u

## 2021-02-19 NOTE — LETTER
2/19/2021    Beatriz Betancur MD  [address] [address]    RE: David Medina       Dear Colleague,    I had the pleasure of seeing David Medina in the [facility].    HPI and Plan:     Mr. David Medina is followed for his history of coronary disease.  He is 80 years old and he is doing well with the continued exception of his chronic back pain.      Mr. Medina initially denied any chest, neck, arm or upper back discomfort.  Then he remembered 2 weeks ago having some transient chest tightness and dyspnea on the treadmill.  It has not recurred.    He continues to experience lower back discomfort and is walking with a cane but getting around. Two years ago, he fell and broke his hip on a cruise, completing the cruise and finally returning for hip surgery in the [facility].      His most recent intervention was  2014 years ago when Dr. Barger performed an intervention on the right coronary artery with stent placement.  That procedure was preceded by exertional angina and his exertional tolerance has improved markedly since that time. A small to medium size partially reversible inferior defect  consistent with mild ischemia in the mid to basal inferior portion in  the RCA distribution. A small basal nontransmural scar is not entirely  excluded. Presence of visceral tracer artifact decreases specificity.      His blood pressure is controlled, he is on good risk factor intervention and his last lipids done about 7 months ago indicated an LDL of 66, [id number] and HDL 56 mg/dL.  He continues on atorvastatin 80 mg daily, ACE inhibition, beta blockade and low-dose aspirin.  He has normal left ventricular systolic performance.  He also continues on a low dose of isosorbide mononitrate.      Exam:  This is a man in no apparent distress.  He was alert and oriented to person, place and time.   Blood  pressure   MmHg, respiratory rate was 14-18 per minute.   Chest was clear to auscultation.   On cardiac auscultation, there was an S1 and S2 without extra sounds or a murmur.  The rhythm was regular.      Assessment/Plan:  Mr. Medina is overall doing  well from a Cardiology standpoint.  He h asymptomatic and his risk factors are under good control.  As I explained to him, the episode with his hip fracture during the 2-week cruise was certainly a good physiologic stressor of his heart and he did well.      I have recommended a return at 1 year u        Thank you for allowing me to participate in the care of your patient.      Sincerely,     Nadia Gutierrez MD     [facility]    cc:   Nadia Gutierrez MD  [address] [place] [id number]         no

## 2021-02-21 NOTE — PATIENT INSTRUCTIONS
Did you know?   I do telehealth (video) visits exclusively on  and .  I still do in-person visits at [facility] on  and .  You can schedule a video visit for follow-up appointments as well as future appointments for certain conditions.  Please see the below link.  [email or web address]    If you have not already done so,  I encourage you to sign up for Sport[employer]om[employer] ([email or web address]).  This will allow you to review your results, securely communicate with a provider, and schedule virtual visits as well.    If you are due for a mammogram or colonoscopy please call the [facility] scheduling number: [phone].     To schedule any ordered imaging studies you can call [facility] [phone].  If you are scheduling a DEXA (bone density) scan please do NOT schedule this at the [facility] [place].  Please ask that it be done at [facility] in [place].  Other preferred DEXA locations include [place] (at the [facility]), [place], or [place].        Patient Education   Personalized Prevention Plan  You are due for the preventive services outlined below.  Your care team is available to assist you in scheduling these services.  If you have already completed any of these items, please share that information with your care team to update in your medical record.  Health Maintenance Due   Topic Date Due     COVID-19 Vaccine (1 of 2) 1956     Zoster (Shingles) Vaccine (2 of 3) 2013     Diabetic Foot Exam  03/10/2017     Diptheria Tetanus Pertussis (DTAP/TDAP/TD) Vaccine (3 - Td) 2020     FALL RISK ASSESSMENT  2020     Colorectal Cancer Screening  2020        Patient Education     Understanding USDA MyPlate  The USDA has guidelines to help you make healthy food choices. These are called MyPlate.  MyPlate shows the food groups that make up healthy meals using the image of a place setting. Before you eat, think about the healthiest choices for what to put on your plate or in your cup or bowl. To learn more about building a healthy plate, visit www.choosemyplate.gov.     The food groups    Fruits. Any fruit or 100% fruit juice counts as part of the Fruit Group. Fruits may be fresh, canned, frozen, or dried, and may be whole, cut-up, or pureed. Make 1/2 of your plate fruits and vegetables.    Vegetables. Any vegetable or 100% vegetable juice counts as a member of the Vegetable Group. Vegetables may be fresh, frozen, canned, or dried. They can be served raw or cooked and may be whole, cut-up, or mashed. Make 1/2 of your plate fruits and vegetables.    Grains. All foods made from grains are part of the Grains Group. These include wheat, rice, oats, cornmeal, and barley. Grains are often used to make foods such as bread, pasta, oatmeal, cereal, tortillas, and grits. Grains should be no more than 1/4 of your plate. At least half of your grains should be whole grains.    Protein. This group includes meat, poultry, seafood, beans and peas, eggs, processed soy products (such as tofu), nuts (including nut butters), and seeds. Make protein choices no more than 1/4 of your plate. Meat and poultry choices should be lean or low fat.    Dairy. The Dairy Group includes all fluid milk products and foods made from milk that contain calcium, such as yogurt and cheese. (Foods that have little calcium, such as cream, butter, and cream cheese, are not part of this group.) Most dairy choices should be low-fat or fat-free.    Oils. Oils aren't a food group, but they do contain essential nutrients. However it's important to watch your intake of oils. These are fats that are liquid at room temperature. They include canola, corn, olive, soybean, vegetable, and sunflower oil. Foods that are mainly oil include mayonnaise, certain salad  dressings, and soft margarines. You likely already get your daily oil allowance from the foods you eat.  Things to limit  Eating healthy also means limiting these things in your diet:    Salt (sodium). Many processed foods have a lot of sodium. To keep sodium intake down, eat fresh vegetables, meats, poultry, and seafood when possible. Purchase low-sodium, reduced-sodium, or no-salt-added food products at the store. And don't add salt to your meals at home. Instead, season them with herbs and spices such as dill, oregano, cumin, and paprika. Or try adding flavor with lemon or lime zest and juice.    Saturated fat. Saturated fats are most often found in animal products such as beef, pork, and chicken. They are often solid at room temperature, such as butter. To reduce your saturated fat intake, choose leaner cuts of meat and poultry. And try healthier cooking methods such as grilling, broiling, roasting, or baking. For a simple lower-fat swap, use plain nonfat yogurt instead of mayonnaise when making potato salad or macaroni salad.    Added sugars. These are sugars added to foods. They are in foods such as ice cream, candy, soda, fruit drinks, sports drinks, energy drinks, cookies, pastries, jams, and syrups. Cut down on added sugars by sharing sweet treats with a family member or friend. You can also choose fruit for dessert, and drink water or other unsweetened beverages.  [employer] last reviewed this educational content on 6/1/2020 2000-2020 The [employer]. [address] [place] [address], [place] [address]. All rights reserved. This information is not intended as a substitute for professional medical care. Always follow your healthcare professional's instructions.        Activities of Daily Living    Your Health Risk Assessment indicates you have difficulties with activities of daily living such as housework, bathing, preparing meals, taking medication, etc. Please make a follow up appointment for us to  address this issue in more detail.    Signs of Hearing Loss      Hearing much better with one ear can be a sign of hearing loss.   Hearing loss is a problem shared by many people. In fact, it is one of the most common health problems, particularly as people age. Most people age 65 and older have some hearing loss. By age 80, almost everyone does. Hearing loss often occurs slowly over the years. So you may not realize your hearing has gotten worse.  Have your hearing checked  Call your healthcare provider if you:    Have to strain to hear normal conversation    Have to watch other people s faces very carefully to follow what they re saying    Need to ask people to repeat what they ve said    Often misunderstand what people are saying    Turn the volume of the television or radio up so high that others complain    Feel that people are mumbling when they re talking to you    Find that the effort to hear leaves you feeling tired and irritated    Notice, when using the phone, that you hear better with one ear than the other  [employer] last reviewed this educational content on 1/1/2020 2000-2020 The [employer]. [address] [place] [address]. All rights reserved. This information is not intended as a substitute for professional medical care. Always follow your healthcare professional's instructions.          Urinary Incontinence (Male)    Urinary incontinence means not being able to control the release of urine from the bladder.   Causes  Common causes of urinary incontinence in men include:    Infection    Certain medicines    Aging    Poor pelvic muscle tone    Bladder spasms    Obesity    Trouble urinating and fully emptying the bladder (urinary retention)  Other things that can cause incontinence are:     Nervous system diseases    Diabetes    Sleep apnea    Urinary tract infections    Prostate surgery    Pelvic injury  Constipation and smoking have also been identified as risk factors.    Symptoms    Urge incontinence (overactive bladder). This is a sudden urge to urinate. It occurs even though there may not be much urine in the bladder. The need to urinate often during the night is common. It's due to bladder spasms.    Stress incontinence. This is urine leakage that you can't control. It can occur with sneezing, coughing, and other actions that put stress on the bladder.    Treatment  Treatment depends on what is causing the condition. Bladder infections are treated with antibiotics. Urinary retention is treated with a bladder catheter.   Home care  Follow these guidelines when caring for yourself at home:    Don't have any foods and drinks that may irritate the bladder. This includes:  ? Chocolate  ? Alcohol  ? Caffeine  ? Carbonated drinks  ? Acidic fruits and juices    Limit fluids to 6 to 8 cups a day.    Lose weight if you are overweight. This will reduce your symptoms.    If advised, do regular pelvic muscle-strengthening exercises such as Kegel exercises.    If needed, wear absorbent pads to catch urine. Change the pads often. This is for good hygiene and to prevent skin and bladder infections.    Bathe daily for good hygiene.    If an antibiotic was prescribed to treat a bladder infection, take it until it's finished. Keep taking it even if you are feeling better. This is to make sure your infection has cleared.    If a catheter was left in place, keep bacteria from getting into the collection bag. Don't disconnect the catheter from the collection bag.    Use a leg band to secure the catheter drainage tube, so it does not pull on the catheter. Drain the collection bag when it becomes full. To do this, use the drain spout at the bottom of the bag. Don't disconnect the bag from the catheter.    Don't pull on or try to remove a catheter. The catheter must be removed by a healthcare provider.    If you smoke, stop. Ask your provider for help if you can't do this on your own.  Follow-up  care  Follow up with your healthcare provider, or as advised.  When to get medical advice  Call your healthcare provider right away if any of these occur:    Fever over 100.4 F (38 C), or as directed by your provider    Bladder pain or fullness    Belly swelling, nausea, or vomiting    Back pain    Weakness, dizziness, or fainting    If a catheter was left in place, return if:  ? The catheter falls out  ? The catheter stops draining for 6 hours  ? Your urine gets cloudy or smells bad  [employer] last reviewed this educational content on 1/1/2020 2000-2020 The [employer]. [address] [place] [id number]. All rights reserved. This information is not intended as a substitute for professional medical care. Always follow your healthcare professional's instructions.          Preventing Falls at Home  A person can fall for many reasons. Older adults may fall because reaction time slows down as we age. Your muscles and joints may get stiff, weak, or less flexible because of illness, medicines, or a physical condition.   Other health problems that make falls more likely include:     Arthritis    Dizziness or lightheadedness when you stand up (orthostatic hypotension)    History of a stroke    Dizziness    Anemia    Certain medicines taken for mental illness or to control blood pressure.    Problems with balance or gait    Bladder or urinary problems    History of falling    Changes in vision (vision impairment)    Changes in thinking skills and memory (cognitive impairment)  Injuries from a fall can include serious injuries such as broken bones, dislocated joints, internal bleeding and cuts. Injuries like these can limit your independence.   Prevention tips  To help prevent falls and fall-related injuries, follow the tips below.    Floors  To make floors safer:     Put nonskid pads under area rugs.    Remove small rugs.    Replace worn floor coverings.    Tack carpets firmly to each step on carpeted  stairs. Put nonskid strips on the edges of uncarpeted stairs.    Keep floors and stairs free of clutter and cords.    Arrange furniture so there are clear pathways.    Clean up any spills right away.  Bathrooms    To make bathrooms safer:     Install grab bars in the tub or shower.    Apply nonskid strips or put a nonskid rubber mat in the tub or shower.    Sit on a bath chair to bathe.    Use bathmats with nonskid backing.  Lighting  To improve visibility in your home:      Keep a flashlight in each room. Or put a lamp next to the bed within easy reach.    Put nightlights in the bedrooms, hallways, kitchen, and bathrooms.    Make sure all stairways have good lighting.    Take your time when going up and down stairs.    Put handrails on both sides of stairs and in walkways for more support. To prevent injury to your wrist or arm, don t use handrails to pull yourself up.    Install grab bars to pull yourself up.    Move or rearrange items that you use often. This will make them easier to find or reach.    Look at your home to find any safety hazards. Especially look at doorways, walkways, and the driveway. Remove or repair any safety problems that you find.  Other changes to make    Look around to find any safety hazards. Look closely at doorways, walkways, and the driveway. Remove or repair any safety problems that you find.    Wear shoes that fit well.    Take your time when going up and down stairs.    Put handrails on both sides of stairs and in walkways for more support. To prevent injury to your wrist or arm, don t use handrails to pull yourself up.    Install grab bars wherever needed to pull yourself up.    Arrange items that you use often. This will make them easier to find or reach.    [employer] last reviewed this educational content on 3/1/2020    4101-8437 The [employer]. [address] [place] [address]. All rights reserved. This information is not intended as a substitute for  professional medical care. Always follow your healthcare professional's instructions.           Patient Education     Understanding USDA MyPlate  The USDA has guidelines to help you make healthy food choices. These are called MyPlate. MyPlate shows the food groups that make up healthy meals using the image of a place setting. Before you eat, think about the healthiest choices for what to put on your plate or in your cup or bowl. To learn more about building a healthy plate, visit www.choosemyplate.gov.     The food groups    Fruits. Any fruit or 100% fruit juice counts as part of the Fruit Group. Fruits may be fresh, canned, frozen, or dried, and may be whole, cut-up, or pureed. Make 1/2 of your plate fruits and vegetables.    Vegetables. Any vegetable or 100% vegetable juice counts as a member of the Vegetable Group. Vegetables may be fresh, frozen, canned, or dried. They can be served raw or cooked and may be whole, cut-up, or mashed. Make 1/2 of your plate fruits and vegetables.    Grains. All foods made from grains are part of the Grains Group. These include wheat, rice, oats, cornmeal, and barley. Grains are often used to make foods such as bread, pasta, oatmeal, cereal, tortillas, and grits. Grains should be no more than 1/4 of your plate. At least half of your grains should be whole grains.    Protein. This group includes meat, poultry, seafood, beans and peas, eggs, processed soy products (such as tofu), nuts (including nut butters), and seeds. Make protein choices no more than 1/4 of your plate. Meat and poultry choices should be lean or low fat.    Dairy. The Dairy Group includes all fluid milk products and foods made from milk that contain calcium, such as yogurt and cheese. (Foods that have little calcium, such as cream, butter, and cream cheese, are not part of this group.) Most dairy choices should be low-fat or fat-free.    Oils. Oils aren't a food group, but they do contain essential nutrients.  However it's important to watch your intake of oils. These are fats that are liquid at room temperature. They include canola, corn, olive, soybean, vegetable, and sunflower oil. Foods that are mainly oil include mayonnaise, certain salad dressings, and soft margarines. You likely already get your daily oil allowance from the foods you eat.  Things to limit  Eating healthy also means limiting these things in your diet:    Salt (sodium). Many processed foods have a lot of sodium. To keep sodium intake down, eat fresh vegetables, meats, poultry, and seafood when possible. Purchase low-sodium, reduced-sodium, or no-salt-added food products at the store. And don't add salt to your meals at home. Instead, season them with herbs and spices such as dill, oregano, cumin, and paprika. Or try adding flavor with lemon or lime zest and juice.    Saturated fat. Saturated fats are most often found in animal products such as beef, pork, and chicken. They are often solid at room temperature, such as butter. To reduce your saturated fat intake, choose leaner cuts of meat and poultry. And try healthier cooking methods such as grilling, broiling, roasting, or baking. For a simple lower-fat swap, use plain nonfat yogurt instead of mayonnaise when making potato salad or macaroni salad.    Added sugars. These are sugars added to foods. They are in foods such as ice cream, candy, soda, fruit drinks, sports drinks, energy drinks, cookies, pastries, jams, and syrups. Cut down on added sugars by sharing sweet treats with a family member or friend. You can also choose fruit for dessert, and drink water or other unsweetened beverages.  [employer] last reviewed this educational content on 6/1/2020 2000-2020 The [employer]. [address] [place] [address], [place] [address]. All rights reserved. This information is not intended as a substitute for professional medical care. Always follow your healthcare professional's  instructions.        Activities of Daily Living    Your Health Risk Assessment indicates you have difficulties with activities of daily living such as housework, bathing, preparing meals, taking medication, etc. Please make a follow up appointment for us to address this issue in more detail.    Signs of Hearing Loss      Hearing much better with one ear can be a sign of hearing loss.   Hearing loss is a problem shared by many people. In fact, it is one of the most common health problems, particularly as people age. Most people age 65 and older have some hearing loss. By age 80, almost everyone does. Hearing loss often occurs slowly over the years. So you may not realize your hearing has gotten worse.  Have your hearing checked  Call your healthcare provider if you:    Have to strain to hear normal conversation    Have to watch other people s faces very carefully to follow what they re saying    Need to ask people to repeat what they ve said    Often misunderstand what people are saying    Turn the volume of the television or radio up so high that others complain    Feel that people are mumbling when they re talking to you    Find that the effort to hear leaves you feeling tired and irritated    Notice, when using the phone, that you hear better with one ear than the other  [employer] last reviewed this educational content on 1/1/2020 2000-2020 The [employer]. [address] [place] [address], [place] [address]. All rights reserved. This information is not intended as a substitute for professional medical care. Always follow your healthcare professional's instructions.          Urinary Incontinence (Male)    Urinary incontinence means not being able to control the release of urine from the bladder.   Causes  Common causes of urinary incontinence in men include:    Infection    Certain medicines    Aging    Poor pelvic muscle tone    Bladder spasms    Obesity    Trouble urinating and fully emptying the bladder  (urinary retention)  Other things that can cause incontinence are:     Nervous system diseases    Diabetes    Sleep apnea    Urinary tract infections    Prostate surgery    Pelvic injury  Constipation and smoking have also been identified as risk factors.   Symptoms    Urge incontinence (overactive bladder). This is a sudden urge to urinate. It occurs even though there may not be much urine in the bladder. The need to urinate often during the night is common. It's due to bladder spasms.    Stress incontinence. This is urine leakage that you can't control. It can occur with sneezing, coughing, and other actions that put stress on the bladder.    Treatment  Treatment depends on what is causing the condition. Bladder infections are treated with antibiotics. Urinary retention is treated with a bladder catheter.   Home care  Follow these guidelines when caring for yourself at home:    Don't have any foods and drinks that may irritate the bladder. This includes:  ? Chocolate  ? Alcohol  ? Caffeine  ? Carbonated drinks  ? Acidic fruits and juices    Limit fluids to 6 to 8 cups a day.    Lose weight if you are overweight. This will reduce your symptoms.    If advised, do regular pelvic muscle-strengthening exercises such as Kegel exercises.    If needed, wear absorbent pads to catch urine. Change the pads often. This is for good hygiene and to prevent skin and bladder infections.    Bathe daily for good hygiene.    If an antibiotic was prescribed to treat a bladder infection, take it until it's finished. Keep taking it even if you are feeling better. This is to make sure your infection has cleared.    If a catheter was left in place, keep bacteria from getting into the collection bag. Don't disconnect the catheter from the collection bag.    Use a leg band to secure the catheter drainage tube, so it does not pull on the catheter. Drain the collection bag when it becomes full. To do this, use the drain spout at the bottom of  the bag. Don't disconnect the bag from the catheter.    Don't pull on or try to remove a catheter. The catheter must be removed by a healthcare provider.    If you smoke, stop. Ask your provider for help if you can't do this on your own.  Follow-up care  Follow up with your healthcare provider, or as advised.  When to get medical advice  Call your healthcare provider right away if any of these occur:    Fever over 100.4 F (38 C), or as directed by your provider    Bladder pain or fullness    Belly swelling, nausea, or vomiting    Back pain    Weakness, dizziness, or fainting    If a catheter was left in place, return if:  ? The catheter falls out  ? The catheter stops draining for 6 hours  ? Your urine gets cloudy or smells bad  [employer] last reviewed this educational content on 1/1/2020 2000-2020 The [employer]. [address] [place] [address]. All rights reserved. This information is not intended as a substitute for professional medical care. Always follow your healthcare professional's instructions.          Preventing Falls at Home  A person can fall for many reasons. Older adults may fall because reaction time slows down as we age. Your muscles and joints may get stiff, weak, or less flexible because of illness, medicines, or a physical condition.   Other health problems that make falls more likely include:     Arthritis    Dizziness or lightheadedness when you stand up (orthostatic hypotension)    History of a stroke    Dizziness    Anemia    Certain medicines taken for mental illness or to control blood pressure.    Problems with balance or gait    Bladder or urinary problems    History of falling    Changes in vision (vision impairment)    Changes in thinking skills and memory (cognitive impairment)  Injuries from a fall can include serious injuries such as broken bones, dislocated joints, internal bleeding and cuts. Injuries like these can limit your independence.   Prevention  tips  To help prevent falls and fall-related injuries, follow the tips below.    Floors  To make floors safer:     Put nonskid pads under area rugs.    Remove small rugs.    Replace worn floor coverings.    Tack carpets firmly to each step on carpeted stairs. Put nonskid strips on the edges of uncarpeted stairs.    Keep floors and stairs free of clutter and cords.    Arrange furniture so there are clear pathways.    Clean up any spills right away.  Bathrooms    To make bathrooms safer:     Install grab bars in the tub or shower.    Apply nonskid strips or put a nonskid rubber mat in the tub or shower.    Sit on a bath chair to bathe.    Use bathmats with nonskid backing.  Lighting  To improve visibility in your home:      Keep a flashlight in each room. Or put a lamp next to the bed within easy reach.    Put nightlights in the bedrooms, hallways, kitchen, and bathrooms.    Make sure all stairways have good lighting.    Take your time when going up and down stairs.    Put handrails on both sides of stairs and in walkways for more support. To prevent injury to your wrist or arm, don t use handrails to pull yourself up.    Install grab bars to pull yourself up.    Move or rearrange items that you use often. This will make them easier to find or reach.    Look at your home to find any safety hazards. Especially look at doorways, walkways, and the driveway. Remove or repair any safety problems that you find.  Other changes to make    Look around to find any safety hazards. Look closely at doorways, walkways, and the driveway. Remove or repair any safety problems that you find.    Wear shoes that fit well.    Take your time when going up and down stairs.    Put handrails on both sides of stairs and in walkways for more support. To prevent injury to your wrist or arm, don t use handrails to pull yourself up.    Install grab bars wherever needed to pull yourself up.    Arrange items that you use often. This will make them  easier to find or reach.    [employer] last reviewed this educational content on 3/1/2020    [id number] [employer] [address] [facility], [place] [address]. All rights reserved. This information is not intended as a substitute for professional medical care. Always follow your healthcare professional's instructions.

## 2021-02-21 NOTE — PROGRESS NOTES
"SUBJECTIVE:   David Medina is a 80 year old male who presents for Preventive Visit.    Patient has been advised of split billing requirements and indicates understanding: Yes   Are you in the first 12 months of your Medicare coverage?  No    Healthy Habits:     In general, how would you rate your overall health?  Good    Frequency of exercise:  2-3 days/week    Duration of exercise:  30-45 minutes    Do you usually eat at least 4 servings of fruit and vegetables a day, include whole grains    & fiber and avoid regularly eating high fat or \"junk\" foods?  No    Taking medications regularly:  Yes    Medication side effects:  None    Ability to successfully perform activities of daily living:  Transportation requires assistance    Home Safety:  No safety concerns identified    Hearing Impairment:  Difficulty following a conversation in a noisy restaurant or crowded room    In the past 6 months, have you been bothered by leaking of urine? Yes    In general, how would you rate your overall mental or emotional health?  Good      PHQ-2 Total Score: 0    Additional concerns today:  Yes    Do you feel safe in your environment? Yes    Have you ever done Advance Care Planning? (For example, a Health Directive, POLST, or a discussion with a medical provider or your loved ones about your wishes): No, advance care planning information given to patient to review.  {:065373}    Fall risk  Fallen 2 or more times in the past year?: Yes  Any fall with injury in the past year?: No  Timed Up and Go Test (>13.5 is fall risk; contact physician) : 26    Cognitive Screening   1) Repeat 3 items (Leader, Season, Table)    2) Clock draw: NORMAL  3) 3 item recall: Recalls 3 objects  Results: 3 items recalled: COGNITIVE IMPAIRMENT LESS LIKELY    Mini-CogTM Copyright ARMANDO Aguilar. Licensed by the author for use in [employer]; reprinted with permission ([email or web address]). All rights reserved.      Do you have sleep apnea, excessive snoring " or daytime drowsiness?: no    Reviewed and updated as needed this visit by clinical staff  Tobacco  Allergies  Meds   Med Hx  Surg Hx  Fam Hx  Soc Hx        Reviewed and updated as needed this visit by Provider                Social History     Tobacco Use     Smoking status: Former Smoker     Packs/day: 3.50     Years: 19.00     Pack years: 66.50     Types: Cigarettes     Quit date: 1980     Years since quittin.8     Smokeless tobacco: Never Used   Substance Use Topics     Alcohol use: Yes     Comment: beer 1-2x/week     If you drink alcohol do you typically have >3 drinks per day or >7 drinks per week? No    Alcohol Use 2021   Prescreen: >3 drinks/day or >7 drinks/week? No   Prescreen: >3 drinks/day or >7 drinks/week? -   No flowsheet data found.        Diabetes Follow-up    How often are you checking your blood sugar? A few times a week  What time of day are you checking your blood sugars (select all that apply)?  Before meals  Have you had any blood sugars above 200?  No  Have you had any blood sugars below 70?  No    What symptoms do you notice when your blood sugar is low?  None    What concerns do you have today about your diabetes? None     Do you have any of these symptoms? (Select all that apply)  Numbness in feet      BP Readings from Last 2 Encounters:   21 135/77   20 (!) 142/80     Hemoglobin A1C (%)   Date Value   2019 6.5 (H)   2019 6.9 (H)     LDL Cholesterol Calculated (mg/dL)   Date Value   02/10/2021 62   2019 66       {Reference  Diabetes Management Resources :725047}    {Reference  Diabetes Log - 7 days :592211}      Current providers sharing in care for this patient include:   Patient Care Team:  Beatriz Betancur MD as PCP - General  Beatriz Betancur MD as Assigned PCP  Nadia Gutierrez MD as Assigned Heart and Vascular Provider    The following health maintenance items are reviewed in Epic and correct as of today:  Health Maintenance    Topic Date Due     COVID-19 Vaccine (1 of 2) 02/23/1956     ZOSTER IMMUNIZATION (2 of 3) 02/26/2013     DIABETIC FOOT EXAM  03/10/2017     DTAP/TDAP/TD IMMUNIZATION (3 - Td) 01/04/2020     FALL RISK ASSESSMENT  05/20/2020     COLORECTAL CANCER SCREENING  11/09/2020     A1C  08/22/2021     EYE EXAM  10/05/2021     LIPID  02/10/2022     MEDICARE ANNUAL WELLNESS VISIT  02/22/2022     BMP  02/22/2022     CMP  02/22/2022     MICROALBUMIN  02/22/2022     ADVANCE CARE PLANNING  10/16/2022     PHQ-2  Completed     INFLUENZA VACCINE  Completed     Pneumococcal Vaccine: Pediatrics (0 to 5 Years) and At-Risk Patients (6 to 64 Years)  Completed     Pneumococcal Vaccine: 65+ Years  Completed     IPV IMMUNIZATION  Aged Out     MENINGITIS IMMUNIZATION  Aged Out     {Chronicprobdata (optional):898058}  {Mammo Decision Support :453848}    Review of Systems   Constitutional: Negative for chills and fever.   HENT: Positive for congestion. Negative for ear pain, hearing loss and sore throat.    Eyes: Negative for pain and visual disturbance.   Respiratory: Negative for cough and shortness of breath.    Cardiovascular: Negative for chest pain, palpitations and peripheral edema.   Gastrointestinal: Positive for heartburn. Negative for abdominal pain, constipation, diarrhea, hematochezia and nausea.   Genitourinary: Positive for impotence. Negative for discharge, dysuria, frequency, genital sores, hematuria and urgency.   Musculoskeletal: Negative for arthralgias, joint swelling and myalgias.   Skin: Negative for rash.   Neurological: Negative for dizziness, weakness, headaches and paresthesias.   Psychiatric/Behavioral: Negative for mood changes. The patient is not nervous/anxious.      {ROS COMP (Optional):156572}    OBJECTIVE:   Wt 115.2 kg (254 lb)   BMI 34.45 kg/m   Estimated body mass index is 34.45 kg/m  as calculated from the following:    Height as of 2/19/21: 1.829 m (6').    Weight as of this encounter: 115.2 kg (254  "lb).  Physical Exam  {Exam (Optional) :903098}    {Diagnostic Test Results (Optional):619995::\"Diagnostic Test Results:\",\"Labs reviewed in Epic\"}    ASSESSMENT / PLAN:   {Dia Picklist:075565}    Patient has been advised of split billing requirements and indicates understanding: {YES / NO:154706::\"Yes\"}  COUNSELING:  {Medicare Counselin}    Estimated body mass index is 34.45 kg/m  as calculated from the following:    Height as of 21: 1.829 m (6').    Weight as of this encounter: 115.2 kg (254 lb).    {Weight Management Plan (ACO) Complete if BMI is abnormal-  Ages 18-64  BMI >24.9.  Age 65+ with BMI <23 or >30 (Optional):417066}    He reports that he quit smoking about 40 years ago. His smoking use included cigarettes. He has a 66.50 pack-year smoking history. He has never used smokeless tobacco.      Appropriate preventive services were discussed with this patient, including applicable screening as appropriate for cardiovascular disease, diabetes, osteopenia/osteoporosis, and glaucoma.  As appropriate for age/gender, discussed screening for colorectal cancer, prostate cancer, breast cancer, and cervical cancer. Checklist reviewing preventive services available has been given to the patient.    Reviewed patients plan of care and provided an AVS. The {CarePlan:917640} for David meets the Care Plan requirement. This Care Plan has been established and reviewed with the {PATIENT, FAMILY MEMBER, CAREGIVER:075935}.    Counseling Resources:  ATP IV Guidelines  Pooled Cohorts Equation Calculator  Breast Cancer Risk Calculator  Breast Cancer: Medication to Reduce Risk  FRAX Risk Assessment  ICSI Preventive Guidelines  Dietary Guidelines for Americans,   [employer]'s MyPlate  ASA Prophylaxis  Lung CA Screening    Beatriz Betancur MD  [facility]    Identified Health Risks:  "

## 2021-02-22 ENCOUNTER — OFFICE VISIT (OUTPATIENT)
Dept: FAMILY MEDICINE | Facility: CLINIC | Age: 81
End: 2021-02-22
Payer: COMMERCIAL

## 2021-02-22 VITALS
HEART RATE: 72 BPM | SYSTOLIC BLOOD PRESSURE: 116 MMHG | WEIGHT: 254 LBS | OXYGEN SATURATION: 98 % | RESPIRATION RATE: 16 BRPM | BODY MASS INDEX: 34.45 KG/M2 | TEMPERATURE: 98.3 F | DIASTOLIC BLOOD PRESSURE: 66 MMHG

## 2021-02-22 DIAGNOSIS — I25.10 ASCVD (ARTERIOSCLEROTIC CARDIOVASCULAR DISEASE): ICD-10-CM

## 2021-02-22 DIAGNOSIS — Z23 NEED FOR VACCINATION: ICD-10-CM

## 2021-02-22 DIAGNOSIS — I10 HYPERTENSION GOAL BP (BLOOD PRESSURE) < 140/90: ICD-10-CM

## 2021-02-22 DIAGNOSIS — E11.42 TYPE 2 DIABETES MELLITUS WITH DIABETIC POLYNEUROPATHY, WITHOUT LONG-TERM CURRENT USE OF INSULIN (H): ICD-10-CM

## 2021-02-22 DIAGNOSIS — E78.00 PURE HYPERCHOLESTEROLEMIA: ICD-10-CM

## 2021-02-22 DIAGNOSIS — Z00.00 ENCOUNTER FOR MEDICARE ANNUAL WELLNESS EXAM: Primary | ICD-10-CM

## 2021-02-22 DIAGNOSIS — K21.9 GASTROESOPHAGEAL REFLUX DISEASE WITHOUT ESOPHAGITIS: ICD-10-CM

## 2021-02-22 DIAGNOSIS — I25.5 ISCHEMIC CARDIOMYOPATHY: ICD-10-CM

## 2021-02-22 LAB
ALBUMIN SERPL-MCNC: 4 G/DL (ref 3.4–5)
ALP SERPL-CCNC: 94 U/L (ref 40–150)
ALT SERPL W P-5'-P-CCNC: 45 U/L (ref 0–70)
ANION GAP SERPL CALCULATED.3IONS-SCNC: 7 MMOL/L (ref 3–14)
AST SERPL W P-5'-P-CCNC: 24 U/L (ref 0–45)
BASOPHILS NFR BLD AUTO: 0.5 %
BILIRUB SERPL-MCNC: 0.6 MG/DL (ref 0.2–1.3)
BUN SERPL-MCNC: 24 MG/DL (ref 7–30)
CALCIUM SERPL-MCNC: 9.9 MG/DL (ref 8.5–10.1)
CHLORIDE SERPL-SCNC: 106 MMOL/L (ref 94–109)
CO2 SERPL-SCNC: 24 MMOL/L (ref 20–32)
CREAT SERPL-MCNC: 0.96 MG/DL (ref 0.66–1.25)
CREAT UR-MCNC: 110 MG/DL
DIFFERENTIAL METHOD BLD: ABNORMAL
EOSINOPHIL NFR BLD AUTO: 4.5 %
ERYTHROCYTE [DISTWIDTH] IN BLOOD BY AUTOMATED COUNT: 13.5 % (ref 10–15)
GFR SERPL CREATININE-BSD FRML MDRD: 74 ML/MIN/{1.73_M2}
GLUCOSE SERPL-MCNC: 173 MG/DL (ref 70–99)
HBA1C MFR BLD: 6.9 % (ref 0–5.6)
HCT VFR BLD AUTO: 42.2 % (ref 40–53)
HGB BLD-MCNC: 14.4 G/DL (ref 13.3–17.7)
LYMPHOCYTES NFR BLD AUTO: 16.9 %
MCH RBC QN AUTO: 32.9 PG (ref 26.5–33)
MCHC RBC AUTO-ENTMCNC: 34.1 G/DL (ref 31.5–36.5)
MCV RBC AUTO: 96 FL (ref 78–100)
MICROALBUMIN UR-MCNC: 10 MG/L
MICROALBUMIN/CREAT UR: 9.02 MG/G CR (ref 0–17)
MONOCYTES NFR BLD AUTO: 11.6 %
NEUTROPHILS NFR BLD AUTO: 66.5 %
PLATELET # BLD AUTO: 196 10E9/L (ref 150–450)
POTASSIUM SERPL-SCNC: 4.5 MMOL/L (ref 3.4–5.3)
PROT SERPL-MCNC: 7.4 G/DL (ref 6.8–8.8)
RBC # BLD AUTO: 4.38 10E12/L (ref 4.4–5.9)
SODIUM SERPL-SCNC: 137 MMOL/L (ref 133–144)
WBC # BLD AUTO: 7.7 10E9/L (ref 4–11)

## 2021-02-22 PROCEDURE — G0439 PPPS, SUBSEQ VISIT: HCPCS | Performed by: FAMILY MEDICINE

## 2021-02-22 PROCEDURE — 90471 IMMUNIZATION ADMIN: CPT | Performed by: FAMILY MEDICINE

## 2021-02-22 PROCEDURE — 36415 COLL VENOUS BLD VENIPUNCTURE: CPT | Performed by: FAMILY MEDICINE

## 2021-02-22 PROCEDURE — 80053 COMPREHEN METABOLIC PANEL: CPT | Performed by: FAMILY MEDICINE

## 2021-02-22 PROCEDURE — 83036 HEMOGLOBIN GLYCOSYLATED A1C: CPT | Performed by: FAMILY MEDICINE

## 2021-02-22 PROCEDURE — 90715 TDAP VACCINE 7 YRS/> IM: CPT | Performed by: FAMILY MEDICINE

## 2021-02-22 PROCEDURE — 85025 COMPLETE CBC W/AUTO DIFF WBC: CPT | Performed by: FAMILY MEDICINE

## 2021-02-22 PROCEDURE — 82043 UR ALBUMIN QUANTITATIVE: CPT | Performed by: FAMILY MEDICINE

## 2021-02-22 PROCEDURE — 99214 OFFICE O/P EST MOD 30 MIN: CPT | Mod: 25 | Performed by: FAMILY MEDICINE

## 2021-02-22 RX ORDER — ATORVASTATIN CALCIUM 80 MG/1
80 TABLET, FILM COATED ORAL DAILY
Qty: 90 TABLET | Refills: 3 | Status: SHIPPED | OUTPATIENT
Start: 2021-02-22 | End: 2021-07-09

## 2021-02-22 RX ORDER — ISOSORBIDE MONONITRATE 30 MG/1
30 TABLET, EXTENDED RELEASE ORAL EVERY MORNING
Qty: 90 TABLET | Refills: 3 | Status: SHIPPED | OUTPATIENT
Start: 2021-02-22 | End: 2021-03-12

## 2021-02-22 RX ORDER — LISINOPRIL 20 MG/1
20 TABLET ORAL DAILY
Qty: 90 TABLET | Refills: 3 | Status: SHIPPED | OUTPATIENT
Start: 2021-02-22 | End: 2021-03-12

## 2021-02-22 RX ORDER — FAMOTIDINE 20 MG/1
TABLET, FILM COATED ORAL
Qty: 180 TABLET | Refills: 3 | Status: SHIPPED | OUTPATIENT
Start: 2021-02-22 | End: 2021-05-08

## 2021-02-22 RX ORDER — METOPROLOL TARTRATE 50 MG
TABLET ORAL
Qty: 90 TABLET | Refills: 3 | Status: SHIPPED | OUTPATIENT
Start: 2021-02-22 | End: 2021-03-12

## 2021-02-22 ASSESSMENT — ENCOUNTER SYMPTOMS
SORE THROAT: 0
DIARRHEA: 0
CONSTIPATION: 0
DYSURIA: 0
SHORTNESS OF BREATH: 0
NAUSEA: 0
JOINT SWELLING: 0
HEMATURIA: 0
HEMATOCHEZIA: 0
DIZZINESS: 0
WEAKNESS: 0
CHILLS: 0
PALPITATIONS: 0
COUGH: 0
ABDOMINAL PAIN: 0
HEARTBURN: 1
HEADACHES: 0
FREQUENCY: 0
FEVER: 0
NERVOUS/ANXIOUS: 0
MYALGIAS: 0
EYE PAIN: 0
ARTHRALGIAS: 0
PARESTHESIAS: 0

## 2021-02-22 ASSESSMENT — ACTIVITIES OF DAILY LIVING (ADL): CURRENT_FUNCTION: TRANSPORTATION REQUIRES ASSISTANCE

## 2021-02-22 NOTE — PROGRESS NOTES
"SUBJECTIVE:   David Medina is a 80 year old male who presents for Preventive Visit.      Patient has been advised of split billing requirements and indicates understanding: Yes   Are you in the first 12 months of your Medicare coverage?  No    Healthy Habits:     In general, how would you rate your overall health?  Good    Frequency of exercise:  2-3 days/week    Duration of exercise:  30-45 minutes    Do you usually eat at least 4 servings of fruit and vegetables a day, include whole grains    & fiber and avoid regularly eating high fat or \"junk\" foods?  No    Taking medications regularly:  Yes    Medication side effects:  None    Ability to successfully perform activities of daily living:  Transportation requires assistance    Home Safety:  No safety concerns identified    Hearing Impairment:  Difficulty following a conversation in a noisy restaurant or crowded room    In the past 6 months, have you been bothered by leaking of urine? Yes    In general, how would you rate your overall mental or emotional health?  Good      PHQ-2 Total Score: 0    Additional concerns today:  Yes    Do you feel safe in your environment? Yes    Have you ever done Advance Care Planning? (For example, a Health Directive, POLST, or a discussion with a medical provider or your loved ones about your wishes): No, advance care planning information given to patient to review.  Patient plans to discuss their wishes with loved ones or provider.        Fall risk  Fallen 2 or more times in the past year?: Yes  Any fall with injury in the past year?: No  Timed Up and Go Test (>13.5 is fall risk; contact physician) : 26    Cognitive Screening   1) Repeat 3 items (Leader, Season, Table)    2) Clock draw: NORMAL  3) 3 item recall: Recalls 3 objects  Results: 3 items recalled: COGNITIVE IMPAIRMENT LESS LIKELY    Mini-CogTM Copyright S [employer]. Licensed by the author for use in [facility]; reprinted with permission ([email or web address].[email or web address]). All " rights reserved.      Do you have sleep apnea, excessive snoring or daytime drowsiness?: no    Reviewed and updated as needed this visit by clinical staff  Tobacco  Allergies  Meds  Problems  Med Hx  Surg Hx  Fam Hx  Soc Hx          Reviewed and updated as needed this visit by Provider   Allergies  Meds  Problems            Social History     Tobacco Use     Smoking status: Former Smoker     Packs/day: 3.50     Years: 19.00     Pack years: 66.50     Types: Cigarettes     Quit date: 1980     Years since quittin.8     Smokeless tobacco: Never Used   Substance Use Topics     Alcohol use: Yes     Comment: beer 1-2x/week     If you drink alcohol do you typically have >3 drinks per day or >7 drinks per week? No    Alcohol Use 2021   Prescreen: >3 drinks/day or >7 drinks/week? No   Prescreen: >3 drinks/day or >7 drinks/week? -   No flowsheet data found.        Diabetes Follow-up    How often are you checking your blood sugar? A few times a week  What time of day are you checking your blood sugars (select all that apply)?  Before meals  Have you had any blood sugars above 200?  No  Have you had any blood sugars below 70?  No    What symptoms do you notice when your blood sugar is low?  Shaky    What concerns do you have today about your diabetes? None     Do you have any of these symptoms? (Select all that apply)  Numbness in feet      BP Readings from Last 2 Encounters:   21 116/66   21 135/77     Hemoglobin A1C (%)   Date Value   2021 6.9 (H)   2019 6.5 (H)     LDL Cholesterol Calculated (mg/dL)   Date Value   02/10/2021 62   2019 66     GERD Follow-Up   Worsening GERD  Takes famotidine twice daily but also needs/uses antacids PRN (at least daily).      Current providers sharing in care for this patient include:   Patient Care Team:  Beatriz Betancur MD as PCP - General  Beatriz Betancur MD as Assigned PCP  Nadia Gutierrez MD as Assigned Heart and Vascular  Provider    The following health maintenance items are reviewed in Epic and correct as of today:  Health Maintenance   Topic Date Due     COVID-19 Vaccine (1 of 2) 02/23/1956     ZOSTER IMMUNIZATION (2 of 3) 02/26/2013     DIABETIC FOOT EXAM  03/10/2017     DTAP/TDAP/TD IMMUNIZATION (3 - Td) 01/04/2020     FALL RISK ASSESSMENT  05/20/2020     COLORECTAL CANCER SCREENING  11/09/2020     A1C  08/22/2021     EYE EXAM  10/05/2021     LIPID  02/10/2022     MEDICARE ANNUAL WELLNESS VISIT  02/22/2022     BMP  02/22/2022     CMP  02/22/2022     MICROALBUMIN  02/22/2022     ADVANCE CARE PLANNING  10/16/2022     PHQ-2  Completed     INFLUENZA VACCINE  Completed     Pneumococcal Vaccine: Pediatrics (0 to 5 Years) and At-Risk Patients (6 to 64 Years)  Completed     Pneumococcal Vaccine: 65+ Years  Completed     IPV IMMUNIZATION  Aged Out     MENINGITIS IMMUNIZATION  Aged Out     BP Readings from Last 3 Encounters:   02/22/21 116/66   02/19/21 135/77   11/07/20 (!) 142/80    Wt Readings from Last 3 Encounters:   02/22/21 115.2 kg (254 lb)   02/19/21 115.7 kg (255 lb)   10/07/20 108.9 kg (240 lb)             Review of Systems   Constitutional: Negative for chills and fever.   HENT: Positive for congestion. Negative for ear pain, hearing loss and sore throat.    Eyes: Negative for pain and visual disturbance.   Respiratory: Negative for cough and shortness of breath.    Cardiovascular: Negative for chest pain, palpitations and peripheral edema.   Gastrointestinal: Positive for heartburn. Negative for abdominal pain, constipation, diarrhea, hematochezia and nausea.   Genitourinary: Positive for impotence. Negative for discharge, dysuria, frequency, genital sores, hematuria and urgency.   Musculoskeletal: Negative for arthralgias, joint swelling and myalgias.   Skin: Negative for rash.   Neurological: Negative for dizziness, weakness, headaches and paresthesias.   Psychiatric/Behavioral: Negative for mood changes. The patient is not  nervous/anxious.        OBJECTIVE:   /66 (BP Location: Left arm, Patient Position: Sitting, Cuff Size: Adult Large)   Pulse 72   Temp 98.3  F (36.8  C) (Oral)   Resp 16   Wt 115.2 kg (254 lb)   SpO2 98%   BMI 34.45 kg/m   Estimated body mass index is 34.45 kg/m  as calculated from the following:    Height as of 2/19/21: 1.829 m (6').    Weight as of this encounter: 115.2 kg (254 lb).  Physical Exam  GENERAL: healthy, alert and no distress  EYES: Eyes grossly normal to inspection, conjunctivae and sclerae normal  HENT: ear canals and TM's normal  NECK: no adenopathy, no asymmetry, masses, or scars and thyroid normal to palpation  RESP: lungs clear to auscultation - no rales, rhonchi or wheezes  CV: regular rate and rhythm, normal S1 S2, no S3 or S4, no murmur, click or rub, no peripheral edema  SKIN: no suspicious lesions or rashes  NEURO: Slow antalgic gait  PSYCH: mentation appears normal, affect normal/bright       ASSESSMENT / PLAN:     Encounter for Medicare annual wellness exam      Type 2 diabetes mellitus with diabetic polyneuropathy, without long-term current use of insulin (H)  stable/well controlled - Continue current medication regimen.   - Hemoglobin A1c  - Comprehensive metabolic panel (BMP + Alb, Alk Phos, ALT, AST, Total. Bili, TP)  - Albumin Random Urine Quantitative with Creat Ratio  - metFORMIN (GLUCOPHAGE) 500 MG tablet  Dispense: 180 tablet; Refill: 1    Gastroesophageal reflux disease without esophagitis  I recommended a time-limited course of PPI (1-2 months).  Discussed R/B/SE.  He was uncertain about the change and will consider this.    - omeprazole (PRILOSEC) 20 MG DR capsule  Dispense: 90 capsule; Refill: 0  - famotidine (PEPCID) 20 MG tablet  Dispense: 180 tablet; Refill: 3  - CBC with platelets and differential    ASCVD (arteriosclerotic cardiovascular disease)  Ischemic cardiomyopathy  stable/well controlled - just had cardiology follow-up   - metoprolol tartrate (LOPRESSOR)  50 MG tablet  Dispense: 90 tablet; Refill: 3  - isosorbide mononitrate (IMDUR) 30 MG 24 hr tablet  Dispense: 90 tablet; Refill: 3    Hypertension goal BP (blood pressure) < 140/90  stable/well controlled - Continue current medication regimen.   - metoprolol tartrate (LOPRESSOR) 50 MG tablet  Dispense: 90 tablet; Refill: 3  - lisinopril (ZESTRIL) 20 MG tablet  Dispense: 90 tablet; Refill: 3    Pure hypercholesterolemia  stable/well controlled - Continue current medication regimen.   - atorvastatin (LIPITOR) 80 MG tablet  Dispense: 90 tablet; Refill: 3    Need for vaccination  - TDAP VACCINE (Adacel, Boostrix)  [[id number]]  - ADMIN 1st VACCINE          COUNSELING:  Reviewed preventive health counseling, as reflected in patient instructions      Estimated body mass index is 34.45 kg/m  as calculated from the following:    Height as of 2/19/21: 1.829 m (6').    Weight as of this encounter: 115.2 kg (254 lb).  Weight management plan: Discussed healthy diet and exercise guidelines      He reports that he quit smoking about 40 years ago. His smoking use included cigarettes. He has a 66.50 pack-year smoking history. He has never used smokeless tobacco.      Appropriate preventive services were discussed with this patient, including applicable screening as appropriate for cardiovascular disease, diabetes, osteopenia/osteoporosis, and glaucoma.  As appropriate for age/gender, discussed screening for colorectal cancer, prostate cancer, breast cancer, and cervical cancer. Checklist reviewing preventive services available has been given to the patient.    Reviewed patients plan of care and provided an AVS. The Basic Care Plan (routine screening as documented in Health Maintenance) for David meets the Care Plan requirement. This Care Plan has been established and reviewed with the Patient.    Counseling Resources:  ATP IV Guidelines  Pooled Cohorts Equation Calculator  Breast Cancer Risk Calculator  Breast Cancer: Medication to  Reduce Risk  FRAX Risk Assessment  ICSI Preventive Guidelines  Dietary Guidelines for Americans, 2010  USDA's MyPlate  ASA Prophylaxis  Lung CA Screening    Beatriz Betancur MD  [facility]      Identified Health Risks:    The patient was counseled and encouraged to consider modifying their diet and eating habits. He was provided with information on recommended healthy diet options.  The patient reports that he has difficulty with activities of daily living. He has assistance from his wife.  The patient was provided with written information regarding signs of hearing loss.  Information on urinary incontinence and treatment options given to patient.  He is at risk for falling and has been provided with information to reduce the risk of falling at home.

## 2021-02-22 NOTE — NURSING NOTE
Prior to immunization administration, verified patients identity using patient s name and date of birth. Please see Immunization Activity for additional information.     Screening Questionnaire for Adult Immunization    Are you sick today?   No   Do you have allergies to medications, food, a vaccine component or latex?   No   Have you ever had a serious reaction after receiving a vaccination?   No   Do you have a long-term health problem with heart, lung, kidney, or metabolic disease (e.g., diabetes), asthma, a blood disorder, no spleen, complement component deficiency, a cochlear implant, or a spinal fluid leak?  Are you on long-term aspirin therapy?   No   Do you have cancer, leukemia, HIV/AIDS, or any other immune system problem?   No   Do you have a parent, brother, or sister with an immune system problem?   No   In the past 3 months, have you taken medications that affect  your immune system, such as prednisone, other steroids, or anticancer drugs; drugs for the treatment of rheumatoid arthritis, Crohn s disease, or psoriasis; or have you had radiation treatments?   No   Have you had a seizure, or a brain or other nervous system problem?   No   During the past year, have you received a transfusion of blood or blood    products, or been given immune (gamma) globulin or antiviral drug?   No   For women: Are you pregnant or is there a chance you could become       pregnant during the next month?   No   Have you received any vaccinations in the past 4 weeks?   No     Immunization questionnaire answers were all negative.        Per orders of Dr. Betancur, injection of Tdap vaccine given by Joy Wan MA. Patient instructed to remain in clinic for 15 minutes afterwards, and to report any adverse reaction to me immediately.       Screening performed by Joy Wan MA on 2/22/2021 at 8:01 AM.  VIS for Tdap vaccine  given on same date of administration.  Staff signature/Title: Joy Wan MA on 2/22/2021  at 8:02 AM

## 2021-02-22 NOTE — LETTER
February 23, 2021      David ECHEVERRIA Carol  [address] [facility] [address]        Dear ,    We are writing to inform you of your test results.    {results letter list:879945}    Resulted Orders   Hemoglobin A1c   Result Value Ref Range    Hemoglobin A1C 6.9 (H) 0 - 5.6 %      Comment:      Normal <5.7% Prediabetes 5.7-6.4%  Diabetes 6.5% or higher - adopted from ADA   consensus guidelines.     Comprehensive metabolic panel (BMP + Alb, Alk Phos, ALT, AST, Total. Bili, TP)   Result Value Ref Range    Sodium 137 133 - 144 mmol/L    Potassium 4.5 3.4 - 5.3 mmol/L    Chloride 106 94 - 109 mmol/L    Carbon Dioxide 24 20 - 32 mmol/L    Anion Gap 7 3 - 14 mmol/L    Glucose 173 (H) 70 - 99 mg/dL    Urea Nitrogen 24 7 - 30 mg/dL    Creatinine 0.96 0.66 - 1.25 mg/dL    GFR Estimate 74 >60 mL/min/[1.73_m2]      Comment:      Non  GFR Calc  Starting 12/18/2018, serum creatinine based estimated GFR (eGFR) will be   calculated using the Chronic Kidney Disease Epidemiology Collaboration   (CKD-EPI) equation.      GFR Estimate If Black 86 >60 mL/min/[1.73_m2]      Comment:       GFR Calc  Starting 12/18/2018, serum creatinine based estimated GFR (eGFR) will be   calculated using the Chronic Kidney Disease Epidemiology Collaboration   (CKD-EPI) equation.      Calcium 9.9 8.5 - 10.1 mg/dL    Bilirubin Total 0.6 0.2 - 1.3 mg/dL    Albumin 4.0 3.4 - 5.0 g/dL    Protein Total 7.4 6.8 - 8.8 g/dL    Alkaline Phosphatase 94 40 - 150 U/L    ALT 45 0 - 70 U/L    AST 24 0 - 45 U/L   Albumin Random Urine Quantitative with Creat Ratio   Result Value Ref Range    Creatinine Urine 110 mg/dL    Albumin Urine mg/L 10 mg/L    Albumin Urine mg/g Cr 9.02 0 - 17 mg/g Cr   CBC with platelets and differential   Result Value Ref Range    WBC 7.7 4.0 - 11.0 10e9/L    RBC Count 4.38 (L) 4.4 - 5.9 10e12/L    Hemoglobin 14.4 13.3 - 17.7 g/dL    Hematocrit 42.2 40.0 - 53.0 %    MCV 96 78 - 100 fl    MCH 32.9 26.5 -  33.0 pg    MCHC 34.1 31.5 - 36.5 g/dL    RDW 13.5 10.0 - 15.0 %    Platelet Count 196 150 - 450 10e9/L    Diff Method Automated Method     % Neutrophils 66.5 %    % Lymphocytes 16.9 %    % Monocytes 11.6 %    % Eosinophils 4.5 %    % Basophils 0.5 %       If you have any questions or concerns, please call the clinic at the number listed above.       Sincerely,      Beatriz Betancru MD

## 2021-02-23 ENCOUNTER — TELEPHONE (OUTPATIENT)
Dept: CARDIOLOGY | Facility: CLINIC | Age: 81
End: 2021-02-23

## 2021-02-23 NOTE — TELEPHONE ENCOUNTER
"Call from patient, he states he discussed finding a new PCP provider since they have moved to [facility]. He states he forgot the names that Dr. Gutierrez gave him last week. Spouse remembers \"[facility]\" but nothing else.  They are asking for the list again.    Will message Dr. Gutierrez to review      2/24/2021 reply from Dr. Gutierrez:  Recommend [facility].   I recommended Rodrick Barrientos and Marija - but others are also excellent.   Thanks.   CD     Attempted to contact patient with update, left message for patient with information and that My Chart was sent with update also.  Sent patient a message on his My Chart with update  "

## 2021-02-23 NOTE — RESULT ENCOUNTER NOTE
Brent Hernandez,  Your labs were within acceptable limits.  This includes Hemoglobin A1C (a test assessing overall blood sugar control for the last 3 months), blood counts, urine albumin (protein) level, and comprehensive metabolic panel (liver function, kidney function, blood sugar, and blood salts) results.  Minor highs/lows were noted which are not clinically significant.     Beatriz Betancur MD

## 2021-02-25 ENCOUNTER — TELEPHONE (OUTPATIENT)
Dept: FAMILY MEDICINE | Facility: CLINIC | Age: 81
End: 2021-02-25

## 2021-02-25 NOTE — TELEPHONE ENCOUNTER
Reason for Call:  Form, our goal is to have forms completed with 72 hours, however, some forms may require a visit or additional information.    Type of letter, form or note:  handicap is up for augusto    Who is the form from?: Patient forgot to mention at last appt could you fill it out please    Where did the form come from: Patient ask that one be provided handicap form    What clinic location was the form placed at    Where the form was placed:   What number is listed as a contact on the form?:        Additional comments:     Call taken on 2/25/2021 at 9:50 AM by Kitty Arzate

## 2021-03-01 NOTE — TELEPHONE ENCOUNTER
Made copy to be abstracted and spoke with patient and he wants the form mailed to him and he will take care of the rest

## 2021-03-01 NOTE — TELEPHONE ENCOUNTER
Done.  In Nurse basket in back provider office at [place].  Please make copy for abstraction.  Beatriz Betancur MD

## 2021-03-08 ENCOUNTER — TELEPHONE (OUTPATIENT)
Dept: FAMILY MEDICINE | Facility: CLINIC | Age: 81
End: 2021-03-08

## 2021-03-08 DIAGNOSIS — I25.5 ISCHEMIC CARDIOMYOPATHY: ICD-10-CM

## 2021-03-08 DIAGNOSIS — I25.10 ASCVD (ARTERIOSCLEROTIC CARDIOVASCULAR DISEASE): ICD-10-CM

## 2021-03-08 DIAGNOSIS — E11.42 TYPE 2 DIABETES MELLITUS WITH DIABETIC POLYNEUROPATHY, WITHOUT LONG-TERM CURRENT USE OF INSULIN (H): ICD-10-CM

## 2021-03-08 DIAGNOSIS — I10 HYPERTENSION GOAL BP (BLOOD PRESSURE) < 140/90: ICD-10-CM

## 2021-03-08 NOTE — TELEPHONE ENCOUNTER
We can provide a letter of medical necessity although I doubt it will change his insurance coverage.  RN - please draft and send letter as requested.

## 2021-03-08 NOTE — LETTER
[facility] [address] [place] [address]  [phone]          2021    RE:    David Medina  ( 1940)                                                                                                        [address] [facility] [address]      To whom it may concern:         This is a letter of medical necessary  for tetanus vaccine coverage to Medicare.        Sincerely,         Beatriz Betancur MD/germaine

## 2021-03-08 NOTE — TELEPHONE ENCOUNTER
Reason for Call:  Other    Detailed comments: Patient got a tetanus shot at his last visit and states it wasn't covered by insurance. He is requesting PCP send a medically necessary form for tetanus vaccine coverage to Medicare. Please assist. Thanks!    Phone Number Patient can be reached at: [phone]    Best Time: Any    Can we leave a detailed message on this number? YES    Call taken on 3/8/2021 at 11:33 AM by Dasha Rayo

## 2021-03-09 NOTE — TELEPHONE ENCOUNTER
I gave pt this message.  I told him we would mail the letter to his house.  He will forward on to Medicare.  He voiced appreciation.    Letter is drafted.  SUMIT De Los Santos

## 2021-03-12 RX ORDER — LISINOPRIL 20 MG/1
20 TABLET ORAL DAILY
Qty: 90 TABLET | Refills: 3 | Status: SHIPPED | OUTPATIENT
Start: 2021-03-12 | End: 2021-07-09

## 2021-03-12 RX ORDER — METOPROLOL TARTRATE 50 MG
TABLET ORAL
Qty: 90 TABLET | Refills: 3 | Status: SHIPPED | OUTPATIENT
Start: 2021-03-12 | End: 2021-07-09

## 2021-03-12 RX ORDER — ISOSORBIDE MONONITRATE 30 MG/1
30 TABLET, EXTENDED RELEASE ORAL EVERY MORNING
Qty: 90 TABLET | Refills: 3 | Status: SHIPPED | OUTPATIENT
Start: 2021-03-12 | End: 2021-07-09

## 2021-03-12 NOTE — TELEPHONE ENCOUNTER
Rxs from 2/22/21 signed by MD were sent electronically to preferred pharmacy    Cesilia Key RN, BSN  [facility]

## 2021-03-22 ENCOUNTER — TELEPHONE (OUTPATIENT)
Dept: FAMILY MEDICINE | Facility: CLINIC | Age: 81
End: 2021-03-22

## 2021-03-22 NOTE — TELEPHONE ENCOUNTER
Reason for Call:  Other call back    Detailed comments: States that she has been waiting on letter for insurance to cover a tetanus shot but has not received that letter. Also states that she has been waiting on a call back for 2 weeks that she has not received.     Phone Number Patient can be reached at: Home number on file [phone] (home)    Best Time: ANY    Can we leave a detailed message on this number? NO    Call taken on 3/22/2021 at 10:53 AM by Cindy Harris

## 2021-03-22 NOTE — LETTER
[facility]  [address] [place] [address]  [phone]          2021    David Medina  ( 1940)                                                                                                                   [address] [facility] [address]      To whom it may concern:      1-Code-  2-Letter head- see above.  3 The name of the facility who dispensed the medication- [facility]  4 This was given during an annual wellness visit.    This is a letter of medical necessary  for tetanus vaccine coverage to Medicare.        Sincerely,         Beatriz Betancur MD/germaine

## 2021-03-22 NOTE — LETTER
April 26, 2021      David JUWAN Carol  [address] [facility] [address]        To Whom It May Concern,       1-Code-NDC number is :54970-051-54  2-Letter head- see above.  3 The name of the facility who dispensed the medication- [facility]  4 This was given during an annual wellness visit.    This is a letter of medical necessary  for tetanus vaccine coverage to Medicare.      Sincerely,        Beatriz Betancur MD

## 2021-04-01 ENCOUNTER — TRANSFERRED RECORDS (OUTPATIENT)
Dept: HEALTH INFORMATION MANAGEMENT | Facility: CLINIC | Age: 81
End: 2021-04-01
Payer: COMMERCIAL

## 2021-04-01 LAB — RETINOPATHY: NORMAL

## 2021-04-23 NOTE — TELEPHONE ENCOUNTER
Piero Loja, [facility]  Shreya Salgado RN; Beatriz Betancur MD   Caller: Unspecified (1 month ago)             shreya Hyatt--carmencita--the national drug code is know as the NDC #--every drug has this --here is an example below but you would need to use the NDC from the vial at the clinic that is specific to that .     Let me know if any questions?       Thx.Dre      NDC number is :09640-692-34 I see a letter was started-by I can't access it.     Shreya Salgado RN

## 2021-04-23 NOTE — TELEPHONE ENCOUNTER
Pended new letter with these 4 parts.    What is the national drug code?    Sending to PCP.    SUMIT De Los Santos

## 2021-04-23 NOTE — TELEPHONE ENCOUNTER
Patient states the appeal was denied due to missing or invalid 'national drug code'. They are requesting PCP redo letter including code, letter head, the name of the facility who dispensed the medication, and that it was an annual wellness visit. They would like this emailed to [email or web address]. Please assist. Thanks!

## 2021-04-26 NOTE — TELEPHONE ENCOUNTER
"I see that the letter pended was started using old letterhead.  Nurses, please be sure to use the new \"[facility]\" letterhead (with logo) going forward.  I started a new letter using our current letterhead.  Letter is in Pod B MA Basket at [employer] work station.  Beatriz Betancur MD   "

## 2021-04-27 NOTE — TELEPHONE ENCOUNTER
Handed letter to RUBIO Marks.  Please inform the caller that this completed letter will be mailed to them.  Mail signed letter.    Thanks!  SUMIT De Los Santos     16-Sep[birth year]2018

## 2021-05-08 ENCOUNTER — APPOINTMENT (OUTPATIENT)
Dept: GENERAL RADIOLOGY | Facility: CLINIC | Age: 81
End: 2021-05-08
Attending: EMERGENCY MEDICINE
Payer: COMMERCIAL

## 2021-05-08 ENCOUNTER — HOSPITAL ENCOUNTER (OUTPATIENT)
Facility: CLINIC | Age: 81
Setting detail: OBSERVATION
Discharge: NURSING, REHAB OR LONG-TERM CARE | End: 2021-05-10
Attending: EMERGENCY MEDICINE | Admitting: INTERNAL MEDICINE
Payer: COMMERCIAL

## 2021-05-08 DIAGNOSIS — S82.209A CLOSED FRACTURE OF TIBIA AND FIBULA, UNSPECIFIED LATERALITY, INITIAL ENCOUNTER: ICD-10-CM

## 2021-05-08 DIAGNOSIS — S82.402A TIBIA/FIBULA FRACTURE, LEFT, CLOSED, INITIAL ENCOUNTER: ICD-10-CM

## 2021-05-08 DIAGNOSIS — T14.8XXA ABRASION: ICD-10-CM

## 2021-05-08 DIAGNOSIS — S82.401A TIBIA/FIBULA FRACTURE, RIGHT, CLOSED, INITIAL ENCOUNTER: ICD-10-CM

## 2021-05-08 DIAGNOSIS — S82.202A TIBIA/FIBULA FRACTURE, LEFT, CLOSED, INITIAL ENCOUNTER: ICD-10-CM

## 2021-05-08 DIAGNOSIS — S51.011A SKIN TEAR OF RIGHT ELBOW WITHOUT COMPLICATION, INITIAL ENCOUNTER: ICD-10-CM

## 2021-05-08 DIAGNOSIS — S82.201A TIBIA/FIBULA FRACTURE, RIGHT, CLOSED, INITIAL ENCOUNTER: ICD-10-CM

## 2021-05-08 DIAGNOSIS — S82.409A CLOSED FRACTURE OF TIBIA AND FIBULA, UNSPECIFIED LATERALITY, INITIAL ENCOUNTER: ICD-10-CM
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
## 2021-05-08 PROBLEM — S82.202D CLOSED FRACTURE OF LEFT TIBIA AND FIBULA WITH ROUTINE HEALING: Status: ACTIVE | Noted: 2021-05-08

## 2021-05-08 PROBLEM — R26.9 GAIT DISORDER: Status: ACTIVE | Noted: 2021-05-08

## 2021-05-08 PROBLEM — R29.6 FALLS FREQUENTLY: Status: ACTIVE | Noted: 2021-05-08

## 2021-05-08 PROBLEM — S82.402D CLOSED FRACTURE OF LEFT TIBIA AND FIBULA WITH ROUTINE HEALING: Status: ACTIVE | Noted: 2021-05-08

## 2021-05-08 LAB
ANION GAP SERPL CALCULATED.3IONS-SCNC: 6 MMOL/L (ref 3–14)
BASOPHILS # BLD AUTO: 0.1 10E9/L (ref 0–0.2)
BASOPHILS NFR BLD AUTO: 0.5 %
BUN SERPL-MCNC: 22 MG/DL (ref 7–30)
CALCIUM SERPL-MCNC: 9 MG/DL (ref 8.5–10.1)
CHLORIDE SERPL-SCNC: 110 MMOL/L (ref 94–109)
CO2 SERPL-SCNC: 25 MMOL/L (ref 20–32)
CREAT SERPL-MCNC: 0.92 MG/DL (ref 0.66–1.25)
DIFFERENTIAL METHOD BLD: ABNORMAL
EOSINOPHIL # BLD AUTO: 0.3 10E9/L (ref 0–0.7)
EOSINOPHIL NFR BLD AUTO: 2.9 %
ERYTHROCYTE [DISTWIDTH] IN BLOOD BY AUTOMATED COUNT: 12.9 % (ref 10–15)
GFR SERPL CREATININE-BSD FRML MDRD: 77 ML/MIN/{1.73_M2}
GLUCOSE BLDC GLUCOMTR-MCNC: 105 MG/DL (ref 70–99)
GLUCOSE BLDC GLUCOMTR-MCNC: 252 MG/DL (ref 70–99)
GLUCOSE SERPL-MCNC: 116 MG/DL (ref 70–99)
HCT VFR BLD AUTO: 37.7 % (ref 40–53)
HGB BLD-MCNC: 13.2 G/DL (ref 13.3–17.7)
IMM GRANULOCYTES # BLD: 0.1 10E9/L (ref 0–0.4)
IMM GRANULOCYTES NFR BLD: 0.8 %
LABORATORY COMMENT REPORT: NORMAL
LYMPHOCYTES # BLD AUTO: 1.7 10E9/L (ref 0.8–5.3)
LYMPHOCYTES NFR BLD AUTO: 18 %
MCH RBC QN AUTO: 33.5 PG (ref 26.5–33)
MCHC RBC AUTO-ENTMCNC: 35 G/DL (ref 31.5–36.5)
MCV RBC AUTO: 96 FL (ref 78–100)
MONOCYTES # BLD AUTO: 1 10E9/L (ref 0–1.3)
MONOCYTES NFR BLD AUTO: 11.2 %
NEUTROPHILS # BLD AUTO: 6.2 10E9/L (ref 1.6–8.3)
NEUTROPHILS NFR BLD AUTO: 66.6 %
NRBC # BLD AUTO: 0 10*3/UL
NRBC BLD AUTO-RTO: 0 /100
PLATELET # BLD AUTO: 162 10E9/L (ref 150–450)
POTASSIUM SERPL-SCNC: 4.1 MMOL/L (ref 3.4–5.3)
RBC # BLD AUTO: 3.94 10E12/L (ref 4.4–5.9)
SARS-COV-2 RNA RESP QL NAA+PROBE: NEGATIVE
SODIUM SERPL-SCNC: 141 MMOL/L (ref 133–144)
SPECIMEN SOURCE: NORMAL
WBC # BLD AUTO: 9.2 10E9/L (ref 4–11)

## 2021-05-08 PROCEDURE — 73590 X-RAY EXAM OF LOWER LEG: CPT | Mod: LT

## 2021-05-08 PROCEDURE — 73610 X-RAY EXAM OF ANKLE: CPT | Mod: LT

## 2021-05-08 PROCEDURE — 85025 COMPLETE CBC W/AUTO DIFF WBC: CPT | Performed by: EMERGENCY MEDICINE

## 2021-05-08 PROCEDURE — 80048 BASIC METABOLIC PNL TOTAL CA: CPT | Performed by: EMERGENCY MEDICINE

## 2021-05-08 PROCEDURE — G0378 HOSPITAL OBSERVATION PER HR: HCPCS

## 2021-05-08 PROCEDURE — 87635 SARS-COV-2 COVID-19 AMP PRB: CPT | Performed by: EMERGENCY MEDICINE

## 2021-05-08 PROCEDURE — C9803 HOPD COVID-19 SPEC COLLECT: HCPCS

## 2021-05-08 PROCEDURE — 73560 X-RAY EXAM OF KNEE 1 OR 2: CPT | Mod: LT

## 2021-05-08 PROCEDURE — 999N001017 HC STATISTIC GLUCOSE BY METER IP

## 2021-05-08 PROCEDURE — 99220 PR INITIAL OBSERVATION CARE,LEVEL III: CPT | Performed by: INTERNAL MEDICINE

## 2021-05-08 PROCEDURE — 99285 EMERGENCY DEPT VISIT HI MDM: CPT

## 2021-05-08 PROCEDURE — 250N000013 HC RX MED GY IP 250 OP 250 PS 637: Performed by: INTERNAL MEDICINE

## 2021-05-08 RX ORDER — LATANOPROST 50 UG/ML
1 SOLUTION/ DROPS OPHTHALMIC AT BEDTIME
Status: DISCONTINUED | OUTPATIENT
Start: 2021-05-08 | End: 2021-05-10 | Stop reason: HOSPADM

## 2021-05-08 RX ORDER — AMOXICILLIN 250 MG
1 CAPSULE ORAL 2 TIMES DAILY PRN
Status: DISCONTINUED | OUTPATIENT
Start: 2021-05-08 | End: 2021-05-10 | Stop reason: HOSPADM

## 2021-05-08 RX ORDER — DEXTROSE MONOHYDRATE 25 G/50ML
25-50 INJECTION, SOLUTION INTRAVENOUS
Status: DISCONTINUED | OUTPATIENT
Start: 2021-05-08 | End: 2021-05-10 | Stop reason: HOSPADM

## 2021-05-08 RX ORDER — BISACODYL 10 MG
10 SUPPOSITORY, RECTAL RECTAL DAILY PRN
Status: DISCONTINUED | OUTPATIENT
Start: 2021-05-08 | End: 2021-05-10 | Stop reason: HOSPADM

## 2021-05-08 RX ORDER — NICOTINE POLACRILEX 4 MG
15-30 LOZENGE BUCCAL
Status: DISCONTINUED | OUTPATIENT
Start: 2021-05-08 | End: 2021-05-10 | Stop reason: HOSPADM

## 2021-05-08 RX ORDER — AMOXICILLIN 250 MG
2 CAPSULE ORAL 2 TIMES DAILY PRN
Status: DISCONTINUED | OUTPATIENT
Start: 2021-05-08 | End: 2021-05-10 | Stop reason: HOSPADM

## 2021-05-08 RX ORDER — OXYCODONE HYDROCHLORIDE 5 MG/1
5 TABLET ORAL EVERY 6 HOURS PRN
Status: DISCONTINUED | OUTPATIENT
Start: 2021-05-08 | End: 2021-05-10 | Stop reason: HOSPADM

## 2021-05-08 RX ORDER — ASPIRIN 81 MG/1
81 TABLET ORAL EVERY EVENING
Status: DISCONTINUED | OUTPATIENT
Start: 2021-05-08 | End: 2021-05-10

## 2021-05-08 RX ORDER — ONDANSETRON 2 MG/ML
4 INJECTION INTRAMUSCULAR; INTRAVENOUS EVERY 6 HOURS PRN
Status: DISCONTINUED | OUTPATIENT
Start: 2021-05-08 | End: 2021-05-10 | Stop reason: HOSPADM

## 2021-05-08 RX ORDER — ISOSORBIDE MONONITRATE 30 MG/1
30 TABLET, EXTENDED RELEASE ORAL EVERY MORNING
Status: DISCONTINUED | OUTPATIENT
Start: 2021-05-09 | End: 2021-05-10 | Stop reason: HOSPADM

## 2021-05-08 RX ORDER — POLYETHYLENE GLYCOL 3350 17 G/17G
17 POWDER, FOR SOLUTION ORAL DAILY PRN
Status: DISCONTINUED | OUTPATIENT
Start: 2021-05-08 | End: 2021-05-10 | Stop reason: HOSPADM

## 2021-05-08 RX ORDER — ATORVASTATIN CALCIUM 40 MG/1
80 TABLET, FILM COATED ORAL DAILY
Status: DISCONTINUED | OUTPATIENT
Start: 2021-05-09 | End: 2021-05-10 | Stop reason: HOSPADM

## 2021-05-08 RX ORDER — LATANOPROST 50 UG/ML
1 SOLUTION/ DROPS OPHTHALMIC AT BEDTIME
COMMUNITY

## 2021-05-08 RX ORDER — LISINOPRIL 20 MG/1
20 TABLET ORAL EVERY EVENING
Status: DISCONTINUED | OUTPATIENT
Start: 2021-05-08 | End: 2021-05-10 | Stop reason: HOSPADM

## 2021-05-08 RX ORDER — LANOLIN ALCOHOL/MO/W.PET/CERES
1000 CREAM (GRAM) TOPICAL DAILY
Status: DISCONTINUED | OUTPATIENT
Start: 2021-05-09 | End: 2021-05-10 | Stop reason: HOSPADM

## 2021-05-08 RX ORDER — METOPROLOL TARTRATE 25 MG/1
25 TABLET, FILM COATED ORAL 2 TIMES DAILY
Status: DISCONTINUED | OUTPATIENT
Start: 2021-05-08 | End: 2021-05-10 | Stop reason: HOSPADM

## 2021-05-08 RX ORDER — ONDANSETRON 4 MG/1
4 TABLET, ORALLY DISINTEGRATING ORAL EVERY 6 HOURS PRN
Status: DISCONTINUED | OUTPATIENT
Start: 2021-05-08 | End: 2021-05-10 | Stop reason: HOSPADM

## 2021-05-08 RX ORDER — ACETAMINOPHEN 325 MG/1
975 TABLET ORAL 3 TIMES DAILY
Status: DISCONTINUED | OUTPATIENT
Start: 2021-05-08 | End: 2021-05-10 | Stop reason: HOSPADM

## 2021-05-08 RX ADMIN — LISINOPRIL 20 MG: 10 TABLET ORAL at 20:16

## 2021-05-08 RX ADMIN — ASPIRIN 81 MG: 81 TABLET, DELAYED RELEASE ORAL at 20:16

## 2021-05-08 RX ADMIN — LATANOPROST 1 DROP: 50 SOLUTION/ DROPS OPHTHALMIC at 21:35

## 2021-05-08 RX ADMIN — METOPROLOL TARTRATE 25 MG: 25 TABLET, FILM COATED ORAL at 20:16

## 2021-05-08 ASSESSMENT — ENCOUNTER SYMPTOMS
NECK PAIN: 0
NAUSEA: 0
BACK PAIN: 0
ABDOMINAL PAIN: 0
ARTHRALGIAS: 1
WEAKNESS: 1
HEADACHES: 0

## 2021-05-08 NOTE — ED NOTES
[facility]  ED Nurse Handoff Report    ED Chief complaint: Fall      ED Diagnosis:   Final diagnoses:   Fall down stairs, initial encounter   Skin tear of right elbow without complication, initial encounter   Abrasion   Closed fracture of tibia and fibula, unspecified laterality, initial encounter       Code Status: Hospitalist to address    Allergies:   Allergies   Allergen Reactions     No Known Allergies        Patient Story: Presents via EMS after mechanical fall. Was using balance bar to go down steps. Reports knees became weak causing him to fall down 2-3 steps. Able to get self up off the floor attempted to go down additional steps and knees gave out again. Reports left leg pain. Knee abrasions.    Focused Assessment:  A&O x4. Vital signs within normal limits. Bilateral lower leg pain. Bilateral ankle fractures. Failed ambulation trial with walker. Pt is fearful of falling. History of balance difficulties.     Treatments and/or interventions provided: labs, xrays.   Patient's response to treatments and/or interventions: declined pain medication in ER    To be done/followed up on inpatient unit:  admission orders    Does this patient have any cognitive concerns?: none    Activity level - Baseline/Home:  Walker  Activity Level - Current:   Unknown    Patient's Preferred language: English   Needed?: No    Isolation: None  Infection: Not Applicable  Patient tested for COVID 19 prior to admission: YES  Bariatric?: No    Vital Signs:   Vitals:    05/08/21 1151 05/08/21 1323 05/08/21 1430 05/08/21 1559   BP: (!) 157/94 129/80  (!) 158/89   Pulse: 67      Resp: 18 18 20 18   Temp: 98.2  F (36.8  C)      TempSrc: Oral      SpO2: 98% 99% 98% 97%       Cardiac Rhythm:     Was the PSS-3 completed:   Yes  What interventions are required if any?               Family Comments: wife at bedside  OBS brochure/video discussed/provided to patient/family: N/A              Name of person given brochure  if not patient: n/a              Relationship to patient: n/a    For the majority of the shift this patient's behavior was Green.   Behavioral interventions performed were none.    ED NURSE PHONE NUMBER: *[id number]

## 2021-05-08 NOTE — H&P
[facility]    History and Physical  Hospitalist       Date of Admission:  5/8/2021    Assessment & Plan     David Medina is a 81 year old male complex past medical history including ischemic cardiomyopathy with normal ejection fracture\echo 2018, GERD good control, coronary artery disease, type 2 diabetes on Metformin, history of thoracic spinal stenosis status post surgery, remote history of cervical radiculopathy no longer an issue, history of right hip fracture, diabetic neuropathy, gait disorder, falls frequently, remote history of left patellar fracture uses assist devices who presents with fall down the stairs with imaging showing bilateral fibular fractures.  Imaging revealed bilateral fibular fractures.    Principal Problem:  Closed fracture of right and left fibula    Assessment:  -Traumatic, secondary to fall today.  Presents with bilateral lateral ankle pain.  Imaging shows minimally displaced fibular fractures.  No other fractures noted.  Normal pulse distal extremity.  No cyanosis.  Placed in a boot in the emergency room.  Patient unable to ambulate independently at this time.    Plan: Discharge observation, physical therapy and occupational therapy consult, orthopedic surgery consult, social work consult will need TCU.  As needed oxycodone for severe pain.  Scheduled Tylenol for now.  Bedrest for now until assessed by Ortho with the likely could be weightbearing as tolerated.  Pneumoboots      Type 2 diabetes mellitus with diabetic polyneuropathy (H)    Assessment: Is on Metformin.  States his blood sugars are approximately in the 150 range.    Plan: Diabetic diet, hold Metformin, insulin sliding scale, follow for need of prandial insulin or Lantus      Coronary artery disease involving native coronary artery of native heart without angina pectoris  Dyslipidemia    Assessment: Followed by [facility] urology, Dr. Gutierrez.  On prior to admission Lipitor 80 mg daily,  aspirin 81 mg daily Imdur 30 mg daily metoprolol 25 mg twice daily.  No cardiopulmonary symptoms.  Has been doing well.  Tolerating medications.  Denies any orthostatic dizziness.    Plan: Continue prior to admission medications.  Patient took his medications today       Cervical radiculopathy    Assessment: Motor issue.  Remote history of [facility] recommending epidural steroid injections.  He denies any upper extremity weakness or paresthesias.  No cervical myelopathy on exam.         Diabetic neuropathy (H)    Assessment: Longstanding history of diabetes.  He has numbness bilateral feet to the level ankle.  Thought to be the cause for his gait disorder.    Plan: Follow check B12 level folic acid level and TSH      Gait disorder    Falls frequently    Assessment: Extensive work-up at [facility] and locally 5 to 10 years ago regarding his gait disorder.  Thought to be related to his diabetic neuropathy.  He uses a cane outside the house and a wheeled walker during ambulation in the house.  He has a bar system to assist with stair navigation at home.  Falls approximately 2-3 times a month for years.  Possible progression.  Today's fall with his legs giving out was typical of his falls.  Got a nonfocal neuro exam.  Normal hip flexor strength.  No evidence for cervical myelopathy.  No signs of infection or cardiopulmonary disease.     Plan: Fall precautions, physical therapy occupational therapy, weightbearing per orthopedic surgery.  Will likely need TCU.  B12 folic acid level, TSH, CBC and BMP.     Lumbosacral plexopathy  -May be contributing to his gait disorder.  Physical therapy occupational therapy consult.      B12 deficiency    Assessment: Noted on problem list.  We will check a B12 folic acid level given his neuropathy.  Does not appear to be on replacement therapy       Hx Spinal stenosis of thoracic region    Assessment: Has post laminectomy years ago.  He has had extensive evaluation of his gait  disorder and thought to be related to his neuropathy and not spinal disease.    Plan: f/u PCP.        Covid status.  Patient has declined Covid vaccine in the past.  He has no Covid-like symptoms at this time.  Routine testing obtained in emergency room.  Pending low suspicion.  Patient has some vaccine hesitancy.  I recommended that he receive the Covid vaccine given he is in a high risk category for a severe infection.  Patient will consider this.    DVT Prophylaxis: Pneumoboots  Code Status: Full Code    Disposition: Expected discharge in 1 to 2 days once has been evaluated by therapy and orthopedic surgery in a safe disposition has been determined.    discussed care plan with patient, patient's wife and ED physician      Billy Bob MD    Primary Care Physician   Beatriz Betancur    Chief Complaint   Fall, bilateral ankle pain    History is obtained from the patient    History of Present Illness   David Medina is a 81 year old male complex past medical history including ischemic cardiomyopathy with normal ejection fracture\echo 2018, GERD good control, coronary artery disease, type 2 diabetes on Metformin, history of thoracic spinal stenosis status post surgery, remote history of cervical radiculopathy no longer an issue, history of right hip fracture, diabetic neuropathy, gait disorder, falls frequently, remote history of left patellar fracture uses assist devices who presents with fall down the stairs with imaging showing bilateral fibular fractures.    Patient is in his usual state of health until this morning when walking down the stairs his legs went out from under him and he fell down the last 4 stairs of this stairwell.  He had immediate pain bilateral ankles.  His right ankle twisted behind him.  He has no other complaints other than some mild knee pain.    Patient was unable to ambulate very well.  He was brought to [facility] for further evaluation.    Patient has had a  longstanding gait disorder.  He is had extensive work-up locally and at [facility].  Last evaluation was approximately 10 years ago.  He uses a 3 wheeled walker in the house and a cane when out of the house.  He does use a bar system when going up or down the stairs at home.  The bar is in front of him and he holds it as he is going up or down the stairs.  This is been in place for years.  He falls about 2-3 times per month which is been stable over the last several years.  Possible escalation in frequency.  No other injuries.  He states he feels unsteady when he is walking.  Patient and his wife indicate that neuropathy was thought to be the cause for his gait disorder.  He had extensive work-up and imaging at [facility] 10 years ago as his last work-up.  His fall this morning was similar to his existing falls where his legs go out from underneath him.    He has felt well otherwise.  No fevers chills, nausea vomiting diarrhea.  No syncope palpitations.  Denies any other joint pain.  He did not hit his head.  No syncope or loss of consciousness.  No UTI symptoms.  No bloody stools or hematuria.  He denies any syncope or orthostatic dizziness.  No history of autonomic neuropathy.    He is followed by [facility] Dr. Gutierrez and in the [facility] system for his primary care provider.  No recent hospitalizations or medication changes.    In the emergency room patient is afebrile.  Normotensive.  Normal vitals here.  CBC and BMP pending.    Patient underwent imaging of his lower extremities.  -  Right ankle x-ray shows-IMPRESSION: Acute minimally displaced oblique fracture of the distal fibula located 2 cm proximal to the tibial plafond. Small age-indeterminate avulsion fracture at the medial malleolus distally. Mild soft tissue swelling. There is normal joint   alignment. The ankle mortise is congruent.    XR Tibia & Fibula Left 2 Views [[id number]]  IMPRESSION:   1. Mildly displaced oblique fracture in the distal  metadiaphysis of the fibula. This courses from the tibiotalar joint line superiorly. There is up to 2 mm of displacement. No angulation.   2. No other fracture.  XR Ankle Left G/E 3 Views [[id number]]  : Oblique fracture of the distal metadiaphysis of the fibula. 2 mm of displacement. Slight widening of the medial ankle mortise suggesting deltoid ligament injury. There are also a few tiny calcifications between the medial malleolus and the   medial talus which could represent tiny deltoid ligament avulsion fractures. Otherwise, negative.     XR Knee Left 1/2 Views [[id number]]  Vertical lucency in the lateral 1 to 2 cm of the patella. This is further inferiorly located than a typical normal variant bipartite patella. However, although this is suspicious for fracture, the margins are indistinct and there is no   apparent joint effusion. No other evidence of acute fracture. Joint space widths are within normal limits. Extensive arterial calcification is present posterior to the knee.    Emergency room patient was placed in a surgical boot for the left ankle but he was unable to ambulate and will be registered observation for further evaluation.  Later patient complained of right ankle pain and x-ray was obtained which showed minimally displaced right fibular fracture as well.  Question of right patellar fracture on imaging.  Patient states he has remote history of patellar fracture.        Past Medical History    Past medical history reviewed with no previously diagnosed medical problems.    Past Surgical History   I have reviewed this patient's surgical history and updated it with pertinent information if needed.  Past Surgical History:   Procedure Laterality Date     AAA REPAIR  12/13/07    endovascular repair, Dr. Jacob ARCEO APPENDECTOMY  12/06    open     CLOSED RX TIBIA SHAFT FX  6/2013    RT prox tibial fracture     COLONOSCOPY  10/3/05    WNL, rpt in 10 yrs, [facility]     COLONOSCOPY  9/23/15     advanced adenoma, rpt 3 yrs     coronary angiogram  2/28/11    diffuse disease with vasospasm     EMG  6/10/10    chronic active polyradiculopathy, mild generalized polyneuropathy, r/o lumbar stenosis     ENDOVAS REPAIR, INFRARENL ABDOM AORTIC ANEURYSM/DISSECT; [id number]/AORTO-UNIFEMORAL PROSTHESIS  6/05    one done,one to do     FLEXIBLE SIGMOIDOSCOPY  11/00    WNL     HEART CATH, ANGIOPLASTY  10/3/14    EMEKA x 1  ostium of the RCA     LAMINECTOMY THORACIC THREE LEVELS  10/31/12    T10, T11, T12 with medial facetectomy     OPEN REDUCTION INTERNAL FIXATION HIP NAILING N/A 11/1/2016    Procedure: OPEN REDUCTION INTERNAL FIXATION HIP NAILING;  Surgeon: Jason Flowers MD;  Location: [facility] OR     SONO ABD RETROPERITNL  11/7/06    AAA 4.2 x 4.8, Dr. James's office     SURGICAL HISTORY OF -       tonsillectomy     TONSILLECTOMY  1939     ZZ STRESS LEXISCAN TEST  10/5/12    small, fixed, apical defect/apical thinning     ZZHC PTCA SINGLE VESSEL  6/05    LAD - 100% stenosis     ZZ UGI ENDOSCOPY, SIMPLE EXAM  8/99    erosive esophagitis, GERD, Dr. Salazar       Prior to Admission Medications   Prior to Admission Medications   Prescriptions Last Dose Informant Patient Reported? Taking?   Cyanocobalamin 1000 MCG CAPS 5/8/2021 at AM Spouse/Significant Other No Yes   Sig: Take 1 capsule by mouth daily   aspirin EC 81 MG tablet 5/7/2021 at PM Spouse/Significant Other No Yes   Sig: Take 1 tablet (81 mg) by mouth daily   atorvastatin (LIPITOR) 80 MG tablet 5/8/2021 at AM Spouse/Significant Other No Yes   Sig: Take 1 tablet (80 mg) by mouth daily   blood glucose (ONE TOUCH DELICA) lancing device  Spouse/Significant Other No No   Sig: Use to test blood sugars four times daily or as directed.   blood glucose (ONETOUCH VERIO IQ) test strip  Spouse/Significant Other No No   Sig: USE TO TEST 4 TIMES DAILY OR AS DIRECTED   blood glucose monitoring (ONE TOUCH DELICA) lancets  Spouse/Significant Other No No   Sig: Use to test blood  sugars four times daily or as directed.   blood glucose monitoring (ONETOUCH VERIO IQ SYSTEM) meter device kit  Spouse/Significant Other No No   Sig: Use to test blood sugar 4 times daily.   cholecalciferol 25 MCG (1000 UT) TABS 5/8/2021 at AM Spouse/Significant Other Yes Yes   Sig: Take 1,000 Units by mouth daily   hydrocortisone 2.5 % ointment  at PRN Spouse/Significant Other No Yes   Sig: APPLY TO AFFECTED AREA ONCE DAILY FOR FACIAL RASH   Patient taking differently: Apply topically daily as needed    isosorbide mononitrate (IMDUR) 30 MG 24 hr tablet 5/8/2021 at AM Spouse/Significant Other No Yes   Sig: Take 1 tablet (30 mg) by mouth every morning   latanoprost (XALATAN) 0.005 % ophthalmic solution Past Week at [facility] Spouse/Significant Other Yes Yes   Sig: Place 1 drop into both eyes At Bedtime   lisinopril (ZESTRIL) 20 MG tablet 5/7/2021 at PM Spouse/Significant Other No Yes   Sig: Take 1 tablet (20 mg) by mouth daily   metFORMIN (GLUCOPHAGE) 500 MG tablet 5/8/2021 at AM Spouse/Significant Other No Yes   Sig: Take 1 tablet (500 mg) by mouth 2 times daily (with meals)   metoprolol tartrate (LOPRESSOR) 50 MG tablet 5/8/2021 at AM Spouse/Significant Other No Yes   Sig: TAKE 1/2 TABLET BY MOUTH TWICE DAILY   multivitamin, therapeutic with minerals (THERA-VIT-M) TABS 5/8/2021 at AM Spouse/Significant Other Yes Yes   Sig: Take 1 tablet by mouth daily   nitroGLYcerin (NITROSTAT) 0.4 MG sublingual tablet  at PRN Spouse/Significant Other No Yes   Sig: Place 1 tablet (0.4 mg) under the tongue every 5 minutes as needed for chest pain Up to 3 doses max.   omeprazole (PRILOSEC) 20 MG DR capsule 5/8/2021 at AM Spouse/Significant Other No Yes   Sig: Take 1 capsule (20 mg) by mouth every morning      Facility-Administered Medications: None     Allergies   Allergies   Allergen Reactions     No Known Allergies        Social History   I have reviewed this patient's social history and updated it with pertinent information if needed.  David Medina  reports that he quit smoking about 41 years ago. His smoking use included cigarettes. He has a 66.50 pack-year smoking history. He has never used smokeless tobacco. He reports current alcohol use. He reports that he does not use drugs.    Family History   I have reviewed this patient's family history and updated it with pertinent information if needed.   Family History   Problem Relation Age of Onset     Respiratory Father         TB     Other - See Comments Mother         FALL       Review of Systems   The 10 point Review of Systems is negative other than noted in the HPI or here.  Imaging studies performed the emergency room    Physical Exam   Temp: 98.2  F (36.8  C) Temp src: Oral BP: 129/80 Pulse: 67   Resp: 20 SpO2: 98 % O2 Device: None (Room air)    Vital Signs with Ranges  Temp:  [98.2  F (36.8  C)] 98.2  F (36.8  C)  Pulse:  [67] 67  Resp:  [18-20] 20  BP: (129-157)/(80-94) 129/80  SpO2:  [98 %-99 %] 98 %  0 lbs 0 oz    Constitutional: No acute distress, appears comfortable in bed.  Nontoxic  Eyes: Normal sclera,  HEENT: Normal oropharynx  Respiratory: Clear to auscultation bilaterally, breathing easily  Cardiovascular: Regular rate and rhythm no rubs gallops or murmurs appreciated  GI: Soft nontender nondistended  Lymph/Hematologic: No cervical lymphadenopathy  Genitourinary: No pain over bladder  Skin: No rash or edema.  No ecchymoses.  Musculoskeletal: He has pain over the right lateral ankle.  Left ankle is in a boot.  No pain with internal or external rotation of bilateral hips.  No pain with passive range of motion upper extremities joints bilaterally.  No spinal tenderness.  Neck is supple.  Neurologic: Cranial nerves II through XII grossly intact, toes downgoing bilaterally on the right.  Left not assessed.  No clonus.  DTR patellar and brachial 2+ symmetric throughout.  Cranial nerves II through XII grossly intact, normal speech and mentation.  Psychiatric: Normal affect    Data    Data reviewed today:  I personally reviewed imaging studies performed the emergency room  No lab results found in last 7 days.    Imaging:  Recent Results (from the past 24 hour(s))   XR Knee Left 1/2 Views    Narrative    EXAM: XR KNEE LT 1/2 VW  LOCATION: [facility]  DATE/TIME: 5/8/2021 12:07 PM    INDICATION: Fell, knee pain and injury  COMPARISON: None.      Impression    IMPRESSION: Vertical lucency in the lateral 1 to 2 cm of the patella. This is further inferiorly located than a typical normal variant bipartite patella. However, although this is suspicious for fracture, the margins are indistinct and there is no   apparent joint effusion. No other evidence of acute fracture. Joint space widths are within normal limits. Extensive arterial calcification is present posterior to the knee.   XR Ankle Left G/E 3 Views    Narrative    EXAM: XR ANKLE LEFT G/E 3 VIEWS  LOCATION: [facility]  DATE/TIME: 05/08/2021, 1:18 PM    INDICATION: Pain after trauma.  COMPARISON: None.      Impression    IMPRESSION: Oblique fracture of the distal metadiaphysis of the fibula. 2 mm of displacement. Slight widening of the medial ankle mortise suggesting deltoid ligament injury. There are also a few tiny calcifications between the medial malleolus and the   medial talus which could represent tiny deltoid ligament avulsion fractures. Otherwise, negative.     XR Tibia & Fibula Left 2 Views    Narrative    EXAM: XR TIBIA and FIBULA LT 2 VW  LOCATION: [facility]  DATE/TIME: 5/8/2021 1:18 PM    INDICATION: Pain after trauma.  COMPARISON: None.      Impression    IMPRESSION:   1. Mildly displaced oblique fracture in the distal metadiaphysis of the fibula. This courses from the tibiotalar joint line superiorly. There is up to 2 mm of displacement. No angulation.  2. No other fracture.   Ankle XR, G/E 3 views, right    Narrative    EXAM: XR ANKLE RIGHT G/E 3 VIEWS  LOCATION: [facility]  DATE/TIME: 5/8/2021 3:10 PM    INDICATION: Fall, right ankle pain  COMPARISON: None.      Impression    IMPRESSION: Acute minimally displaced oblique fracture of the distal fibula located 2 cm proximal to the tibial plafond. Small age-indeterminate avulsion fracture at the medial malleolus distally. Mild soft tissue swelling. There is normal joint   alignment. The ankle mortise is congruent.

## 2021-05-08 NOTE — PLAN OF CARE
Came from ED around 1800.  A&O.  VSS, ex slight HTN.  Denies pain.  Bedrest.  Urinal at bedside.  Abrasion on BL knee and right elbow.  Ortho consult pending.

## 2021-05-08 NOTE — ED PROVIDER NOTES
History   Chief Complaint:  Fall    HPI   David Medina is a 81 year old male with history of type 2 DM and NSTEMI who presents after a fall. The patient states he was walking down the stairs with a balance bar when his knees became weak and caused him to fall down 2 steps. He attempted to go down additional steps, however, his knees gave out again. He states he did not his his head. He notes left leg pain. He denies headache, neck pain, back pain, chest pain, abdominal pain, nausea, numbness or tingling in the extremities. He uses a cane and a walker.     Review of Systems   Cardiovascular: Negative for chest pain.   Gastrointestinal: Negative for abdominal pain and nausea.   Musculoskeletal: Positive for arthralgias. Negative for back pain and neck pain.   Neurological: Positive for weakness. Negative for headaches.   All other systems reviewed and are negative.    Allergies:  The patient has no known allergies.     Medications:  Lipitor  Pepcid  Imdur  Zestril  Metformin  Lopressor  Nitrostat  Prilosec    Past Medical History:    AAA  Aortic root dilation  B12 deficiency  Cervical radiculopathy  Type 2 DM  GERD  Hyperlipidemia  Hypertension  Depression  NSTEMI  Spinal stenosis  Ischemic cardiomyopathy  Tubular adenoma of colon   Sciatica     Past Surgical History:    Endovascular repair  Appendectomy  Colonoscopy  Coronary angiogram  Sigmoidoscopy  Laminectomy  ORIF hip  Tosillectomy  Endoscopy     Family History:    Father - Tuberculosis    Social History:  The patient arrived to the emergency department via EMS.    Physical Exam     Patient Vitals for the past 24 hrs:   BP Temp Temp src Pulse Resp SpO2   05/08/21 1430 -- -- -- -- 20 98 %   05/08/21 1323 129/80 -- -- -- 18 99 %   05/08/21 1151 (!) 157/94 98.2  F (36.8  C) Oral 67 18 98 %       Physical Exam  SKIN: Superficial 2 cm skin tear right lateral elbow.  Superficial abrasions to the bilateral lower extremities.  HEMATOLOGIC/IMMUNOLOGIC/LYMPHATIC:   No pallor.  Bilateral lower extremity edema.  HENT:  No facial or scalp trauma.  No oral callosal, lingual or dental trauma.  EYES:  PERRL, no ocular trauma, normal EOM.  CARDIOVASCULAR:  Normal rate and a regular rhythm.  RESPIRATORY:  No respiratory distress, breath sounds equal and normal.  GASTROINTESTINAL:  Soft nontender abdomen.  MUSCULOSKELETAL: Cervical spine nontender.  Painless active range of motion of the head and neck.  Painless passive range of motion of right upper extremity.  Passive range of motion of left upper extremity produces left shoulder pain but minimal.  Left shoulder palpably nontender without deformity.  Full range of motion of the lower limbs which exacerbates left knee pain.  Bilateral ankles tender to palpation without deformity.  Nontender feet.  No pain with range of motion of the lower extremities to the hips.  NEUROLOGIC:  Alert, conversant, GCS 15.  Oriented to self place and time.  No aphasia.  No dysarthria.  No gross motor deficit.  Decreased tactile sensation of the bilateral lower limbs.  PSYCHIATRIC:  Normal mood.    Emergency Department Course      Imaging:  XR Tibia & Fibula Left 2 Views  1. Mildly displaced oblique fracture in the distal metadiaphysis of the fibula. This courses from the tibiotalar joint line superiorly. There is up to 2 mm of displacement. No angulation.  2. No other fracture.  Reading per radiology.    XR Ankle Left G/E 3 Views  Oblique fracture of the distal metadiaphysis of the fibula. 2 mm of displacement. Slight widening of the medial ankle mortise suggesting deltoid ligament injury. There are also a few tiny calcifications between the medial malleolus and the   medial talus which could represent tiny deltoid ligament avulsion fractures. Otherwise, negative.  Reading per radiology.    XR Knee Left 1/2 Views  Vertical lucency in the lateral 1 to 2 cm of the patella. This is further inferiorly located than a typical normal variant bipartite patella.  However, although this is suspicious for fracture, the margins are indistinct and there is no   apparent joint effusion. No other evidence of acute fracture. Joint space widths are within normal limits. Extensive arterial calcification is present posterior to the knee.  Reading per radiology.    XR Ankle G/E 3 Views, Right  Acute minimally displaced oblique fracture of the distal fibula located 2 cm proximal to the tibial plafond. Small age-indeterminate avulsion fracture at the medial malleolus distally. Mild soft tissue swelling. There is normal joint   alignment. The ankle mortise is congruent.  Reading per radiology.    Laboratory:  CBC: Pending  BMP: Pending    Asymptomatic COVID19 Virus PCR by nasopharyngeal swab pending      Emergency Department Course:    Reviewed:  I reviewed vitals and past medical history    Assessments:  1150 I obtained history and examined the patient as noted above.   1310 I rechecked the patient and explained findings. Patient states pain has increased in his lower left leg. Additional imaging ordered.   1500 I rechecked the patient.    Consults:   1516 I spoke to Dr. Bob of the hospitalist service who accepts the patient for admission.     Disposition:  The patient was admitted to the hospital under the care of Dr. Bob.     Impression & Plan       Medical Decision Making:  David Medina is an 81 year old male who fell twice trying to descend stairs and unfortunately suffered a left fibular fracture as well as a left knee contusion with evidence of an old left patellar fracture. Other cutaneous injuries which were superficial including right elbow skin tear and abrasions of the lower limbs. A walking boot was applied to the left lower extremity given the fibular fracture and the patient could not ambulate successfully.  On recheck the patient was now complaining of right ankle pain as well as so imaging performed of that region which revealed fibular fracture.  He will be  admitted for observation and possible placement for rehab or further home care.       Covid-19  David Medina was evaluated during a global COVID-19 pandemic, which necessitated consideration that the patient might be at risk for infection with the SARS-CoV-2 virus that causes COVID-19.   Applicable protocols for evaluation were followed during the patient's care.   COVID-19 was considered as part of the patient's evaluation. The plan for testing is:  a test was obtained during this visit.    Diagnosis:    ICD-10-CM    1. Fall down stairs, initial encounter  W10.8XXA Asymptomatic SARS-CoV-2 COVID-19 Virus (Coronavirus) by PCR   2. Skin tear of right elbow without complication, initial encounter  S51.011A    3. Abrasion  T14.8XXA    4. Closed fracture of tibia and fibula, unspecified laterality, initial encounter  S82.209A     S82.409A      Scribe Disclosure:  I, Aishwarya Garza, am serving as a scribe at 12:49 PM on 5/8/2021 to document services personally performed by Manny Geronimo MD based on my observations and the provider's statements to me.     Manny Geronimo MD  05/08/21 1600

## 2021-05-08 NOTE — PHARMACY-ADMISSION MEDICATION HISTORY
Pharmacy Medication History  Admission medication history interview status for the 5/8/2021  admission is complete. See EPIC admission navigator for prior to admission medications     Location of Interview: Patient room  Medication history sources: Patient and Patient's family/friend (spouse)    Significant changes made to the medication list:    In the past week, patient estimated taking medication this percent of the time: greater than 90%    Additional medication history information:       Medication reconciliation completed by provider prior to medication history? No    Time spent in this activity: 15 minutes     Prior to Admission medications    Medication Sig Last Dose Taking? Auth Provider   aspirin EC 81 MG tablet Take 1 tablet (81 mg) by mouth daily 5/7/2021 at PM Yes Sandi Patel APRN CNP   atorvastatin (LIPITOR) 80 MG tablet Take 1 tablet (80 mg) by mouth daily 5/8/2021 at AM Yes Beatriz Betancur MD   cholecalciferol 25 MCG (1000 UT) TABS Take 1,000 Units by mouth daily 5/8/2021 at AM Yes Unknown, Entered By History   Cyanocobalamin 1000 MCG CAPS Take 1 capsule by mouth daily 5/8/2021 at AM Yes Beatriz Betancur MD   hydrocortisone 2.5 % ointment APPLY TO AFFECTED AREA ONCE DAILY FOR FACIAL RASH  Patient taking differently: Apply topically daily as needed   at PRN Yes Beatriz Betancur MD   isosorbide mononitrate (IMDUR) 30 MG 24 hr tablet Take 1 tablet (30 mg) by mouth every morning 5/8/2021 at AM Yes Beatriz Betancur MD   latanoprost (XALATAN) 0.005 % ophthalmic solution Place 1 drop into both eyes At Bedtime Past Week at HA Yes Unknown, Entered By History   lisinopril (ZESTRIL) 20 MG tablet Take 1 tablet (20 mg) by mouth daily 5/7/2021 at PM Yes Beatriz Betancur MD   metFORMIN (GLUCOPHAGE) 500 MG tablet Take 1 tablet (500 mg) by mouth 2 times daily (with meals) 5/8/2021 at AM Yes Beatriz Betancur MD   metoprolol tartrate (LOPRESSOR) 50 MG tablet TAKE 1/2 TABLET BY MOUTH TWICE DAILY  5/8/2021 at AM Yes Beatriz Betancur MD   multivitamin, therapeutic with minerals (THERA-VIT-M) TABS Take 1 tablet by mouth daily 5/8/2021 at AM Yes Reported, Patient   nitroGLYcerin (NITROSTAT) 0.4 MG sublingual tablet Place 1 tablet (0.4 mg) under the tongue every 5 minutes as needed for chest pain Up to 3 doses max.  at PRN Yes Beatriz Betancur MD   omeprazole (PRILOSEC) 20 MG DR capsule Take 1 capsule (20 mg) by mouth every morning 5/8/2021 at AM Yes Beatriz Betancur MD   blood glucose (ONE TOUCH DELICA) lancing device Use to test blood sugars four times daily or as directed.   Beatriz Betancur MD   blood glucose (ONETOUCH VERIO IQ) test strip USE TO TEST 4 TIMES DAILY OR AS DIRECTED   Beatriz Betancur MD   blood glucose monitoring (ONE TOUCH DELICA) lancets Use to test blood sugars four times daily or as directed.   Beatriz Betancur MD   blood glucose monitoring (ONETOUCH VERIO IQ SYSTEM) meter device kit Use to test blood sugar 4 times daily.   Beatriz Betancur MD       The information provided in this note is only as accurate as the sources available at the time of update(s)

## 2021-05-09 LAB
ANION GAP SERPL CALCULATED.3IONS-SCNC: 4 MMOL/L (ref 3–14)
BUN SERPL-MCNC: 16 MG/DL (ref 7–30)
CALCIUM SERPL-MCNC: 9 MG/DL (ref 8.5–10.1)
CHLORIDE SERPL-SCNC: 108 MMOL/L (ref 94–109)
CO2 SERPL-SCNC: 27 MMOL/L (ref 20–32)
CREAT SERPL-MCNC: 0.98 MG/DL (ref 0.66–1.25)
ERYTHROCYTE [DISTWIDTH] IN BLOOD BY AUTOMATED COUNT: 13 % (ref 10–15)
FOLATE SERPL-MCNC: 26.4 NG/ML
GFR SERPL CREATININE-BSD FRML MDRD: 72 ML/MIN/{1.73_M2}
GLUCOSE BLDC GLUCOMTR-MCNC: 171 MG/DL (ref 70–99)
GLUCOSE BLDC GLUCOMTR-MCNC: 213 MG/DL (ref 70–99)
GLUCOSE BLDC GLUCOMTR-MCNC: 222 MG/DL (ref 70–99)
GLUCOSE SERPL-MCNC: 161 MG/DL (ref 70–99)
HBA1C MFR BLD: 6.9 % (ref 0–5.6)
HCT VFR BLD AUTO: 37.1 % (ref 40–53)
HGB BLD-MCNC: 12.4 G/DL (ref 13.3–17.7)
MCH RBC QN AUTO: 32.2 PG (ref 26.5–33)
MCHC RBC AUTO-ENTMCNC: 33.4 G/DL (ref 31.5–36.5)
MCV RBC AUTO: 96 FL (ref 78–100)
PLATELET # BLD AUTO: 163 10E9/L (ref 150–450)
POTASSIUM SERPL-SCNC: 4.1 MMOL/L (ref 3.4–5.3)
RBC # BLD AUTO: 3.85 10E12/L (ref 4.4–5.9)
SODIUM SERPL-SCNC: 139 MMOL/L (ref 133–144)
TSH SERPL DL<=0.005 MIU/L-ACNC: 1.75 MU/L (ref 0.4–4)
VIT B12 SERPL-MCNC: 446 PG/ML (ref 193–986)
WBC # BLD AUTO: 7.5 10E9/L (ref 4–11)

## 2021-05-09 PROCEDURE — 99225 PR SUBSEQUENT OBSERVATION CARE,LEVEL II: CPT | Performed by: INTERNAL MEDICINE

## 2021-05-09 PROCEDURE — 250N000011 HC RX IP 250 OP 636: Performed by: INTERNAL MEDICINE

## 2021-05-09 PROCEDURE — 82607 VITAMIN B-12: CPT | Performed by: INTERNAL MEDICINE

## 2021-05-09 PROCEDURE — 82746 ASSAY OF FOLIC ACID SERUM: CPT | Performed by: INTERNAL MEDICINE

## 2021-05-09 PROCEDURE — 85027 COMPLETE CBC AUTOMATED: CPT | Performed by: INTERNAL MEDICINE

## 2021-05-09 PROCEDURE — 36415 COLL VENOUS BLD VENIPUNCTURE: CPT | Performed by: INTERNAL MEDICINE

## 2021-05-09 PROCEDURE — 999N001017 HC STATISTIC GLUCOSE BY METER IP

## 2021-05-09 PROCEDURE — 99207 PR CDG-CODE CATEGORY CHANGED: CPT | Performed by: INTERNAL MEDICINE

## 2021-05-09 PROCEDURE — 84443 ASSAY THYROID STIM HORMONE: CPT | Performed by: INTERNAL MEDICINE

## 2021-05-09 PROCEDURE — 83036 HEMOGLOBIN GLYCOSYLATED A1C: CPT | Performed by: INTERNAL MEDICINE

## 2021-05-09 PROCEDURE — G0378 HOSPITAL OBSERVATION PER HR: HCPCS

## 2021-05-09 PROCEDURE — 96372 THER/PROPH/DIAG INJ SC/IM: CPT | Performed by: INTERNAL MEDICINE

## 2021-05-09 PROCEDURE — 250N000013 HC RX MED GY IP 250 OP 250 PS 637: Performed by: INTERNAL MEDICINE

## 2021-05-09 PROCEDURE — 80048 BASIC METABOLIC PNL TOTAL CA: CPT | Performed by: INTERNAL MEDICINE

## 2021-05-09 RX ADMIN — CYANOCOBALAMIN TAB 1000 MCG 1000 MCG: 1000 TAB at 08:04

## 2021-05-09 RX ADMIN — ISOSORBIDE MONONITRATE 30 MG: 30 TABLET, EXTENDED RELEASE ORAL at 08:05

## 2021-05-09 RX ADMIN — LISINOPRIL 20 MG: 10 TABLET ORAL at 19:33

## 2021-05-09 RX ADMIN — ACETAMINOPHEN 975 MG: 325 TABLET, FILM COATED ORAL at 19:33

## 2021-05-09 RX ADMIN — ACETAMINOPHEN 975 MG: 325 TABLET, FILM COATED ORAL at 13:57

## 2021-05-09 RX ADMIN — ATORVASTATIN CALCIUM 80 MG: 40 TABLET, FILM COATED ORAL at 08:04

## 2021-05-09 RX ADMIN — METOPROLOL TARTRATE 25 MG: 25 TABLET, FILM COATED ORAL at 08:04

## 2021-05-09 RX ADMIN — OMEPRAZOLE 20 MG: 20 CAPSULE, DELAYED RELEASE ORAL at 08:04

## 2021-05-09 RX ADMIN — ASPIRIN 81 MG: 81 TABLET, DELAYED RELEASE ORAL at 19:33

## 2021-05-09 RX ADMIN — ENOXAPARIN SODIUM 40 MG: 40 INJECTION SUBCUTANEOUS at 16:29

## 2021-05-09 RX ADMIN — METOPROLOL TARTRATE 25 MG: 25 TABLET, FILM COATED ORAL at 19:33

## 2021-05-09 NOTE — PLAN OF CARE
Patient is A&O, VSS on RA, CMS intact, moderate carb diet, bedrest, independently shifting in bed, and voiding adequately. He has pain, but refuses to take any pain medication, also refused to have his blood sugar check at 2am. Will continue to monitor

## 2021-05-09 NOTE — CONSULTS
Care Management Initial Consult    General Information  Assessment completed with: Patient, Spouse or significant other, Sanjuana  Type of CM/SW Visit: Initial Assessment    Primary Care Provider verified and updated as needed:     Readmission within the last 30 days: no previous admission in last 30 days      Reason for Consult: discharge planning  Advance Care Planning:            Communication Assessment  Patient's communication style: spoken language (English or Bilingual)    Hearing Difficulty or Deaf: no   Wear Glasses or Blind: yes    Cognitive  Cognitive/Neuro/Behavioral: WDL                      Living Environment:   People in home: spouse  Sarika  Current living Arrangements: house      Able to return to prior arrangements: yes       Family/Social Support:  Care provided by: self, spouse/significant other  Provides care for: no one  Marital Status:   Wife  Sarika       Description of Support System: Supportive, Involved    Support Assessment: Adequate family and caregiver support    Current Resources:   Patient receiving home care services:       Community Resources:    Equipment currently used at home: none  Supplies currently used at home:      Employment/Financial:  Employment Status: retired        Financial Concerns: No concerns identified           Lifestyle & Psychosocial Needs:        Socioeconomic History     Marital status:      Spouse name: Sarika     Number of children: 4     Years of education: Not on file     Highest education level: Not on file   Occupational History     Employer: RETIRED     Tobacco Use     Smoking status: Former Smoker     Packs/day: 3.50     Years: 19.00     Pack years: 66.50     Types: Cigarettes     Quit date: 1980     Years since quittin.0     Smokeless tobacco: Never Used   Substance and Sexual Activity     Alcohol use: Yes     Comment: beer 1-2x/week     Drug use: No     Sexual activity: Yes     Partners: Female       Functional  Status:  Prior to admission patient needed assistance:              Mental Health Status:          Chemical Dependency Status:                Values/Beliefs:  Spiritual, Cultural Beliefs, Zoroastrianism Practices, Values that affect care:                 Additional Information:  Consult for discharge planning. Writer spoke to patient and his wife in his room. Patient is aware of TCU recommendations and in agreement. Writer provided a TCU list to patient and discussed ratings on Medicare. Patient would like referrals sent to [facility] and [facility]. If patient needs transportation, will need transport set up as he will be unable to get in and out of a car. He is aware of potential out of pocket costs and in agreement. Referrals sent via [employer].    Patient is not vaccinated at this time. He is aware of 14 day quarantine.     KATIA Benton

## 2021-05-09 NOTE — CONSULTS
[facility]    Orthopedics Consultation    Date of Admission:  5/8/2021    Assessment & Plan   David Medina is a 81 year old male who was admitted on 5/8/2021. I was asked to see the patient for bilateral ankle pain.  He has bilateral bimalleolar equivalent ankle fractures.  Typically these fractures are operative.  But due to the amount of swelling he has, the amount he ambulates at baseline and his uncontrolled diabetes he should be treated nonsurgically.  We will place bilateral casts on his lower extremities on 5/10/2021.  Until then he can use his the cam walker boots for comfort.    -Nonweightbearing bilateral lower extremities, he will be nonweightbearing for approximately 8 weeks  -PT/OT  -Likely will need TCU placement  -Casts to be placed tomorrow on 5/10/2021, use cam walker boot until then  -Okay to have a diet as he will not be having surgery today  -He will eventually follow-up with me in clinic in approximately 3 weeks for repeat x-rays.  He will need to contact my care coordinator Mariam at [phone] for an appointment      Brien Calvo MD    Code Status    Full Code    Reason for Consult   Reason for consult: Bilateral ankle pain    Primary Care Physician   Beatriz Betancur    History of Present Illness   David Medina is a 81 year old male who presents with a chief complaint of bilateral ankle pain.  He states that he was ambulating on stairs using a bar and his legs gave out from underneath him.  He has baseline difficulty walking due to weakness he had immediate pain in his bilateral ankles.  He denied hitting his head or losing consciousness.  Because he was unable to ambulate following the fall he was brought into the emergency department where he was found to have bilateral bimalleolar equivalent ankle fractures.  His wife is here with him today and states that he ambulates very little and only with a 4 wheeled walker.  This is due to baseline peripheral  neuropathy.  He has had significant weakness in the lower extremities and frequent falls.  He is a poorly controlled diabetic.  He is a non-smoker.  He has significant decreased sensation in his lower extremities.    MEDS:   No current outpatient medications on file.       PAST MEDICAL HISTORY:   Past Medical History:   Diagnosis Date     AAA (abdominal aortic aneurysm) without rupture (H)     stent 2005, s/p endovascular repair 12/07     Allergic rhinitis      Aortic root dilatation (H)      ASCVD (arteriosclerotic cardiovascular disease) 06/2005    Cardiac cath Oct 2014: EMEKA to RCA, cath 2011: medical management, cath Aug 2005: EMEKA to OM; cath June 2005: EMEKA to LAD     B12 deficiency      Benign paroxysmal positional vertigo      Cervical radiculopathy 10/30/2008    Injected through [facility] 10/07     Closed fracture of patella 02/2007    left     DM type 2 (diabetes mellitus, type 2) (H)      Gastroesophageal reflux disease without esophagitis      Hyperlipidemia LDL goal < 70      Hypertension goal BP (blood pressure) < 130/80      Ischemic cardiomyopathy 06/2005    ischemic cardiomyopathy, EF 45%     Lumbosacral plexopathy     diabetic radiculoplexopathy causing proximal muscle weakness, Dr. 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 Villa (neuro) and Dr. 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 Jacob ARCEO APPENDECTOMY      open     CLOSED RX TIBIA SHAFT FX  2013    RT prox tibial fracture     COLONOSCOPY  10/3/05    WNL, rpt in 10 yrs, Dr. Vicente, [place] [facility]     COLONOSCOPY  9/23/15    advanced adenoma, rpt 3 yrs     coronary angiogram  11    diffuse disease with vasospasm     EMG  6/10/10    chronic active polyradiculopathy, mild generalized polyneuropathy, r/o lumbar stenosis     ENDOVAS REPAIR, INFRARENL ABDOM AORTIC ANEURYSM/DISSECT; [id number]/AORTO-UNIFEMORAL PROSTHESIS      one done,one to do     FLEXIBLE SIGMOIDOSCOPY      WNL     HEART CATH, ANGIOPLASTY  10/3/14    EMEKA x 1  ostium of the RCA     LAMINECTOMY THORACIC THREE LEVELS  10/31/12    T10, T11, T12 with medial facetectomy     OPEN REDUCTION INTERNAL FIXATION HIP NAILING N/A 2016    Procedure: OPEN REDUCTION INTERNAL FIXATION HIP NAILING;  Surgeon: Jason Flowers MD;  Location: [facility] OR     SONO ABD RETROPERITNL  06    AAA 4.2 x 4.8, Dr. James's office     SURGICAL HISTORY OF -       tonsillectomy     TONSILLECTOMY  193     ZZ STRESS LEXISCAN TEST  10/5/12    small, fixed, apical defect/apical thinning     ZZHC PTCA SINGLE VESSEL      LAD - 100% stenosis     ZZHC UGI ENDOSCOPY, SIMPLE EXAM      erosive esophagitis, GERD, Dr. Salazar       FAMILY HISTORY:   Family History   Problem Relation Age of Onset     Respiratory Father         TB     Other - See Comments Mother         FALL       SOCIAL HISTORY:   Social History     Tobacco Use     Smoking status: Former Smoker     Packs/day: 3.50     Years: 19.00     Pack years: 66.50     Types: Cigarettes     Quit date: 1980     Years since quittin.0     Smokeless tobacco: Never Used   Substance Use Topics     Alcohol use: Yes     Comment: beer 1-2x/week       ALLERGIES:    Allergies   Allergen Reactions     No Known Allergies        ROS:  10 point ROS neg other than the symptoms noted above in the HPI.    Physical  Exam   Temp: 99.1  F (37.3  C) Temp src: Oral BP: 134/57 Pulse: 68   Resp: 16 SpO2: 95 % O2 Device: None (Room air)    Vital Signs with Ranges  Temp:  [97.4  F (36.3  C)-99.1  F (37.3  C)] 99.1  F (37.3  C)  Pulse:  [58-83] 68  Resp:  [16-20] 16  BP: (120-170)/(57-94) 134/57  SpO2:  [91 %-100 %] 95 %  0 lbs 0 oz    Constitutional: Pleasant, alert, appropriate, following commands.  HEENT: Head atraumatic normocephalic. Pupils equal round and reactive to light.  Respiratory: Unlabored breathing no audible wheeze  Cardiovascular: Regular rate and rhythm  GI: Abdomen soft nontender nondistended.  Lymph/Hematologic: No lymphadenopathy in areas examined  Genitourinary:  No painting  Skin: No rashes, no cyanosis, no edema.  Musculoskeletal: Focused examination of the left lower extremity demonstrates significant swelling about the ankle.  He has decreased sensation globally in the left foot.  He is able to move his toes dorsally and plantarly.  He is able to move the ankle in flexion and extension but this causes pain.  He has tenderness to palpation over the fibula and over the deltoid ligament.  He has a 1+ DP pulse and capillary refill of less than 2 seconds.  He has a minor skin lesion over the anterior aspect of his distal tibia.  This appears to be atrophic changes.    Focused examination of the right lower extremity demonstrates swelling about the ankle.  He has decreased sensation globally in the right foot as well.  He is able to move his toes dorsally and plantarly.  He is able to move the ankle in flexion and extension but this is painful.  He has tenderness to palpation over the fibula and over the deltoid ligament.  He has a 1+ DP pulse and capillary refill less than 2 seconds.  There are no skin lesions.  Neurologic: mental status, speech normal, alert and oriented x iii, LAINA, reflexes normal and symmetric      Data   Results for orders placed or performed during the hospital encounter of 05/08/21 (from the  past 24 hour(s))   XR Knee Left 1/2 Views    Narrative    EXAM: XR KNEE LT 1/2 VW  LOCATION: [facility]  DATE/TIME: 5/8/2021 12:07 PM    INDICATION: Fell, knee pain and injury  COMPARISON: None.      Impression    IMPRESSION: Vertical lucency in the lateral 1 to 2 cm of the patella. This is further inferiorly located than a typical normal variant bipartite patella. However, although this is suspicious for fracture, the margins are indistinct and there is no   apparent joint effusion. No other evidence of acute fracture. Joint space widths are within normal limits. Extensive arterial calcification is present posterior to the knee.   XR Ankle Left G/E 3 Views    Narrative    EXAM: XR ANKLE LEFT G/E 3 VIEWS  LOCATION: [facility]  DATE/TIME: 05/08/2021, 1:18 PM    INDICATION: Pain after trauma.  COMPARISON: None.      Impression    IMPRESSION: Oblique fracture of the distal metadiaphysis of the fibula. 2 mm of displacement. Slight widening of the medial ankle mortise suggesting deltoid ligament injury. There are also a few tiny calcifications between the medial malleolus and the   medial talus which could represent tiny deltoid ligament avulsion fractures. Otherwise, negative.     XR Tibia & Fibula Left 2 Views    Narrative    EXAM: XR TIBIA and FIBULA LT 2 VW  LOCATION: [facility]  DATE/TIME: 5/8/2021 1:18 PM    INDICATION: Pain after trauma.  COMPARISON: None.      Impression    IMPRESSION:   1. Mildly displaced oblique fracture in the distal metadiaphysis of the fibula. This courses from the tibiotalar joint line superiorly. There is up to 2 mm of displacement. No angulation.  2. No other fracture.   Ankle XR, G/E 3 views, right    Narrative    EXAM: XR ANKLE RIGHT G/E 3 VIEWS  LOCATION: [facility]  DATE/TIME: 5/8/2021 3:10 PM    INDICATION: Fall, right ankle pain  COMPARISON: None.      Impression    IMPRESSION: Acute minimally displaced oblique fracture of the  distal fibula located 2 cm proximal to the tibial plafond. Small age-indeterminate avulsion fracture at the medial malleolus distally. Mild soft tissue swelling. There is normal joint   alignment. The ankle mortise is congruent.   Asymptomatic SARS-CoV-2 COVID-19 Virus (Coronavirus) by PCR    Specimen: Nasopharyngeal   Result Value Ref Range    SARS-CoV-2 Virus Specimen Source Nasopharyngeal     SARS-CoV-2 PCR Result NEGATIVE     SARS-CoV-2 PCR Comment (Note)    CBC with platelets + differential   Result Value Ref Range    WBC 9.2 4.0 - 11.0 10e9/L    RBC Count 3.94 (L) 4.4 - 5.9 10e12/L    Hemoglobin 13.2 (L) 13.3 - 17.7 g/dL    Hematocrit 37.7 (L) 40.0 - 53.0 %    MCV 96 78 - 100 fl    MCH 33.5 (H) 26.5 - 33.0 pg    MCHC 35.0 31.5 - 36.5 g/dL    RDW 12.9 10.0 - 15.0 %    Platelet Count 162 150 - 450 10e9/L    Diff Method Automated Method     % Neutrophils 66.6 %    % Lymphocytes 18.0 %    % Monocytes 11.2 %    % Eosinophils 2.9 %    % Basophils 0.5 %    % Immature Granulocytes 0.8 %    Nucleated RBCs 0 0 /100    Absolute Neutrophil 6.2 1.6 - 8.3 10e9/L    Absolute Lymphocytes 1.7 0.8 - 5.3 10e9/L    Absolute Monocytes 1.0 0.0 - 1.3 10e9/L    Absolute Eosinophils 0.3 0.0 - 0.7 10e9/L    Absolute Basophils 0.1 0.0 - 0.2 10e9/L    Abs Immature Granulocytes 0.1 0 - 0.4 10e9/L    Absolute Nucleated RBC 0.0    Basic metabolic panel   Result Value Ref Range    Sodium 141 133 - 144 mmol/L    Potassium 4.1 3.4 - 5.3 mmol/L    Chloride 110 (H) 94 - 109 mmol/L    Carbon Dioxide 25 20 - 32 mmol/L    Anion Gap 6 3 - 14 mmol/L    Glucose 116 (H) 70 - 99 mg/dL    Urea Nitrogen 22 7 - 30 mg/dL    Creatinine 0.92 0.66 - 1.25 mg/dL    GFR Estimate 77 >60 mL/min/[1.73_m2]    GFR Estimate If Black 89 >60 mL/min/[1.73_m2]    Calcium 9.0 8.5 - 10.1 mg/dL   Glucose by meter   Result Value Ref Range    Glucose 105 (H) 70 - 99 mg/dL   Glucose by meter   Result Value Ref Range    Glucose 252 (H) 70 - 99 mg/dL   TSH with free T4 reflex    Result Value Ref Range    TSH 1.75 0.40 - 4.00 mU/L   Basic metabolic panel   Result Value Ref Range    Sodium 139 133 - 144 mmol/L    Potassium 4.1 3.4 - 5.3 mmol/L    Chloride 108 94 - 109 mmol/L    Carbon Dioxide 27 20 - 32 mmol/L    Anion Gap 4 3 - 14 mmol/L    Glucose 161 (H) 70 - 99 mg/dL    Urea Nitrogen 16 7 - 30 mg/dL    Creatinine 0.98 0.66 - 1.25 mg/dL    GFR Estimate 72 >60 mL/min/[1.73_m2]    GFR Estimate If Black 83 >60 mL/min/[1.73_m2]    Calcium 9.0 8.5 - 10.1 mg/dL   CBC with platelets   Result Value Ref Range    WBC 7.5 4.0 - 11.0 10e9/L    RBC Count 3.85 (L) 4.4 - 5.9 10e12/L    Hemoglobin 12.4 (L) 13.3 - 17.7 g/dL    Hematocrit 37.1 (L) 40.0 - 53.0 %    MCV 96 78 - 100 fl    MCH 32.2 26.5 - 33.0 pg    MCHC 33.4 31.5 - 36.5 g/dL    RDW 13.0 10.0 - 15.0 %    Platelet Count 163 150 - 450 10e9/L   Hemoglobin A1c   Result Value Ref Range    Hemoglobin A1C 6.9 (H) 0 - 5.6 %

## 2021-05-09 NOTE — PLAN OF CARE
A&O.  VSS, RA. CMS intact. Pain managed with tylenol.  BG check, refuse insulin.  NWB BLLE.  Cast to be placed tomorrow.

## 2021-05-09 NOTE — PROGRESS NOTES
[facility]    Hospitalist Progress Note    Assessment & Plan   David Medina is a 81 year old male who was admitted on 5/8/2021.  Patient with a history of diabetic neuropathy and gait disorder with frequent falls presents with fall down the stairs and imaging shows bilateral fibular fractures.  Closed fracture of right and left fibula  ---Secondary to trauma from the fall, seen by orthopedic surgery, plan to place cast on 5/10 and cam boot.  ---No plan for surgery noted, patient will follow up with orthopedic surgery in 3 weeks for repeat x-rays, he will remain nonweightbearing for approximately 8 weeks.  ---PT/OT to evaluate patient, patient possibly would need TCU, social work following, possible discharge after cast placement 5/10.  ---We will placed on Lovenox for DVT prophylaxis as the patient might need immobilization for at least 8 weeks.  Type 2 diabetes mellitus with polyneuropathy  ---Hold Metformin, continue sliding scale, monitor blood sugars and add Premeal coverage if needed  Coronary artery disease involving native coronary artery of native heart without angina pectoris  Dyslipidemia  ---Follows up with Dr. Gutierrez, continue PTA medications including Lipitor 80 mg daily, aspirin 81 mg daily Imdur, Imdur 30 mg and metoprolol 25 mg twice daily.  Gait disorder  Frequent falls  Diabetic neuropathy  Lumbosacral plexopathy  ---Neuropathy possibly contributing to the fall, continue PT/OT, B12 and folate levels pending.  History of spinal stenosis of thoracic region  ---Follow-up outpatient with primary care.  Covid negative this admission, patient had refused Covid vaccine in the past.  Total time spend 35 min >50% spend on coordination of care including discussion about plan of care and discharge plans, all the questions answered, patient and family would like to look into TCU options, discussed about social work consult.    DVT Prophylaxis: Lovenox 40 mg subcu  Code Status: Full  Code     Disposition: Expected discharge as early as 5/10 to TCU    Carmelina Wall MD  Text Page   (7am to 6pm)    Interval History   Patient is resting, pain 4/10, denies any nausea, tolerating diet well, spouse near bedside, they are worried about discharge disposition, that they would prefer patient to go to TCU if possible, plan for cast placement noted for tomorrow.    -Data reviewed today: I reviewed all new labs and imaging results over the last 24 hours.  Physical Exam   Temp: 99.1  F (37.3  C) Temp src: Oral BP: 134/57 Pulse: 68   Resp: 16 SpO2: 95 % O2 Device: None (Room air)    There were no vitals filed for this visit.  Vital Signs with Ranges  Temp:  [97.4  F (36.3  C)-99.1  F (37.3  C)] 99.1  F (37.3  C)  Pulse:  [58-83] 68  Resp:  [16-20] 16  BP: (120-170)/(57-93) 134/57  SpO2:  [91 %-100 %] 95 %  I/O last 3 completed shifts:  In: 240 [P.O.:240]  Out: 800 [Urine:800]    Constitutional: Awake, alert, cooperative, no apparent distress  Respiratory: Clear to auscultation bilaterally, no crackles or wheezing  Cardiovascular: Regular rate and rhythm, normal S1 and S2, and no murmur noted  GI: Normal bowel sounds, soft, non-distended, non-tender  Skin/Integumen: Edema and tenderness noted bilateral lower extremities above ankle.  Neuro : moving all 4 extremities, no focal deficit noted     Medications       acetaminophen  975 mg Oral TID     aspirin  81 mg Oral QPM     atorvastatin  80 mg Oral Daily     cyanocobalamin  1,000 mcg Oral Daily     insulin aspart  1-7 Units Subcutaneous TID AC     insulin aspart  1-5 Units Subcutaneous At Bedtime     isosorbide mononitrate  30 mg Oral QAM     latanoprost  1 drop Both Eyes At Bedtime     lisinopril  20 mg Oral QPM     metoprolol tartrate  25 mg Oral BID     omeprazole  20 mg Oral QAM       Data   Recent Labs   Lab 05/09/21  0635 05/08/21  1523   WBC 7.5 9.2   HGB 12.4* 13.2*   MCV 96 96    162    141   POTASSIUM 4.1 4.1   CHLORIDE 108 110*   CO2 27  25   BUN 16 22   CR 0.98 0.92   ANIONGAP 4 6   GENEVIEVE 9.0 9.0   * 116*     Recent Labs   Lab 05/09/21  0635 05/08/21  2047 05/08/21  1822 05/08/21  1523   *  --   --  116*   BGM  --  252* 105*  --        Imaging:   Recent Results (from the past 24 hour(s))   XR Ankle Left G/E 3 Views    Narrative    EXAM: XR ANKLE LEFT G/E 3 VIEWS  LOCATION: [facility]  DATE/TIME: 05/08/2021, 1:18 PM    INDICATION: Pain after trauma.  COMPARISON: None.      Impression    IMPRESSION: Oblique fracture of the distal metadiaphysis of the fibula. 2 mm of displacement. Slight widening of the medial ankle mortise suggesting deltoid ligament injury. There are also a few tiny calcifications between the medial malleolus and the   medial talus which could represent tiny deltoid ligament avulsion fractures. Otherwise, negative.     XR Tibia & Fibula Left 2 Views    Narrative    EXAM: XR TIBIA and FIBULA LT 2 VW  LOCATION: [facility]  DATE/TIME: 5/8/2021 1:18 PM    INDICATION: Pain after trauma.  COMPARISON: None.      Impression    IMPRESSION:   1. Mildly displaced oblique fracture in the distal metadiaphysis of the fibula. This courses from the tibiotalar joint line superiorly. There is up to 2 mm of displacement. No angulation.  2. No other fracture.   Ankle XR, G/E 3 views, right    Narrative    EXAM: XR ANKLE RIGHT G/E 3 VIEWS  LOCATION: [facility]  DATE/TIME: 5/8/2021 3:10 PM    INDICATION: Fall, right ankle pain  COMPARISON: None.      Impression    IMPRESSION: Acute minimally displaced oblique fracture of the distal fibula located 2 cm proximal to the tibial plafond. Small age-indeterminate avulsion fracture at the medial malleolus distally. Mild soft tissue swelling. There is normal joint   alignment. The ankle mortise is congruent.

## 2021-05-09 NOTE — PLAN OF CARE
PT: Orders received. Chart reviewed and discussed with care team.  PT not appropriate today due to pt with active bedrest orders and ortho consult not yet completed. Pt with bilat fibular fxs following traumatic fall down steps. Will await updated WBing and activity orders from ortho prior to evaluating.

## 2021-05-10 ENCOUNTER — APPOINTMENT (OUTPATIENT)
Dept: PHYSICAL THERAPY | Facility: CLINIC | Age: 81
End: 2021-05-10
Attending: INTERNAL MEDICINE
Payer: COMMERCIAL

## 2021-05-10 ENCOUNTER — APPOINTMENT (OUTPATIENT)
Dept: GENERAL RADIOLOGY | Facility: CLINIC | Age: 81
End: 2021-05-10
Attending: ORTHOPAEDIC SURGERY
Payer: COMMERCIAL

## 2021-05-10 VITALS
TEMPERATURE: 97.7 F | HEART RATE: 75 BPM | OXYGEN SATURATION: 98 % | DIASTOLIC BLOOD PRESSURE: 81 MMHG | RESPIRATION RATE: 18 BRPM | SYSTOLIC BLOOD PRESSURE: 133 MMHG

## 2021-05-10 VITALS
RESPIRATION RATE: 16 BRPM | DIASTOLIC BLOOD PRESSURE: 77 MMHG | TEMPERATURE: 98.2 F | HEART RATE: 69 BPM | OXYGEN SATURATION: 98 % | SYSTOLIC BLOOD PRESSURE: 139 MMHG

## 2021-05-10 LAB
GLUCOSE BLDC GLUCOMTR-MCNC: 169 MG/DL (ref 70–99)
GLUCOSE BLDC GLUCOMTR-MCNC: 172 MG/DL (ref 70–99)

## 2021-05-10 PROCEDURE — 96372 THER/PROPH/DIAG INJ SC/IM: CPT | Performed by: INTERNAL MEDICINE

## 2021-05-10 PROCEDURE — 250N000013 HC RX MED GY IP 250 OP 250 PS 637: Performed by: INTERNAL MEDICINE

## 2021-05-10 PROCEDURE — 99217 PR OBSERVATION CARE DISCHARGE: CPT | Performed by: INTERNAL MEDICINE

## 2021-05-10 PROCEDURE — G0378 HOSPITAL OBSERVATION PER HR: HCPCS

## 2021-05-10 PROCEDURE — 97161 PT EVAL LOW COMPLEX 20 MIN: CPT | Mod: GP | Performed by: PHYSICAL THERAPIST

## 2021-05-10 PROCEDURE — 97530 THERAPEUTIC ACTIVITIES: CPT | Mod: GP | Performed by: PHYSICAL THERAPIST

## 2021-05-10 PROCEDURE — 250N000012 HC RX MED GY IP 250 OP 636 PS 637: Performed by: INTERNAL MEDICINE

## 2021-05-10 PROCEDURE — 999N001017 HC STATISTIC GLUCOSE BY METER IP

## 2021-05-10 PROCEDURE — 999N000065 XR ANKLE BILATERAL G/E 3 VIEWS

## 2021-05-10 RX ORDER — ACETAMINOPHEN 325 MG/1
975 TABLET ORAL 3 TIMES DAILY
DISCHARGE
Start: 2021-05-10 | End: 2021-05-12

## 2021-05-10 RX ORDER — ASPIRIN 325 MG
325 TABLET ORAL DAILY
Status: DISCONTINUED | OUTPATIENT
Start: 2021-05-10 | End: 2021-05-10 | Stop reason: HOSPADM

## 2021-05-10 RX ORDER — ASPIRIN 325 MG
325 TABLET ORAL DAILY
DISCHARGE
Start: 2021-05-10 | End: 2021-07-07

## 2021-05-10 RX ADMIN — OMEPRAZOLE 20 MG: 20 CAPSULE, DELAYED RELEASE ORAL at 08:42

## 2021-05-10 RX ADMIN — INSULIN ASPART 1 UNITS: 100 INJECTION, SOLUTION INTRAVENOUS; SUBCUTANEOUS at 13:20

## 2021-05-10 RX ADMIN — ISOSORBIDE MONONITRATE 30 MG: 30 TABLET, EXTENDED RELEASE ORAL at 08:42

## 2021-05-10 RX ADMIN — INSULIN ASPART 1 UNITS: 100 INJECTION, SOLUTION INTRAVENOUS; SUBCUTANEOUS at 08:42

## 2021-05-10 RX ADMIN — ACETAMINOPHEN 975 MG: 325 TABLET, FILM COATED ORAL at 08:42

## 2021-05-10 RX ADMIN — ASPIRIN 325 MG ORAL TABLET 325 MG: 325 PILL ORAL at 10:50

## 2021-05-10 RX ADMIN — METOPROLOL TARTRATE 25 MG: 25 TABLET, FILM COATED ORAL at 08:42

## 2021-05-10 RX ADMIN — CYANOCOBALAMIN TAB 1000 MCG 1000 MCG: 1000 TAB at 08:42

## 2021-05-10 RX ADMIN — ATORVASTATIN CALCIUM 80 MG: 40 TABLET, FILM COATED ORAL at 08:42

## 2021-05-10 NOTE — PROGRESS NOTES
Care Management Discharge Note    Discharge Date: 05/10/21(TCU)  Expected Time of Departure: wheelchair via [employer] 1430    Discharge Disposition: Transitional Care    Discharge Services: None    Discharge DME: None    Discharge Transportation: agency    Private pay costs discussed: Not applicable    PAS Confirmation Code: [id number]  Patient/family educated on Medicare website which has current facility and service quality ratings: yes    Education Provided on the Discharge Plan:    Persons Notified of Discharge Plans: yes  Patient/Family in Agreement with the Plan: yes    Handoff Referral Completed: No    Additional Information:  Patient acceted to [facility]. Writer spoke to patient who stated that is his first choice after he and his wife discussed. He was sitting up in a wheelchair with both legs elevated and is in agreement to transferring via wheelchair if able to. Writer placed a call to [facility] to confirm bed and request transport time.     Update: patient to transport via [employer] today at 1430. Writer updated patient of time and he is in agreement. He reports that he will contact his wife so she can be present when he discharges. Orders received for discharge and faxed. PAS completed.    PAS-RR    D: Per DHS regulation, ARNULFO completed and submitted PAS-RR to [place] [employer] via the Senior LinkAge Line.  PAS-RR confirmation # is : [id number]    I: SW spoke with patient and they are aware a PAS-RR has been submitted.  ARNULFO reviewed with patient that they may be contacted for a follow up appointment within 10 days of hospital discharge if their SNF stay is < 30 days.  Contact information for [facility] LinkAge Line was also provided.    A: patient verbalized understanding.    P: Further questions may be directed to [facility] LinkAge Line at #[phone], option #4 for PAS-RR staff.          KATIA Bentno

## 2021-05-10 NOTE — DISCHARGE SUMMARY
[facility]    Discharge Summary  Hospitalist    Date of Admission:  5/8/2021  Date of Discharge:  5/10/2021  Discharging Provider: Arlene Montano    Discharge Diagnoses   Closed fracture of right and left fibula after mechanical fall down stairs  DM II, with peripheral neuropathy  CAD  Hypertension  Dyslipidemia  Gait disorder -- with diabetic neuropathy and lumbosacral plexopathy  Frequent falls  Spinal stenosis of thoracic region    History of Present Illness   David Medina is a 81 year old male with PMHx CAD, dyslipidemia, DM II with peripheral neuropathy , lumbosacral plexopathy, gait disorder and hx of spinal stenosis among other medical problems who was admitted under observation status on 5/8/2021 after a fall down some stairs which resulted in bilateral fibular fractures.    Hospital Course   David Medina was admitted on 5/8/2021.  The following problems were addressed during his hospitalization:    Closed fracture of right and left fibula after mechanical fall down stairs  Has difficulties ambulating at baseline with hx of peripheral neuropathy. Per wife, ambulates very little and only with a 4 wheeled walker. Had fall down some stairs on the day of admission. Had immediate onset of pain in his ankles bilaterally and was unable to ambulate. No head trauma or LOC. Xray imaging obtained in ED showed bilateral distal fibular fractures with minimal/mild displacement.     Seen by ortho this stay. Did not require surgery. Recommended NWB to bilateral LEs for 8 wks. Placed in bilateral LE casts on 5/10.   Pain well managed with Tylenol this stay.   ASA increased from baseline 81mg daily to 325mg daily for DVT ppx given immobility  Discharged to TCU.   Will follow up with [facility] n 3 wks with repeat xrays     DM II, with peripheral neuropathy  A1C 6.9 this stay. Manages with Metformin.   Metformin held on admission and was placed on sliding scale insulin. Minimal insulin needs  this stay.   Cont metformin at TCU.     CAD  Hypertension  Dyslipidemia  Follows up with [facility].   Home meds: asa 81mg daily, atorvastatin 80mg daily, Imdur 30mg daily, lisinopril 20mg daily, metoprolol 25mg BID  ASA increased to 325mg daily this stay for DVT ppx as above  Other meds continued unchanged.     Gait disorder -- with diabetic neuropathy and lumbosacral plexopathy  Frequent falls  Spinal stenosis of thoracic region  Suspect this conditions contributed to fall. TSH, B12 and folate levels nl this stay.   TCU stay given above    Arlene Montano DO    Significant Results and Procedures   Bilateral LE cast placement per Dr. Calvo on 5/10/21.     Code Status   Full Code       Primary Care Physician   Beatriz Betancur    Physical Exam   Temp: 98.2  F (36.8  C) Temp src: Oral BP: 139/77 Pulse: 69   Resp: 16 SpO2: 98 % O2 Device: None (Room air)    There were no vitals filed for this visit.  Vital Signs with Ranges  Temp:  [98  F (36.7  C)-98.5  F (36.9  C)] 98.2  F (36.8  C)  Pulse:  [69-81] 69  Resp:  [16-18] 16  BP: (111-147)/(59-77) 139/77  SpO2:  [95 %-98 %] 98 %  I/O last 3 completed shifts:  In: -   Out: 1100 [Urine:1100]    General: Resting comfortably, alert, conversive, NAD  CVS: HRRR, no MGR, casts being placed to LEs to did not directly examine  Respiratory: CTAB, no wheeze/rales/rhonchi, no increased work of breathing  GI: S, NT, ND, +BS  Skin: Warm/dry  Neuro: CNs 2-12 intact, no focal motor/sensory deficits    Discharge Disposition   Discharged to TCU  Condition at discharge: Stable    Consultations This Hospital Stay   ORTHOPEDIC SURGERY IP CONSULT  ----------------------------------------------------------  PHYSICAL THERAPY ADULT IP CONSULT  OCCUPATIONAL THERAPY ADULT IP CONSULT  CARE MANAGEMENT / SOCIAL WORK IP CONSULT    Time Spent on this Encounter   IArlene DO, personally saw the patient today and spent greater than 30 minutes discharging this  patient.    Discharge Orders      General info for SNF    Length of Stay Estimate: Short Term Care: Estimated # of Days <30  Condition at Discharge: Improving  Level of care:skilled   Rehabilitation Potential: Good  Admission H&P remains valid and up-to-date: Yes  Recent Chemotherapy: N/A  Use Nursing Home Standing Orders: N/A     Mantoux instructions    Give two-step Mantoux (PPD) Per Facility Policy Yes     Reason for your hospital stay    Management of your bilateral fibular fractures, for which you were placed in casts and will be non-weight bearing for the next 8 weeks.     Glucose monitor nursing POCT    Before meals and at bedtime     Weight bearing status    NWB to bilateral LEs     Follow Up and recommended labs and tests    Follow up with Dr. Calvo of [facility] in clinic in 3 weeks with repeat xrays.     General info for SNF    Length of Stay Estimate: Short Term Care: Estimated # of Days <30  Condition at Discharge: Improving  Level of care:skilled   Rehabilitation Potential: Good  Admission H&P remains valid and up-to-date: Yes  Recent Chemotherapy: N/A  Use Nursing Home Standing Orders: Yes     Mantoux instructions    Give two-step Mantoux (PPD) Per Facility Policy Yes     Reason for your hospital stay    Bilateral ankle fractures     Wound care (specify)    Bilateral low leg casts.  Keep casts applied.  Nothing down casts to itch.  Do not get wet.     Follow Up and recommended labs and tests    Follow-up with Dr Calvo at [facility] clinic in approximately 3 weeks for repeat x-rays.  He will need to contact Mariam at [phone] for an appointment     Weight bearing status    NWB Bilateral LEs     Activity - Up with nursing assistance    NWB to bilateral LEs     Physical Therapy Adult Consult    Evaluate and treat as clinically indicated.    Reason:  Bilateral distal fibular fractures     Occupational Therapy Adult Consult    Evaluate and treat as clinically indicated.    Reason:  Bilateral  distal fibular fractures     Fall precautions     Advance Diet as Tolerated    Follow this diet upon discharge: Regular     Discharge Medications   Current Discharge Medication List      START taking these medications    Details   acetaminophen (TYLENOL) 325 MG tablet Take 3 tablets (975 mg) by mouth 3 times daily  Qty:      Associated Diagnoses: Tibia/fibula fracture, left, closed, initial encounter; Tibia/fibula fracture, right, closed, initial encounter      aspirin (ASA) 325 MG tablet Take 1 tablet (325 mg) by mouth daily  Qty:      Associated Diagnoses: DVT prophylaxis         CONTINUE these medications which have NOT CHANGED    Details   atorvastatin (LIPITOR) 80 MG tablet Take 1 tablet (80 mg) by mouth daily  Qty: 90 tablet, Refills: 3    Associated Diagnoses: Pure hypercholesterolemia      cholecalciferol 25 MCG (1000 UT) TABS Take 1,000 Units by mouth daily      Cyanocobalamin 1000 MCG CAPS Take 1 capsule by mouth daily  Qty: 100 capsule, Refills: 3    Associated Diagnoses: B12 deficiency      hydrocortisone 2.5 % ointment APPLY TO AFFECTED AREA ONCE DAILY FOR FACIAL RASH  Qty: 20 g, Refills: 6    Associated Diagnoses: Rash      isosorbide mononitrate (IMDUR) 30 MG 24 hr tablet Take 1 tablet (30 mg) by mouth every morning  Qty: 90 tablet, Refills: 3    Associated Diagnoses: ASCVD (arteriosclerotic cardiovascular disease); Ischemic cardiomyopathy      latanoprost (XALATAN) 0.005 % ophthalmic solution Place 1 drop into both eyes At Bedtime      lisinopril (ZESTRIL) 20 MG tablet Take 1 tablet (20 mg) by mouth daily  Qty: 90 tablet, Refills: 3    Associated Diagnoses: Hypertension goal BP (blood pressure) < 140/90      metFORMIN (GLUCOPHAGE) 500 MG tablet Take 1 tablet (500 mg) by mouth 2 times daily (with meals)  Qty: 180 tablet, Refills: 1    Associated Diagnoses: Type 2 diabetes mellitus with diabetic polyneuropathy, without long-term current use of insulin (H)      metoprolol tartrate (LOPRESSOR) 50 MG  tablet TAKE 1/2 TABLET BY MOUTH TWICE DAILY  Qty: 90 tablet, Refills: 3    Associated Diagnoses: ASCVD (arteriosclerotic cardiovascular disease); Ischemic cardiomyopathy; Hypertension goal BP (blood pressure) < 140/90      multivitamin, therapeutic with minerals (THERA-VIT-M) TABS Take 1 tablet by mouth daily      nitroGLYcerin (NITROSTAT) 0.4 MG sublingual tablet Place 1 tablet (0.4 mg) under the tongue every 5 minutes as needed for chest pain Up to 3 doses max.  Qty: 25 tablet, Refills: 3    Associated Diagnoses: ASCVD (arteriosclerotic cardiovascular disease); Ischemic cardiomyopathy      omeprazole (PRILOSEC) 20 MG DR capsule Take 1 capsule (20 mg) by mouth every morning  Qty: 90 capsule, Refills: 0    Associated Diagnoses: Gastroesophageal reflux disease without esophagitis      blood glucose (ONE TOUCH DELICA) lancing device Use to test blood sugars four times daily or as directed.  Qty: 1 each, Refills: 1    Associated Diagnoses: Type 2 diabetes mellitus with diabetic polyneuropathy (H)      blood glucose (ONETOUCH VERIO IQ) test strip USE TO TEST 4 TIMES DAILY OR AS DIRECTED  Qty: 100 strip, Refills: 4    Associated Diagnoses: Type 2 diabetes mellitus with diabetic polyneuropathy, without long-term current use of insulin (H)      blood glucose monitoring (ONE TOUCH DELICA) lancets Use to test blood sugars four times daily or as directed.  Qty: 100 each, Refills: 13    Associated Diagnoses: Type 2 diabetes mellitus with diabetic polyneuropathy (H)      blood glucose monitoring (ONETOUCH VERIO IQ SYSTEM) meter device kit Use to test blood sugar 4 times daily.  Qty: 1 kit, Refills: 0    Associated Diagnoses: Type 2 diabetes mellitus with diabetic polyneuropathy, without long-term current use of insulin (H)         STOP taking these medications       aspirin EC 81 MG tablet Comments:   Reason for Stopping:             Allergies   Allergies   Allergen Reactions     No Known Allergies      Data   Most Recent 3  CBC's:  Recent Labs   Lab Test 05/09/21  0635 05/08/21  1523 02/22/21  0709   WBC 7.5 9.2 7.7   HGB 12.4* 13.2* 14.4   MCV 96 96 96    162 196      Most Recent 3 BMP's:  Recent Labs   Lab Test 05/09/21  0635 05/08/21  1523 02/22/21  0709    141 137   POTASSIUM 4.1 4.1 4.5   CHLORIDE 108 110* 106   CO2 27 25 24   BUN 16 22 24   CR 0.98 0.92 0.96   ANIONGAP 4 6 7   GENEVIEVE 9.0 9.0 9.9   * 116* 173*     Most Recent TSH, T4 and A1c Labs:  Recent Labs   Lab Test 05/09/21  0635   TSH 1.75   A1C 6.9*     Results for orders placed or performed during the hospital encounter of 05/08/21   XR Knee Left 1/2 Views    Narrative    EXAM: XR KNEE LT 1/2 VW  LOCATION: [facility]  DATE/TIME: 5/8/2021 12:07 PM    INDICATION: Fell, knee pain and injury  COMPARISON: None.      Impression    IMPRESSION: Vertical lucency in the lateral 1 to 2 cm of the patella. This is further inferiorly located than a typical normal variant bipartite patella. However, although this is suspicious for fracture, the margins are indistinct and there is no   apparent joint effusion. No other evidence of acute fracture. Joint space widths are within normal limits. Extensive arterial calcification is present posterior to the knee.   XR Tibia & Fibula Left 2 Views    Narrative    EXAM: XR TIBIA and FIBULA LT 2 VW  LOCATION: [facility]  DATE/TIME: 5/8/2021 1:18 PM    INDICATION: Pain after trauma.  COMPARISON: None.      Impression    IMPRESSION:   1. Mildly displaced oblique fracture in the distal metadiaphysis of the fibula. This courses from the tibiotalar joint line superiorly. There is up to 2 mm of displacement. No angulation.  2. No other fracture.   XR Ankle Left G/E 3 Views    Narrative    EXAM: XR ANKLE LEFT G/E 3 VIEWS  LOCATION: [facility]  DATE/TIME: 05/08/2021, 1:18 PM    INDICATION: Pain after trauma.  COMPARISON: None.      Impression    IMPRESSION: Oblique fracture of the distal  metadiaphysis of the fibula. 2 mm of displacement. Slight widening of the medial ankle mortise suggesting deltoid ligament injury. There are also a few tiny calcifications between the medial malleolus and the   medial talus which could represent tiny deltoid ligament avulsion fractures. Otherwise, negative.     Ankle XR, G/E 3 views, right    Narrative    EXAM: XR ANKLE RIGHT G/E 3 VIEWS  LOCATION: [facility]  DATE/TIME: 5/8/2021 3:10 PM    INDICATION: Fall, right ankle pain  COMPARISON: None.      Impression    IMPRESSION: Acute minimally displaced oblique fracture of the distal fibula located 2 cm proximal to the tibial plafond. Small age-indeterminate avulsion fracture at the medial malleolus distally. Mild soft tissue swelling. There is normal joint   alignment. The ankle mortise is congruent.

## 2021-05-10 NOTE — PLAN OF CARE
Pt A/Ox4, VSS on RA, scheduled tylenol for pain, CMS intact, bedrest, voiding in urinal, casts being put on today, will continue to monitor

## 2021-05-10 NOTE — PROCEDURES
Procedure: bilateral cast application    This is an 81-year-old male with bilateral ankle fractures.  We a long discussion regarding surgical and nonsurgical treatment options and after discussing risks benefits and alternatives they elected to move forward with nonsurgical treatment options.  We did discuss casting of the bilateral lower extremities.  We discussed cast care.  The bilateral lower extremities were wrapped using cast padding and stockinette for the ends of the cast.  The bilateral lower extremities were then casted in neutral dorsiflexion with a slight varus mold in order to ensure that the talus is in the medial portion of the tibiotalar joint.  Patient tolerated the procedure very well and felt much more comfortable following cast placement.    We will order bilateral ankle x-rays to ensure that we have good alignment in his cast.

## 2021-05-10 NOTE — PROGRESS NOTES
"   05/10/21 1011   Quick Adds   Type of Visit Initial PT Evaluation   Living Environment   People in home spouse   Current Living Arrangements house   Home Accessibility stairs to enter home;stairs within home   Number of Stairs, Main Entrance 3   Stair Railings, Main Entrance railings on both sides of stairs   Number of Stairs, Within Home, Primary   (15)   Stair Railings, Within Home, Primary railing on left side (ascending)   Transportation Anticipated family or friend will provide   Self-Care   Usual Activity Tolerance moderate   Current Activity Tolerance fair   Regular Exercise No   Equipment Currently Used at Home walker, rolling   Disability/Function   Fall history within last six months yes   Number of times patient has fallen within last six months   (\"Couple dozen\")   General Information   Onset of Illness/Injury or Date of Surgery 05/08/21   Referring Physician Brien Calvo MD   Patient/Family Therapy Goals Statement (PT) TCU.    Pertinent History of Current Problem (include personal factors and/or comorbidities that impact the POC) 80 y/o male admitted after a fall sustaining B closed fracture of R and L fibula. PMH including DM2, NSTEMI.    Existing Precautions/Restrictions fall   Weight-Bearing Status - LLE nonweight-bearing   Weight-Bearing Status - RLE nonweight-bearing   General Observations Pt in supine upon arrival of therapist.    Cognition   Orientation Status (Cognition) oriented x 3   Affect/Mental Status (Cognition) WFL   Follows Commands (Cognition) WFL   Pain Assessment   Patient Currently in Pain   (B ankle pain: 2/10)   Integumentary/Edema   Integumentary/Edema Comments Noted B casts donned.    Posture    Posture Comments Noted forward head and shoulder posture sitting EOB.    Range of Motion (ROM)   ROM Comment Unable to assess B ankle ROM secondary to casts, otherwise B LEs WFL.    Strength   Strength Comments Not formally assessed, pt demonstrates at least 3/5 grossly in B LEs " with bed mobility.    Bed Mobility   Comment (Bed Mobility) Supine-sit with Mario of 2.    Transfers   Transfer Safety Comments NT.    Gait/Stairs (Locomotion)   Comment (Gait/Stairs) NT.    Sensory Examination   Sensory Perception Comments Pt reports numbness/tingling in B LEs at baseline secondary to neuropathy.   Clinical Impression   Criteria for Skilled Therapeutic Intervention yes, treatment indicated   PT Diagnosis (PT) Difficulty with functional mobility.    Influenced by the following impairments B NWB restriction, Decreased activity tolerance, Generalized weakness   Functional limitations due to impairments Limited functional mobility requiring assist of 2 and use of universal sling.    Clinical Presentation Stable/Uncomplicated   Clinical Presentation Rationale Based on PMH, current presentation, and social support.    Clinical Decision Making (Complexity) low complexity   Therapy Frequency (PT) 3x/week   Predicted Duration of Therapy Intervention (days/wks) 4 days   Planned Therapy Interventions (PT) bed mobility training;strengthening;transfer training   Risk & Benefits of therapy have been explained patient   PT Discharge Planning    PT Discharge Recommendation (DC Rec) Transitional Care Facility   PT Rationale for DC Rec Pt is below baseline, limited by B NWB restriction. Pt will benefit from continued skilled PT intervention at TCU.    PT Brief overview of current status  Bed mobility with Mario of 2, lift to chair   Total Evaluation Time   Total Evaluation Time (Minutes) 10

## 2021-05-10 NOTE — PROGRESS NOTES
Orthopedics    Patient was seen by Dr Calvo this morning.  Patient reports his pain is well controlled.    Bilateral short leg casts were applied.  Patient notes that his discomfort has greatly improved.      He is able to flex/extend his toes and sensation is intact. Toes warm to touch.     Plan:  NWB Bilateral lower extremities  Keep casts applied  Elevate bilateral LE when at rest  Will likely require TCU placement.   Follow-up with Dr Calvo at [facility] clinic in approximately 3 weeks for repeat x-rays.  He will need to contact Mariam at [phone] for an appointment.

## 2021-05-10 NOTE — PROGRESS NOTES
PRIOR TO DISCHARGE     Comments:   -diagnostic tests and consults completed and resulted - NOT MET  -vital signs normal or at patient baseline - MET  -tolerating oral intake to maintain hydration - MET  -adequate pain control on oral analgesics - MET  -safe disposition plan has been identified - MET  -seen by orthopaedic surgery and PT - MET

## 2021-05-10 NOTE — PLAN OF CARE
PRIOR TO DISCHARGE     Comments:   -diagnostic tests and consults completed and resulted - MET  -vital signs normal or at patient baseline - MET  -tolerating oral intake to maintain hydration - MET  -adequate pain control on oral analgesics - MET   -safe disposition plan has been identified - MET  -seen by orthopaedic surgery and PT - MET  Nurse to notify provider when observation goals have been met and patient is ready for discharge. - MET    A/Ox4, scheduled Tylenol for pain management, up with 2 assist and lift, voiding via urinal, bilateral LE's casted by ortho today, SL discontinued, and patient discharged to TCU via W/C.

## 2021-05-10 NOTE — PLAN OF CARE
Physical Therapy Discharge Summary    Reason for therapy discharge:    Discharged to transitional care facility.    Progress towards therapy goal(s). See goals on Care Plan in [facility] electronic health record for goal details.  Goals not met.  Barriers to achieving goals:   discharge from facility and discharge on same date as initial evaluation.    Therapy recommendation(s):    Continued therapy is recommended.  Rationale/Recommendations:  To progress independence and safety with functional mobility.

## 2021-05-10 NOTE — PROGRESS NOTES
[facility]      OUTPATIENT PHYSICAL THERAPY EVALUATION  PLAN OF TREATMENT FOR OUTPATIENT REHABILITATION  (COMPLETE FOR INITIAL CLAIMS ONLY)  Patient's Last Name, First Name, M.I.  YOB: 1940  David Medina                        Provider's Name  [facility] Medical Record No.  [id number]                               Onset Date:  05/08/21   Start of Care Date:  05/10/21      Type:     _X_PT   ___OT   ___SLP Medical Diagnosis:  B fibula fractures                        PT Diagnosis:  Difficulty with functional mobility.    Visits from SOC:  1   _________________________________________________________________________________  Plan of Treatment/Functional Goals    Planned Interventions: bed mobility training, strengthening, transfer training     Goals: See Physical Therapy Goals on Care Plan in [employer] electronic health record.    Therapy Frequency: 3x/week  Predicted Duration of Therapy Intervention: 4 days  _________________________________________________________________________________    I CERTIFY THE NEED FOR THESE SERVICES FURNISHED UNDER        THIS PLAN OF TREATMENT AND WHILE UNDER MY CARE     (Physician co-signature of this document indicates review and certification of the therapy plan).                Certification date from: 05/10/21, Certification date to: 05/14/21    Referring Physician: Brien Calvo MD            Initial Assessment        See Physical Therapy evaluation dated 05/10/21 in [employer] electronic health record.

## 2021-05-10 NOTE — PROGRESS NOTES
[facility]    Hospitalist Progress Note    Assessment & Plan   David Medina is a 81 year old male with PMHx CAD, dyslipidemia, DM II with peripheral neuropathy , lumbosacral plexopathy, gait disorder and hx of spinal stenosis among other medical problems who was admitted under observation status on 5/8/2021 after a fall down some stairs which resulted in bilateral fibular fractures.     Closed fracture of right and left fibula after mechanical fall down stairs  Has difficulties ambulating at baseline with hx of peripheral neuropathy. Per wife, ambulates very little and only with a 4 wheeled walker. Had fall down some stairs on the day of admission. Had immediate onset of pain in his ankles bilaterally and was unable to ambulate. No head trauma or LOC. Xray imaging obtained in ED showed bilateral distal fibular fractures with minimal/mild displacement.   -- ortho following -- no plans for surgery, recommended NWB to bilateral LEs for 8 wks, placed in casts on 5/10  -- pain control with Tylenol (hasn't needed anything stronger)  -- DVT ppx with ASA 325mg daily dt prolonged period of immobilization  -- will need TCU at discharge  -- follow up with [facility]CO n 3 wks with repeat xrays    DM II, with peripheral neuropathy  A1C 6.9 this stay. Manages with Metformin.   Metformin held on admission and was placed on sliding scale insulin     Recent Labs   Lab 05/10/21  0636 05/09/21  2139 05/09/21  1737 05/09/21  1242 05/09/21  0635 05/08/21  2047 05/08/21  1822 05/08/21  1523   GLC  --   --   --   --  161*  --   --  116*   * 222* 171* 213*  --  252* 105*  --        CAD  Hypertension  Dyslipidemia  Follows up with [facility].   Home meds: asa 81mg daily, atorvastatin 80mg daily, Imdur 30mg daily, lisinopril 20mg daily, metoprolol 25mg BID  ASA increased to 325mg daily this stay for DVT ppx as above  Other meds continued unchanged.    Gait disorder -- with diabetic neuropathy and lumbosacral  plexopathy  Frequent falls  Spinal stenosis of thoracic region  Suspect this conditions contributed to fall. TSH, B12 and folate levels nl this stay.   TCU stay given above     FEN: no IVFs, lytes stable, regular diet  DVT Prophylaxis: ASA 325mg daily per ortho recs  Code Status: Full Code    Disposition: Casts placed per ortho today. Family inquiring about TCU stay, SW following. Anticipate discharge later today vs tomorrow, pending ortho recommendations and acceptance to facility.    Arlene Kittyjosiah Montano    Interval History   Seen this morning. Feeling okay. Ortho placing casts. Denies cp/sob/cough, abd pain/n/v. No BM yet but says this isn't unusual for him. Pain controlled with Tylenol.    -Data reviewed today: I reviewed all new labs and imaging results over the last 24 hours. I personally reviewed no images or EKG's today.    Physical Exam   Temp: 98.2  F (36.8  C) Temp src: Oral BP: (!) 147/73 Pulse: 74   Resp: 18 SpO2: 97 % O2 Device: None (Room air)    There were no vitals filed for this visit.  Vital Signs with Ranges  Temp:  [98  F (36.7  C)-98.5  F (36.9  C)] 98.2  F (36.8  C)  Pulse:  [73-81] 74  Resp:  [17-18] 18  BP: (111-147)/(59-75) 147/73  SpO2:  [95 %-97 %] 97 %  I/O last 3 completed shifts:  In: -   Out: 1100 [Urine:1100]    Constitutional: Resting comfortably, alert and answering questions appropriately, NAD  Respiratory: CTAB, no wheeze/rales/rhonchi, no increased work of breathing  Cardiovascular: HRRR, no MGR, casts being placed to LEs to did not directly examine  GI: S, NT, ND, +BS  Skin/Integumen: warm/dry  Other:      Medications       acetaminophen  975 mg Oral TID     aspirin  81 mg Oral QPM     atorvastatin  80 mg Oral Daily     cyanocobalamin  1,000 mcg Oral Daily     enoxaparin ANTICOAGULANT  40 mg Subcutaneous Q24H     insulin aspart  1-7 Units Subcutaneous TID AC     insulin aspart  1-5 Units Subcutaneous At Bedtime     isosorbide mononitrate  30 mg Oral QAM     latanoprost  1  drop Both Eyes At Bedtime     lisinopril  20 mg Oral QPM     metoprolol tartrate  25 mg Oral BID     omeprazole  20 mg Oral QAM       Data   Recent Labs   Lab 05/09/21  0635 05/08/21  1523   WBC 7.5 9.2   HGB 12.4* 13.2*   MCV 96 96    162    141   POTASSIUM 4.1 4.1   CHLORIDE 108 110*   CO2 27 25   BUN 16 22   CR 0.98 0.92   ANIONGAP 4 6   GENEVIEVE 9.0 9.0   * 116*       No results found for this or any previous visit (from the past 24 hour(s)).

## 2021-05-11 ENCOUNTER — NURSING HOME VISIT (OUTPATIENT)
Dept: GERIATRICS | Facility: CLINIC | Age: 81
End: 2021-05-11
Payer: COMMERCIAL

## 2021-05-11 ENCOUNTER — HOSPITAL LABORATORY (OUTPATIENT)
Dept: OTHER | Facility: CLINIC | Age: 81
End: 2021-05-11

## 2021-05-11 DIAGNOSIS — H11.31 SUBCONJUNCTIVAL HEMATOMA, RIGHT: ICD-10-CM

## 2021-05-11 DIAGNOSIS — G62.9 PERIPHERAL POLYNEUROPATHY: ICD-10-CM

## 2021-05-11 DIAGNOSIS — I25.10 CORONARY ARTERY DISEASE INVOLVING NATIVE CORONARY ARTERY OF NATIVE HEART WITHOUT ANGINA PECTORIS: ICD-10-CM

## 2021-05-11 DIAGNOSIS — I10 ESSENTIAL HYPERTENSION: ICD-10-CM

## 2021-05-11 DIAGNOSIS — S82.831D CLOSED FRACTURE OF DISTAL END OF RIGHT FIBULA WITH ROUTINE HEALING, UNSPECIFIED FRACTURE MORPHOLOGY, SUBSEQUENT ENCOUNTER: Primary | ICD-10-CM

## 2021-05-11 DIAGNOSIS — E78.5 DYSLIPIDEMIA: ICD-10-CM

## 2021-05-11 DIAGNOSIS — E11.42 TYPE 2 DIABETES MELLITUS WITH DIABETIC POLYNEUROPATHY, WITHOUT LONG-TERM CURRENT USE OF INSULIN (H): ICD-10-CM
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
## 2021-05-11 DIAGNOSIS — R53.81 PHYSICAL DECONDITIONING: ICD-10-CM

## 2021-05-11 DIAGNOSIS — S82.832D CLOSED FRACTURE OF DISTAL END OF LEFT FIBULA WITH ROUTINE HEALING, UNSPECIFIED FRACTURE MORPHOLOGY, SUBSEQUENT ENCOUNTER: ICD-10-CM

## 2021-05-11 DIAGNOSIS — R26.9 GAIT DISORDER: ICD-10-CM

## 2021-05-11 DIAGNOSIS — R29.6 FALLS FREQUENTLY: ICD-10-CM

## 2021-05-11 DIAGNOSIS — K59.01 SLOW TRANSIT CONSTIPATION: ICD-10-CM

## 2021-05-11 DIAGNOSIS — M48.04 SPINAL STENOSIS OF THORACIC REGION: ICD-10-CM

## 2021-05-11 DIAGNOSIS — Z71.89 ADVANCED DIRECTIVES, COUNSELING/DISCUSSION: ICD-10-CM

## 2021-05-11 LAB
LABORATORY COMMENT REPORT: NORMAL
SARS-COV-2 RNA RESP QL NAA+PROBE: NEGATIVE
SARS-COV-2 RNA RESP QL NAA+PROBE: NORMAL
SPECIMEN SOURCE: NORMAL
SPECIMEN SOURCE: NORMAL

## 2021-05-11 PROCEDURE — 99305 1ST NF CARE MODERATE MDM 35: CPT | Performed by: NURSE PRACTITIONER

## 2021-05-11 RX ORDER — AMOXICILLIN 250 MG
1 CAPSULE ORAL DAILY
COMMUNITY
End: 2021-06-28

## 2021-05-11 NOTE — PROGRESS NOTES
[facility] GERIATRIC SERVICES  PRIMARY CARE PROVIDER AND CLINIC:  Beat[facility] Betancur MD, [facility] [place] [address] / S  Chief Complaint   Patient presents with     Hospital F/U      [facility] Record Number:  [id number]  Place of Service where encounter took place:  [facility] [[id number]]    David Medina  is a 81 year old  (1940), admitted to the above facility from  [facility]. Hospital stay 5/8/21 through 5/10/21.     HPI:    81 year old male with PMHx CAD, HLD, DM II with peripheral neuropathy, lumbosacral plexopathy, gait disorder and hx of spinal stenosis hospitalized after a fall down some stairs which resulted in bilateral fibular fractures. Ortho: NWB to bilateral LEs for 8 wks. Placed in bilateral LE casts on 5/10. Pain well managed with Tylenol and on  mg daily for DVT prophylaxis. TCO f/u 3 weeks. DM managed with metformin. CAD/HTN/HLD: on ASA, atorvastatin 80mg daily, Imdur 30mg daily, lisinopril 20mg daily, metoprolol 25mg BID. To TCU for therapies.     Patient seen for initial TCU visit. Reports frustrated with bilat LE NWB status and requests to be moved to a room with ceiling lift for easier transfers. No pain - wants tylenol changed to PRN only. Denies dyspnea, chest pain, bladder issues. Does report constipation. Discussed POLST: Full Code desired. BP range 133-138/78-81 and sats 98%. HR 70s. PTA lives with wife.     CODE STATUS/ADVANCE DIRECTIVES DISCUSSION:  Full Code  CPR/Full code   ALLERGIES:   Allergies   Allergen Reactions     No Known Allergies       PAST MEDICAL HISTORY:   Past Medical History:   Diagnosis Date     AAA (abdominal aortic aneurysm) without rupture (H)     stent 2005, s/p endovascular repair 12/07     Allergic rhinitis      Aortic root dilatation (H)      ASCVD (arteriosclerotic cardiovascular disease) 06/2005    Cardiac cath Oct 2014: EMEKA to RCA, cath 2011: medical management, cath Aug  2005: EMEKA to OM; cath June 2005: EMEKA to LAD     B12 deficiency      Benign paroxysmal positional vertigo      Cervical radiculopathy 10/30/2008    Injected through [facility] Sp[facility] 10/07     Closed fracture of patella 02/2007    left     DM type 2 (diabetes mellitus, type 2) (H)      Gastroesophageal reflux disease without esophagitis      Hyperlipidemia LDL goal < 70      Hypertension goal BP (blood pressure) < 130/80      Ischemic cardiomyopathy 06/2005    ischemic cardiomyopathy, EF 45%     Lumbosacral plexopathy     diabetic radiculoplexopathy causing proximal muscle weakness, Dr. 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 Villa (neuro) and Dr. 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, dad with TB     Tubular adenoma of colon     advanced adenoma (15 mm) 9/23/15 - rpt 3 yrs      PAST SURGICAL HISTORY:   has a past surgical history that includes surgical history of - ; UPPER GI ENDOSCOPY,EXAM (8/99); flexible sigmoidoscopy (11/00); PTCA SINGLE VESSEL (6/05); endovas repair, infrarenl abdom aortic aneurysm/dissect; zoqlh-xkh-hcbut/aorto-unifemoral prosthesis (6/05); sono abd retroperitnl (11/7/06); APPENDECTOMY (12/06); aaa repair (12/13/07); Colonoscopy (10/3/05); EMG (6/10/10); coronary angiogram (2/28/11); Stress lexiscan test (10/5/12); Laminectomy thoracic three levels (10/31/12); closed rx tibia shaft fx (6/2013); tonsillectomy (1940); Heart Cath, Angioplasty (10/3/14); colonoscopy (9/23/15); and Open reduction internal fixation hip nailing (N/A, 11/1/2016).  FAMILY HISTORY: family history  includes Other - See Comments in his mother; Respiratory in his father.  SOCIAL HISTORY:   reports that he quit smoking about 41 years ago. His smoking use included cigarettes. He has a 66.50 pack-year smoking history. He has never used smokeless tobacco. He reports current alcohol use. He reports that he does not use drugs.  Patient's living condition: lives with spouse    Post Discharge Medication Reconciliation Status: discharge medications reconciled and changed, per note/orders  Current Outpatient Medications   Medication Sig     acetaminophen (TYLENOL) 325 MG tablet Take 3 tablets (975 mg) by mouth 3 times daily (Patient taking differently: Take 975 mg by mouth 3 times daily as needed for pain )     aspirin (ASA) 325 MG tablet Take 1 tablet (325 mg) by mouth daily     atorvastatin (LIPITOR) 80 MG tablet Take 1 tablet (80 mg) by mouth daily     cholecalciferol 25 MCG (1000 UT) TABS Take 1,000 Units by mouth daily     Cyanocobalamin 1000 MCG CAPS Take 1 capsule by mouth daily     hydrocortisone 2.5 % ointment APPLY TO AFFECTED AREA ONCE DAILY FOR FACIAL RASH (Patient taking differently: Apply topically daily as needed )     isosorbide mononitrate (IMDUR) 30 MG 24 hr tablet Take 1 tablet (30 mg) by mouth every morning     latanoprost (XALATAN) 0.005 % ophthalmic solution Place 1 drop into both eyes At Bedtime     lisinopril (ZESTRIL) 20 MG tablet Take 1 tablet (20 mg) by mouth daily     metFORMIN (GLUCOPHAGE) 500 MG tablet Take 1 tablet (500 mg) by mouth 2 times daily (with meals)     metoprolol tartrate (LOPRESSOR) 50 MG tablet TAKE 1/2 TABLET BY MOUTH TWICE DAILY     multivitamin, therapeutic with minerals (THERA-VIT-M) TABS Take 1 tablet by mouth daily     nitroGLYcerin (NITROSTAT) 0.4 MG sublingual tablet Place 1 tablet (0.4 mg) under the tongue every 5 minutes as needed for chest pain Up to 3 doses max.     omeprazole (PRILOSEC) 20 MG DR capsule Take 1 capsule (20 mg) by mouth every morning      senna-docusate (SENOKOT-S/PERICOLACE) 8.6-50 MG tablet Take 2 tablets by mouth daily     blood glucose (ONE TOUCH DELICA) lancing device Use to test blood sugars four times daily or as directed. (Patient not taking: Reported on 5/11/2021)     blood glucose (ONETOUCH VERIO IQ) test strip USE TO TEST 4 TIMES DAILY OR AS DIRECTED (Patient not taking: Reported on 5/11/2021)     blood glucose monitoring (ONE TOUCH DELICA) lancets Use to test blood sugars four times daily or as directed. (Patient not taking: Reported on 5/11/2021)     blood glucose monitoring (ONETOUCH VERIO IQ SYSTEM) meter device kit Use to test blood sugar 4 times daily. (Patient not taking: Reported on 5/11/2021)     No current facility-administered medications for this visit.        ROS:  10 point ROS of systems including Constitutional, Eyes, Respiratory, Cardiovascular, Gastroenterology, Genitourinary, Integumentary, Musculoskeletal, Psychiatric were all negative except for pertinent positives noted in my HPI.    Vitals:  /81   Pulse 75   Temp 97.7  F (36.5  C)   Resp 18   SpO2 98%   Exam:  GENERAL APPEARANCE:  Alert, in no distress, pleasant, cooperative, oriented x 4  EYES:  EOM, lids, pupils and irises normal, right inner eye conjunctiva reddened, no discharge or mattering on lids or lashes noted  ENT:  Mouth normal, moist mucous membranes, nose normal without drainage or crusting, external ears without lesions, hearing acuity intact  NECK: supple, symmetrical, trachea midline  RESP:  respiratory effort normal, no chest wall tenderness, no respiratory distress, Lung sounds clear, patient is on room air  CV:  Auscultation of heart done, rate and rhythm controlled, no murmur, no rub or gallop. Edema none noted. VASCULAR: CMS bilateral toes intact  ABDOMEN:  hypoactive bowel sounds, soft, nontender, no palpable masses.  M/S:   Gait and station NWB both legs, no tenderness or swelling of the joints; digits normal  NEURO: cranial nerves  2-12 grossly intact, no facial asymmetry, no speech deficits and able to follow directions  PSYCH:  insight and judgement and memory appears intact, affect and mood normal     Lab/Diagnostic data:  Most Recent 3 CBC's:  Recent Labs   Lab Test 05/09/21  0635 05/08/21  1523 02/22/21  0709   WBC 7.5 9.2 7.7   HGB 12.4* 13.2* 14.4   MCV 96 96 96    162 196     Most Recent 3 BMP's:  Recent Labs   Lab Test 05/09/21  0635 05/08/21  1523 02/22/21  0709    141 137   POTASSIUM 4.1 4.1 4.5   CHLORIDE 108 110* 106   CO2 27 25 24   BUN 16 22 24   CR 0.98 0.92 0.96   ANIONGAP 4 6 7   GENEVIEVE 9.0 9.0 9.9   * 116* 173*       ASSESSMENT/PLAN:  Closed fracture of distal end of right fibula with routine healing, unspecified fracture morphology, subsequent encounter  Closed fracture of distal end of left fibula with routine healing, unspecified fracture morphology, subsequent encounter  Fall down stairs, subsequent encounter  Falls frequently  Spinal stenosis of thoracic region  Gait disorder  Physical deconditioning  Acute on chronic, now in bilateral LE casts and NWB. Per patient request tylenol change to 975 mg TID PRN pain, continue  mg daily for DVT prophylaxis, vit d 1000 units daily. F/U with ortho three weeks as recommended. Therapies as ordered - f/u with progress next visit.     Type 2 diabetes mellitus with diabetic polyneuropathy, without long-term current use of insulin (H)  Peripheral polyneuropathy  Chronic, stable with metformin 500 mg BID. F/U with BG ranges next visit.     Coronary artery disease involving native coronary artery of native heart without angina pectoris  Dyslipidemia  Essential hypertension  Chronic, stable at this time. Continue asa 325 mg daily, Imdur 30 mg daily, lisinopril 20 mg daily, lopressor 25 mg BID, PRN nitroglycerin. Monitor vs and f/u with ranges next visit.     Slow transit constipation  New complaint - start senna s 2 tabs daily, staff to update provider if not  effective.     Subconjunctival hematoma, right  Acute onset prior to admission - no vision changes or pain, monitor.     Advanced directives, counseling/discussion  Reviewed and completed POLST: Full Code desired.     Orders:  1. Full Code  2. Change tylenol to 975 mg PO TID PRN pain  3. Check BG daily at alternating times diagnosis DM  4. Provide commode at bedside  5. Senna S 2 tabs PO daily diagnosis constipation    Total unit/floor time of 37 minutes consisted of the following: Examination of patient, reviewing the record including pertinent labs and imaging, placing orders, and completing documentation.  More than 50% of this time (19 minutes) (>50%) was spent in coordination of care with the patient, nursing staff and other healthcare providers.   This time was spent on discussing the care plan including hospital course, hospital discharge recommendations, recent TCU course and concerns, medications, mobility concerns, discharge/safe placement, specialty follow up need.  Time was also spent on discussing POLST and code status.       Time with patient included: Discussion of diagnostic and imaging test results, diagnosis and prognosis, overall goal and disposition plan.      -Medical decision making and discussions included:  Risks/benefits of pain meds  Risks/benefits of adding laxatives  PT/OT restrictions to include NWB both legs  DM management plan to include meds, BG checks daily  HTN management plan to include monitor vs, meds as above    -Rx management plan discussion included:  Follow up needed with ortho in three weeks  Disposition and discharge plan to include likely need for home care after discharge  Medication changes to include add laxative  Patient education concerning POLST and completed form    -Time on communication of care included:  Updated RN, RN manager, HUC on above orders and POC, family concerns re: mobility, visiting restrictions  Discussion with wife via phone about mobility and  quarantine     Electronically signed by:  GIANNA Jones CNP

## 2021-05-12 ENCOUNTER — NURSING HOME VISIT (OUTPATIENT)
Dept: GERIATRICS | Facility: CLINIC | Age: 81
End: 2021-05-12
Payer: COMMERCIAL

## 2021-05-12 VITALS
OXYGEN SATURATION: 97 % | TEMPERATURE: 97.8 F | HEART RATE: 69 BPM | SYSTOLIC BLOOD PRESSURE: 133 MMHG | DIASTOLIC BLOOD PRESSURE: 78 MMHG | BODY MASS INDEX: 30.93 KG/M2 | RESPIRATION RATE: 18 BRPM | WEIGHT: 254 LBS | HEIGHT: 76 IN

## 2021-05-12 DIAGNOSIS — R53.81 PHYSICAL DECONDITIONING: ICD-10-CM

## 2021-05-12 DIAGNOSIS — S82.401D CLOSED FRACTURE OF BOTH FIBULAE WITH ROUTINE HEALING, SUBSEQUENT ENCOUNTER: Primary | ICD-10-CM
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
## 2021-05-12 DIAGNOSIS — S82.402D CLOSED FRACTURE OF BOTH FIBULAE WITH ROUTINE HEALING, SUBSEQUENT ENCOUNTER: Primary | ICD-10-CM

## 2021-05-12 DIAGNOSIS — I25.10 CORONARY ARTERY DISEASE INVOLVING NATIVE CORONARY ARTERY OF NATIVE HEART WITHOUT ANGINA PECTORIS: ICD-10-CM

## 2021-05-12 DIAGNOSIS — K59.01 SLOW TRANSIT CONSTIPATION: ICD-10-CM

## 2021-05-12 DIAGNOSIS — I10 ESSENTIAL HYPERTENSION: ICD-10-CM

## 2021-05-12 DIAGNOSIS — E11.9 TYPE 2 DIABETES MELLITUS WITHOUT COMPLICATION, WITHOUT LONG-TERM CURRENT USE OF INSULIN (H): ICD-10-CM

## 2021-05-12 DIAGNOSIS — E78.5 HYPERLIPIDEMIA, UNSPECIFIED HYPERLIPIDEMIA TYPE: ICD-10-CM

## 2021-05-12 DIAGNOSIS — K21.9 GASTROESOPHAGEAL REFLUX DISEASE WITHOUT ESOPHAGITIS: ICD-10-CM

## 2021-05-12 PROCEDURE — 99305 1ST NF CARE MODERATE MDM 35: CPT | Performed by: INTERNAL MEDICINE

## 2021-05-12 RX ORDER — ACETAMINOPHEN 325 MG/1
975 TABLET ORAL 3 TIMES DAILY PRN
COMMUNITY
End: 2021-08-03

## 2021-05-12 ASSESSMENT — MIFFLIN-ST. JEOR: SCORE: 1953.64

## 2021-05-12 NOTE — LETTER
5/12/2021        RE: David Medina  [address] [facility] [address]        [place] [facility]  INITIAL VISIT NOTE  May 12 2021    PRIMARY CARE PROVIDER AND CLINIC:  Beatriz Betancur [place] [address] / S*    Chief Complaint   Patient presents with     Hospital F/U       HPI:    David Medina is a 81 year old  (1940) male who was seen at [place] [facility] on May 12, 2021 for an initial visit. Medical history is notable for CAD, HTN, DM II, peripheral neuropathy and thoracic spine stenosis. He was hospitalized at [facility] from 5/8/21 to 5/10/21 where he presented after falling down some steps and was found to have bilateral distal fibula fractures. Ortho consulted and recommendation was for non-op management and casts were placed to both lower extremities. He was admitted to this facility for medical management and rehab.     Today, Mr. Medina is seen in his room sitting up in bed. He is an excellent historian and recounted the unfortunate accident that left him with two broken ankles. Pain control is variable, but overall improving. He is taking just acetaminophen so he can better gauge how his pain is doing. Last BM on Saturday, but he typically goes every 2-3 days so not concerned yet. No concerns today per discussion with nursing. He is working with therapies and is an extensive assist of two with mechanical lift for transfers. They are going to switch rooms so that he can have a lift built into the ceiling. Goal is to return home with his spouse     CODE STATUS:   CPR/Full code     ALLERGIES:     Allergies   Allergen Reactions     No Known Allergies        PAST MEDICAL HISTORY:   Past Medical History:   Diagnosis Date     AAA (abdominal aortic aneurysm) without rupture (H)     stent 2005, s/p endovascular repair 12/07     Allergic rhinitis      Aortic root dilatation (H)      ASCVD (arteriosclerotic cardiovascular disease) 06/2005     Cardiac cath Oct 2014: EMEKA to RCA, cath 2011: medical management, cath Aug 2005: EMEKA to OM; cath June 2005: EMEKA to LAD     B12 deficiency      Benign paroxysmal positional vertigo      Cervical radiculopathy 10/30/2008    Injected through Ortho Spine 10/07     Closed fracture of patella 02/2007    left     DM type 2 (diabetes mellitus, type 2) (H)      Gastroesophageal reflux disease without esophagitis      Hyperlipidemia LDL goal < 70      Hypertension goal BP (blood pressure) < 130/80      Ischemic cardiomyopathy 06/2005    ischemic cardiomyopathy, EF 45%     Lumbosacral plexopathy     diabetic radiculoplexopathy causing proximal muscle weakness, Dr. 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 Villa (neuro) and Dr. 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 Jacob ARCEO APPENDECTOMY  12/06    open     CLOSED RX TIBIA SHAFT FX  6/2013    RT prox tibial fracture     COLONOSCOPY  10/3/05    WNL, rpt in 10 yrs, Dr. Vicente, [place] [facility]     COLONOSCOPY  9/23/15    advanced adenoma, rpt 3 yrs     coronary angiogram  2/28/11    diffuse disease with vasospasm     EMG  6/10/10    chronic active polyradiculopathy, mild generalized polyneuropathy, r/o lumbar stenosis     ENDOVAS REPAIR, INFRARENL ABDOM AORTIC ANEURYSM/DISSECT; [id number]/AORTO-UNIFEMORAL PROSTHESIS  6/05    one  done,one to do     FLEXIBLE SIGMOIDOSCOPY  11/00    WN     HEART CATH, ANGIOPLASTY  10/3/14    EMEKA x 1  ostium of the RCA     LAMINECTOMY THORACIC THREE LEVELS  10/31/12    T10, T11, T12 with medial facetectomy     OPEN REDUCTION INTERNAL FIXATION HIP NAILING N/A 11/1/2016    Procedure: OPEN REDUCTION INTERNAL FIXATION HIP NAILING;  Surgeon: Jason Flowers MD;  Location: [facility] OR     SONO ABD RETROPERITNL  11/7/06    AAA 4.2 x 4.8, Dr. James's office     SURGICAL HISTORY OF -       tonsillectomy     TONSILLECTOMY  1940     ZZ STRESS LEXISCAN TEST  10/5/12    small, fixed, apical defect/apical thinning     ZZHC PTCA SINGLE VESSEL  6/05    LAD - 100% stenosis     [facility]    erosive esophagitis, GERD, Dr. Salazar       FAMILY HISTORY:   Family History   Problem Relation Age of Onset     Respiratory Father         TB     Other - See Comments Mother         FALL       SOCIAL HISTORY:   Lives with spouse    MEDICATIONS:  Post Discharge Medication Reconciliation Status: discharge medications reconciled and changed, per note/orders.   Current Outpatient Medications   Medication Sig Dispense Refill     acetaminophen (TYLENOL) 325 MG tablet Take 975 mg by mouth 3 times daily as needed for mild pain       aspirin (ASA) 325 MG tablet Take 1 tablet (325 mg) by mouth daily       atorvastatin (LIPITOR) 80 MG tablet Take 1 tablet (80 mg) by mouth daily 90 tablet 3     cholecalciferol 25 MCG (1000 UT) TABS Take 1,000 Units by mouth daily       Cyanocobalamin 1000 MCG CAPS Take 1 capsule by mouth daily 100 capsule 3     hydrocortisone 2.5 % ointment APPLY TO AFFECTED AREA ONCE DAILY FOR FACIAL RASH 20 g 6     isosorbide mononitrate (IMDUR) 30 MG 24 hr tablet Take 1 tablet (30 mg) by mouth every morning 90 tablet 3     latanoprost (XALATAN) 0.005 % ophthalmic solution Place 1 drop into both eyes At Bedtime       lisinopril (ZESTRIL) 20 MG tablet Take 1 tablet (20 mg) by mouth daily 90 tablet 3      "metFORMIN (GLUCOPHAGE) 500 MG tablet Take 1 tablet (500 mg) by mouth 2 times daily (with meals) 180 tablet 1     metoprolol tartrate (LOPRESSOR) 50 MG tablet TAKE 1/2 TABLET BY MOUTH TWICE DAILY 90 tablet 3     multivitamin, therapeutic with minerals (THERA-VIT-M) TABS Take 1 tablet by mouth daily       nitroGLYcerin (NITROSTAT) 0.4 MG sublingual tablet Place 1 tablet (0.4 mg) under the tongue every 5 minutes as needed for chest pain Up to 3 doses max. 25 tablet 3     omeprazole (PRILOSEC) 20 MG DR capsule Take 1 capsule (20 mg) by mouth every morning 90 capsule 0     senna-docusate (SENOKOT-S/PERICOLACE) 8.6-50 MG tablet Take 2 tablets by mouth daily       blood glucose (ONE TOUCH DELICA) lancing device Use to test blood sugars four times daily or as directed. (Patient not taking: Reported on 5/11/2021) 1 each 1     blood glucose (ONETOUCH VERIO IQ) test strip USE TO TEST 4 TIMES DAILY OR AS DIRECTED (Patient not taking: Reported on 5/11/2021) 100 strip 4     blood glucose monitoring (ONE TOUCH DELICA) lancets Use to test blood sugars four times daily or as directed. (Patient not taking: Reported on 5/11/2021) 100 each 13     blood glucose monitoring (ONETOUCH VERIO IQ SYSTEM) meter device kit Use to test blood sugar 4 times daily. (Patient not taking: Reported on 5/11/2021) 1 kit 0       ROS:  10 point ROS neg other than the symptoms noted above in the HPI.    PHYSICAL EXAM:  /78   Pulse 69   Temp 97.8  F (36.6  C)   Resp 18   Ht 1.93 m (6' 4\")   Wt 115.2 kg (254 lb)   SpO2 97%   BMI 30.92 kg/m     Gen: sitting up in bed, alert, cooperative and in no acute distress  HEENT: normocephalic; oropharynx clear  Card: RRR, S1, S2, no murmurs  Resp: lungs clear to auscultation bilaterally, no crackles or wheezes  MSK: normal muscle tone, no LE edema  Neuro: CX II-XII grossly in tact; ROM in all four extremities grossly in tact  Psych: alert and oriented x3; normal affect  Skin: toes are pink, warm, well " perfused; no ecchymoses    LABORATORY/IMAGING DATA:  Reviewed as per Epic    ASSESSMENT/PLAN:    Bilateral Fibula Fractures (5/8/21)  Secondary to a fall down steps. Conservative management with casts to both ankles  -- NWB  -- analgesia with APAP 975 mg TID  -- DVT ppx with  mg per ortho  -- PT/OT  -- follow up with ortho as scheduled    CAD, HTN, HLD  SBPs 130s  --  mg (resume 81 mg after DVT ppx is done), atorvastatin 80 mg daily, Imdur 30 mg daily, lisinopril 20 mg daily, metoprolol 25 mg BID  -- follow BPs and adjust medications as needed    DM, Type II  Hgb A1c 6.9. Sugars 150s-203 at TCU with limited data as new admit  -- metformin 500 mg BID  -- establish trend for sugars and then can likely decreased frequency of checks to PRN    GERD  -- omeprazole 20 mg daily    Slow Transit Constipation  -- Senna-S 2 tabs daily  -- adjust bowel regimen as needed    Fall  Physical Deconditioning  In setting of hospitalization and underlying medical conditions  -- ongoing PT/OT      Electronically signed by:  Anju Mcclelland MD                          Sincerely,        Anju Mcclelland MD

## 2021-05-12 NOTE — PROGRESS NOTES
[place] [facility]  INITIAL VISIT NOTE  May 12 2021    PRIMARY C[facility] PROVID[facility] AND CLINIC[facility] [place] [address] / S*    Chief Complaint   Patient presents with     Hospital F/U       HPI:    David Medina is a 81 year old  (1940) [facility]. Medical history is notable for CAD, HTN, DM II, peripheral neuropathy and thoracic spine stenosis. He was hospitalized at [facility] from 5/8/21 to 5/10/21 where he presented after falling down some steps and was found to have bilateral distal fibula fractures. Ortho consulted and recommendation was for non-op management and casts were placed to both lower extremities. He was admitted to this facility for medical management and rehab.     Today, Mr. Medina is seen in his room sitting up in bed. He is an excellent historian and recounted the unfortunate accident that left him with two broken ankles. Pain control is variable, but overall improving. He is taking just acetaminophen so he can better gauge how his pain is doing. Last BM on Saturday, but he typically goes every 2-3 days so not concerned yet. No concerns today per discussion with nursing. He is working with therapies and is an extensive assist of two with mechanical lift for transfers. They are going to switch rooms so that he can have a lift built into the ceiling. Goal is to return home with his spouse     CODE STATUS:   CPR/Full code     ALLERGIES:     Allergies   Allergen Reactions     No Known Allergies        PAST MEDICAL HISTORY:   Past Medical History:   Diagnosis Date     AAA (abdominal aortic aneurysm) without rupture (H)     stent 2005, s/p endovascular repair 12/07     Allergic rhinitis      Aortic root dilatation (H)      ASCVD (arteriosclerotic cardiovascular disease) 06/2005    Cardiac cath Oct 2014: EMEKA to RCA, cath 2011: medical management, cath Aug 2005: EMEKA  to OM; cath June 2005: EMEKA to LAD     B12 deficiency      Benign paroxysmal positional vertigo      Cervical radiculopathy 10/30/2008    Injected through Ortho Spine 10/07     Closed fracture of patella 02/2007    left     DM type 2 (diabetes mellitus, type 2) (H)      Gastroesophageal reflux disease without esophagitis      Hyperlipidemia LDL goal < 70      Hypertension goal BP (blood pressure) < 130/80      Ischemic cardiomyopathy 06/2005    ischemic cardiomyopathy, EF 45%     Lumbosacral plexopathy     diabetic radiculoplexopathy causing proximal muscle weakness, Dr. 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 Villa (neuro) and Dr. 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 Jacob ARCEO APPENDECTOMY  12/06    open     CLOSED RX TIBIA SHAFT FX  6/2013    RT prox tibial fracture     COLONOSCOPY  10/3/05    WNL, rpt in 10 yrs, Dr. Vicente, [place] [facility]     COLONOSCOPY  9/23/15    advanced adenoma, rpt 3 yrs     coronary angiogram  2/28/11    diffuse disease with vasospasm     EMG  6/10/10    chronic active polyradiculopathy, mild generalized polyneuropathy, r/o lumbar stenosis     ENDOVAS REPAIR, INFRARENL ABDOM AORTIC ANEURYSM/DISSECT; [id number]/AORTO-UNIFEMORAL PROSTHESIS  6/05    one done,one to do     FLEXIBLE SIGMOIDOSCOPY  11/00    WNL     HEART CATH, ANGIOPLASTY  10/3/14     EMEKA x 1  ostium of the RCA     LAMINECTOMY THORACIC THREE LEVELS  10/31/12    T10, T11, T12 with medial facetectomy     OPEN REDUCTION INTERNAL FIXATION HIP NAILING N/A 11/1/2016    Procedure: OPEN REDUCTION INTERNAL FIXATION HIP NAILING;  Surgeon: Jason Flowers MD;  Location: [facility] OR     SONO ABD RETROPERITNL  11/7/06    AAA 4.2 x 4.8, Dr. James's office     SURGICAL HISTORY OF -       tonsillectomy     TONSILLECTOMY  1940     ZZ STRESS LEXISCAN TEST  10/5/12    small, fixed, apical defect/apical thinning     ZZHC PTCA SINGLE VESSEL  6/05    LAD - 100% stenosis     ZZHC UGI ENDOSCOPY, SIMPLE EXAM  8/99    erosive esophagitis, GERD, Dr. Salazar       FAMILY HISTORY:   Family History   Problem Relation Age of Onset     Respiratory Father         TB     Other - See Comments Mother         FALL       SOCIAL HISTORY:   Lives with spouse    MEDICATIONS:  Post Discharge Medication Reconciliation Status: discharge medications reconciled and changed, per note/orders.   Current Outpatient Medications   Medication Sig Dispense Refill     acetaminophen (TYLENOL) 325 MG tablet Take 975 mg by mouth 3 times daily as needed for mild pain       aspirin (ASA) 325 MG tablet Take 1 tablet (325 mg) by mouth daily       atorvastatin (LIPITOR) 80 MG tablet Take 1 tablet (80 mg) by mouth daily 90 tablet 3     cholecalciferol 25 MCG (1000 UT) TABS Take 1,000 Units by mouth daily       Cyanocobalamin 1000 MCG CAPS Take 1 capsule by mouth daily 100 capsule 3     hydrocortisone 2.5 % ointment APPLY TO AFFECTED AREA ONCE DAILY FOR FACIAL RASH 20 g 6     isosorbide mononitrate (IMDUR) 30 MG 24 hr tablet Take 1 tablet (30 mg) by mouth every morning 90 tablet 3     latanoprost (XALATAN) 0.005 % ophthalmic solution Place 1 drop into both eyes At Bedtime       lisinopril (ZESTRIL) 20 MG tablet Take 1 tablet (20 mg) by mouth daily 90 tablet 3     metFORMIN (GLUCOPHAGE) 500 MG tablet Take 1 tablet (500 mg) by mouth 2 times daily (with meals)  "180 tablet 1     metoprolol tartrate (LOPRESSOR) 50 MG tablet TAKE 1/2 TABLET BY MOUTH TWICE DAILY 90 tablet 3     multivitamin, therapeutic with minerals (THERA-VIT-M) TABS Take 1 tablet by mouth daily       nitroGLYcerin (NITROSTAT) 0.4 MG sublingual tablet Place 1 tablet (0.4 mg) under the tongue every 5 minutes as needed for chest pain Up to 3 doses max. 25 tablet 3     omeprazole (PRILOSEC) 20 MG DR capsule Take 1 capsule (20 mg) by mouth every morning 90 capsule 0     senna-docusate (SENOKOT-S/PERICOLACE) 8.6-50 MG tablet Take 2 tablets by mouth daily       blood glucose (ONE TOUCH DELICA) lancing device Use to test blood sugars four times daily or as directed. (Patient not taking: Reported on 5/11/2021) 1 each 1     blood glucose (ONETOUCH VERIO IQ) test strip USE TO TEST 4 TIMES DAILY OR AS DIRECTED (Patient not taking: Reported on 5/11/2021) 100 strip 4     blood glucose monitoring (ONE TOUCH DELICA) lancets Use to test blood sugars four times daily or as directed. (Patient not taking: Reported on 5/11/2021) 100 each 13     blood glucose monitoring (ONETOUCH VERIO IQ SYSTEM) meter device kit Use to test blood sugar 4 times daily. (Patient not taking: Reported on 5/11/2021) 1 kit 0       ROS:  10 point ROS neg other than the symptoms noted above in the HPI.    PHYSICAL EXAM:  /78   Pulse 69   Temp 97.8  F (36.6  C)   Resp 18   Ht 1.93 m (6' 4\")   Wt 115.2 kg (254 lb)   SpO2 97%   BMI 30.92 kg/m     Gen: sitting up in bed, alert, cooperative and in no acute distress  HEENT: normocephalic; oropharynx clear  Card: RRR, S1, S2, no murmurs  Resp: lungs clear to auscultation bilaterally, no crackles or wheezes  MSK: normal muscle tone, no LE edema  Neuro: CX II-XII grossly in tact; ROM in all four extremities grossly in tact  Psych: alert and oriented x3; normal affect  Skin: toes are pink, warm, well perfused; no ecchymoses    LABORATORY/IMAGING DATA:  Reviewed as per " Epic    ASSESSMENT/PLAN:    Bilateral Fibula Fractures (5/8/21)  Secondary to a fall down steps. Conservative management with casts to both ankles  -- NWB  -- analgesia with APAP 975 mg TID  -- DVT ppx with  mg per ortho  -- PT/OT  -- follow up with ortho as scheduled    CAD, HTN, HLD  SBPs 130s  --  mg (resume 81 mg after DVT ppx is done), atorvastatin 80 mg daily, Imdur 30 mg daily, lisinopril 20 mg daily, metoprolol 25 mg BID  -- follow BPs and adjust medications as needed    DM, Type II  Hgb A1c 6.9. Sugars 150s-203 at TCU with limited data as new admit  -- metformin 500 mg BID  -- establish trend for sugars and then can likely decreased frequency of checks to PRN    GERD  -- omeprazole 20 mg daily    Slow Transit Constipation  -- Senna-S 2 tabs daily  -- adjust bowel regimen as needed    Fall  Physical Deconditioning  In setting of hospitalization and underlying medical conditions  -- ongoing PT/OT      Electronically signed by:  Anju Mcclelland MD

## 2021-05-13 LAB
GAMMA INTERFERON BACKGROUND BLD IA-ACNC: 0 IU/ML
M TB IFN-G CD4+ BCKGRND COR BLD-ACNC: 10 IU/ML
M TB TUBERC IFN-G BLD QL: NEGATIVE
MITOGEN IGNF BCKGRD COR BLD-ACNC: 0 IU/ML
MITOGEN IGNF BCKGRD COR BLD-ACNC: 0.03 IU/ML

## 2021-05-17 VITALS
WEIGHT: 254 LBS | SYSTOLIC BLOOD PRESSURE: 126 MMHG | BODY MASS INDEX: 30.92 KG/M2 | HEART RATE: 85 BPM | RESPIRATION RATE: 22 BRPM | DIASTOLIC BLOOD PRESSURE: 72 MMHG | TEMPERATURE: 98.4 F | OXYGEN SATURATION: 97 %

## 2021-05-18 ENCOUNTER — NURSING HOME VISIT (OUTPATIENT)
Dept: GERIATRICS | Facility: CLINIC | Age: 81
End: 2021-05-18
Payer: COMMERCIAL

## 2021-05-18 DIAGNOSIS — E53.8 B12 DEFICIENCY: ICD-10-CM

## 2021-05-18 DIAGNOSIS — S82.202D CLOSED FRACTURE OF LEFT TIBIA AND FIBULA WITH ROUTINE HEALING: ICD-10-CM

## 2021-05-18 DIAGNOSIS — I10 BENIGN ESSENTIAL HYPERTENSION: ICD-10-CM

## 2021-05-18 DIAGNOSIS — E11.42 TYPE 2 DIABETES MELLITUS WITH DIABETIC POLYNEUROPATHY, WITHOUT LONG-TERM CURRENT USE OF INSULIN (H): Primary | ICD-10-CM

## 2021-05-18 DIAGNOSIS — E78.00 PURE HYPERCHOLESTEROLEMIA: ICD-10-CM

## 2021-05-18 DIAGNOSIS — S82.401D CLOSED FRACTURE OF RIGHT TIBIA AND FIBULA WITH ROUTINE HEALING, SUBSEQUENT ENCOUNTER: ICD-10-CM

## 2021-05-18 DIAGNOSIS — S82.402D CLOSED FRACTURE OF LEFT TIBIA AND FIBULA WITH ROUTINE HEALING: ICD-10-CM

## 2021-05-18 DIAGNOSIS — R29.6 FALLS FREQUENTLY: ICD-10-CM

## 2021-05-18 DIAGNOSIS — S82.201D CLOSED FRACTURE OF RIGHT TIBIA AND FIBULA WITH ROUTINE HEALING, SUBSEQUENT ENCOUNTER: ICD-10-CM

## 2021-05-18 PROCEDURE — 99310 SBSQ NF CARE HIGH MDM 45: CPT | Performed by: NURSE PRACTITIONER

## 2021-05-18 NOTE — PROGRESS NOTES
[facility]    Chief Complaint   Patient presents with     RECHECK     HPI:  David Medina is a 81 year old  (1940), who is being [facility] today [id number]     HPI:    81 year old male with PMHx CAD, HLD, DM II with peripheral neuropathy, lumbosacral plexopathy, gait disorder and hx of spinal stenosis hospitalized after a fall down some stairs which resulted in bilateral fibular fractures. Ortho: NWB to bilateral LEs for 8 wks. Placed in bilateral LE casts on 5/10. Pain well managed with Tylenol and on  mg daily for DVT prophylaxis. [facility] f/u 3 weeks. DM managed with metformin. CAD/HTN/HLD: on ASA, atorvastatin 80mg daily, Imdur 30mg daily, lisinopril 20mg daily, metoprolol 25mg BID. TCU stay was recommended.    Today's concern is:   Constipation, hypertension    Allergies, and PMH/PSH reviewed in [facility] today.  REVIEW OF SYSTEMS:  4 point ROS including Respiratory, CV, GI and , other than that noted in the HPI,  is negative    Objective:   /72   Pulse 85   Temp 98.4  F (36.9  C)   Resp 22   Wt 115.2 kg (254 lb)   SpO2 97%   BMI 30.92 kg/m    GENERAL APPEARANCE:  Alert, in no distress, pleasant, cooperative, oriented, denies pain  RESP:  respiratory effort normal, no chest wall tenderness, no respiratory distress, Lung sounds clear, RA  CV:  Auscultation of heart done, rate and rhythm controlled, no murmur, no rub or gallop.   M/S:  Bilateral LE NWB  NEURO: No focal deficits.       Most Recent 3 CBC's:  Recent Labs   Lab Test 05/09/21  0635 05/08/21  1523 02/22/21  0709   WBC 7.5 9.2 7.7   HGB 12.4* 13.2* 14.4   MCV 96 96 96    162 196     Most Recent 3 BMP's:  Recent Labs   Lab Test 05/09/21  0635 05/08/21  1523 02/22/21  0709    141 137   POTASSIUM 4.1 4.1 4.5   CHLORIDE 108 110* 106   CO2 27 25 24   BUN 16 22 24   CR 0.98 0.92 0.96   ANIONGAP 4 6 7   GENEVIEVE 9.0 9.0 9.9   * 116* 173*        Assessment/Plan:    S/P mechanical fall   Closed fracture of distal end of right fibula with routine healing, unspecified fracture morphology, subsequent encounter  Closed fracture of distal end of left fibula with routine healing, unspecified fracture morphology, subsequent encounter  Spinal stenosis of thoracic region  Falls frequently  Currently in bilateral LE casts, NWB. F/u with ortho on 6/1    DVT prophylaxis  Continue ASA 325mg daily, duration per ortho    T2DM  Last A1c 6.9 on 5/9/21, continue metformin 500mg BID. BS     HTN  Continue imdur 30mg daily, lisinopril 20mg daily and metoprolol tartrate 25mg BID. SBP <140, HR <80    GERD  Continue omeprazole 20mg daily    Constipation  Change senna S to 1 tab daily    Vit D  Continue D3 1000U daily    B12 deficiency  Continue cyanocobalamin 1000mcg daily    HLD  Continue statin    Orders:  Change senna S to daily    Total time spent with patient visit at the skilled nursing facility was 35 min including patient visit and review of past records. Greater than 50% of total time spent with counseling and coordinating care due to pain control with existing tylenol PRN, need to change senna per loose stools, maintaining NWB, f/u with ortho and therapy, which lasted 20 minutes.     Electronically signed by: Farhan Ludwig NP

## 2021-05-21 ENCOUNTER — NURSING HOME VISIT (OUTPATIENT)
Dept: GERIATRICS | Facility: CLINIC | Age: 81
End: 2021-05-21
Payer: COMMERCIAL

## 2021-05-21 VITALS
TEMPERATURE: 96.4 F | WEIGHT: 182 LBS | DIASTOLIC BLOOD PRESSURE: 61 MMHG | HEART RATE: 64 BPM | BODY MASS INDEX: 22.15 KG/M2 | RESPIRATION RATE: 16 BRPM | SYSTOLIC BLOOD PRESSURE: 107 MMHG | OXYGEN SATURATION: 98 %

## 2021-05-21 DIAGNOSIS — Z79.01 ANTICOAGULATED: ICD-10-CM

## 2021-05-21 DIAGNOSIS — S82.402D CLOSED FRACTURE OF LEFT TIBIA AND FIBULA WITH ROUTINE HEALING: ICD-10-CM

## 2021-05-21 DIAGNOSIS — K59.01 SLOW TRANSIT CONSTIPATION: ICD-10-CM

## 2021-05-21 DIAGNOSIS — S82.202D CLOSED FRACTURE OF LEFT TIBIA AND FIBULA WITH ROUTINE HEALING: ICD-10-CM

## 2021-05-21 DIAGNOSIS — R29.6 FALLS FREQUENTLY: ICD-10-CM

## 2021-05-21 DIAGNOSIS — E11.42 TYPE 2 DIABETES MELLITUS WITH DIABETIC POLYNEUROPATHY, WITHOUT LONG-TERM CURRENT USE OF INSULIN (H): ICD-10-CM

## 2021-05-21 DIAGNOSIS — I10 BENIGN ESSENTIAL HYPERTENSION: ICD-10-CM

## 2021-05-21 DIAGNOSIS — E11.42 DIABETIC POLYNEUROPATHY ASSOCIATED WITH TYPE 2 DIABETES MELLITUS (H): Primary | ICD-10-CM

## 2021-05-21 PROCEDURE — 99309 SBSQ NF CARE MODERATE MDM 30: CPT | Performed by: NURSE PRACTITIONER

## 2021-05-21 NOTE — PROGRESS NOTES
[facility] GERIATRIC SERVICES    Chief Complaint   Patient presents with     RECHECK     HPI:  David Medina is a 81 year old  (1940), who [facility] [[id number]].     HPI:    81 year old male with PMHx CAD, HLD, DM II with peripheral neuropathy, lumbosacral plexopathy, gait disorder and hx of spinal stenosis hospitalized after a fall down some stairs which resulted in bilateral fibular fractures. Ortho: NWB to bilateral LEs for 8 wks. Placed in bilateral LE casts on 5/10. Pain well managed with Tylenol and on  mg daily for DVT prophylaxis. TCO f/u 3 weeks. DM managed with metformin. CAD/HTN/HLD: on ASA, atorvastatin 80mg daily, Imdur 30mg daily, lisinopril 20mg daily, metoprolol 25mg BID. TCU stay was recommended.    Today's concern is:   Therapy progress    Allergies, and PMH/PSH reviewed in [facility] today.  REVIEW OF SYSTEMS:  4 point ROS including Respiratory, CV, GI and , other than that noted in the HPI,  is negative    Objective:   /61   Pulse 64   Temp 96.4  F (35.8  C)   Resp 16   Wt 82.6 kg (182 lb)   SpO2 98%   BMI 22.15 kg/m    GENERAL APPEARANCE:  Alert, in no distress, pleasant, cooperative, oriented, denies pain  RESP:  respiratory effort normal, no chest wall tenderness, no respiratory distress, lung sounds clear, RA  CV:  Auscultation of heart done, rate and rhythm controlled, no murmur, no rub or gallop.   M/S:  Bilateral LE NWB  NEURO: No focal deficits.       Most Recent 3 CBC's:  Recent Labs   Lab Test 05/09/21  0635 05/08/21  1523 02/22/21  0709   WBC 7.5 9.2 7.7   HGB 12.4* 13.2* 14.4   MCV 96 96 96    162 196     Most Recent 3 BMP's:  Recent Labs   Lab Test 05/09/21  0635 05/08/21  1523 02/22/21  0709    141 137   POTASSIUM 4.1 4.1 4.5   CHLORIDE 108 110* 106   CO2 27 25 24   BUN 16 22 24   CR 0.98 0.92 0.96   ANIONGAP 4 6 7   GENEVIEVE 9.0 9.0 9.9   * 116* 173*        Assessment/Plan:    S/P mechanical fall   Closed fracture of distal end of right fibula with routine healing, unspecified fracture morphology, subsequent encounter  Closed fracture of distal end of left fibula with routine healing, unspecified fracture morphology, subsequent encounter  Spinal stenosis of thoracic region  Falls frequently  Currently in bilateral LE casts, NWB. F/u with ortho on 6/1     DVT prophylaxis  Continue ASA 325mg daily, duration per ortho     T2DM  Last A1c 6.9 on 5/9/21, continue metformin 500mg BID. BS <200     HTN  Continue imdur 30mg daily, lisinopril 20mg daily and metoprolol tartrate 25mg BID. SBP <140, HR <80     GERD  Continue omeprazole 20mg daily     Constipation  Continue senna S 1 tab daily, effective     Vit D  Continue D3 1000U daily     B12 deficiency  Continue cyanocobalamin 1000mcg daily     HLD  Continue statin    Therapy  SLUMS 27/30, working with increasing core strength, balance     Orders:  Lab recheck      Electronically signed by: Farhan Ludwig NP

## 2021-05-22 PROBLEM — K59.01 SLOW TRANSIT CONSTIPATION: Status: ACTIVE | Noted: 2021-05-22

## 2021-05-22 PROBLEM — Z79.01 ANTICOAGULATED: Status: ACTIVE | Noted: 2021-05-22

## 2021-05-25 ENCOUNTER — HOSPITAL LABORATORY (OUTPATIENT)
Dept: OTHER | Facility: CLINIC | Age: 81
End: 2021-05-25

## 2021-05-25 VITALS
DIASTOLIC BLOOD PRESSURE: 81 MMHG | BODY MASS INDEX: 30.42 KG/M2 | SYSTOLIC BLOOD PRESSURE: 142 MMHG | TEMPERATURE: 98.1 F | WEIGHT: 249.9 LBS | OXYGEN SATURATION: 97 % | HEART RATE: 61 BPM | RESPIRATION RATE: 18 BRPM

## 2021-05-26 ENCOUNTER — NURSING HOME VISIT (OUTPATIENT)
Dept: GERIATRICS | Facility: CLINIC | Age: 81
End: 2021-05-26
Payer: COMMERCIAL

## 2021-05-26 DIAGNOSIS — K59.01 SLOW TRANSIT CONSTIPATION: ICD-10-CM

## 2021-05-26 DIAGNOSIS — Z79.01 ANTICOAGULATED: ICD-10-CM

## 2021-05-26 DIAGNOSIS — S82.401D CLOSED FRACTURE OF RIGHT TIBIA AND FIBULA WITH ROUTINE HEALING, SUBSEQUENT ENCOUNTER: ICD-10-CM

## 2021-05-26 DIAGNOSIS — S82.201D CLOSED FRACTURE OF RIGHT TIBIA AND FIBULA WITH ROUTINE HEALING, SUBSEQUENT ENCOUNTER: ICD-10-CM

## 2021-05-26 DIAGNOSIS — E11.42 TYPE 2 DIABETES MELLITUS WITH DIABETIC POLYNEUROPATHY, WITHOUT LONG-TERM CURRENT USE OF INSULIN (H): Primary | ICD-10-CM

## 2021-05-26 DIAGNOSIS — S82.202D CLOSED FRACTURE OF LEFT TIBIA AND FIBULA WITH ROUTINE HEALING: ICD-10-CM

## 2021-05-26 DIAGNOSIS — S82.402D CLOSED FRACTURE OF LEFT TIBIA AND FIBULA WITH ROUTINE HEALING: ICD-10-CM

## 2021-05-26 DIAGNOSIS — I10 BENIGN ESSENTIAL HYPERTENSION: ICD-10-CM

## 2021-05-26 PROCEDURE — 99308 SBSQ NF CARE LOW MDM 20: CPT | Performed by: NURSE PRACTITIONER

## 2021-05-26 NOTE — PROGRESS NOTES
[facility] GERIATRIC SERVICES    Chief Complaint   Patient presents with     RECHECK     HPI:  David Medina is a 81 year old  (1940), who is being [facility] today [facility] [[id number]].     HPI:    81 year old male with PMHx CAD, HLD, DM II with peripheral neuropathy, lumbosacral plexopathy, gait disorder and hx of spinal stenosis hospitalized after a fall down some stairs which resulted in bilateral fibular fractures. Ortho: NWB to bilateral LEs for 8 wks. Placed in bilateral LE casts on 5/10. Pain well managed with Tylenol and on  mg daily for DVT prophylaxis. TCO f/u 3 weeks. DM managed with metformin. CAD/HTN/HLD: on ASA, atorvastatin 80mg daily, Imdur 30mg daily, lisinopril 20mg daily, metoprolol 25mg BID. TCU stay was recommended.    Today's concern is:   Therapy progress    Allergies, and PMH/PSH reviewed in [facility] today.  REVIEW OF SYSTEMS:  4 point ROS including Respiratory, CV, GI and , other than that noted in the HPI,  is negative    Objective:   BP (!) 142/81   Pulse 61   Temp 98.1  F (36.7  C)   Resp 18   Wt 113.4 kg (249 lb 14.4 oz)   SpO2 97%   BMI 30.42 kg/m    GENERAL APPEARANCE:  Alert, in no distress, pleasant, cooperative, oriented, denies pain  RESP:  respiratory effort normal, no chest wall tenderness, no respiratory distress, lung sounds clear, RA  CV:  Auscultation of heart done, rate and rhythm controlled, no murmur, no rub or gallop.   M/S:  Bilateral LE NWB  NEURO: No focal deficits.      Most Recent 3 CBC's:  Recent Labs   Lab Test 05/09/21  0635 05/08/21  1523 02/22/21  0709   WBC 7.5 9.2 7.7   HGB 12.4* 13.2* 14.4   MCV 96 96 96    162 196     Most Recent 3 BMP's:  Recent Labs   Lab Test 05/09/21  0635 05/08/21  1523 02/22/21  0709    141 137   POTASSIUM 4.1 4.1 4.5   CHLORIDE 108 110* 106   CO2 27 25 24   BUN 16 22 24   CR 0.98 0.92 0.96   ANIONGAP 4 6 7   GENEVIEVE 9.0 9.0 9.9   * 116* 173*        Assessment/Plan:    S/P mechanical fall   Closed fracture of distal end of right fibula with routine healing, unspecified fracture morphology, subsequent encounter  Closed fracture of distal end of left fibula with routine healing, unspecified fracture morphology, subsequent encounter  Spinal stenosis of thoracic region  Falls frequently  Currently in bilateral LE casts, NWB. F/u with ortho on 6/1     DVT prophylaxis  Continue ASA 325mg daily, duration per ortho     T2DM  Last A1c 6.9 on 5/9/21, continue metformin 500mg BID. BS <200     HTN  Continue imdur 30mg daily, lisinopril 20mg daily and metoprolol tartrate 25mg BID. SBP <140, HR <80     GERD  Continue omeprazole 20mg daily     Constipation  Continue senna S 1 tab daily, effective     Vit D  Continue D3 1000U daily     B12 deficiency  Continue cyanocobalamin 1000mcg daily     HLD  Continue statin     Therapy  SLUMS 27/30, working with increasing core strength, balance      Electronically signed by: Farhan Ludwig NP

## 2021-06-02 ENCOUNTER — NURSING HOME VISIT (OUTPATIENT)
Dept: GERIATRICS | Facility: CLINIC | Age: 81
End: 2021-06-02
Payer: COMMERCIAL

## 2021-06-02 VITALS
OXYGEN SATURATION: 97 % | HEART RATE: 63 BPM | WEIGHT: 246 LBS | BODY MASS INDEX: 29.94 KG/M2 | SYSTOLIC BLOOD PRESSURE: 124 MMHG | TEMPERATURE: 97.6 F | RESPIRATION RATE: 18 BRPM | DIASTOLIC BLOOD PRESSURE: 73 MMHG

## 2021-06-02 DIAGNOSIS — R29.6 FALLS FREQUENTLY: ICD-10-CM

## 2021-06-02 DIAGNOSIS — E11.42 TYPE 2 DIABETES MELLITUS WITH DIABETIC POLYNEUROPATHY, WITHOUT LONG-TERM CURRENT USE OF INSULIN (H): Primary | ICD-10-CM

## 2021-06-02 DIAGNOSIS — K59.01 SLOW TRANSIT CONSTIPATION: ICD-10-CM

## 2021-06-02 DIAGNOSIS — Z79.01 ANTICOAGULATED: ICD-10-CM

## 2021-06-02 DIAGNOSIS — S82.202D CLOSED FRACTURE OF LEFT TIBIA AND FIBULA WITH ROUTINE HEALING: ICD-10-CM

## 2021-06-02 DIAGNOSIS — E53.8 B12 DEFICIENCY: ICD-10-CM

## 2021-06-02 DIAGNOSIS — S82.402D CLOSED FRACTURE OF LEFT TIBIA AND FIBULA WITH ROUTINE HEALING: ICD-10-CM

## 2021-06-02 PROCEDURE — 99308 SBSQ NF CARE LOW MDM 20: CPT | Performed by: NURSE PRACTITIONER

## 2021-06-02 NOTE — PROGRESS NOTES
[facility] GERIATRIC SERVICES    Chief Complaint   Patient presents with     RECHECK     HPI:  David Medina is a 81 year old  (1940), who is being [facility] [[id number]].     This is a 81 year old male with PMHx CAD, HLD, DM II with peripheral neuropathy, lumbosacral plexopathy, gait disorder and hx of spinal stenosis hospitalized after a fall down some stairs which resulted in bilateral fibular fractures. Ortho: NWB to bilateral LEs for 8 wks. Placed in bilateral LE casts on 5/10. Pain well managed with Tylenol and on  mg daily for DVT prophylaxis. TCO f/u 3 weeks. DM managed with metformin. CAD/HTN/HLD: on ASA, atorvastatin 80mg daily, Imdur 30mg daily, lisinopril 20mg daily, metoprolol 25mg BID. TCU stay was recommended.    Today's concern is:   F/u with ortho    Allergies, and PMH/PSH reviewed in [facility] today.  REVIEW OF SYSTEMS:  4 point ROS including Respiratory, CV, GI and , other than that noted in the HPI,  is negative    Objective:   /73   Pulse 63   Temp 97.6  F (36.4  C)   Resp 18   Wt 111.6 kg (246 lb)   SpO2 97%   BMI 29.94 kg/m    GENERAL APPEARANCE:  Alert, in no distress, pleasant, cooperative, oriented, denies pain  RESP:  respiratory effort normal, no chest wall tenderness, no respiratory distress, lung sounds clear, RA  CV:  Auscultation of heart done, rate and rhythm controlled, no murmur, no rub or gallop.   M/S:  Bilateral LE NWB  NEURO: No focal deficits      Most Recent 3 CBC's:  Recent Labs   Lab Test 05/09/21  0635 05/08/21  1523 02/22/21  0709   WBC 7.5 9.2 7.7   HGB 12.4* 13.2* 14.4   MCV 96 96 96    162 196     Most Recent 3 BMP's:  Recent Labs   Lab Test 05/09/21  0635 05/08/21  1523 02/22/21  0709    141 137   POTASSIUM 4.1 4.1 4.5   CHLORIDE 108 110* 106   CO2 27 25 24   BUN 16 22 24   CR 0.98 0.92 0.96   ANIONGAP 4 6 7   GENEVIEVE 9.0 9.0 9.9   * 116* 173*        Assessment/Plan:    S/P mechanical fall   Closed fracture of distal end of right fibula with routine healing, unspecified fracture morphology, subsequent encounter  Closed fracture of distal end of left fibula with routine healing, unspecified fracture morphology, subsequent encounter  Spinal stenosis of thoracic region  Falls frequently  Currently in bilateral LE casts (recasted), per recent f/u on 6/1 will need an additional 3 wks of NWB. F/u with ortho in 3 wks     DVT prophylaxis  Continue ASA 325mg daily, duration per ortho     T2DM  Last A1c 6.9 on 5/9/21, continue metformin 500mg BID. BS <200     HTN  Continue imdur 30mg daily, lisinopril 20mg daily and metoprolol tartrate 25mg BID. SBP <140, HR <80     GERD  Continue omeprazole 20mg daily, no issues     Constipation  Continue senna S 1 tab daily, effective     Vit D  Continue D3 1000U daily     B12 deficiency  Continue cyanocobalamin 1000mcg daily     HLD  Continue statin     Therapy  SLUMS 27/30, working with increasing core strength, balance      Electronically signed by: Farhan Ludwig NP

## 2021-06-08 ENCOUNTER — HOSPITAL LABORATORY (OUTPATIENT)
Dept: OTHER | Facility: CLINIC | Age: 81
End: 2021-06-08

## 2021-06-08 VITALS
WEIGHT: 258.8 LBS | TEMPERATURE: 98.3 F | BODY MASS INDEX: 31.5 KG/M2 | DIASTOLIC BLOOD PRESSURE: 70 MMHG | OXYGEN SATURATION: 96 % | RESPIRATION RATE: 20 BRPM | SYSTOLIC BLOOD PRESSURE: 124 MMHG | HEART RATE: 65 BPM

## 2021-06-09 ENCOUNTER — NURSING HOME VISIT (OUTPATIENT)
Dept: GERIATRICS | Facility: CLINIC | Age: 81
End: 2021-06-09
Payer: COMMERCIAL

## 2021-06-09 DIAGNOSIS — S82.402D CLOSED FRACTURE OF LEFT TIBIA AND FIBULA WITH ROUTINE HEALING: ICD-10-CM

## 2021-06-09 DIAGNOSIS — Z79.01 ANTICOAGULATED: ICD-10-CM

## 2021-06-09 DIAGNOSIS — S82.201D CLOSED FRACTURE OF RIGHT TIBIA AND FIBULA WITH ROUTINE HEALING, SUBSEQUENT ENCOUNTER: ICD-10-CM

## 2021-06-09 DIAGNOSIS — S82.202D CLOSED FRACTURE OF LEFT TIBIA AND FIBULA WITH ROUTINE HEALING: ICD-10-CM

## 2021-06-09 DIAGNOSIS — E11.42 TYPE 2 DIABETES MELLITUS WITH DIABETIC POLYNEUROPATHY, WITHOUT LONG-TERM CURRENT USE OF INSULIN (H): ICD-10-CM

## 2021-06-09 DIAGNOSIS — I10 BENIGN ESSENTIAL HYPERTENSION: ICD-10-CM

## 2021-06-09 DIAGNOSIS — S82.401D CLOSED FRACTURE OF RIGHT TIBIA AND FIBULA WITH ROUTINE HEALING, SUBSEQUENT ENCOUNTER: ICD-10-CM

## 2021-06-09 DIAGNOSIS — K59.01 SLOW TRANSIT CONSTIPATION: Primary | ICD-10-CM

## 2021-06-09 DIAGNOSIS — R29.6 FALLS FREQUENTLY: ICD-10-CM

## 2021-06-09 PROCEDURE — 99308 SBSQ NF CARE LOW MDM 20: CPT | Performed by: NURSE PRACTITIONER

## 2021-06-09 NOTE — PROGRESS NOTES
[facility] GERIATRIC SERVICES    Chief Complaint   Patient presents with     RECHECK     HPI:  David Medina is a 81 year old  (1940), who is being seen today for an episodic care visit at: [facility]) [[id number]].     This is a 81 year old male with PMHx CAD, HLD, DM II with peripheral neuropathy, lumbosacral plexopathy, gait disorder and hx of spinal stenosis hospitalized after a fall down some stairs which resulted in bilateral fibular fractures. Ortho: NWB to bilateral LEs for 8 wks. Placed in bilateral LE casts on 5/10. Pain well managed with Tylenol and on  mg daily for DVT prophylaxis. TCO f/u 3 weeks. DM managed with metformin. CAD/HTN/HLD: on ASA, atorvastatin 80mg daily, Imdur 30mg daily, lisinopril 20mg daily, metoprolol 25mg BID. TCU stay was recommended.    Today's concern is:   therapy    Allergies, and PMH/PSH reviewed in [facility] today.  REVIEW OF SYSTEMS:  4 point ROS including Respiratory, CV, GI and , other than that noted in the HPI,  is negative    Objective:   /70   Pulse 65   Temp 98.3  F (36.8  C)   Resp 20   Wt 117.4 kg (258 lb 12.8 oz)   SpO2 96%   BMI 31.50 kg/m    GENERAL APPEARANCE:  Alert, in no distress, pleasant, cooperative, oriented, denies pain  RESP:  respiratory effort normal, no chest wall tenderness, no respiratory distress, lung sounds clear, RA  CV:  Auscultation of heart done, rate and rhythm controlled, no murmur, no rub or gallop.   M/S:  Bilateral LE NWB  NEURO: No focal deficits. [facility] 27/30      Most Recent 3 CBC's:  Recent Labs   Lab Test 05/09/21  0635 05/08/21  1523 02/22/21  0709   WBC 7.5 9.2 7.7   HGB 12.4* 13.2* 14.4   MCV 96 96 96    162 196     Most Recent 3 BMP's:  Recent Labs   Lab Test 05/09/21  0635 05/08/21  1523 02/22/21  0709    141 137   POTASSIUM 4.1 4.1 4.5   CHLORIDE 108 110* 106   CO2 27 25 24   BUN 16 22 24   CR 0.98 0.92 0.96   ANIONGAP 4 6 7   GENEVIEVE 9.0 9.0 9.9   * 116* 173*        Assessment/Plan:    S/P mechanical fall   Closed fracture of distal end of right fibula with routine healing, unspecified fracture morphology, subsequent encounter  Closed fracture of distal end of left fibula with routine healing, unspecified fracture morphology, subsequent encounter  Spinal stenosis of thoracic region  Falls frequently  Currently in bilateral LE casts (recasted), per recent f/u on 6/1 will need an additional 2 wks of NWB. F/u with ortho on 6/25 with Dr Calvo     DVT prophylaxis  Continue ASA 325mg daily, duration per ortho     T2DM  Last A1c 6.9 on 5/9/21, continue metformin 500mg BID. BS <200     HTN  Continue imdur 30mg daily, lisinopril 20mg daily and metoprolol tartrate 25mg BID. SBP <140, HR <80     GERD  Continue omeprazole 20mg daily, no issues     Constipation  Continue senna S 1 tab daily, effective     Vit D  Continue D3 1000U daily     B12 deficiency  Continue cyanocobalamin 1000mcg daily     HLD  Continue statin    Orders:  NA    Electronically signed by: Farhan Ludwig NP

## 2021-06-15 VITALS
WEIGHT: 247.1 LBS | TEMPERATURE: 98.2 F | HEART RATE: 83 BPM | BODY MASS INDEX: 30.08 KG/M2 | OXYGEN SATURATION: 98 % | SYSTOLIC BLOOD PRESSURE: 124 MMHG | DIASTOLIC BLOOD PRESSURE: 80 MMHG | RESPIRATION RATE: 16 BRPM

## 2021-06-16 ENCOUNTER — NURSING HOME VISIT (OUTPATIENT)
Dept: GERIATRICS | Facility: CLINIC | Age: 81
End: 2021-06-16
Payer: COMMERCIAL

## 2021-06-16 DIAGNOSIS — S82.201D CLOSED FRACTURE OF RIGHT TIBIA AND FIBULA WITH ROUTINE HEALING, SUBSEQUENT ENCOUNTER: ICD-10-CM

## 2021-06-16 DIAGNOSIS — S82.402D CLOSED FRACTURE OF LEFT TIBIA AND FIBULA WITH ROUTINE HEALING: ICD-10-CM

## 2021-06-16 DIAGNOSIS — S82.202D CLOSED FRACTURE OF LEFT TIBIA AND FIBULA WITH ROUTINE HEALING: ICD-10-CM

## 2021-06-16 DIAGNOSIS — S82.401D CLOSED FRACTURE OF RIGHT TIBIA AND FIBULA WITH ROUTINE HEALING, SUBSEQUENT ENCOUNTER: ICD-10-CM

## 2021-06-16 DIAGNOSIS — K59.01 SLOW TRANSIT CONSTIPATION: Primary | ICD-10-CM

## 2021-06-16 DIAGNOSIS — I10 BENIGN ESSENTIAL HYPERTENSION: ICD-10-CM

## 2021-06-16 DIAGNOSIS — Z79.01 ANTICOAGULATED: ICD-10-CM

## 2021-06-16 PROCEDURE — 99308 SBSQ NF CARE LOW MDM 20: CPT | Performed by: NURSE PRACTITIONER

## 2021-06-16 NOTE — PROGRESS NOTES
[facility] GERIATRIC SERVICES    Chief Complaint   Patient presents with     RECHECK     HPI:  David Medina is a 81 year old  (1940), who is being seen today for an episodic care visit at: [facility] ([facility]) [[id number]].     This is a 81 year old male with PMHx CAD, HLD, DM II with peripheral neuropathy, lumbosacral plexopathy, gait disorder and hx of spinal stenosis hospitalized after a fall down some stairs which resulted in bilateral fibular fractures. Ortho: NWB to bilateral LEs for 8 wks. Placed in bilateral LE casts on 5/10. Pain well managed with Tylenol and on  mg daily for DVT prophylaxis. TCO f/u 3 weeks. DM managed with metformin. CAD/HTN/HLD: on ASA, atorvastatin 80mg daily, Imdur 30mg daily, lisinopril 20mg daily, metoprolol 25mg BID. TCU stay was recommended.    Today's concern is:   Therapy maintenance    Allergies, and PMH/PSH reviewed in [facility] today.  REVIEW OF SYSTEMS:  4 point ROS including Respiratory, CV, GI and , other than that noted in the HPI,  is negative    Objective:   /80   Pulse 83   Temp 98.2  F (36.8  C)   Resp 16   Wt 112.1 kg (247 lb 1.6 oz)   SpO2 98%   BMI 30.08 kg/m    GENERAL APPEARANCE:  Alert, in no distress, pleasant, cooperative, oriented, denies pain  RESP:  respiratory effort normal, no chest wall tenderness, no respiratory distress, lung sounds clear, RA  CV:  Auscultation of heart done, rate and rhythm controlled, no murmur, no rub or gallop.   M/S:  Bilateral LE NWB  NEURO: No focal deficits. [facility] 27/30      Most Recent 3 CBC's:  Recent Labs   Lab Test 05/09/21  0635 05/08/21  1523 02/22/21  0709   WBC 7.5 9.2 7.7   HGB 12.4* 13.2* 14.4   MCV 96 96 96    162 196     Most Recent 3 BMP's:  Recent Labs   Lab Test 05/09/21  0635 05/08/21  1523 02/22/21  0709    141 137   POTASSIUM 4.1 4.1 4.5   CHLORIDE 108 110* 106   CO2 27 25 24   BUN 16 22 24   CR 0.98 0.92 0.96   ANIONGAP 4 6 7   GENEVIEVE 9.0 9.0 9.9   * 116*  173*       Assessment/Plan:    S/P mechanical fall   Closed fracture of distal end of right fibula with routine healing, unspecified fracture morphology, subsequent encounter  Closed fracture of distal end of left fibula with routine healing, unspecified fracture morphology, subsequent encounter  Spinal stenosis of thoracic region  Falls frequently  Currently in bilateral LE casts (recasted), per recent f/u on 6/1 will need an additional 2 wks of NWB. F/u with ortho on 6/25 with Dr Calvo     DVT prophylaxis  Continue ASA 325mg daily, duration per ortho     T2DM  Last A1c 6.9 on 5/9/21, continue metformin 500mg BID. BS <200     HTN  Continue imdur 30mg daily, lisinopril 20mg daily and metoprolol tartrate 25mg BID. SBP <130, HR <70     GERD  Continue omeprazole 20mg daily, no issues     Constipation  Continue senna S 1 tab daily, effective     Vit D  Continue D3 1000U daily     B12 deficiency  Continue cyanocobalamin 1000mcg daily     HLD  Continue statin    Orders:  BMP/CBC recheck    Electronically signed by: Farhan Ludwig NP

## 2021-06-22 ENCOUNTER — HOSPITAL LABORATORY (OUTPATIENT)
Dept: OTHER | Facility: CLINIC | Age: 81
End: 2021-06-22

## 2021-06-22 LAB
ANION GAP SERPL CALCULATED.3IONS-SCNC: 4 MMOL/L (ref 3–14)
BUN SERPL-MCNC: 29 MG/DL (ref 7–30)
CALCIUM SERPL-MCNC: 9.2 MG/DL (ref 8.5–10.1)
CHLORIDE SERPL-SCNC: 107 MMOL/L (ref 94–109)
CO2 SERPL-SCNC: 26 MMOL/L (ref 20–32)
CREAT SERPL-MCNC: 1.1 MG/DL (ref 0.66–1.25)
ERYTHROCYTE [DISTWIDTH] IN BLOOD BY AUTOMATED COUNT: 13.1 % (ref 10–15)
GFR SERPL CREATININE-BSD FRML MDRD: 62 ML/MIN/{1.73_M2}
GLUCOSE SERPL-MCNC: 159 MG/DL (ref 70–99)
HCT VFR BLD AUTO: 38.9 % (ref 40–53)
HGB BLD-MCNC: 12.7 G/DL (ref 13.3–17.7)
LABORATORY COMMENT REPORT: NORMAL
MCH RBC QN AUTO: 31.8 PG (ref 26.5–33)
MCHC RBC AUTO-ENTMCNC: 32.6 G/DL (ref 31.5–36.5)
MCV RBC AUTO: 97 FL (ref 78–100)
PLATELET # BLD AUTO: 189 10E9/L (ref 150–450)
POTASSIUM SERPL-SCNC: 4.1 MMOL/L (ref 3.4–5.3)
RBC # BLD AUTO: 4 10E12/L (ref 4.4–5.9)
SARS-COV-2 RNA RESP QL NAA+PROBE: NEGATIVE
SARS-COV-2 RNA RESP QL NAA+PROBE: NORMAL
SODIUM SERPL-SCNC: 137 MMOL/L (ref 133–144)
SPECIMEN SOURCE: NORMAL
SPECIMEN SOURCE: NORMAL
WBC # BLD AUTO: 7.2 10E9/L (ref 4–11)

## 2021-06-23 ENCOUNTER — NURSING HOME VISIT (OUTPATIENT)
Dept: GERIATRICS | Facility: CLINIC | Age: 81
End: 2021-06-23
Payer: COMMERCIAL

## 2021-06-23 VITALS
TEMPERATURE: 97 F | OXYGEN SATURATION: 99 % | HEIGHT: 76 IN | HEART RATE: 66 BPM | DIASTOLIC BLOOD PRESSURE: 79 MMHG | SYSTOLIC BLOOD PRESSURE: 129 MMHG | WEIGHT: 253.8 LBS | BODY MASS INDEX: 30.91 KG/M2 | RESPIRATION RATE: 18 BRPM

## 2021-06-23 DIAGNOSIS — E11.42 TYPE 2 DIABETES MELLITUS WITH DIABETIC POLYNEUROPATHY, WITHOUT LONG-TERM CURRENT USE OF INSULIN (H): Primary | ICD-10-CM

## 2021-06-23 DIAGNOSIS — K59.01 SLOW TRANSIT CONSTIPATION: ICD-10-CM

## 2021-06-23 DIAGNOSIS — I10 BENIGN ESSENTIAL HYPERTENSION: ICD-10-CM

## 2021-06-23 DIAGNOSIS — S82.402D CLOSED FRACTURE OF LEFT TIBIA AND FIBULA WITH ROUTINE HEALING: ICD-10-CM

## 2021-06-23 DIAGNOSIS — E53.8 B12 DEFICIENCY: ICD-10-CM

## 2021-06-23 DIAGNOSIS — Z79.01 ANTICOAGULATED: ICD-10-CM

## 2021-06-23 DIAGNOSIS — S82.202D CLOSED FRACTURE OF LEFT TIBIA AND FIBULA WITH ROUTINE HEALING: ICD-10-CM

## 2021-06-23 PROCEDURE — 99308 SBSQ NF CARE LOW MDM 20: CPT | Performed by: NURSE PRACTITIONER

## 2021-06-23 ASSESSMENT — MIFFLIN-ST. JEOR: SCORE: 1952.73

## 2021-06-23 NOTE — PROGRESS NOTES
"[facility] GERIATRIC SERVICES    Chief Complaint   Patient presents with     RECHECK     HPI:  David Medina is a 81 year old  (1940), who is being seen today for an episodic care visit at: [facility] ([facility]) [[id number]].     This is a 81 year old male with PMHx CAD, HLD, DM II with peripheral neuropathy, lumbosacral plexopathy, gait disorder and hx of spinal stenosis hospitalized after a fall down some stairs which resulted in bilateral fibular fractures. Ortho: NWB to bilateral LEs for 8 wks. Placed in bilateral LE casts on 5/10. Pain well managed with Tylenol and on  mg daily for DVT prophylaxis. TCO f/u 3 weeks. DM managed with metformin. CAD/HTN/HLD: on ASA, atorvastatin 80mg daily, Imdur 30mg daily, lisinopril 20mg daily, metoprolol 25mg BID. TCU stay was recommended so discharged to [facility].    Today's concern is:   Lab recheck    Allergies, and PMH/PSH reviewed in [facility] today.  REVIEW OF SYSTEMS:  4 point ROS including Respiratory, CV, GI and , other than that noted in the HPI,  is negative    Objective:   /79   Pulse 66   Temp 97  F (36.1  C)   Resp 18   Ht 1.93 m (6' 4\")   Wt 115.1 kg (253 lb 12.8 oz)   SpO2 99%   BMI 30.89 kg/m    GENERAL APPEARANCE:  Alert, in no distress, pleasant, cooperative, oriented, denies pain  RESP:  respiratory effort normal, no chest wall tenderness, no respiratory distress, lung sounds clear, RA  CV:  Auscultation of heart done, rate and rhythm controlled, no murmur, no rub or gallop.   M/S:  Bilateral LE NWB  NEURO: No focal deficits. [facility] 27/30      Most Recent 3 CBC's:  Recent Labs   Lab Test 06/22/21  1425 05/09/21  0635 05/08/21  1523   WBC 7.2 7.5 9.2   HGB 12.7* 12.4* 13.2*   MCV 97 96 96    163 162     Most Recent 3 BMP's:  Recent Labs   Lab Test 06/22/21  1425 05/09/21  0635 05/08/21  1523    139 141   POTASSIUM 4.1 4.1 4.1   CHLORIDE 107 108 110*   CO2 26 27 25   BUN 29 16 22   CR 1.10 0.98 0.92   ANIONGAP 4 " 4 6   GENEVIEVE 9.2 9.0 9.0   * 161* 116*       Assessment/Plan:    S/P mechanical fall   Closed fracture of distal end of right fibula with routine healing, unspecified fracture morphology, subsequent encounter  Closed fracture of distal end of left fibula with routine healing, unspecified fracture morphology, subsequent encounter  Spinal stenosis of thoracic region  Falls frequently  Currently in bilateral LE casts (recasted), per recent f/u on 6/1 will need an additional few days of NWB. F/u with ortho on 6/25 with Dr Calvo     DVT prophylaxis  Continue ASA 325mg daily, duration per ortho     T2DM  Last A1c 6.9 on 5/9/21, continue metformin 500mg BID. BS <200     HTN  Continue imdur 30mg daily, lisinopril 20mg daily and metoprolol tartrate 25mg BID. SBP <130, HR <70    Normocytic anemia  Hgb 12.7 on 6/22     GERD  Continue omeprazole 20mg daily, no issues     Constipation  Continue senna S 1 tab daily, effective     Vit D  Continue D3 1000U daily     B12 deficiency  Continue cyanocobalamin 1000mcg daily     HLD  Continue statin    Orders:  NA    Electronically signed by: Farhan Ludwig NP

## 2021-06-27 VITALS
TEMPERATURE: 96.4 F | SYSTOLIC BLOOD PRESSURE: 129 MMHG | DIASTOLIC BLOOD PRESSURE: 69 MMHG | OXYGEN SATURATION: 98 % | WEIGHT: 253.8 LBS | BODY MASS INDEX: 30.89 KG/M2 | RESPIRATION RATE: 18 BRPM | HEART RATE: 87 BPM

## 2021-06-28 ENCOUNTER — NURSING HOME VISIT (OUTPATIENT)
Dept: GERIATRICS | Facility: CLINIC | Age: 81
End: 2021-06-28
Payer: COMMERCIAL

## 2021-06-28 DIAGNOSIS — K59.01 SLOW TRANSIT CONSTIPATION: ICD-10-CM

## 2021-06-28 DIAGNOSIS — E11.42 TYPE 2 DIABETES MELLITUS WITH DIABETIC POLYNEUROPATHY, WITHOUT LONG-TERM CURRENT USE OF INSULIN (H): Primary | ICD-10-CM

## 2021-06-28 PROCEDURE — 99309 SBSQ NF CARE MODERATE MDM 30: CPT | Performed by: NURSE PRACTITIONER

## 2021-06-28 NOTE — PROGRESS NOTES
[facility] GERIATRIC SERVICES    Chief Complaint   Patient presents with     RECHECK     HPI:  David Medina is a 81 year old  (1940), who is being seen today for an episodic care visit at: [facility] ([facility]) [[id number]].     This is a 81 year old male with PMHx CAD, HLD, DM II with peripheral neuropathy, lumbosacral plexopathy, gait disorder and hx of spinal stenosis hospitalized after a fall down some stairs which resulted in bilateral fibular fractures. Ortho: NWB to bilateral LEs for 8 wks. Placed in bilateral LE casts on 5/10. Pain well managed with Tylenol and on  mg daily for DVT prophylaxis. TCO f/u 3 weeks. DM managed with metformin. CAD/HTN/HLD: on ASA, atorvastatin 80mg daily, Imdur 30mg daily, lisinopril 20mg daily, metoprolol 25mg BID. TCU stay was recommended so discharged to [facility].    Today's concern is:   WB now WBAT    Allergies, and PMH/PSH reviewed in [facility] today.  REVIEW OF SYSTEMS:  4 point ROS including Respiratory, CV, GI and , other than that noted in the HPI,  is negative    Objective:   /69   Pulse 87   Temp 96.4  F (35.8  C)   Resp 18   Wt 115.1 kg (253 lb 12.8 oz)   SpO2 98%   BMI 30.89 kg/m    GENERAL APPEARANCE:  Alert, in no distress, pleasant, cooperative, oriented, denies pain  RESP:  respiratory effort normal, no chest wall tenderness, no respiratory distress, lung sounds clear, RA  CV:  Auscultation of heart done, rate and rhythm controlled, no murmur, no rub or gallop.   M/S:  Now WBAT  NEURO: No focal deficits. [facility] 27/30      Most Recent 3 CBC's:  Recent Labs   Lab Test 06/22/21  1425 05/09/21  0635 05/08/21  1523   WBC 7.2 7.5 9.2   HGB 12.7* 12.4* 13.2*   MCV 97 96 96    163 162     Most Recent 3 BMP's:  Recent Labs   Lab Test 06/22/21  1425 05/09/21  0635 05/08/21  1523    139 141   POTASSIUM 4.1 4.1 4.1   CHLORIDE 107 108 110*   CO2 26 27 25   BUN 29 16 22   CR 1.10 0.98 0.92   ANIONGAP 4 4 6   GENEVIEVE 9.2 9.0 9.0    * 161* 116*       Assessment/Plan:    S/P mechanical fall   Closed fracture of distal end of right fibula with routine healing, unspecified fracture morphology, subsequent encounter  Closed fracture of distal end of left fibula with routine healing, unspecified fracture morphology, subsequent encounter  Spinal stenosis of thoracic region  Falls frequently  Now WBAT after 6 wks. F/u with ortho      DVT prophylaxis  Continue ASA 325mg daily, duration per ortho     T2DM  Last A1c 6.9 on 5/9/21, continue metformin 500mg BID. BS <200     HTN  Continue imdur 30mg daily, lisinopril 20mg daily and metoprolol tartrate 25mg BID. SBP <130, HR <70     Normocytic anemia  Hgb 12.7 on 6/22     GERD  Continue omeprazole 20mg daily, no issues     Constipation  Have discontinued senna S     Vit D  Continue D3 1000U daily     B12 deficiency  Continue cyanocobalamin 1000mcg daily     HLD  Continue statin    Orders:  WBAT now\, discontinue senna S    Electronically signed by: Farhan Ludwig NP

## 2021-07-04 ENCOUNTER — TELEPHONE (OUTPATIENT)
Dept: GERIATRICS | Facility: CLINIC | Age: 81
End: 2021-07-04

## 2021-07-04 NOTE — TELEPHONE ENCOUNTER
Page RE: patient had fall; found on floor, started neuro checks, when fell back did hit head against toilet seat but denies pain, no bruising on head, on [id number], VS stable, attempt to self transfer, impulsive.  -continue neuro and follow up  -call back with change in status, may need to hold ASA if having bruising

## 2021-07-06 ENCOUNTER — HOSPITAL LABORATORY (OUTPATIENT)
Dept: OTHER | Facility: CLINIC | Age: 81
End: 2021-07-06

## 2021-07-06 VITALS
DIASTOLIC BLOOD PRESSURE: 86 MMHG | SYSTOLIC BLOOD PRESSURE: 142 MMHG | TEMPERATURE: 97.8 F | BODY MASS INDEX: 31.7 KG/M2 | OXYGEN SATURATION: 98 % | RESPIRATION RATE: 22 BRPM | WEIGHT: 260.4 LBS | HEART RATE: 66 BPM

## 2021-07-06 LAB
SARS-COV-2 RNA RESP QL NAA+PROBE: NORMAL
SPECIMEN SOURCE: NORMAL

## 2021-07-07 ENCOUNTER — NURSING HOME VISIT (OUTPATIENT)
Dept: GERIATRICS | Facility: CLINIC | Age: 81
End: 2021-07-07
Payer: COMMERCIAL

## 2021-07-07 DIAGNOSIS — R29.6 FALLS FREQUENTLY: ICD-10-CM

## 2021-07-07 DIAGNOSIS — I10 BENIGN ESSENTIAL HYPERTENSION: ICD-10-CM

## 2021-07-07 DIAGNOSIS — S82.401D CLOSED FRACTURE OF RIGHT TIBIA AND FIBULA WITH ROUTINE HEALING, SUBSEQUENT ENCOUNTER: ICD-10-CM

## 2021-07-07 DIAGNOSIS — S82.201D CLOSED FRACTURE OF RIGHT TIBIA AND FIBULA WITH ROUTINE HEALING, SUBSEQUENT ENCOUNTER: ICD-10-CM

## 2021-07-07 DIAGNOSIS — K59.01 SLOW TRANSIT CONSTIPATION: ICD-10-CM

## 2021-07-07 DIAGNOSIS — E11.42 TYPE 2 DIABETES MELLITUS WITH DIABETIC POLYNEUROPATHY, WITHOUT LONG-TERM CURRENT USE OF INSULIN (H): Primary | ICD-10-CM

## 2021-07-07 DIAGNOSIS — S82.402D CLOSED FRACTURE OF LEFT TIBIA AND FIBULA WITH ROUTINE HEALING: ICD-10-CM

## 2021-07-07 DIAGNOSIS — S82.202D CLOSED FRACTURE OF LEFT TIBIA AND FIBULA WITH ROUTINE HEALING: ICD-10-CM

## 2021-07-07 LAB
LABORATORY COMMENT REPORT: NORMAL
SARS-COV-2 RNA RESP QL NAA+PROBE: NEGATIVE
SPECIMEN SOURCE: NORMAL

## 2021-07-07 PROCEDURE — 99309 SBSQ NF CARE MODERATE MDM 30: CPT | Performed by: NURSE PRACTITIONER

## 2021-07-07 RX ORDER — ASPIRIN 81 MG/1
81 TABLET ORAL DAILY
COMMUNITY

## 2021-07-07 NOTE — PROGRESS NOTES
[facility] GERIATRIC SERVICES    Chief Complaint   Patient presents with     RECHECK     HPI:  David Medina is a 81 year old  (1940), who is being seen today for an episodic care visit at: [facility] ([facility]) [[id number]].     This is a 81 year old male with PMHx CAD, HLD, DM II with peripheral neuropathy, lumbosacral plexopathy, gait disorder and hx of spinal stenosis hospitalized after a fall down some stairs which resulted in bilateral fibular fractures. Ortho: NWB to bilateral LEs for 8 wks. Placed in bilateral LE casts on 5/10. Pain well managed with Tylenol and on  mg daily for DVT prophylaxis. TCO f/u 3 weeks. DM managed with metformin. CAD/HTN/HLD: on ASA, atorvastatin 80mg daily, Imdur 30mg daily, lisinopril 20mg daily, metoprolol 25mg BID. TCU stay was recommended so discharged to [facility].    Today's concern is:   F/u per fall    Allergies, and PMH/PSH reviewed in [facility] today.  REVIEW OF SYSTEMS:  4 point ROS including Respiratory, CV, GI and , other than that noted in the HPI,  is negative    Objective:   BP (!) 142/86   Pulse 66   Temp 97.8  F (36.6  C)   Resp 22   Wt 118.1 kg (260 lb 6.4 oz)   SpO2 98%   BMI 31.70 kg/m    GENERAL APPEARANCE:  Alert, in no distress, pleasant, cooperative, oriented, denies pain  RESP:  respiratory effort normal, no chest wall tenderness, no respiratory distress, lung sounds clear, RA  CV:  Auscultation of heart done, rate and rhythm controlled, no murmur, no rub or gallop.   M/S:  WBAT, wearing CAM boots  NEURO: No focal deficits. SLUMS 27/30      Most Recent 3 CBC's:  Recent Labs   Lab Test 06/22/21  1425 05/09/21  0635 05/08/21  1523   WBC 7.2 7.5 9.2   HGB 12.7* 12.4* 13.2*   MCV 97 96 96    163 162     Most Recent 3 BMP's:  Recent Labs   Lab Test 06/22/21  1425 05/09/21  0635 05/08/21  1523    139 141   POTASSIUM 4.1 4.1 4.1   CHLORIDE 107 108 110*   CO2 26 27 25   BUN 29 16 22   CR 1.10 0.98 0.92   ANIONGAP 4 4 6    GENEVIEVE 9.2 9.0 9.0   * 161* 116*       Assessment/Plan:    S/P mechanical fall   Closed fracture of distal end of right fibula with routine healing, unspecified fracture morphology, subsequent encounter  Closed fracture of distal end of left fibula with routine healing, unspecified fracture morphology, subsequent encounter  Spinal stenosis of thoracic region  Falls frequently  Now WBAT after 8 wks. F/u with ortho on 7/27 with Dr Calvo     DVT prophylaxis  CAD and NSTEMI hx  Change ASA to 81 daily, fixed 8wks of DVT prophylactic dose     T2DM  Last A1c 6.9 on 5/9/21, continue metformin 500mg BID. BS <200     HTN  Continue imdur 30mg daily, lisinopril 20mg daily and metoprolol tartrate 25mg BID. SBP <130, HR <70     Normocytic anemia  Hgb 12.7 on 6/22     GERD  Continue omeprazole 20mg daily, no issues     Constipation  Have discontinued senna S, effective     Vit D  Continue D3 1000U daily     B12 deficiency  Continue cyanocobalamin 1000mcg daily     HLD  Continue statin    Therapy  Ambulates +600ft, will be discharging soon, apparently lost balance on 7/4, no injuries       Orders:  Change ASA to 81mg daily    Electronically signed by: Farhan Ludwig NP

## 2021-07-08 VITALS
HEART RATE: 66 BPM | RESPIRATION RATE: 18 BRPM | DIASTOLIC BLOOD PRESSURE: 81 MMHG | WEIGHT: 260.4 LBS | SYSTOLIC BLOOD PRESSURE: 127 MMHG | TEMPERATURE: 97.8 F | OXYGEN SATURATION: 98 % | BODY MASS INDEX: 31.7 KG/M2

## 2021-07-09 ENCOUNTER — DISCHARGE SUMMARY NURSING HOME (OUTPATIENT)
Dept: GERIATRICS | Facility: CLINIC | Age: 81
End: 2021-07-09
Payer: COMMERCIAL

## 2021-07-09 DIAGNOSIS — I10 HYPERTENSION GOAL BP (BLOOD PRESSURE) < 140/90: ICD-10-CM

## 2021-07-09 DIAGNOSIS — I25.10 ASCVD (ARTERIOSCLEROTIC CARDIOVASCULAR DISEASE): ICD-10-CM

## 2021-07-09 DIAGNOSIS — R29.6 FALLS FREQUENTLY: ICD-10-CM

## 2021-07-09 DIAGNOSIS — S82.202D CLOSED FRACTURE OF LEFT TIBIA AND FIBULA WITH ROUTINE HEALING: ICD-10-CM

## 2021-07-09 DIAGNOSIS — I25.5 ISCHEMIC CARDIOMYOPATHY: ICD-10-CM

## 2021-07-09 DIAGNOSIS — Z79.01 ANTICOAGULATED: ICD-10-CM

## 2021-07-09 DIAGNOSIS — S82.401D CLOSED FRACTURE OF RIGHT TIBIA AND FIBULA WITH ROUTINE HEALING, SUBSEQUENT ENCOUNTER: ICD-10-CM

## 2021-07-09 DIAGNOSIS — K59.01 SLOW TRANSIT CONSTIPATION: Primary | ICD-10-CM

## 2021-07-09 DIAGNOSIS — S82.402D CLOSED FRACTURE OF LEFT TIBIA AND FIBULA WITH ROUTINE HEALING: ICD-10-CM

## 2021-07-09 DIAGNOSIS — S82.201D CLOSED FRACTURE OF RIGHT TIBIA AND FIBULA WITH ROUTINE HEALING, SUBSEQUENT ENCOUNTER: ICD-10-CM

## 2021-07-09 DIAGNOSIS — E78.00 PURE HYPERCHOLESTEROLEMIA: ICD-10-CM

## 2021-07-09 DIAGNOSIS — I10 BENIGN ESSENTIAL HYPERTENSION: ICD-10-CM

## 2021-07-09 DIAGNOSIS — E11.42 TYPE 2 DIABETES MELLITUS WITH DIABETIC POLYNEUROPATHY, WITHOUT LONG-TERM CURRENT USE OF INSULIN (H): ICD-10-CM

## 2021-07-09 PROCEDURE — 99316 NF DSCHRG MGMT 30 MIN+: CPT | Performed by: NURSE PRACTITIONER

## 2021-07-09 RX ORDER — ISOSORBIDE MONONITRATE 30 MG/1
30 TABLET, EXTENDED RELEASE ORAL EVERY MORNING
Qty: 30 TABLET | Refills: 0 | Status: SHIPPED | OUTPATIENT
Start: 2021-07-09 | End: 2022-05-26

## 2021-07-09 RX ORDER — METOPROLOL TARTRATE 50 MG
TABLET ORAL
Qty: 30 TABLET | Refills: 0 | Status: SHIPPED | OUTPATIENT
Start: 2021-07-09 | End: 2022-04-22

## 2021-07-09 RX ORDER — LISINOPRIL 20 MG/1
20 TABLET ORAL DAILY
Qty: 30 TABLET | Refills: 0 | OUTPATIENT
Start: 2021-07-09 | End: 2021-10-31

## 2021-07-09 RX ORDER — ATORVASTATIN CALCIUM 80 MG/1
80 TABLET, FILM COATED ORAL DAILY
Qty: 30 TABLET | Refills: 0 | Status: SHIPPED | OUTPATIENT
Start: 2021-07-09 | End: 2022-05-26

## 2021-07-09 NOTE — PROGRESS NOTES
[facility] GERIATRIC SERVICES DISCHARGE SUMMARY  PATIENT'S NAME: David Medina  YOB: 1940  MEDICAL RECORD NUMBER:  [id number]  Place of Service where encounter took place:  [facility] ([facility]) [[id number]]    PRIMARY CARE PROVIDER AND CLINIC RESPONSIBLE AFTER TRANSFER:   Beatriz Betancur MD, [place] [id number] FOR VIJI / S   G Provider     Transferring providers: Farhan Ludwig NP, Dr. Christie MD  Recent Hospitalization/ED:  [facility] stay 5/8/21 to 5/10/21.  Date of SNF Admission: 5/10/21  Date of SNF (anticipated) Discharge: July 12, 2021  Discharged to: previous independent home  Cognitive Scores: SLUMS: 27/30  Physical Function: independent  DME: NA    CODE STATUS/ADVANCE DIRECTIVES DISCUSSION:  Full Code   ALLERGIES: No known allergies    HPI  This is a 81 year old male with PMHx CAD, HLD, DM II with peripheral neuropathy, lumbosacral plexopathy, gait disorder and hx of spinal stenosis hospitalized after a fall down some stairs which resulted in bilateral fibular fractures. Ortho: NWB to bilateral LEs for 8 wks. Placed in bilateral LE casts on 5/10. Pain well managed with Tylenol and on  mg daily for DVT prophylaxis. TCO f/u 3 weeks. DM managed with metformin. CAD/HTN/HLD: on ASA, atorvastatin 80mg daily, Imdur 30mg daily, lisinopril 20mg daily, metoprolol 25mg BID. TCU stay was recommended [facility]    Summary of nursing facility stay:     S/P mechanical fall   Closed fracture of distal end of right fibula with routine healing, unspecified fracture morphology, subsequent encounter  Closed fracture of distal end of left fibula with routine healing, unspecified fracture morphology, subsequent encounter  Spinal stenosis of thoracic region  Falls frequently  Now WBAT after 8 wks. F/u with ortho on 7/27 with Dr Calvo     DVT prophylaxis  CAD and NSTEMI hx  Change ASA to 81 daily, finished 8 wks  of DVT prophylactic dose     T2DM  Last A1c 6.9 on 5/9/21, continue metformin 500mg BID. BS <200     HTN  Continue imdur 30mg daily, lisinopril 20mg daily and metoprolol tartrate 25mg BID. SBP <130, HR <70     Normocytic anemia  Hgb 12.7 on 6/22     GERD  Continue omeprazole 20mg daily, no issues     Constipation  Have discontinued senna S, effective     Vit D  Continue D3 1000U daily     B12 deficiency  Continue cyanocobalamin 1000mcg daily     HLD  Continue statin    Discharge Medications:    Current Outpatient Medications   Medication Sig Dispense Refill     acetaminophen (TYLENOL) 325 MG tablet Take 975 mg by mouth 3 times daily as needed for mild pain       aspirin 81 MG EC tablet Take 81 mg by mouth daily       atorvastatin (LIPITOR) 80 MG tablet Take 1 tablet (80 mg) by mouth daily 90 tablet 3     blood glucose (ONE TOUCH DELICA) lancing device Use to test blood sugars four times daily or as directed. (Patient not taking: Reported on 5/11/2021) 1 each 1     blood glucose (ONETOUCH VERIO IQ) test strip USE TO TEST 4 TIMES DAILY OR AS DIRECTED (Patient not taking: Reported on 5/11/2021) 100 strip 4     blood glucose monitoring (ONE TOUCH DELICA) lancets Use to test blood sugars four times daily or as directed. (Patient not taking: Reported on 5/11/2021) 100 each 13     blood glucose monitoring (ONETOUCH VERIO IQ SYSTEM) meter device kit Use to test blood sugar 4 times daily. (Patient not taking: Reported on 5/11/2021) 1 kit 0     cholecalciferol 25 MCG (1000 UT) TABS Take 1,000 Units by mouth daily       Cyanocobalamin 1000 MCG CAPS Take 1 capsule by mouth daily 100 capsule 3     hydrocortisone 2.5 % ointment APPLY TO AFFECTED AREA ONCE DAILY FOR FACIAL RASH 20 g 6     isosorbide mononitrate (IMDUR) 30 MG 24 hr tablet Take 1 tablet (30 mg) by mouth every morning 90 tablet 3     latanoprost (XALATAN) 0.005 % ophthalmic solution Place 1 drop into both eyes At Bedtime       lisinopril (ZESTRIL) 20 MG tablet Take 1  tablet (20 mg) by mouth daily 90 tablet 3     metFORMIN (GLUCOPHAGE) 500 MG tablet Take 1 tablet (500 mg) by mouth 2 times daily (with meals) 180 tablet 1     metoprolol tartrate (LOPRESSOR) 50 MG tablet TAKE 1/2 TABLET BY MOUTH TWICE DAILY 90 tablet 3     multivitamin, therapeutic with minerals (THERA-VIT-M) TABS Take 1 tablet by mouth daily       nitroGLYcerin (NITROSTAT) 0.4 MG sublingual tablet Place 1 tablet (0.4 mg) under the tongue every 5 minutes as needed for chest pain Up to 3 doses max. 25 tablet 3     omeprazole (PRILOSEC) 20 MG DR capsule Take 1 capsule (20 mg) by mouth every morning 90 capsule 0       Controlled medications:   not applicable/none     Past Medical History:   Past Medical History:   Diagnosis Date     AAA (abdominal aortic aneurysm) without rupture (H)     stent 2005, s/p endovascular repair 12/07     Allergic rhinitis      Aortic root dilatation (H)      ASCVD (arteriosclerotic cardiovascular disease) 06/2005    Cardiac cath Oct 2014: EMEKA to RCA, cath 2011: medical management, cath Aug 2005: EMEKA to OM; cath June 2005: EMEKA to LAD     B12 deficiency      Benign paroxysmal positional vertigo      Cervical radiculopathy 10/30/2008    Injected through Ortho Spine 10/07     Closed fracture of patella 02/2007    left     DM type 2 (diabetes mellitus, type 2) (H)      Gastroesophageal reflux disease without esophagitis      Hyperlipidemia LDL goal < 70      Hypertension goal BP (blood pressure) < 130/80      Ischemic cardiomyopathy 06/2005    ischemic cardiomyopathy, EF 45%     Lumbosacral plexopathy     diabetic radiculoplexopathy causing proximal muscle weakness, Dr. 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 Villa (neuro) and Dr. 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  GENERAL APPEARANCE:  Alert, in no distress, pleasant, cooperative, oriented, denies pain  RESP:  respiratory effort normal, no chest wall tenderness, no respiratory distress, lung sounds clear, RA  CV:  Auscultation of heart done, rate and rhythm controlled, no murmur, no rub or gallop.   M/S:  WBAT, wearing CAM boots  NEURO: No focal deficits. SLUMS 27/30    SNF labs:   Most Recent 3 CBC's:  Recent Labs   Lab Test 06/22/21  1425 05/09/21  0635 05/08/21  1523   WBC 7.2 7.5 9.2   HGB 12.7* 12.4* 13.2*   MCV 97 96 96    163 162     Most Recent 3 BMP's:  Recent Labs   Lab Test 06/22/21  1425 05/09/21  0635 05/08/21  1523    139 141   POTASSIUM 4.1 4.1 4.1   CHLORIDE 107 108 110*   CO2 26 27 25   BUN 29 16 22   CR 1.10 0.98 0.92   ANIONGAP 4 4 6   GENEVIEVE 9.2 9.0 9.0   * 161* 116*       DISCHARGE PLAN:    Follow up labs: No labs orders/due    Medical Follow Up:      Follow up with primary care provider in 1-2 weeks    Current Surveyor scheduled appointments:       Discharge Services: Home Care:  Occupational Therapy, Physical Therapy, Registered Nurse, Home Health Aide and     Discharge Instructions Verbalized to Patient at Discharge:     None    TOTAL DISCHARGE TIME:   Greater than 30 minutes  Electronically signed by:  Farhan Ludwig NP     Home care Face to Face documentation done in [facility] attached to Home care orders for [facility].

## 2021-07-16 ENCOUNTER — MEDICAL CORRESPONDENCE (OUTPATIENT)
Dept: HEALTH INFORMATION MANAGEMENT | Facility: CLINIC | Age: 81
End: 2021-07-16

## 2021-07-16 ENCOUNTER — TELEPHONE (OUTPATIENT)
Dept: FAMILY MEDICINE | Facility: CLINIC | Age: 81
End: 2021-07-16

## 2021-07-16 NOTE — TELEPHONE ENCOUNTER
Reason for Call: Other order    Detailed comments: [facility] is requesting orders for PT and OT eval and weekly nurse visits for 60 days. Please assist. Thanks!    Phone Number Patient can be reached at: [phone]    Best Time: Any    Can we leave a detailed message on this number? YES    Call taken on 7/16/2021 at 1:05 PM by Dasha Rayo

## 2021-07-16 NOTE — TELEPHONE ENCOUNTER
I called and spoke to Miah at [facility].  I approved the orders for PT and OT eval and weekly nurse visits for 60 days.   I also left her know that David is due in for a follow up with his provider. She said she will let the family know and they can make that appt.    Fallon Mayo RN

## 2021-07-26 ENCOUNTER — TELEPHONE (OUTPATIENT)
Dept: FAMILY MEDICINE | Facility: CLINIC | Age: 81
End: 2021-07-26

## 2021-07-26 NOTE — TELEPHONE ENCOUNTER
-Please contact patient to schedule TCU discharge follow up visit with Dr. Betancur.  May use same day hold.    Call received from BRITTNEE Clifton, with [facility] requesting verbal home care orders for continued PT:  1. Twice per week for 7 weeks    Writer gave authorization for requested home care orders and informed Elodia melanie will reach out to patient to schedule follow up appt with Dr. Betancur as pt not seen since February 2021.    Thank you!  KEY SmallwoodN, RN  [facility]

## 2021-08-02 NOTE — PROGRESS NOTES
Assessment & Plan     Closed fracture of distal end of left fibula, unspecified fracture morphology, sequela  Closed fracture of distal end of right fibula, unspecified fracture morphology, sequela  Continue rehab at home with goal of preventing future falls.      Gastroesophageal reflux disease without esophagitis  I recommended trial of decreased PPI dose.  - omeprazole (PRILOSEC) 10 MG DR capsule  Dispense: 90 capsule; Refill: 3      Review of prior external note(s) from - hospital discharge summary, TCU discharge summary  Review of the result(s) of each unique test - left and right ankle xrays from 5/8/2021    Return in about 3 months (around 11/3/2021) for follow up, with me (Dr. Betancur), in person.    Beatriz Betancur MD  [facility]        Walter Hernandez is a 81 year old who presents for the following health issues     HPI       Hospital Follow-up Visit:    Hospital/Nursing Home/IP Rehab Facility: [facility]  Date of Admission: 5/8/2021  Date of Discharge: 5/10/2021  Reason(s) for Admission: fall down stairs, bilateral fibular fxs      Hospital/Nursing Home/IP Rehab Facility: [facility]  Date of Admission: 5/10/2021  Date of Discharge: 7/12/2021  Reason(s) for Admission: TCU    Was your hospitalization related to COVID-19? No   Problems taking medications regularly:  None  Medication changes since discharge: None  Problems adhering to non-medication therapy:  None    Summary of hospitalization:  [facility] discharge summary reviewed  Diagnostic Tests/Treatments reviewed.  Follow up needed: none  Other Healthcare Providers Involved in Patient s Care:         Specialist appointment - ortho  Update since discharge: improved.   Post Discharge Medication Reconciliation: discharge medications reconciled and changed, per note/orders.  Plan of care communicated with patient        FROM TCU DISCHARGE SUMMARY:  This is a 81 year  old male with PMHx CAD, HLD, DM II with peripheral neuropathy, lumbosacral plexopathy, gait disorder and hx of spinal stenosis hospitalized after a fall down some stairs which resulted in bilateral fibular fractures. Ortho: NWB to bilateral LEs for 8 wks. Placed in bilateral LE casts on 5/10. Pain well managed with Tylenol and on  mg daily for DVT prophylaxis. TCO f/u 3 weeks. DM managed with metformin. CAD/HTN/HLD: on ASA, atorvastatin 80mg daily, Imdur 30mg daily, lisinopril 20mg daily, metoprolol 25mg BID. TCU stay was recommended so discharged to [facility].    Summary of nursing facility stay:      S/P mechanical fall   Closed fracture of distal end of right fibula with routine healing, unspecified fracture morphology, subsequent encounter  Closed fracture of distal end of left fibula with routine healing, unspecified fracture morphology, subsequent encounter  Spinal stenosis of thoracic region  Falls frequently  Now WBAT after 8 wks. F/u with ortho on 7/27 with Dr Calvo     DVT prophylaxis  CAD and NSTEMI hx  Change ASA to 81 daily, finished 8 wks of DVT prophylactic dose     T2DM  Last A1c 6.9 on 5/9/21, continue metformin 500mg BID. BS <200     HTN  Continue imdur 30mg daily, lisinopril 20mg daily and metoprolol tartrate 25mg BID. SBP <130, HR <70     Normocytic anemia  Hgb 12.7 on 6/22     GERD  Continue omeprazole 20mg daily, no issues     Constipation  Have discontinued senna S, effective     Vit D  Continue D3 1000U daily     B12 deficiency  Continue cyanocobalamin 1000mcg daily     HLD  Continue statin    UPDATE SINCE DISCHARGE:  Doing well.  Gets home care including PT/OT.  Walking with standard walker.  Discharged by Ortho.     GERD F/U: Finds the omeprazole (which I prescribed for him in Feb) to be very helpful.  His heartburn symptoms have resolved.    Review of Systems         Objective    Pulse 69   Temp 97.6  F (36.4  C) (Oral)   Resp 18   Wt 116.1 kg (256 lb)   SpO2 97%   BMI  31.16 kg/m    Body mass index is 31.16 kg/m .  Physical Exam   GEN:  no apparent distress  LUNGS:  normal respiratory effort, and lungs clear to auscultation bilaterally - no rales, rhonchi or wheezes  CV: regular rate and rhythm, normal S1 S2, no S3 or S4 and no murmur, click or rub  NEURO: Gait - walks quite well with standard walker

## 2021-08-02 NOTE — PATIENT INSTRUCTIONS
Did you know?   I do telehealth (video) visits exclusively on Wednesdays.  I still do in-person visits at [facility] ([phone]) on Mondays, Tuesdays and Thursdays.  You can schedule a video visit for follow-up appointments as well as future appointments for certain conditions.  Please see the below link.  [email or web address]    If you have not already done so,  I encourage you to sign up for [email or web address] ([email or web address]).  This will allow you to review your results, securely communicate with your provider care team, and schedule virtual visits as well.    To schedule any ordered imaging studies you can call [facility] Scheduling at [phone].  If you are scheduling a DEXA (bone density) scan please do NOT schedule this at the [facility].  Please ask that it be done at [facility] in [place].  Other preferred DEXA locations include [place] (at the [facility]), [place], or [place].

## 2021-08-03 ENCOUNTER — OFFICE VISIT (OUTPATIENT)
Dept: FAMILY MEDICINE | Facility: CLINIC | Age: 81
End: 2021-08-03
Payer: COMMERCIAL

## 2021-08-03 ENCOUNTER — TELEPHONE (OUTPATIENT)
Dept: FAMILY MEDICINE | Facility: CLINIC | Age: 81
End: 2021-08-03

## 2021-08-03 VITALS
BODY MASS INDEX: 31.16 KG/M2 | WEIGHT: 256 LBS | OXYGEN SATURATION: 97 % | HEART RATE: 69 BPM | TEMPERATURE: 97.6 F | RESPIRATION RATE: 18 BRPM | SYSTOLIC BLOOD PRESSURE: 125 MMHG | DIASTOLIC BLOOD PRESSURE: 73 MMHG

## 2021-08-03 DIAGNOSIS — K21.9 GASTROESOPHAGEAL REFLUX DISEASE WITHOUT ESOPHAGITIS: ICD-10-CM

## 2021-08-03 DIAGNOSIS — S82.832S CLOSED FRACTURE OF DISTAL END OF LEFT FIBULA, UNSPECIFIED FRACTURE MORPHOLOGY, SEQUELA: Primary | ICD-10-CM

## 2021-08-03 DIAGNOSIS — S82.831S CLOSED FRACTURE OF DISTAL END OF RIGHT FIBULA, UNSPECIFIED FRACTURE MORPHOLOGY, SEQUELA: ICD-10-CM

## 2021-08-03 PROCEDURE — 99214 OFFICE O/P EST MOD 30 MIN: CPT | Performed by: FAMILY MEDICINE

## 2021-08-03 RX ORDER — OMEPRAZOLE 10 MG/1
20 CAPSULE, DELAYED RELEASE ORAL DAILY
Qty: 90 CAPSULE | Refills: 3 | Status: SHIPPED | OUTPATIENT
Start: 2021-08-03 | End: 2021-11-08

## 2021-08-03 NOTE — TELEPHONE ENCOUNTER
Reason for Call:  Form, our goal is to have forms completed with 72 hours, however, some forms may require a visit or additional information.    Type of letter, form or note:  Home Health Certification    Who is the form from?: Home care [facility]    Where did the form come from: form was faxed in    What clinic location was the form placed at?: [facility]    Where the form was placed: Providers Box/Folder    What number is listed as a contact on the form?: [phone]       Additional comments: please advise thank you!    Call taken on 8/3/2021 at 3:30 PM by Leonarda Robin

## 2021-08-10 DIAGNOSIS — Z53.9 DIAGNOSIS NOT YET DEFINED: Primary | ICD-10-CM

## 2021-08-10 PROCEDURE — G0180 MD CERTIFICATION HHA PATIENT: HCPCS | Performed by: FAMILY MEDICINE

## 2021-08-11 NOTE — TELEPHONE ENCOUNTER
I have signed all forms and orders that were in my folders.  All paperwork needing further attention (return faxing, etc) has been transferred to the Pod B MA basket.    Please be sure to abstract home care certification/plan of care orders.  Beatriz Betancur MD

## 2021-09-13 ENCOUNTER — TELEPHONE (OUTPATIENT)
Dept: FAMILY MEDICINE | Facility: CLINIC | Age: 81
End: 2021-09-13

## 2021-09-13 NOTE — TELEPHONE ENCOUNTER
Left message on Elodia's identified, confidential voicemail giving verbal authorization for requested home care orders.    KEY SmallwoodN, RN  [facility]

## 2021-09-13 NOTE — TELEPHONE ENCOUNTER
Reason for Call: Other call back    Detailed comments: [facility] is requesting orders for continued home PT 1 x week for 4 weeks.    Phone Number Patient can be reached at: [facility] [phone]    Best Time: Any    Can we leave a detailed message on this number? YES    Call taken on 9/13/2021 at 4:53 PM by Dasha Rayo

## 2021-10-01 ENCOUNTER — TELEPHONE (OUTPATIENT)
Dept: FAMILY MEDICINE | Facility: CLINIC | Age: 81
End: 2021-10-01

## 2021-10-01 NOTE — TELEPHONE ENCOUNTER
Reason for Call:  Form, our goal is to have forms completed with 72 hours, however, some forms may require a visit or additional information.    Type of letter, form or note:  Home Health Certification    Who is the form from?: Home care    Where did the form come from: form was faxed in    What clinic location was the form placed at?: [facility]    Where the form was placed: placed in pod A providers signature folder Box/Folder    What number is listed as a contact on the form?: [phone]       Additional comments:     Call taken on 10/1/2021 at 3:28 PM by Chery Foster

## 2021-10-04 DIAGNOSIS — Z53.9 DIAGNOSIS NOT YET DEFINED: Primary | ICD-10-CM

## 2021-10-04 PROCEDURE — G0179 MD RECERTIFICATION HHA PT: HCPCS | Performed by: FAMILY MEDICINE

## 2021-10-04 NOTE — TELEPHONE ENCOUNTER
Cert/plan of care [facility] faxed to [phone]  Stat faxed [phone]

## 2021-10-24 ENCOUNTER — HEALTH MAINTENANCE LETTER (OUTPATIENT)
Age: 81
End: 2021-10-24

## 2021-10-26 DIAGNOSIS — E11.42 TYPE 2 DIABETES MELLITUS WITH DIABETIC POLYNEUROPATHY, WITHOUT LONG-TERM CURRENT USE OF INSULIN (H): ICD-10-CM

## 2021-10-27 NOTE — TELEPHONE ENCOUNTER
Prescription approved per [facility] Refill Protocol.  Clau Ramsey RN  [facility]

## 2021-10-31 ENCOUNTER — HOSPITAL ENCOUNTER (EMERGENCY)
Facility: CLINIC | Age: 81
Discharge: HOME | End: 2021-10-31
Attending: PHYSICIAN ASSISTANT | Admitting: PHYSICIAN ASSISTANT
Payer: COMMERCIAL

## 2021-10-31 VITALS
BODY MASS INDEX: 33.86 KG/M2 | HEIGHT: 72 IN | HEART RATE: 86 BPM | RESPIRATION RATE: 16 BRPM | OXYGEN SATURATION: 96 % | DIASTOLIC BLOOD PRESSURE: 95 MMHG | TEMPERATURE: 98.6 F | SYSTOLIC BLOOD PRESSURE: 149 MMHG | WEIGHT: 250 LBS

## 2021-10-31 DIAGNOSIS — J20.9 ACUTE BRONCHITIS, UNSPECIFIED ORGANISM: ICD-10-CM

## 2021-10-31 DIAGNOSIS — T78.3XXA ANGIOEDEMA, INITIAL ENCOUNTER: ICD-10-CM

## 2021-10-31 DIAGNOSIS — K13.79 UVULAR SWELLING: ICD-10-CM

## 2021-10-31 LAB
ANION GAP SERPL CALCULATED.3IONS-SCNC: 8 MMOL/L (ref 3–14)
BASOPHILS # BLD AUTO: 0.1 10E3/UL (ref 0–0.2)
BASOPHILS NFR BLD AUTO: 1 %
BUN SERPL-MCNC: 26 MG/DL (ref 7–30)
CALCIUM SERPL-MCNC: 9.3 MG/DL (ref 8.5–10.1)
CHLORIDE BLD-SCNC: 106 MMOL/L (ref 94–109)
CO2 SERPL-SCNC: 21 MMOL/L (ref 20–32)
CREAT SERPL-MCNC: 1.08 MG/DL (ref 0.66–1.25)
DEPRECATED S PYO AG THROAT QL EIA: NEGATIVE
EOSINOPHIL # BLD AUTO: 0.9 10E3/UL (ref 0–0.7)
EOSINOPHIL NFR BLD AUTO: 10 %
ERYTHROCYTE [DISTWIDTH] IN BLOOD BY AUTOMATED COUNT: 13.2 % (ref 10–15)
FLUAV RNA SPEC QL NAA+PROBE: NEGATIVE
FLUBV RNA RESP QL NAA+PROBE: NEGATIVE
GFR SERPL CREATININE-BSD FRML MDRD: 64 ML/MIN/1.73M2
GLUCOSE BLD-MCNC: 158 MG/DL (ref 70–99)
GROUP A STREP BY PCR: NOT DETECTED
HCT VFR BLD AUTO: 39.3 % (ref 40–53)
HGB BLD-MCNC: 13.3 G/DL (ref 13.3–17.7)
IMM GRANULOCYTES # BLD: 0.1 10E3/UL
IMM GRANULOCYTES NFR BLD: 1 %
LYMPHOCYTES # BLD AUTO: 1.1 10E3/UL (ref 0.8–5.3)
LYMPHOCYTES NFR BLD AUTO: 12 %
MCH RBC QN AUTO: 31.7 PG (ref 26.5–33)
MCHC RBC AUTO-ENTMCNC: 33.8 G/DL (ref 31.5–36.5)
MCV RBC AUTO: 94 FL (ref 78–100)
MONOCYTES # BLD AUTO: 1 10E3/UL (ref 0–1.3)
MONOCYTES NFR BLD AUTO: 12 %
NEUTROPHILS # BLD AUTO: 5.6 10E3/UL (ref 1.6–8.3)
NEUTROPHILS NFR BLD AUTO: 64 %
NRBC # BLD AUTO: 0 10E3/UL
NRBC BLD AUTO-RTO: 0 /100
PLATELET # BLD AUTO: 192 10E3/UL (ref 150–450)
POTASSIUM BLD-SCNC: 4.2 MMOL/L (ref 3.4–5.3)
RBC # BLD AUTO: 4.19 10E6/UL (ref 4.4–5.9)
SARS-COV-2 RNA RESP QL NAA+PROBE: NEGATIVE
SODIUM SERPL-SCNC: 135 MMOL/L (ref 133–144)
WBC # BLD AUTO: 8.7 10E3/UL (ref 4–11)

## 2021-10-31 PROCEDURE — 99284 EMERGENCY DEPT VISIT MOD MDM: CPT | Mod: 25

## 2021-10-31 PROCEDURE — 96375 TX/PRO/DX INJ NEW DRUG ADDON: CPT

## 2021-10-31 PROCEDURE — 36415 COLL VENOUS BLD VENIPUNCTURE: CPT | Performed by: PHYSICIAN ASSISTANT

## 2021-10-31 PROCEDURE — 87651 STREP A DNA AMP PROBE: CPT | Performed by: PHYSICIAN ASSISTANT

## 2021-10-31 PROCEDURE — 96374 THER/PROPH/DIAG INJ IV PUSH: CPT

## 2021-10-31 PROCEDURE — 87636 SARSCOV2 & INF A&B AMP PRB: CPT | Performed by: PHYSICIAN ASSISTANT

## 2021-10-31 PROCEDURE — 85025 COMPLETE CBC W/AUTO DIFF WBC: CPT | Performed by: PHYSICIAN ASSISTANT

## 2021-10-31 PROCEDURE — C9803 HOPD COVID-19 SPEC COLLECT: HCPCS

## 2021-10-31 PROCEDURE — 250N000011 HC RX IP 250 OP 636: Performed by: PHYSICIAN ASSISTANT

## 2021-10-31 PROCEDURE — 80048 BASIC METABOLIC PNL TOTAL CA: CPT | Performed by: PHYSICIAN ASSISTANT

## 2021-10-31 RX ORDER — DEXAMETHASONE SODIUM PHOSPHATE 4 MG/ML
10 VIAL (ML) INJECTION ONCE
Status: DISCONTINUED | OUTPATIENT
Start: 2021-10-31 | End: 2021-10-31

## 2021-10-31 RX ORDER — DIPHENHYDRAMINE HYDROCHLORIDE 50 MG/ML
25 INJECTION INTRAMUSCULAR; INTRAVENOUS ONCE
Status: COMPLETED | OUTPATIENT
Start: 2021-10-31 | End: 2021-10-31

## 2021-10-31 RX ORDER — DEXAMETHASONE SODIUM PHOSPHATE 10 MG/ML
10 INJECTION, SOLUTION INTRAMUSCULAR; INTRAVENOUS ONCE
Status: COMPLETED | OUTPATIENT
Start: 2021-10-31 | End: 2021-10-31

## 2021-10-31 RX ADMIN — DIPHENHYDRAMINE HYDROCHLORIDE 25 MG: 50 INJECTION, SOLUTION INTRAMUSCULAR; INTRAVENOUS at 16:06

## 2021-10-31 RX ADMIN — DEXAMETHASONE SODIUM PHOSPHATE 10 MG: 10 INJECTION, SOLUTION INTRAMUSCULAR; INTRAVENOUS at 16:05

## 2021-10-31 ASSESSMENT — ENCOUNTER SYMPTOMS
SORE THROAT: 1
TROUBLE SWALLOWING: 1
FEVER: 0

## 2021-10-31 ASSESSMENT — MIFFLIN-ST. JEOR: SCORE: 1871.99

## 2021-10-31 NOTE — ED PROVIDER NOTES
History     Chief Complaint:  Pharyngitis    The history is provided by the patient.      David Medina is a 81 year old male who presents with pharyngitis. The patient has had a chest cold for the past couple of weeks since then.  Today he awoke from a nap and noted throat pain and swelling.  He notes it was difficult to talk due to the pain and now has a muffled voice.  In the ED, he has a muffled voice.  He is able to swallow his secretions without difficulty.  He denies any fever. The patient is not vaccinated for COVID-19.     Review of Systems   Constitutional: Negative for fever.   HENT: Positive for sore throat and trouble swallowing.      Allergies:  No known drug allergies    Medications:    Atorvastatin  Cholecalciferol   Cyanocobalamin   Isosorbide mononitrate   Lisinopril   Metformin  Metoprolol tartrate   Nitroglycerin   Omeprazole    Past Medical History:    Abdominal aortic aneurysm  Allergic rhinitis  ASCVD  B12 deficiency  Type II diabetes mellitus  Hyperlipidemia  Hypertension  Depression  NSTEMI  Spinal stenosis  Tubular adenoma of colon  Lumbosacral plexopathy  Erectile dysfunction  Ischemic cardiomyopathy  Sciatica     Past Surgical History:    Tonsillectomy  Endoscopy  Sigmoidoscopy  Laminectomy  Colonoscopy  Heart cath angioplasty    Family History:    TB: Father    Social History:  The patient presents to the ED with his spouse.     Physical Exam     Patient Vitals for the past 24 hrs:   BP Temp Temp src Resp SpO2 Height Weight   10/31/21 1800 -- -- -- -- 97 % -- --   10/31/21 1738 -- -- -- -- 99 % -- --   10/31/21 1700 -- -- -- -- 98 % -- --   10/31/21 1630 -- -- -- -- 97 % -- --   10/31/21 1623 -- -- -- -- 98 % -- --   10/31/21 1622 -- -- -- -- 97 % -- --   10/31/21 1621 -- -- -- -- 97 % -- --   10/31/21 1620 -- -- -- -- 98 % -- --   10/31/21 1618 -- -- -- -- 98 % -- --   10/31/21 1617 -- -- -- -- 96 % -- --   10/31/21 1616 -- -- -- -- 97 % -- --   10/31/21 8321 -- -- -- -- 97 % -- --    10/31/21 1614 -- -- -- -- 97 % -- --   10/31/21 1613 -- -- -- -- 96 % -- --   10/31/21 1612 -- -- -- -- 97 % -- --   10/31/21 1611 -- -- -- -- 97 % -- --   10/31/21 1429 (!) 161/92 98.6  F (37  C) Tympanic 16 98 % 1.829 m (6') 113.4 kg (250 lb)       Physical Exam  General: Alert, cooperative.   Head:  Scalp is atraumatic.  Eyes:  The pupils are equal, round, and reactive to light. Normal conjunctiva.   ENT:                                      Ears:  The external ears are normal.   Nose:  The external nose is normal.  Nasal congestion.  Throat:  The uvula is erythematous and edematous.  Uvula midline.  He has a muffled voice, though he is handling his secretions.  He is drinking water at the bedside.    Neck:  Normal range of motion.  No lymphadenopathy.  CV:  Normal rate. No murmur. 2+ radial pulses  Resp:  Breath sounds are clear bilaterally. Non-labored, no retractions or accessory muscle use.  GI:  Abdomen is soft, no distension, no tenderness.   MS:  Normal range of motion. No acute deformities.   Skin:  Warm and dry. No rash.   Neuro:  Alert. Strength and sensation grossly intact.   Psych:  Awake. Alert.  Appropriate interactions.       Emergency Department Course     Laboratory:  Labs Ordered and Resulted from Time of ED Arrival to Time of ED Departure   BASIC METABOLIC PANEL - Abnormal       Result Value    Sodium 135      Potassium 4.2      Chloride 106      Carbon Dioxide (CO2) 21      Anion Gap 8      Urea Nitrogen 26      Creatinine 1.08      Calcium 9.3      Glucose 158 (*)     GFR Estimate 64     CBC WITH PLATELETS AND DIFFERENTIAL - Abnormal    WBC Count 8.7      RBC Count 4.19 (*)     Hemoglobin 13.3      Hematocrit 39.3 (*)     MCV 94      MCH 31.7      MCHC 33.8      RDW 13.2      Platelet Count 192      % Neutrophils 64      % Lymphocytes 12      % Monocytes 12      % Eosinophils 10      % Basophils 1      % Immature Granulocytes 1      NRBCs per 100 WBC 0      Absolute Neutrophils 5.6       Absolute Lymphocytes 1.1      Absolute Monocytes 1.0      Absolute Eosinophils 0.9 (*)     Absolute Basophils 0.1      Absolute Immature Granulocytes 0.1 (*)     Absolute NRBCs 0.0     INFLUENZA A/B & SARS-COV2 PCR MULTIPLEX - Normal    Influenza A target Negative      Influenza B target Negative      SARS CoV2 PCR Negative     STREPTOCOCCUS A RAPID SCREEN W REFELX TO PCR - Normal    Group A Strep antigen Negative     GROUP A STREPTOCOCCUS PCR THROAT SWAB       Emergency Department Course:    Reviewed:  I reviewed nursing notes, vitals, past history and care everywhere    Assessments:  1531 I obtained history and examined the patient as noted above.   1650 I rechecked and updated the patient on findings and treatment plan. He is speaking better though physical examination looks similar to initial.   1745 I evaluated the patient alongside Dr. Soto.  Plan to continue to monitor until around 2000 and decide on disposition at this time.    Interventions:  1606 Benadryl, 25 mg, IV  1605 Decadron, 10 mg, IV    Disposition:  The patient be signed out to Dr. Soto.    Impression & Plan      Medical Decision Making:    David Medina is a 81 year old male who presents to the ED with few days of URI symptoms and this morning awoke from a nap with posterior oropharyngeal swelling. He reported it was difficult to talk initially, though symptoms do seem to be improving. On examination, he has edema and erythema to the uvula concerning for angioedema.  Uvula midline and no evidence of peritonsillar abscess.  There is no fever or leukocytosis to suggest infectious source.  Rapid strep test negative.  He does state he takes lisinopril which he has been on for years. Certainly this could be angioedema due to ACE inhibitor and plan to discontinue this. Steroid and benadryl given in the ED and patient was observed for approximately 3 hours.  On recheck, he noted some improvement though physical exam somewhat similar.  Plan to  observe for another 2 hours.  Patient be signed out to Dr. Soto pending recheck and disposition.  Please see his note for final disposition.     Covid-19  David Medina was evaluated during a global COVID-19 pandemic, which necessitated consideration that the patient might be at risk for infection with the SARS-CoV-2 virus that causes COVID-19.   Applicable protocols for evaluation were followed during the patient's care.   COVID-19 was considered as part of the patient's evaluation. The plan for testing is:  a test was obtained during this visit.    Diagnosis:    ICD-10-CM    1. Angioedema, initial encounter  T78.3XXA    2. Uvular swelling  K13.79        Discharge Medications:  New Prescriptions    No medications on file         Scribe Disclosure:  I, Isiah Gupta, am serving as a scribe at 3:31 PM on 10/31/2021 to document services personally performed by Roselyn Zamora PA-C based on my observations and the provider's statements to me.      Roselyn Zamora PA-C  10/31/21 [id number]

## 2021-10-31 NOTE — ED NOTES
Patient ambulated to the bathroom using walker. Assistance x1 needed to get up off of toilet. Patient states his throat feels less swollen and that he feels better. Roselyn BARROW notified.

## 2021-10-31 NOTE — ED PROVIDER NOTES
Emergency Department Attending Supervision Note  10/31/2021  3:44 PM      I evaluated this patient in conjunction with Roselyn Zamora PA-C      Briefly, the patient presented with pharyngitis. The patient reports that he has had a chest cold for a few weeks and today woke up from a nap with a swollen uvula and sore throat. He has had trouble talking and swallowing since then. He is able to swallow his secretions without difficulty. He denies fever or rash. He is unvaccinated for COVID.  Patient has taken lisinopril for years.      On my exam:  BP (!) 149/95   Pulse 86   Temp 98.6  F (37  C) (Tympanic)   Resp 16   Ht 1.829 m (6')   Wt 113.4 kg (250 lb)   SpO2 96%   BMI 33.91 kg/m    General: Patient in mild distress.  Alert and cooperative with exam. Normal mentation.  Nontoxic appearance  HEENT: NC/AT. Conjunctiva without injection or scleral icterus. External ears normal.  Muffled voice with significant uvular edema without posterior oropharynx erythema or exudate.  No significant neck swelling or asymmetry.  No evidence of deep space infection.  Respiratory: Breathing comfortably on room air.  Lungs clear to auscultation  CV: Normal rate, all extremities well perfused  GI:  Non-distended abdomen  Skin: Warm, dry, no rashes/open wounds on exposed skin  Musculoskeletal: No obvious deformities  Neuro: Alert, answers questions appropriately. No gross motor deficits    Results:  Streptococcus A Rapid PCR: Negative  CBC: WBC: 8.7, HGB: 13.3, PLT: 192  BMP: Glucose 158(H), o/w WNL (Creatinine: 1.08)  Symptomatic Influenza A/B antigen & COVID-19 PCR: Negative    ED course:  1544 I obtained history and examined the patient as noted above.     1619 I rechecked the patient and explained findings.    1741 I rechecked the patient and discussed plan of care options.     1916 I rechecked the patient and explained findings.    2108 I rechecked the patient, who is comfortable with discharge.     David Medina is  a 81 year old male who presents for evaluation of uvula swelling and sore throat.  Signs and symptoms are consistent with angioedema, likely induced by ACE inhibitors. He was observed here in emergency department and had no progression of symptoms and in fact was feeling improved.  We did watch here for six hours prior to considering discharge. Based on this will not hospitalize patient at this time.  He was treated here with Decadron and Benadryl.  Return for angioedema or anaphylactic symptoms.  Patient informed that he needs to discontinue use of ACE inhibitor's and follow-up closely with his PCP for continued blood pressure management.  Patient additionally likely has some mild viral bronchitis; supportive care was discussed.    Diagnosis    ICD-10-CM    1. Angioedema, initial encounter  T78.3XXA    2. Uvular swelling  K13.79    3. Acute bronchitis, unspecified organism  J20.9      Scribe Disclosure:  Mireya WEBB, am serving as a scribe at 3:45 PM on 10/31/2021 to document services personally performed by William Soto DO based on my observations and the provider's statements to me.          William Soto DO  10/31/21 4244

## 2021-11-01 ENCOUNTER — TELEPHONE (OUTPATIENT)
Dept: FAMILY MEDICINE | Facility: CLINIC | Age: 81
End: 2021-11-01

## 2021-11-01 ENCOUNTER — PATIENT OUTREACH (OUTPATIENT)
Dept: FAMILY MEDICINE | Facility: CLINIC | Age: 81
End: 2021-11-01

## 2021-11-01 DIAGNOSIS — I25.10 CORONARY ARTERY DISEASE INVOLVING NATIVE CORONARY ARTERY OF NATIVE HEART WITHOUT ANGINA PECTORIS: ICD-10-CM

## 2021-11-01 DIAGNOSIS — I10 BENIGN ESSENTIAL HYPERTENSION: Primary | ICD-10-CM

## 2021-11-01 RX ORDER — LOSARTAN POTASSIUM 50 MG/1
50 TABLET ORAL DAILY
Qty: 90 TABLET | Refills: 0 | Status: SHIPPED | OUTPATIENT
Start: 2021-11-01 | End: 2022-01-27

## 2021-11-01 RX ORDER — LOSARTAN POTASSIUM 50 MG/1
50 TABLET ORAL DAILY
Qty: 90 TABLET | Refills: 0 | Status: SHIPPED | OUTPATIENT
Start: 2021-11-01 | End: 2021-11-01

## 2021-11-01 NOTE — TELEPHONE ENCOUNTER
Patient is calling and is requesting medication to be resent to the location listed below. Please advise, thank you!

## 2021-11-01 NOTE — TELEPHONE ENCOUNTER
Dr. Betancur-    Patient calling stating he was seen in ED 10/31/21 for angioedema. ED provider thought this due to his lisinopril medication and patient was instructed to contact PCP first thing Monday morning to change blood pressure medications.     Pt has appt scheduled with you 11/8/21     Pharmacy pended.     OK to leave detailed message at [phone]     MC Charles RN  [facility]

## 2021-11-01 NOTE — ED NOTES
Report received. Patient visualized. Vitals are stable. Patient given apple juice and ice water per request and with MD permission. Will continue to monitor.

## 2021-11-01 NOTE — TELEPHONE ENCOUNTER
Writer called patient and reviewed message per Dr. Betancur, along with to which medication pharmacy was sent.    Patient verbalized understanding and in agreement with plan.    KEY SmallwoodN, RN  [facility]

## 2021-11-01 NOTE — TELEPHONE ENCOUNTER
Yes, he must avoid ACE-inhibitors.  I have ordered a different medicine (losartan) in its place.    He should be aware that he could have further episodes of angioedema for the next month or so from the prior lisinopril.  If he does he needs to go to the ER.    I will mail him some information about this but please review this section:    Can the swelling happen again after I have stopped the ACE inhibitor?  Yes. You might get swelling again 1 or more times within the next few months. The medicine affects your body for a while, even after you stop taking it.  If you get swelling around your mouth or tongue again, go to the emergency department so doctors can monitor your breathing.  The swelling should stop happening after a few months. If it does not, talk to your doctor. You might need to see an allergy specialist, because there may be another reason that you are still swelling. Sometimes people have swelling caused by another problem that the ACE inhibitor makes worse.

## 2021-11-01 NOTE — TELEPHONE ENCOUNTER
Encounter opened in error.    Please disregard.    KEY SmallwoodN, RN  [facility]

## 2021-11-07 NOTE — PROGRESS NOTES
"  Assessment & Plan     Cough  Persistent x 4 weeks with wheezing.   - XR Chest 2 Views    Acute bronchitis with wheezing  CXR clear.  I will treat with zpak and low-dose prednisone.    - azithromycin (ZITHROMAX) 250 MG tablet  Dispense: 6 tablet; Refill: 0  - predniSONE (DELTASONE) 20 MG tablet  Dispense: 5 tablet; Refill: 0    Benign essential hypertension  stable/well controlled - continue losartan 50 mg daily.      Angiotensin converting enzyme inhibitor-aggravated angioedema, subsequent encounter  ACE-I added to allergy list.  Discussed that he can have periodic recurrence for a few months after discontinuing lisinopril but the severity and likelihood decrease with time.      Gastroesophageal reflux disease without esophagitis  I clarified dose and re-sent Rx to pharmacy.  Agree that lowest effective dose is our goal.    - omeprazole (PRILOSEC) 10 MG DR capsule  Dispense: 90 capsule; Refill: 3    ADDENDUM:  Abdominal aortic aneurysm (AAA) without rupture (H)  s/p endovascular repair 2007.  I reached out to his vascular surgeon, Dr. James, who noted that this has been followed most recently by Dr. Krueger with interventional radiology (nurse Meena Sweet) with stability.  Dr. James recommended repeating US in 3 years and if stable and asymptomatic at that time he can discontinue surveillance.       No follow-ups on file.    Beatriz Betancur MD  [facility]        Walter Hernandez is a 81 year old who presents for the following health issues  accompanied by his spouse.    [facility]     ED/UC Followup:    Facility:  [facility]  Date of visit: 10/31/2021  Reason for visit: angioedema  Bronchitis   Current Status: angiodema  - uvula swollen yesterday      Bronchitis Follow-Up:  Noted \"chest cold\" x few weeks preceding his ER visit.  Flu and COVID testing were negative.  Getting better.  Cough is productive.  Some shortness of breath.      Angioedema Follow-Up: " Yesterday had sore throat and uvula seemed more enlarged.    HTN Follow-Up: I switched his ACE-Inhibitor to losartan 50 mg daily on 11/1/2021.      GERD Follow-Up: We had planned to decrease omeprazole dose to 10 mg but pharmacy filled 20 mg.      Review of Systems   as above       Objective    /77   Pulse 60   Temp 99.2  F (37.3  C) (Oral)   SpO2 96%   There is no height or weight on file to calculate BMI.  Physical Exam   GENERAL APPEARANCE: healthy, alert and no distress  EYES: Eyes grossly normal to inspection, conjunctivae and sclerae normal  ENT: external ears and nose without lesions or scars, TM's and canals clear bilaterally and oropharynx clear with moist mucus membranes and normal landmarks (no uvular enlargement)  NECK: no adenopathy, no asymmetry, masses, or scars and thyroid normal to palpation  RESP: lungs with expiratory wheezes throughout  CV: regular rate and rhythm, normal S1 S2, no S3 or S4, no murmur, click or rub, no peripheral edema   NEURO: marked lower extremity weakness  PSYCH: mentation appears normal and affect normal/bright       CXR - no acute disease

## 2021-11-07 NOTE — PATIENT INSTRUCTIONS
I kindly request that you check your MyChart prior to all appointments with me and complete any assigned questionnaires ahead of time.  (Or you can come a bit early to your appointment and complete questionnaires on one of our tablets.)  This frees up more of our designated visit time to address your health issues. If you have not already done so, I encourage you to sign up for Mychart ([email or web address]).  This will allow you to review your results, securely communicate with your provider care team, and schedule virtual visits as well.    FYI:  I do telehealth (video) visits exclusively on Wednesdays.  I still do in-person visits at [facility] ([phone]) on Mondays, Tuesdays and Thursdays.  You can schedule a video visit for many conditions.  Please follow this link:  [email or web address]    To schedule any ordered imaging studies (including mammogram) you can call [facility] Scheduling at [phone].  If you are scheduling a DEXA (bone density) scan please do NOT schedule this at the [facility].  Please ask that it be done at [facility] in [place].  Other preferred DEXA locations include [place] (at the [facility] [place]), [place], or [place].

## 2021-11-08 ENCOUNTER — ANCILLARY PROCEDURE (OUTPATIENT)
Dept: GENERAL RADIOLOGY | Facility: CLINIC | Age: 81
End: 2021-11-08
Attending: FAMILY MEDICINE
Payer: COMMERCIAL

## 2021-11-08 ENCOUNTER — OFFICE VISIT (OUTPATIENT)
Dept: FAMILY MEDICINE | Facility: CLINIC | Age: 81
End: 2021-11-08
Payer: COMMERCIAL

## 2021-11-08 VITALS
OXYGEN SATURATION: 96 % | HEART RATE: 60 BPM | DIASTOLIC BLOOD PRESSURE: 77 MMHG | SYSTOLIC BLOOD PRESSURE: 139 MMHG | TEMPERATURE: 99.2 F

## 2021-11-08 DIAGNOSIS — I10 BENIGN ESSENTIAL HYPERTENSION: ICD-10-CM

## 2021-11-08 DIAGNOSIS — J20.9 ACUTE BRONCHITIS WITH WHEEZING: ICD-10-CM

## 2021-11-08 DIAGNOSIS — I71.40 AAA (ABDOMINAL AORTIC ANEURYSM) WITHOUT RUPTURE (H): ICD-10-CM

## 2021-11-08 DIAGNOSIS — T78.3XXD ANGIOTENSIN CONVERTING ENZYME INHIBITOR-AGGRAVATED ANGIOEDEMA, SUBSEQUENT ENCOUNTER: ICD-10-CM

## 2021-11-08 DIAGNOSIS — I71.40 ABDOMINAL AORTIC ANEURYSM (AAA) WITHOUT RUPTURE (H): ICD-10-CM

## 2021-11-08 DIAGNOSIS — R05.9 COUGH: ICD-10-CM

## 2021-11-08 DIAGNOSIS — T46.4X5D ANGIOTENSIN CONVERTING ENZYME INHIBITOR-AGGRAVATED ANGIOEDEMA, SUBSEQUENT ENCOUNTER: ICD-10-CM

## 2021-11-08 DIAGNOSIS — R05.9 COUGH: Primary | ICD-10-CM

## 2021-11-08 DIAGNOSIS — K21.9 GASTROESOPHAGEAL REFLUX DISEASE WITHOUT ESOPHAGITIS: ICD-10-CM

## 2021-11-08 PROCEDURE — 71046 X-RAY EXAM CHEST 2 VIEWS: CPT | Performed by: RADIOLOGY

## 2021-11-08 PROCEDURE — 99214 OFFICE O/P EST MOD 30 MIN: CPT | Performed by: FAMILY MEDICINE

## 2021-11-08 RX ORDER — OMEPRAZOLE 10 MG/1
10 CAPSULE, DELAYED RELEASE ORAL DAILY
Qty: 90 CAPSULE | Refills: 3 | Status: SHIPPED | OUTPATIENT
Start: 2021-11-08 | End: 2022-10-12

## 2021-11-08 RX ORDER — PREDNISONE 20 MG/1
20 TABLET ORAL DAILY
Qty: 5 TABLET | Refills: 0 | Status: SHIPPED | OUTPATIENT
Start: 2021-11-08 | End: 2021-11-13

## 2021-11-08 RX ORDER — AZITHROMYCIN 250 MG/1
TABLET, FILM COATED ORAL
Qty: 6 TABLET | Refills: 0 | Status: SHIPPED | OUTPATIENT
Start: 2021-11-08 | End: 2022-12-12

## 2021-11-08 NOTE — Clinical Note
Please abstract the following data from this visit with this patient into the appropriate field in Epic:    Tests that can be patient reported without a hard copy:    Eye exam with ophthalmology on this date: 4/2021 [facility] Dr. Leyva     Other Tests found in the patient's chart through Chart Review/Care Everywhere:        Note to Abstraction: If this section is blank, no results were found via Chart Review/Care Everywhere.    Aleta Ghosh MA

## 2021-12-19 ENCOUNTER — HEALTH MAINTENANCE LETTER (OUTPATIENT)
Age: 81
End: 2021-12-19

## 2022-01-31 DIAGNOSIS — I71.40 AAA (ABDOMINAL AORTIC ANEURYSM) WITHOUT RUPTURE (H): Primary | ICD-10-CM

## 2022-02-01 ENCOUNTER — TELEPHONE (OUTPATIENT)
Dept: OTHER | Facility: CLINIC | Age: 82
End: 2022-02-01
Payer: COMMERCIAL

## 2022-02-01 NOTE — TELEPHONE ENCOUNTER
LM for patient to call us back to schedule Ultrasound for AO/IVC/Iliac duplex limited for Dr. Krueger.     Appt note: H/O EVAR in 2007. 2 year follow up. Comparison study done 2/2020. Call with results.

## 2022-02-17 ENCOUNTER — TRANSFERRED RECORDS (OUTPATIENT)
Dept: HEALTH INFORMATION MANAGEMENT | Facility: CLINIC | Age: 82
End: 2022-02-17

## 2022-02-17 LAB — RETINOPATHY: NEGATIVE

## 2022-03-01 ENCOUNTER — HOSPITAL ENCOUNTER (OUTPATIENT)
Dept: ULTRASOUND IMAGING | Facility: CLINIC | Age: 82
Discharge: HOME | End: 2022-03-01
Attending: RADIOLOGY | Admitting: RADIOLOGY
Payer: COMMERCIAL

## 2022-03-01 DIAGNOSIS — I71.40 AAA (ABDOMINAL AORTIC ANEURYSM) WITHOUT RUPTURE (H): ICD-10-CM

## 2022-03-01 PROCEDURE — 93979 VASCULAR STUDY: CPT

## 2022-03-02 NOTE — RESULT ENCOUNTER NOTE
Called patient with results.  Recommend follow up in 1 year.  Meena Cheung RN  IR nurse clinician  [phone]

## 2022-03-07 DIAGNOSIS — I71.40 AAA (ABDOMINAL AORTIC ANEURYSM) WITHOUT RUPTURE (H): Primary | ICD-10-CM

## 2022-03-07 NOTE — RESULT ENCOUNTER NOTE
Called patient with results.  Recommend 2 year follow up.  Meena Cheung RN  IR nurse clinician  [phone]

## 2022-04-01 ENCOUNTER — TRANSFERRED RECORDS (OUTPATIENT)
Dept: HEALTH INFORMATION MANAGEMENT | Facility: CLINIC | Age: 82
End: 2022-04-01

## 2022-04-01 LAB — RETINOPATHY: NORMAL

## 2022-04-10 ENCOUNTER — HEALTH MAINTENANCE LETTER (OUTPATIENT)
Age: 82
End: 2022-04-10

## 2022-04-22 DIAGNOSIS — I25.5 ISCHEMIC CARDIOMYOPATHY: ICD-10-CM

## 2022-04-22 DIAGNOSIS — I10 HYPERTENSION GOAL BP (BLOOD PRESSURE) < 140/90: ICD-10-CM

## 2022-04-22 DIAGNOSIS — I25.10 ASCVD (ARTERIOSCLEROTIC CARDIOVASCULAR DISEASE): ICD-10-CM

## 2022-04-22 RX ORDER — METOPROLOL TARTRATE 50 MG
TABLET ORAL
Qty: 30 TABLET | Refills: 5 | Status: SHIPPED | OUTPATIENT
Start: 2022-04-22 | End: 2022-07-11

## 2022-04-22 NOTE — TELEPHONE ENCOUNTER
DOD signed this med.  I updated pt on this available med refill on the metoprolol.  Pt is just not impressed with mychart. Messages seem to get lost for a while.  I did tell pt that he can ask his pharmacy about refills, if they don't have any, the pharmacy sends us a refill request.  I did confirm that pt got a 6 month refill.  Pt was very grateful for this f/u.  SUMIT De Los Santos

## 2022-04-22 NOTE — TELEPHONE ENCOUNTER
Med passed the refill prot.  11/8/21 was last OV with PCP.  HTN med change noted in this visit. Metoprolol is not discussed.    Pt only has one tab left.  Can this med be sent in ASAP.  Will send to DOD and PCP. PCP is out this week.  SUMIT De Los Santos

## 2022-04-22 NOTE — TELEPHONE ENCOUNTER
PT's wife calling quite upset about mychart communication about the metoprolol.  (I had to slow her down to confirm pt ID, confirm new pharmacy). She was not wanting to rehash her need from clinic.  Wife is upset about the 4/12/22 mychart communication from a specialist?    PT has ONE tab left of the med.    Will send high priority.  Please inform wife when this is done.  OK to leave a detailed message.  SUMIT De Los Santos

## 2022-05-26 DIAGNOSIS — E78.00 PURE HYPERCHOLESTEROLEMIA: ICD-10-CM

## 2022-05-26 DIAGNOSIS — I25.5 ISCHEMIC CARDIOMYOPATHY: ICD-10-CM

## 2022-05-26 DIAGNOSIS — I25.10 ASCVD (ARTERIOSCLEROTIC CARDIOVASCULAR DISEASE): ICD-10-CM

## 2022-05-26 RX ORDER — ATORVASTATIN CALCIUM 80 MG/1
80 TABLET, FILM COATED ORAL DAILY
Qty: 90 TABLET | Refills: 0 | Status: SHIPPED | OUTPATIENT
Start: 2022-05-26 | End: 2022-07-11

## 2022-05-26 RX ORDER — ISOSORBIDE MONONITRATE 30 MG/1
30 TABLET, EXTENDED RELEASE ORAL EVERY MORNING
Qty: 90 TABLET | Refills: 3 | Status: SHIPPED | OUTPATIENT
Start: 2022-05-26 | End: 2022-07-11

## 2022-05-26 NOTE — TELEPHONE ENCOUNTER
Routing refill request to provider for review/approval because:  Last ordered from provider at [facility]    Pt has appt with PCP 7/11/22    KEY CharlesN RN  [facility]

## 2022-05-26 NOTE — TELEPHONE ENCOUNTER
Patient only has 10 days worth or these medication has appointment schedule for 7- will need refilled enough to get him to his appointment

## 2022-07-06 ENCOUNTER — TELEPHONE (OUTPATIENT)
Dept: NURSING | Facility: CLINIC | Age: 82
End: 2022-07-06

## 2022-07-06 DIAGNOSIS — I25.5 ISCHEMIC CARDIOMYOPATHY: ICD-10-CM

## 2022-07-06 DIAGNOSIS — E11.42 TYPE 2 DIABETES MELLITUS WITH DIABETIC POLYNEUROPATHY, WITHOUT LONG-TERM CURRENT USE OF INSULIN (H): ICD-10-CM

## 2022-07-06 DIAGNOSIS — I10 HYPERTENSION GOAL BP (BLOOD PRESSURE) < 140/90: ICD-10-CM

## 2022-07-06 DIAGNOSIS — I25.10 ASCVD (ARTERIOSCLEROTIC CARDIOVASCULAR DISEASE): ICD-10-CM

## 2022-07-06 NOTE — TELEPHONE ENCOUNTER
Clinic Action Needed:Yes refill request    Reason for Call:  Spouse Sarika calling with patient (consent to communicate on file).   metFORMIN (GLUCOPHAGE) 500 MG tablet 180 tablet 0 10/27/2021  No   Sig: TAKE 1 TABLET BY MOUTH TWICE A DAY WITH MEALS   Sent to pharmacy as: metFORMIN HCl 500 MG Oral Tablet (GLUCOPHAGE)     Patient has 2 tablets remaining. Requesting 30 day supply. Patient has appointment scheduled for 7/11/22 with Dr Betancur. Patient stating he plans to come fasting and would like lab work done that day.    Shavonne Jang RN  [facility]

## 2022-07-07 NOTE — TELEPHONE ENCOUNTER
I called amaury and he says he has missed only 2 or 4 of his metformin doses over the last year.    ---Prescription approved per [facility] Refill Protocol.       Thu Batista RN BSN     [facility]        --Last visit:  11/8/2021 [place] for cough+6.    --Future Visit:   Next 5 appointments (look out 90 days)    Jul 11, 2022  7:00 AM  (Arrive by 6:45 AM)  Adult Preventative Visit with Beatriz Betancur MD  [facility] [address] [place] [address] [phone]

## 2022-07-09 PROBLEM — S82.201A CLOSED FRACTURE OF RIGHT TIBIA AND FIBULA: Status: RESOLVED | Noted: 2021-05-08 | Resolved: 2022-07-09

## 2022-07-09 PROBLEM — S82.402D CLOSED FRACTURE OF LEFT TIBIA AND FIBULA WITH ROUTINE HEALING: Status: RESOLVED | Noted: 2021-05-08 | Resolved: 2022-07-09

## 2022-07-09 PROBLEM — S82.401A CLOSED FRACTURE OF RIGHT TIBIA AND FIBULA: Status: RESOLVED | Noted: 2021-05-08 | Resolved: 2022-07-09

## 2022-07-09 PROBLEM — S82.202D CLOSED FRACTURE OF LEFT TIBIA AND FIBULA WITH ROUTINE HEALING: Status: RESOLVED | Noted: 2021-05-08 | Resolved: 2022-07-09

## 2022-07-09 NOTE — PATIENT INSTRUCTIONS
If you have MyChart:  1) I kindly request that you check your MyChart prior to all appointments with me and complete any assigned questionnaires ahead of time.    2) You may receive auto-released results from our system before I have the opportunity to review and comment.  Be assured I will review and comment on all of your results as soon as I can.        FYI:  1) I do virtual (video) visits exclusively on Wednesdays.  I still do in-person visits at [facility] ([phone]) on Mondays, Tuesdays and Thursdays.  You can schedule a video visit for many conditions.  Please follow this link:  [email or web address]  2) My schedule has been booking out very far in advance (2 months).  I apologize for the lack of timely access.  If you need to be seen for a chronic condition or preventive (wellness) visit, please be sure to schedule that appointment 2-3 months in advance.  If you have a concern that you feel cannot wait until my next available appointment (such as a hospital follow-up or new symptom of concern) please ask to speak to one of the [facility] nurses who may be able to access a sooner appointment.    I do ask that all patients who are taking chronic medications for conditions that I am managing schedule an in-person visit with me at least once a year.     To schedule any ordered imaging studies (including mammogram and/or DEXA scan) you can call [facility] at [phone].      Patient Education   Personalized Prevention Plan  You are due for the preventive services outlined below.  Your care team is available to assist you in scheduling these services.  If you have already completed any of these items, please share that information with your care team to update in your medical record.  Health Maintenance Due   Topic Date Due    Zoster (Shingles) Vaccine (2 of 3) 02/26/2013    Prostate Test  11/06/2014    Diabetic Foot Exam   03/10/2017    Cholesterol Lab  02/10/2022    Comprehensive Metabolic Panel  02/22/2022    Kidney Microalbumin Urine Test  02/22/2022    Eye Exam  04/01/2022    ANNUAL REVIEW OF HM ORDERS  08/03/2022     Your Health Risk Assessment indicates you feel you are not in good health    A healthy lifestyle helps keep the body fit and the mind alert. It helps protect you from disease, helps you fight disease, and helps prevent chronic disease (disease that doesn't go away) from getting worse. This is important as you get older and begin to notice twinges in muscles and joints and a decline in the strength and stamina you once took for granted. A healthy lifestyle includes good healthcare, good nutrition, weight control, recreation, and regular exercise. Avoid harmful substances and do what you can to keep safe. Another part of a healthy lifestyle is stay mentally active and socially involved.    Good healthcare   Have a wellness visit every year.   If you have new symptoms, let us know right away. Don't wait until the next checkup.   Take medicines exactly as prescribed and keep your medicines in a safe place. Tell us if your medicine causes problems.   Healthy diet and weight control   Eat 3 or 4 small, nutritious, low-fat, high-fiber meals a day. Include a variety of fruits, vegetables, and whole-grain foods.   Make sure you get enough calcium in your diet. Calcium, vitamin D, and exercise help prevent osteoporosis (bone thinning).   If you live alone, try eating with others when you can. That way you get a good meal and have company while you eat it.   Try to keep a healthy weight. If you eat more calories than your body uses for energy, it will be stored as fat and you will gain weight.     Recreation   Recreation is not limited to sports and team events. It includes any activity that provides relaxation, interest, enjoyment, and exercise. Recreation provides an outlet for physical, mental, and social energy. It can give  a sense of worth and achievement. It can help you stay healthy.    Mental Exercise and Social Involvement  Mental and emotional health is as important as physical health. Keep in touch with friends and family. Stay as active as possible. Continue to learn and challenge yourself.   Things you can do to stay mentally active are:  Learn something new, like a foreign language or musical instrument.   Play SCRABBLE or do crossword puzzles. If you cannot find people to play these games with you at home, you can play them with others on your computer through the Internet.   Join a games club--anything from card games to chess or checkers or lawn bowling.   Start a new hobby.   Go back to school.   Volunteer.   Read.   Keep up with world events.    Exercise for a Healthier Heart  You may wonder how you can improve the health of your heart. If you re thinking about exercise, you re on the right track. You don t need to become an athlete. But you do need a certain amount of brisk exercise to help strengthen your heart. If you have been diagnosed with a heart condition, your healthcare provider may advise exercise to help stabilize your condition. To help make exercise a habit, choose safe, fun activities.      Exercise with a friend. When activity is fun, you're more likely to stick with it.   Before you start  Check with your healthcare provider before starting an exercise program. This is especially important if you have not been active for a while. It's also important if you have a long-term (chronic) health problem such as heart disease, diabetes, or obesity. Or if you are at high risk for having these problems.   Why exercise?  Exercising regularly offers many healthy rewards. It can help you do all of the following:   Improve your blood cholesterol level to help prevent further heart trouble  Lower your blood pressure to help prevent a stroke or heart attack  Control diabetes, or reduce your risk of getting this  disease  Improve your heart and lung function  Reach and stay at a healthy weight  Make your muscles stronger so you can stay active  Prevent falls and fractures by slowing the loss of bone mass (osteoporosis)  Manage stress better  Reduce your blood pressure  Improve your sense of self and your body image  Exercise tips    Ease into your routine. Set small goals. Then build on them. If you are not sure what your activity level should be, talk with your healthcare provider first before starting an exercise routine.  Exercise on most days. Aim for a total of 150 minutes (2 hours and 30 minutes) or more of moderate-intensity aerobic activity each week. Or 75 minutes (1 hour and 15 minutes) or more of vigorous-intensity aerobic activity each week. Or try for a combination of both. Moderate activity means that you breathe heavier and your heart rate increases but you can still talk. Think about doing 40 minutes of moderate exercise, 3 to 4 times a week. For best results, activity should last for about 40 minutes to lower blood pressure and cholesterol. It's OK to work up to the 40-minute period over time. Examples of moderate-intensity activity are walking 1 mile in 15 minutes. Or doing 30 to 45 minutes of yard work.  Step up your daily activity level.  Along with your exercise program, try being more active the whole day. Walk instead of drive. Or park further away so that you take more steps each day. Do more household tasks or yard work. You may not be able to meet the advised mount of physical activity. But doing some moderate- or vigorous-intensity aerobic activity can help reduce your risk for heart disease. Your healthcare provider can help you figure out what is best for you.  Choose 1 or more activities you enjoy.  Walking is one of the easiest things you can do. You can also try swimming, riding a bike, dancing, or taking an exercise class.    When to call your healthcare provider  Call your healthcare  provider if you have any of these:   Chest pain or feel dizzy or lightheaded  Burning, tightness, pressure, or heaviness in your chest, neck, shoulders, back, or arms  Abnormal shortness of breath  More joint or muscle pain  A very fast or irregular heartbeat (palpitations)  [employer] last reviewed this educational content on 7/1/2019 2000-2021 [employer]. All rights reserved. This information is not intended as a substitute for professional medical care. Always follow your healthcare professional's instructions.          Understanding USDA MyPlate  The USDA has guidelines to help you make healthy food choices. These are called MyPlate. MyPlate shows the food groups that make up healthy meals using the image of a place setting. Before you eat, think about the healthiest choices for what to put on your plate or in your cup or bowl. To learn more about building a healthy plate, visit www.choosemyplate.gov.    The food groups  Fruits. Any fruit or 100% fruit juice counts as part of the Fruit Group. Fruits may be fresh, canned, frozen, or dried, and may be whole, cut-up, or pureed. Make 1/2 of your plate fruits and vegetables.  Vegetables. Any vegetable or 100% vegetable juice counts as a member of the Vegetable Group. Vegetables may be fresh, frozen, canned, or dried. They can be served raw or cooked and may be whole, cut-up, or mashed. Make 1/2 of your plate fruits and vegetables.  Grains. All foods made from grains are part of the Grains Group. These include wheat, rice, oats, cornmeal, and barley. Grains are often used to make foods such as bread, pasta, oatmeal, cereal, tortillas, and grits. Grains should be no more than 1/4 of your plate. At least half of your grains should be whole grains.  Protein. This group includes meat, poultry, seafood, beans and peas, eggs, processed soy products (such as tofu), nuts (including nut butters), and seeds. Make protein choices no more than 1/4 of your plate.  Meat and poultry choices should be lean or low fat.  Dairy. The Dairy Group includes all fluid milk products and foods made from milk that contain calcium, such as yogurt and cheese. (Foods that have little calcium, such as cream, butter, and cream cheese, are not part of this group.) Most dairy choices should be low-fat or fat-free.  Oils. Oils aren't a food group, but they do contain essential nutrients. However it's important to watch your intake of oils. These are fats that are liquid at room temperature. They include canola, corn, olive, soybean, vegetable, and sunflower oil. Foods that are mainly oil include mayonnaise, certain salad dressings, and soft margarines. You likely already get your daily oil allowance from the foods you eat.  Things to limit  Eating healthy also means limiting these things in your diet:     Salt (sodium). Many processed foods have a lot of sodium. To keep sodium intake down, eat fresh vegetables, meats, poultry, and seafood when possible. Purchase low-sodium, reduced-sodium, or no-salt-added food products at the store. And don't add salt to your meals at home. Instead, season them with herbs and spices such as dill, oregano, cumin, and paprika. Or try adding flavor with lemon or lime zest and juice.  Saturated fat. Saturated fats are most often found in animal products such as beef, pork, and chicken. They are often solid at room temperature, such as butter. To reduce your saturated fat intake, choose leaner cuts of meat and poultry. And try healthier cooking methods such as grilling, broiling, roasting, or baking. For a simple lower-fat swap, use plain nonfat yogurt instead of mayonnaise when making potato salad or macaroni salad.  Added sugars. These are sugars added to foods. They are in foods such as ice cream, candy, soda, fruit drinks, sports drinks, energy drinks, cookies, pastries, jams, and syrups. Cut down on added sugars by sharing sweet treats with a family member or  friend. You can also choose fruit for dessert, and drink water or other unsweetened beverages.     [employer] last reviewed this educational content on 6/1/2020 2000-2021 [employer]. All rights reserved. This information is not intended as a substitute for professional medical care. Always follow your healthcare professional's instructions.        Activities of Daily Living    Your Health Risk Assessment indicates you have difficulties with activities of daily living such as housework, bathing, preparing meals, taking medication, etc. Please make a follow up appointment for us to address this issue in more detail.    Signs of Hearing Loss      Hearing much better with one ear can be a sign of hearing loss.   Hearing loss is a problem shared by many people. In fact, it is one of the most common health problems, particularly as people age. Most people age 65 and older have some hearing loss. By age 80, almost everyone does. Hearing loss often occurs slowly over the years. So you may not realize your hearing has gotten worse.  Have your hearing checked  Call your healthcare provider if you:  Have to strain to hear normal conversation  Have to watch other people s faces very carefully to follow what they re saying  Need to ask people to repeat what they ve said  Often misunderstand what people are saying  Turn the volume of the television or radio up so high that others complain  Feel that people are mumbling when they re talking to you  Find that the effort to hear leaves you feeling tired and irritated  Notice, when using the phone, that you hear better with one ear than the other  [employer] last reviewed this educational content on 1/1/2020 2000-2021 [employer]. All rights reserved. This information is not intended as a substitute for professional medical care. Always follow your healthcare professional's instructions.          Urinary Incontinence (Male)    Urinary incontinence means  not being able to control the release of urine from the bladder.   Causes  Common causes of urinary incontinence in men include:  Infection  Certain medicines  Aging  Poor pelvic muscle tone  Bladder spasms  Obesity  Trouble urinating and fully emptying the bladder (urinary retention)  Other things that can cause incontinence are:   Nervous system diseases  Diabetes  Sleep apnea  Urinary tract infections  Prostate surgery  Pelvic injury  Constipation and smoking have also been identified as risk factors.   Symptoms  Urge incontinence (overactive bladder). This is a sudden urge to urinate. It occurs even though there may not be much urine in the bladder. The need to urinate often during the night is common. It's due to bladder spasms.  Stress incontinence. This is urine leakage that you can't control. It can occur with sneezing, coughing, and other actions that put stress on the bladder.    Treatment  Treatment depends on what is causing the condition. Bladder infections are treated with antibiotics. Urinary retention is treated with a bladder catheter.   Home care  Follow these guidelines when caring for yourself at home:  Don't have any foods and drinks that may irritate the bladder. This includes:  Chocolate  Alcohol  Caffeine  Carbonated drinks  Acidic fruits and juices  Limit fluids to 6 to 8 cups a day.  Lose weight if you are overweight. This will reduce your symptoms.  If advised, do regular pelvic muscle-strengthening exercises such as Kegel exercises.  If needed, wear absorbent pads to catch urine. Change the pads often. This is for good hygiene and to prevent skin and bladder infections.  Bathe daily for good hygiene.  If an antibiotic was prescribed to treat a bladder infection, take it until it's finished. Keep taking it even if you are feeling better. This is to make sure your infection has cleared.  If a catheter was left in place, keep bacteria from getting into the collection bag. Don't disconnect  the catheter from the collection bag.  Use a leg band to secure the catheter drainage tube, so it does not pull on the catheter. Drain the collection bag when it becomes full. To do this, use the drain spout at the bottom of the bag. Don't disconnect the bag from the catheter.  Don't pull on or try to remove a catheter. The catheter must be removed by a healthcare provider.  If you smoke, stop. Ask your provider for help if you can't do this on your own.  Follow-up care  Follow up with your healthcare provider, or as advised.  When to get medical advice  Call your healthcare provider right away if any of these occur:  Fever over 100.4 F (38 C), or as directed by your provider  Bladder pain or fullness  Belly swelling, nausea, or vomiting  Back pain  Weakness, dizziness, or fainting  If a catheter was left in place, return if:  The catheter falls out  The catheter stops draining for 6 hours  Your urine gets cloudy or smells bad  [employer] last reviewed this educational content on 1/1/2020 2000-2021 [employer]. All rights reserved. This information is not intended as a substitute for professional medical care. Always follow your healthcare professional's instructions.        Your Health Risk Assessment indicates you feel you are not in good emotional health.    Recreation   Recreation is not limited to sports and team events. It includes any activity that provides relaxation, interest, enjoyment, and exercise. Recreation provides an outlet for physical, mental, and social energy. It can give a sense of worth and achievement. It can help you stay healthy.    Mental Exercise and Social Involvement  Mental and emotional health is as important as physical health. Keep in touch with friends and family. Stay as active as possible. Continue to learn and challenge yourself.   Things you can do to stay mentally active are:  Learn something new, like a foreign language or musical instrument.   Play SCRABBLE or  do crossword puzzles. If you cannot find people to play these games with you at home, you can play them with others on your computer through the Internet.   Join a games club--anything from card games to chess or checkers or lawn bowling.   Start a new hobby.   Go back to school.   Volunteer.   Read.   Keep up with world events.    Preventing Falls at Home  A person can fall for many reasons. Older adults may fall because reaction time slows down as we age. Your muscles and joints may get stiff, weak, or less flexible because of illness, medicines, or a physical condition.   Other health problems that make falls more likely include:   Arthritis  Dizziness or lightheadedness when you stand up (orthostatic hypotension)  History of a stroke  Dizziness  Anemia  Certain medicines taken for mental illness or to control blood pressure.  Problems with balance or gait  Bladder or urinary problems  History of falling  Changes in vision (vision impairment)  Changes in thinking skills and memory (cognitive impairment)  Injuries from a fall can include serious injuries such as broken bones, dislocated joints, internal bleeding and cuts. Injuries like these can limit your independence.   Prevention tips  To help prevent falls and fall-related injuries, follow the tips below.    Floors  To make floors safer:   Put nonskid pads under area rugs.  Remove small rugs.  Replace worn floor coverings.  Tack carpets firmly to each step on carpeted stairs. Put nonskid strips on the edges of uncarpeted stairs.  Keep floors and stairs free of clutter and cords.  Arrange furniture so there are clear pathways.  Clean up any spills right away.  Bathrooms    To make bathrooms safer:   Install grab bars in the tub or shower.  Apply nonskid strips or put a nonskid rubber mat in the tub or shower.  Sit on a bath chair to bathe.  Use bathmats with nonskid backing.  Lighting  To improve visibility in your home:    Keep a flashlight in each room. Or  put a lamp next to the bed within easy reach.  Put nightlights in the bedrooms, hallways, kitchen, and bathrooms.  Make sure all stairways have good lighting.  Take your time when going up and down stairs.  Put handrails on both sides of stairs and in walkways for more support. To prevent injury to your wrist or arm, don t use handrails to pull yourself up.  Install grab bars to pull yourself up.  Move or rearrange items that you use often. This will make them easier to find or reach.  Look at your home to find any safety hazards. Especially look at doorways, walkways, and the driveway. Remove or repair any safety problems that you find.  Other changes to make  Look around to find any safety hazards. Look closely at doorways, walkways, and the driveway. Remove or repair any safety problems that you find.  Wear shoes that fit well.  Take your time when going up and down stairs.  Put handrails on both sides of stairs and in walkways for more support. To prevent injury to your wrist or arm, don t use handrails to pull yourself up.  Install grab bars wherever needed to pull yourself up.  Arrange items that you use often. This will make them easier to find or reach.    [employer] last reviewed this educational content on 3/1/2020    4850-1803 [employer]. All rights reserved. This information is not intended as a substitute for professional medical care. Always follow your healthcare professional's instructions.

## 2022-07-11 ENCOUNTER — MYC MEDICAL ADVICE (OUTPATIENT)
Dept: FAMILY MEDICINE | Facility: CLINIC | Age: 82
End: 2022-07-11

## 2022-07-11 ENCOUNTER — OFFICE VISIT (OUTPATIENT)
Dept: FAMILY MEDICINE | Facility: CLINIC | Age: 82
End: 2022-07-11
Payer: COMMERCIAL

## 2022-07-11 VITALS
OXYGEN SATURATION: 98 % | RESPIRATION RATE: 18 BRPM | SYSTOLIC BLOOD PRESSURE: 124 MMHG | WEIGHT: 269 LBS | TEMPERATURE: 97.2 F | DIASTOLIC BLOOD PRESSURE: 76 MMHG | HEIGHT: 72 IN | HEART RATE: 57 BPM | BODY MASS INDEX: 36.44 KG/M2

## 2022-07-11 DIAGNOSIS — E78.00 PURE HYPERCHOLESTEROLEMIA: ICD-10-CM

## 2022-07-11 DIAGNOSIS — Z12.5 SCREENING FOR PROSTATE CANCER: ICD-10-CM

## 2022-07-11 DIAGNOSIS — E66.01 SEVERE OBESITY (BMI 35.0-39.9) WITH COMORBIDITY (H): ICD-10-CM

## 2022-07-11 DIAGNOSIS — E11.42 TYPE 2 DIABETES MELLITUS WITH DIABETIC POLYNEUROPATHY, WITHOUT LONG-TERM CURRENT USE OF INSULIN (H): ICD-10-CM

## 2022-07-11 DIAGNOSIS — R26.9 GAIT DISORDER: ICD-10-CM

## 2022-07-11 DIAGNOSIS — I71.40 AAA (ABDOMINAL AORTIC ANEURYSM) WITHOUT RUPTURE (H): ICD-10-CM

## 2022-07-11 DIAGNOSIS — H91.90 DECREASED HEARING, UNSPECIFIED LATERALITY: ICD-10-CM

## 2022-07-11 DIAGNOSIS — I25.10 ASCVD (ARTERIOSCLEROTIC CARDIOVASCULAR DISEASE): ICD-10-CM

## 2022-07-11 DIAGNOSIS — I25.10 CORONARY ARTERY DISEASE INVOLVING NATIVE CORONARY ARTERY OF NATIVE HEART WITHOUT ANGINA PECTORIS: ICD-10-CM

## 2022-07-11 DIAGNOSIS — I10 BENIGN ESSENTIAL HYPERTENSION: ICD-10-CM

## 2022-07-11 DIAGNOSIS — I25.5 ISCHEMIC CARDIOMYOPATHY: ICD-10-CM

## 2022-07-11 DIAGNOSIS — R29.6 FALLS FREQUENTLY: ICD-10-CM

## 2022-07-11 DIAGNOSIS — Z00.00 ENCOUNTER FOR MEDICARE ANNUAL WELLNESS EXAM: Primary | ICD-10-CM

## 2022-07-11 LAB — HBA1C MFR BLD: 7.8 % (ref 0–5.6)

## 2022-07-11 PROCEDURE — 36415 COLL VENOUS BLD VENIPUNCTURE: CPT | Performed by: FAMILY MEDICINE

## 2022-07-11 PROCEDURE — 99397 PER PM REEVAL EST PAT 65+ YR: CPT | Performed by: FAMILY MEDICINE

## 2022-07-11 PROCEDURE — 82043 UR ALBUMIN QUANTITATIVE: CPT | Performed by: FAMILY MEDICINE

## 2022-07-11 PROCEDURE — 80061 LIPID PANEL: CPT | Performed by: FAMILY MEDICINE

## 2022-07-11 PROCEDURE — 83036 HEMOGLOBIN GLYCOSYLATED A1C: CPT | Performed by: FAMILY MEDICINE

## 2022-07-11 PROCEDURE — G0103 PSA SCREENING: HCPCS | Performed by: FAMILY MEDICINE

## 2022-07-11 PROCEDURE — 99214 OFFICE O/P EST MOD 30 MIN: CPT | Mod: 25 | Performed by: FAMILY MEDICINE

## 2022-07-11 PROCEDURE — 80053 COMPREHEN METABOLIC PANEL: CPT | Performed by: FAMILY MEDICINE

## 2022-07-11 RX ORDER — BLOOD SUGAR DIAGNOSTIC
STRIP MISCELLANEOUS
Qty: 100 STRIP | Refills: 4 | Status: SHIPPED | OUTPATIENT
Start: 2022-07-11 | End: 2022-12-01

## 2022-07-11 RX ORDER — LOSARTAN POTASSIUM 50 MG/1
50 TABLET ORAL DAILY
Qty: 90 TABLET | Refills: 1 | Status: SHIPPED | OUTPATIENT
Start: 2022-07-11 | End: 2023-03-06

## 2022-07-11 RX ORDER — LANCING DEVICE/LANCETS
KIT MISCELLANEOUS
Qty: 1 EACH | Refills: 1 | Status: SHIPPED | OUTPATIENT
Start: 2022-07-11 | End: 2023-04-03

## 2022-07-11 RX ORDER — ISOSORBIDE MONONITRATE 30 MG/1
30 TABLET, EXTENDED RELEASE ORAL EVERY MORNING
Qty: 90 TABLET | Refills: 3 | Status: SHIPPED | OUTPATIENT
Start: 2022-07-11 | End: 2023-08-18

## 2022-07-11 RX ORDER — NITROGLYCERIN 0.4 MG/1
0.4 TABLET SUBLINGUAL EVERY 5 MIN PRN
Qty: 25 TABLET | Refills: 3 | Status: SHIPPED | OUTPATIENT
Start: 2022-07-11

## 2022-07-11 RX ORDER — METOPROLOL TARTRATE 50 MG
TABLET ORAL
Qty: 90 TABLET | Refills: 3 | Status: SHIPPED | OUTPATIENT
Start: 2022-07-11 | End: 2023-07-09

## 2022-07-11 RX ORDER — ATORVASTATIN CALCIUM 80 MG/1
80 TABLET, FILM COATED ORAL DAILY
Qty: 90 TABLET | Refills: 3 | Status: SHIPPED | OUTPATIENT
Start: 2022-07-11 | End: 2023-04-04

## 2022-07-11 ASSESSMENT — ENCOUNTER SYMPTOMS
NERVOUS/ANXIOUS: 0
EYE PAIN: 0
SHORTNESS OF BREATH: 0
HEMATURIA: 0
WEAKNESS: 1
HEARTBURN: 0
FEVER: 0
DYSURIA: 0
MYALGIAS: 1
COUGH: 0
HEADACHES: 0
CHILLS: 0
ARTHRALGIAS: 1
DIARRHEA: 0
HEMATOCHEZIA: 0
PALPITATIONS: 0
CONSTIPATION: 0
DIZZINESS: 0
PARESTHESIAS: 0
JOINT SWELLING: 0
SORE THROAT: 0
NAUSEA: 0
ABDOMINAL PAIN: 0
FREQUENCY: 0

## 2022-07-11 ASSESSMENT — ACTIVITIES OF DAILY LIVING (ADL)
CURRENT_FUNCTION: PREPARING MEALS REQUIRES ASSISTANCE
CURRENT_FUNCTION: MEDICATION ADMINISTRATION REQUIRES ASSISTANCE
CURRENT_FUNCTION: TRANSPORTATION REQUIRES ASSISTANCE

## 2022-07-11 NOTE — PROGRESS NOTES
"SUBJECTIVE:   David Medina is a 82 year old male who presents for Preventive Visit.      Patient has been advised of split billing requirements and indicates understanding: Yes  Are you in the first 12 months of your Medicare coverage?  No    Healthy Habits:     In general, how would you rate your overall health?  Fair    Frequency of exercise:  1 day/week    Duration of exercise:  Less than 15 minutes    Do you usually eat at least 4 servings of fruit and vegetables a day, include whole grains    & fiber and avoid regularly eating high fat or \"junk\" foods?  No    Taking medications regularly:  Yes    Medication side effects:  Muscle aches    Ability to successfully perform activities of daily living:  Transportation requires assistance, preparing meals requires assistance and medication administration requires assistance    Home Safety:  No safety concerns identified    Hearing Impairment:  Difficulty following a conversation in a noisy restaurant or crowded room, difficulty following dialogue in the theater, difficult to understand a speaker at a public meeting or [employer] service, need to ask people to speak up or repeat themselves and difficulty understanding soft or whispered speech    In the past 6 months, have you been bothered by leaking of urine? Yes    In general, how would you rate your overall mental or emotional health?  Fair      PHQ-2 Total Score: 2    Additional concerns today:  Yes    Diabetes Follow-up    How often are you checking your blood sugar? A few times a week - runs 140-160 in AM  What time of day are you checking your blood sugars (select all that apply)?  Before meals  Have you had any blood sugars above 200?  Yes \"once or twice\"  Have you had any blood sugars below 70?  No    What symptoms do you notice when your blood sugar is low?  None    What concerns do you have today about your diabetes? None     Do you have any of these symptoms? (Select all that apply)  Numbness in " feet    Have you had a diabetic eye exam in the last 12 months? Yes- Date of last eye exam: 2022,  Location: Dr. Leyva, [facility]  Has exams every 6 months.          BP Readings from Last 2 Encounters:   22 124/76   21 139/77     Hemoglobin A1C POCT (%)   Date Value   2021 6.9 (H)   2021 6.9 (H)     Hemoglobin A1C (%)   Date Value   2022 7.8 (H)     LDL Cholesterol Calculated (mg/dL)   Date Value   02/10/2021 62   2019 66         Do you feel safe in your environment? Yes    Have you ever done Advance Care Planning? (For example, a Health Directive, POLST, or a discussion with a medical provider or your loved ones about your wishes): No, advance care planning information given to patient to review.  Advanced care planning was discussed at today's visit.       Fall risk  Fallen 2 or more times in the past year?: Yes  Any fall with injury in the past year?: Yes   Get up and Go Test: 31 seconds       Cognitive Screening   1) Repeat 3 items (Leader, Season, Table)    2) Clock draw: NORMAL  3) 3 item recall: Recalls 2 objects   Results: NORMAL clock, 1-2 items recalled: COGNITIVE IMPAIRMENT LESS LIKELY    Mini-CogTM Copyright S Jeff. Licensed by the author for use in [facility]; reprinted with permission (shira@[facility]). All rights reserved.        Reviewed and updated as needed this visit by clinical staff   Tobacco  Allergies  Meds  Problems               Reviewed and updated as needed this visit by Provider    Allergies  Meds  Problems              Social History     Tobacco Use     Smoking status: Former Smoker     Packs/day: 3.50     Years: 19.00     Pack years: 66.50     Types: Cigarettes     Quit date: 1980     Years since quittin.1     Smokeless tobacco: Never Used   Substance Use Topics     Alcohol use: Yes     Comment: beer 1-2x/week         Alcohol Use 2022   Prescreen: >3 drinks/day or >7 drinks/week? No   Prescreen: >3  drinks/day or >7 drinks/week? -       Current providers sharing in care for this patient include:   Patient Care Team:  Beatriz Betancur MD as PCP - Nadia Lewis MD as Assigned Heart and Vascular Provider  Beatriz Betancur MD as Assigned PCP    The following health maintenance items are reviewed in Epic and correct as of today:  Health Maintenance Due   Topic Date Due     ZOSTER IMMUNIZATION (2 of 3) 02/26/2013     PSA  11/06/2014     DIABETIC FOOT EXAM  03/10/2017     LIPID  02/10/2022     CMP  02/22/2022     MICROALBUMIN  02/22/2022     EYE EXAM  04/01/2022     ANNUAL REVIEW OF HM ORDERS  08/03/2022     BP Readings from Last 3 Encounters:   07/11/22 124/76   11/08/21 139/77   10/31/21 (!) 149/95    Wt Readings from Last 3 Encounters:   07/11/22 122 kg (269 lb)   10/31/21 113.4 kg (250 lb)   08/03/21 116.1 kg (256 lb)              Review of Systems   Constitutional: Negative for chills and fever.   HENT: Positive for congestion and hearing loss. Negative for ear pain and sore throat.    Eyes: Negative for pain and visual disturbance.   Respiratory: Negative for cough and shortness of breath.    Cardiovascular: Negative for chest pain, palpitations and peripheral edema.   Gastrointestinal: Negative for abdominal pain, constipation, diarrhea, heartburn, hematochezia and nausea.   Genitourinary: Positive for urgency. Negative for dysuria, frequency, genital sores and hematuria.   Musculoskeletal: Positive for arthralgias and myalgias. Negative for joint swelling.   Skin: Negative for rash.   Neurological: Positive for weakness. Negative for dizziness, headaches and paresthesias.   Psychiatric/Behavioral: Negative for mood changes. The patient is not nervous/anxious.      No new symptoms.      OBJECTIVE:   /76   Pulse 57   Temp 97.2  F (36.2  C) (Temporal)   Resp 18   Ht 1.829 m (6')   Wt 122 kg (269 lb)   SpO2 98%   BMI 36.48 kg/m   Estimated body mass index is 36.48 kg/m  as calculated  from the following:    Height as of this encounter: 1.829 m (6').    Weight as of this encounter: 122 kg (269 lb).  Physical Exam  GENERAL: healthy, alert and no distress  EYES: Eyes grossly normal to inspection, conjunctivae and sclerae normal  HENT: ear canals and TM's normal  NECK: no adenopathy, no asymmetry, masses, or scars and thyroid normal to palpation  RESP: lungs clear to auscultation - no rales, rhonchi or wheezes  CV: regular rate and rhythm, normal S1 S2, no S3 or S4, no murmur, click or rub, trace peripheral edema  ABDOMEN: soft, nontender, no hepatosplenomegaly, no masses  MS: no gross musculoskeletal defects noted, no edema  SKIN: no suspicious lesions or rashes, shiny hyperpigmentation pf lower legs  NEURO: mentation intact and speech normal  PSYCH: mentation appears normal, affect normal/bright   DIABETIC FOOT EXAM:  Bilateral feet examined.  Dystrophic left great toenail; no other lesions or deformities noted.  Skin is warm and dry.  Pedal pulses are absent.  Sensation is diminished by nylon filament exam to the knees bilaterally.       ASSESSMENT / PLAN:     Encounter for Medicare annual wellness exam      Type 2 diabetes mellitus with diabetic polyneuropathy, without long-term current use of insulin (H)  BMI 35.0-39.9 with comorbidity (H)  Diabetes stable/adequately controlled but with increased weight/severe obesity. Continue current medication regimen. His severe neuropathy and gait dysfunction severely limit his mobility and he plans to work on limiting portion size/caloric intake.    - HEMOGLOBIN A1C  - Lipid panel reflex to direct LDL Fasting  - COMPREHENSIVE METABOLIC PANEL  - Albumin Random Urine Quantitative with Creat Ratio  - metFORMIN (GLUCOPHAGE) 500 MG tablet  Dispense: 180 tablet; Refill: 1  - blood glucose (ONETOUCH VERIO IQ) test strip  Dispense: 100 strip; Refill: 4  - blood glucose (ONE TOUCH DELICA) lancing device  Dispense: 1 each; Refill: 1  - Home Care Referral    Pure  hypercholesterolemia  - Lipid panel reflex to direct LDL Fasting  - atorvastatin (LIPITOR) 80 MG tablet  Dispense: 90 tablet; Refill: 3    AAA (abdominal aortic aneurysm) without rupture (H)  Per Vascular Surgery plan for repeat US in 2024.    Benign essential hypertension  stable/well controlled - Continue current medication regimen.   - losartan (COZAAR) 50 MG tablet  Dispense: 90 tablet; Refill: 1  - metoprolol tartrate (LOPRESSOR) 50 MG tablet  Dispense: 90 tablet; Refill: 3    Coronary artery disease involving native coronary artery of native heart without angina pectoris  Ischemic cardiomyopathy  ASCVD (arteriosclerotic cardiovascular disease)  stable/well controlled - Continue current medication regimen.   - nitroGLYcerin (NITROSTAT) 0.4 MG sublingual tablet  Dispense: 25 tablet; Refill: 3  - isosorbide mononitrate (IMDUR) 30 MG 24 hr tablet  Dispense: 90 tablet; Refill: 3  - metoprolol tartrate (LOPRESSOR) 50 MG tablet  Dispense: 90 tablet; Refill: 3    Screening for prostate cancer  - PSA, screen    Decreased hearing, unspecified laterality  - Adult Audiology  Referral    Gait disorder  Falls frequently  He sometimes has trouble getting out of chair at home.  Wife can't lift him.  We'll have Home Care OT/PT do home safety eval and make recommendations for additional assistive devices.    - Home Care Referral       COUNSELING:  Reviewed preventive health counseling, as reflected in patient instructions    Estimated body mass index is 36.48 kg/m  as calculated from the following:    Height as of this encounter: 1.829 m (6').    Weight as of this encounter: 122 kg (269 lb).  Wt Readings from Last 3 Encounters:   07/11/22 122 kg (269 lb)   10/31/21 113.4 kg (250 lb)   08/03/21 116.1 kg (256 lb)    Weight management plan: see above    He reports that he quit smoking about 42 years ago. His smoking use included cigarettes. He has a 66.50 pack-year smoking history. He has never used smokeless  tobacco.      Appropriate preventive services were discussed with this patient, including applicable screening as appropriate for cardiovascular disease, diabetes, osteopenia/osteoporosis, and glaucoma.  As appropriate for age/gender, discussed screening for colorectal cancer, prostate cancer, breast cancer, and cervical cancer. Checklist reviewing preventive services available has been given to the patient.    Reviewed patients plan of care and provided an AVS. The Basic Care Plan (routine screening as documented in Health Maintenance) for David meets the Care Plan requirement. This Care Plan has been established and reviewed with the Patient and spouse.    Counseling Resources:  ATP IV Guidelines  Pooled Cohorts Equation Calculator  Breast Cancer Risk Calculator  Breast Cancer: Medication to Reduce Risk  FRAX Risk Assessment  ICSI Preventive Guidelines  Dietary Guidelines for Americans, 2010  [employer]'s MyPlate  ASA Prophylaxis  Lung CA Screening    Beatriz Betancur MD  [facility]    Identified Health Risks:    The patient was provided with suggestions to help him develop a healthy physical lifestyle.  He is at risk for lack of exercise and has been provided with information to increase physical activity for the benefit of his well-being.  The patient was counseled and encouraged to consider modifying their diet and eating habits. He was provided with information on recommended healthy diet options.  The patient reports that he has difficulty with activities of daily living. His wife helps him and I have ordered a Home Care home safety eval.  The patient was provided with written information regarding signs of hearing loss.  Information on urinary incontinence and treatment options given to patient.  The patient was provided with suggestions to help him develop a healthy emotional lifestyle.  He is at risk for falling and has been provided with information to reduce the risk of falling  at home.

## 2022-07-13 LAB
ALBUMIN SERPL-MCNC: 4.2 G/DL (ref 3.4–5)
ALP SERPL-CCNC: 86 U/L (ref 40–150)
ALT SERPL W P-5'-P-CCNC: 38 U/L (ref 0–70)
ANION GAP SERPL CALCULATED.3IONS-SCNC: 11 MMOL/L (ref 3–14)
AST SERPL W P-5'-P-CCNC: 22 U/L (ref 0–45)
BILIRUB SERPL-MCNC: 0.7 MG/DL (ref 0.2–1.3)
BUN SERPL-MCNC: 20 MG/DL (ref 7–30)
CALCIUM SERPL-MCNC: 9.7 MG/DL (ref 8.5–10.1)
CHLORIDE BLD-SCNC: 105 MMOL/L (ref 94–109)
CHOLEST SERPL-MCNC: 138 MG/DL
CO2 SERPL-SCNC: 24 MMOL/L (ref 20–32)
CREAT SERPL-MCNC: 1.18 MG/DL (ref 0.66–1.25)
CREAT UR-MCNC: 234 MG/DL
FASTING STATUS PATIENT QL REPORTED: YES
GFR SERPL CREATININE-BSD FRML MDRD: 61 ML/MIN/1.73M2
GLUCOSE BLD-MCNC: 195 MG/DL (ref 70–99)
HDLC SERPL-MCNC: 48 MG/DL
LDLC SERPL CALC-MCNC: 57 MG/DL
MICROALBUMIN UR-MCNC: 28 MG/L
MICROALBUMIN/CREAT UR: 11.97 MG/G CR (ref 0–17)
NONHDLC SERPL-MCNC: 90 MG/DL
POTASSIUM BLD-SCNC: 4.8 MMOL/L (ref 3.4–5.3)
PROT SERPL-MCNC: 7.1 G/DL (ref 6.8–8.8)
PSA SERPL-MCNC: 1.27 UG/L (ref 0–4)
SODIUM SERPL-SCNC: 140 MMOL/L (ref 133–144)
TRIGL SERPL-MCNC: 167 MG/DL

## 2022-07-14 NOTE — RESULT ENCOUNTER NOTE
Brent Hernandez,  Your test results fall within the expected range(s) or remain unchanged from previous results.  Please continue with your current treatment plan.    Beatriz Betancur MD

## 2022-08-25 DIAGNOSIS — K21.9 GASTROESOPHAGEAL REFLUX DISEASE WITHOUT ESOPHAGITIS: ICD-10-CM

## 2022-08-30 RX ORDER — OMEPRAZOLE 10 MG/1
CAPSULE, DELAYED RELEASE ORAL
Qty: 90 CAPSULE | Refills: 1 | OUTPATIENT
Start: 2022-08-30

## 2022-08-30 NOTE — TELEPHONE ENCOUNTER
Message sent to pharmacy - Refusal reason: Patient has requested refill too soon (ORDER AT [facility] 5788 SENT 11/8/21 FOR 1 YR SUPPLY.).  Jd ARANA

## 2022-10-07 ENCOUNTER — NURSE TRIAGE (OUTPATIENT)
Dept: FAMILY MEDICINE | Facility: CLINIC | Age: 82
End: 2022-10-07

## 2022-10-07 NOTE — TELEPHONE ENCOUNTER
David calling with increased glucose levels in the last week of 160-268. He checks his glucose before breakfast daily, he is not having any symptoms of: fever, frequent urination, difficulty breathing, dizziness, weakness or vomiting.   He is taking metformin 500 mg BID with meals.   Last a1c  Hemoglobin A1C   Date Value Ref Range Status   07/11/2022 7.8 (H) 0.0 - 5.6 % Final     Comment:     Normal <5.7%   Prediabetes 5.7-6.4%    Diabetes 6.5% or higher     Note: Adopted from ADA consensus guidelines.   05/09/2021 6.9 (H) 0 - 5.6 % Final     Comment:     Normal <5.7% Prediabetes 5.7-6.4%  Diabetes 6.5% or higher - adopted from ADA   consensus guidelines.       Should we have him start testing TID? Come to  this weekend if starts having any readings over 280, fever, frequent urination, difficulty breathing, dizziness, weakness or vomiting.     Make a virtual visit with you to go over glucose numbers and follow up next week?      Fallon Mayo RN    Reason for Disposition    Blood glucose 240 - 300 mg/dL (13.3 - 16.7 mmol/L)    Additional Information    Negative: Unconscious or difficult to awaken    Negative: Acting confused (e.g., disoriented, slurred speech)    Negative: Very weak (can't stand)    Negative: Sounds like a life-threatening emergency to the triager    Negative: Vomiting and signs of dehydration (e.g., very dry mouth, lightheaded, dark urine)    Negative: Blood glucose > 240 mg/dL (13.3 mmol/L) and rapid breathing    Negative: Blood glucose > 500 mg/dL (27.8 mmol/L)    Negative: Blood glucose > 240 mg/dL (13.3 mmol/L) AND urine ketones moderate-large (or more than 1+)    Negative: Blood glucose > 240 mg/dL (13.3 mmol/L) and blood ketones > 1.4 mmol/L    Negative: Blood glucose > 240 mg/dL (13.3 mmol/L) AND vomiting AND unable to check for ketones (in blood or urine)    Negative: Vomiting lasting > 4 hours    Negative: Patient sounds very sick or weak to the triager    Negative: Caller has URGENT  "medication or insulin pump question and triager unable to answer question    Negative: Blood glucose > 400 mg/dL (22.2 mmol/L)    Negative: Blood glucose > 300 mg/dL (16.7 mmol/L) AND two or more times in a row    Negative: Urine ketones moderate - large (or blood ketones > 1.4 mmol/L)    Negative: New-onset diabetes mellitus suspected (e.g., frequent urination, weak, weight loss)    Negative: Symptoms of high blood sugar (e.g., frequent urination, weak, weight loss) and not able to test blood glucose    Negative: Patient wants to be seen    Negative: Caller has NON-URGENT medication question about med that PCP prescribed and triager unable to answer question    Answer Assessment - Initial Assessment Questions  1. BLOOD GLUCOSE: \"What is your blood glucose level?\"       248 this AM  2. ONSET: \"When did you check the blood glucose?\"      This morning before breakfest  3. USUAL RANGE: \"What is your glucose level usually?\" (e.g., usual fasting morning value, usual evening value)      For the last week morning glucose levels running 160-248  4. KETONES: \"Do you check for ketones (urine or blood test strips)?\" If yes, ask: \"What does the test show now?\"       no  5. TYPE 1 or 2:  \"Do you know what type of diabetes you have?\"  (e.g., Type 1, Type 2, Gestational; doesn't know)       Type 2  6. INSULIN: \"Do you take insulin?\" \"What type of insulin(s) do you use? What is the mode of delivery? (syringe, pen; injection or pump)?\"       no  7. DIABETES PILLS: \"Do you take any pills for your diabetes?\" If yes, ask: \"Have you missed taking any pills recently?\"      Takes metformin daily and has not missed any meds  8. OTHER SYMPTOMS: \"Do you have any symptoms?\" (e.g., fever, frequent urination, difficulty breathing, dizziness, weakness, vomiting)      No fever, frequent urination, difficulty breathing, dizziness, weakness or vomiting  9. PREGNANCY: \"Is there any chance you are pregnant?\" \"When was your last menstrual period?\"     "  no    Protocols used: DIABETES - HIGH BLOOD SUGAR-A-OH

## 2022-10-07 NOTE — TELEPHONE ENCOUNTER
I called back and spoke to David and went over plan to start testing glucoseTID? Come to  this weekend if starts having any readings over 280, fever, frequent urination, difficulty breathing, dizziness, weakness or vomiting.      Virtual visit scheduled with Dr. Betancur Wednesday, 10/12/2022.    He will call back and ask to speak with triage if he has any further questions/      Fallon Mayo RN

## 2022-10-11 NOTE — PROGRESS NOTES
David is a 82 year old who is being evaluated via a billable video visit.      How would you like to obtain your AVS? MyChart  If the video visit is dropped, the invitation should be resent by: Text to cell phone: [phone]  Will anyone else be joining your video visit? No          Assessment & Plan     Type 2 diabetes mellitus with diabetic polyneuropathy, without long-term current use of insulin (H)  With recent hyperglycemia.  He is otherwise feeling well.  We'll have him add a third daily dose of metformin for a couple weeks.  If blood sugars still elevated (and no lows) he'll add a fourth daily dose.    - metFORMIN (GLUCOPHAGE) 500 MG tablet    Gastroesophageal reflux disease without esophagitis  stable/well controlled- Continue current medication regimen.   - omeprazole (PRILOSEC) 10 MG DR capsule  Dispense: 90 capsule; Refill: 3     Patient Instructions   Increase your metformin dose to:  500 mg in AM (with breakfast) and 1,000 mg (2 tablets) in PM (with dinner).    Monitor your blood sugars on that dose for a couple weeks.  If you are still getting high readings (especially if greater than 200) and NOT getting any low readings (less than 100), you can increase the metformin dose to:  1,000 mg (2 tablets) in AM (with breakfast) and 1,000 mg (2 tablets) in PM (with dinner).    Send me an update in a few weeks on your final dose and blood sugar readings so we can re-send your metformin prescription to your pharmacy with the updated dose.      No follow-ups on file.    Beatriz Betancur MD  [facility]   David is a 82 year old accompanied by his spouse, presenting for the following health issues:  Diabetes      History of Present Illness       Reason for visit:  Elevated blood sugar  Symptom onset:  1-2 weeks ago  Symptoms include:  Blood tests  Symptom intensity:  Moderate  Symptom progression:  Staying the same  Had these symptoms before:  Xin consumes 1  sweetened beverage(s) daily.   He is taking medications regularly.       Diabetes Follow-up  How often are you checking your blood sugar? Three times daily  Blood sugar testing frequency justification:  Uncontrolled diabetes  What time of day are you checking your blood sugars (select all that apply)?  Before and after meals  Have you had any blood sugars above 200?  Yes   Have you had any blood sugars below 70?  No    What symptoms do you notice when your blood sugar is low?  Other: light headed     What concerns do you have today about your diabetes? None and Blood sugar is often over 200     Do you have any of these symptoms? (Select all that apply)  Redness, sores, or blisters on feet        He spoke to triage nurse last week with reports of BG's 160-268.  He was advised to start testing TID and go to  for any additional symptoms or BG's > 280.    Blood sugars always used to run 140-160 in am.  For years.  Very stable.    He noted increase for no apparent reason.  No signs/symptoms of infection: no sinus pressure, ear pain, cough, abdominal pain, change in urinary habits, skin wounds.  He cut back on carbs and BG's have still been high  AM: before food: several have been > 200  PM: 1-2 hour after lunch: 223 (avg)  NINFA: 1-2 hours after dinner: 196 (avg)    He tried taking a 3rd metformin dose and that seemed to help; it was 178 the next AM      BP Readings from Last 2 Encounters:   07/11/22 124/76   11/08/21 139/77     Hemoglobin A1C (%)   Date Value   07/11/2022 7.8 (H)   05/09/2021 6.9 (H)   02/22/2021 6.9 (H)     LDL Cholesterol Calculated (mg/dL)   Date Value   07/11/2022 57   02/10/2021 62   11/20/2019 66     GERD Follow-Up   Symptoms are very well controlled on low-dose prilosec (10 mg daily).  He dropped the dose from 20 mg daily a year ago.      Review of Systems   as above       Objective    Vitals - Patient Reported  Weight (Patient Reported): 113.4 kg (250 lb)  Height (Patient Reported): 193 cm (6'  "4\")  BMI (Based on Pt Reported Ht/Wt): 30.43      Vitals:  No vitals were obtained today due to virtual visit.    Physical Exam   GENERAL: Healthy, alert and no distress  EYES: Eyes grossly normal to inspection.  No discharge or erythema, or obvious scleral/conjunctival abnormalities.  RESP: No audible wheeze, cough, or visible cyanosis.  No visible retractions or increased work of breathing.    SKIN: Visible skin clear. No significant rash, abnormal pigmentation or lesions.  NEURO: Cranial nerves grossly intact.  Mentation and speech appropriate for age.  PSYCH: Mentation appears normal, affect normal/bright, judgement and insight intact, normal speech and appearance well-groomed.            Video-Visit Details    Video Start Time: 9:39 AM    Type of service:  Video Visit    Video End Time:9:57 AM    Originating Location (pt. Location): Home    Distant Location (provider location):  [facility]     Platform used for Video Visit: Charo    "

## 2022-10-12 ENCOUNTER — VIRTUAL VISIT (OUTPATIENT)
Dept: FAMILY MEDICINE | Facility: CLINIC | Age: 82
End: 2022-10-12
Payer: COMMERCIAL

## 2022-10-12 ENCOUNTER — TRANSFERRED RECORDS (OUTPATIENT)
Dept: HEALTH INFORMATION MANAGEMENT | Facility: CLINIC | Age: 82
End: 2022-10-12

## 2022-10-12 DIAGNOSIS — K21.9 GASTROESOPHAGEAL REFLUX DISEASE WITHOUT ESOPHAGITIS: ICD-10-CM

## 2022-10-12 DIAGNOSIS — E11.42 TYPE 2 DIABETES MELLITUS WITH DIABETIC POLYNEUROPATHY, WITHOUT LONG-TERM CURRENT USE OF INSULIN (H): Primary | ICD-10-CM

## 2022-10-12 PROCEDURE — 99214 OFFICE O/P EST MOD 30 MIN: CPT | Mod: 95 | Performed by: FAMILY MEDICINE

## 2022-10-12 RX ORDER — OMEPRAZOLE 10 MG/1
10 CAPSULE, DELAYED RELEASE ORAL DAILY
Qty: 90 CAPSULE | Refills: 3 | Status: SHIPPED | OUTPATIENT
Start: 2022-10-12 | End: 2023-10-03

## 2022-10-12 NOTE — PATIENT INSTRUCTIONS
Increase your metformin dose to:  500 mg in AM (with breakfast) and 1,000 mg (2 tablets) in PM (with dinner).    Monitor your blood sugars on that dose for a couple weeks.  If you are still getting high readings (especially if greater than 200) and NOT getting any low readings (less than 100), you can increase the metformin dose to:  1,000 mg (2 tablets) in AM (with breakfast) and 1,000 mg (2 tablets) in PM (with dinner).    Send me an update in a few weeks on your final dose and blood sugar readings so we can re-send your metformin prescription to your pharmacy with the updated dose.

## 2022-10-15 ENCOUNTER — HEALTH MAINTENANCE LETTER (OUTPATIENT)
Age: 82
End: 2022-10-15

## 2022-10-18 ENCOUNTER — TRANSFERRED RECORDS (OUTPATIENT)
Dept: HEALTH INFORMATION MANAGEMENT | Facility: CLINIC | Age: 82
End: 2022-10-18

## 2022-10-18 LAB — RETINOPATHY: NEGATIVE

## 2022-11-10 DIAGNOSIS — I25.10 ASCVD (ARTERIOSCLEROTIC CARDIOVASCULAR DISEASE): ICD-10-CM

## 2022-11-10 DIAGNOSIS — I25.5 ISCHEMIC CARDIOMYOPATHY: ICD-10-CM

## 2022-11-10 NOTE — TELEPHONE ENCOUNTER
Routing refill request to provider for review/approval because:  -- Nitrates Failed    Sublingual nitro order needs review  If refill exceeds 1 bottle per month, please forward request to provider.     Last Written Prescription Date:  7/11/2022  Last Fill Quantity: 25,  # refills: 3   Last office visit: 7/11/2022 with prescribing provider:  Pipe   Future Office Visit:  none    KEY SamN RN  [facility]

## 2022-11-11 RX ORDER — NITROGLYCERIN 0.4 MG/1
0.4 TABLET SUBLINGUAL EVERY 5 MIN PRN
Qty: 75 TABLET | Refills: 1 | OUTPATIENT
Start: 2022-11-11

## 2022-11-11 NOTE — TELEPHONE ENCOUNTER
Please clarify with patient if he has actually gone through 4 bottles of his SL NTG in the past 4 months.  If so we should notify Cardiology.    Beatriz Betancur MD  [facility]

## 2022-11-11 NOTE — TELEPHONE ENCOUNTER
"Pt states he has \"not even gone through the first bottle.\"    Refused.    EVONNE Ramsey RN  [facility]    "

## 2022-11-30 DIAGNOSIS — E11.42 TYPE 2 DIABETES MELLITUS WITH DIABETIC POLYNEUROPATHY, WITHOUT LONG-TERM CURRENT USE OF INSULIN (H): ICD-10-CM

## 2022-12-01 RX ORDER — BLOOD SUGAR DIAGNOSTIC
STRIP MISCELLANEOUS
Qty: 100 STRIP | Refills: 4 | Status: SHIPPED | OUTPATIENT
Start: 2022-12-01 | End: 2023-04-03

## 2022-12-09 ENCOUNTER — E-VISIT (OUTPATIENT)
Dept: FAMILY MEDICINE | Facility: CLINIC | Age: 82
End: 2022-12-09
Payer: COMMERCIAL

## 2022-12-09 ENCOUNTER — TELEPHONE (OUTPATIENT)
Dept: FAMILY MEDICINE | Facility: CLINIC | Age: 82
End: 2022-12-09

## 2022-12-09 DIAGNOSIS — J06.9 ACUTE URI: Primary | ICD-10-CM

## 2022-12-09 PROCEDURE — 99421 OL DIG E/M SVC 5-10 MIN: CPT | Performed by: FAMILY MEDICINE

## 2022-12-09 NOTE — PATIENT INSTRUCTIONS
Thank you for choosing us for your care. Based on your symptoms and length of illness, I do not think that you need a prescription at this time.  Please follow the care advise I've provided and use the over the counter medications to help relieve your symptoms.   View your full visit summary for details by clicking on the link below.       David - Your symptoms suggest that you have a viral upper respiratory illness.  This includes viral rhinosinusitis and viral bronchitis.  Antibiotics do not help viral illnesses; the best remedies treat the symptoms (see below).      I have ordered testing for COVID, influenza, and RSV which are currently the most prevalent viruses.  If you'd like to be tested please call  [phone] for a testing appointment.   There are antiviral medications that can help with flu and COVID if started within 5 days of symptom onset.      The typical course of a viral illness is that you feel rather miserable for the first few days - with sore throat, runny nose/nasal congestion, cough, and sometimes fever and body aches.  You should start to feel better after about 5-7 days and much better by 10-14 days.  If you develop sudden worsening of symptoms or fever after the first 5-7 days, or if you have persistence of your symptoms beyond 14 days, let us know as you may have developed a secondary bacterial infection.      If you're not feeling better within the next 5 days, please respond to this message and we can consider if a prescription is needed.  You can schedule an appointment right here in [facility], or call [phone]  If the visit is for the same symptoms as your eVisit, we'll refund the cost of your eVisit if seen within seven days.  Of course please let us know if your symptoms worsen (particularly if you are experiencing worrisome symptoms such as shortness of breath or persistent fevers.    For symptom relief I suggest tryin. Steam.  Take a long, hot shower.  Or if you don't  want to get in the shower just run it with the bathroom door shut for a few minutes and breathe the steam.  2. Drink hot liquids frequently such as tea or hot water with honey and lemon.  3. Acetaminophen (Tylenol) and ibuprofen (Motrin or Advil) as needed for headache, sore throat, body aches, or fever.  4. For loosening phlegm and sputum try guaifenesin which is available in many combination products or alone as plain Robitussin or plain Mucinex.  5.  For cough suppression try dextromethorphan (DM) which is available in combination with guaifenesin (Robitussin DM or Mucinex DM) or as a plain syrup (Delsym). Or try Zarbee's Natural (honey-based) cough remedies.   6. For nasal congestion try:    Nealmed sinus rinses.    Nasal steroid spray such as nasacort or flonase, which are over-the-counter.  7. And most importantly: plenty of rest and sleep     Beatriz Betancur MD  [facility]

## 2022-12-12 ENCOUNTER — TELEPHONE (OUTPATIENT)
Dept: FAMILY MEDICINE | Facility: CLINIC | Age: 82
End: 2022-12-12

## 2022-12-12 DIAGNOSIS — J20.9 ACUTE BRONCHITIS WITH COEXISTING CONDITION REQUIRING PROPHYLACTIC TREATMENT: Primary | ICD-10-CM

## 2022-12-12 RX ORDER — AZITHROMYCIN 250 MG/1
TABLET, FILM COATED ORAL
Qty: 6 TABLET | Refills: 0 | Status: SHIPPED | OUTPATIENT
Start: 2022-12-12 | End: 2022-12-17

## 2022-12-12 NOTE — TELEPHONE ENCOUNTER
I also charted this on the 12/9/22 EVISIT.    PT and wife called in.  Pt had NOT read the EVISIT info from 12/9/22.    PT has been using Mucinex since last Wed.      Pt is hard to get mobile. Mobility issues.  PT is worn out from coughing.    Wife says pt was sick with this a year ago, needed antibiotic.    Pharmacy pended.    PT will [facility].        OK to leave a detailed message.  SUMIT De Los Santos

## 2022-12-12 NOTE — TELEPHONE ENCOUNTER
PT and wife called in.  Pt had NOT read the EVISIT info from 12/9/22.     PT has been using Mucinex since last Wed.        Pt is hard to get mobile. Mobility issues.  PT is worn out from coughing.     Wife says pt was sick with this a year ago, needed antibiotic.    Pharmacy pended.     PT will review his EVISIT from [facility].           OK to leave a detailed message.  SUMIT De Los Santos

## 2022-12-12 NOTE — TELEPHONE ENCOUNTER
I sent prescription for azithromycin.  This is what he had a year ago.  If not improving after antibiotics he should be seen.      Beatriz Betancur MD

## 2023-02-08 DIAGNOSIS — R21 RASH: ICD-10-CM

## 2023-02-10 RX ORDER — HYDROCORTISONE 25 MG/G
OINTMENT TOPICAL
Qty: 20 G | Refills: 6 | Status: SHIPPED | OUTPATIENT
Start: 2023-02-10

## 2023-02-10 NOTE — TELEPHONE ENCOUNTER
Routing refill request to provider for review/approval because:  Drug not on the [facility] refill protocol       Last Written Prescription Date:  6/2/2020  Last Fill Quantity: 20 g,  # refills: 6   Last office visit: 7/11/2022 with prescribing provider:     Future Office Visit:    Cassidy Petersen RN  [facility]

## 2023-02-17 ENCOUNTER — TELEPHONE (OUTPATIENT)
Dept: FAMILY MEDICINE | Facility: CLINIC | Age: 83
End: 2023-02-17
Payer: COMMERCIAL

## 2023-02-17 DIAGNOSIS — E11.42 TYPE 2 DIABETES MELLITUS WITH DIABETIC POLYNEUROPATHY, WITHOUT LONG-TERM CURRENT USE OF INSULIN (H): Primary | ICD-10-CM

## 2023-02-17 NOTE — TELEPHONE ENCOUNTER
Medication Question or Refill        What medication are you calling about (include dose and sig)?: Metformin     Preferred Pharmacy:    [facility]/pharmacy #[id number] - [place], [place] - [address]  [address] [facility] [address]  Phone: [phone] Fax: [phone]      Controlled Substance Agreement on file:   CSA -- Patient Level:    CSA: None found at the patient level.       Who prescribed the medication?: Dr. Betancur    Do you need a refill? Yes    When did you use the medication last? Has been out    Patient offered an appointment? No    Do you have any questions or concerns?  Yes: [facility] [place] [facility] been [facility] medication, do not see that documented, patient is out. He started reducing his dose to make it stretch. States he was unable to get into see PCP until March, but can not wait until that appointment for the requested medication.

## 2023-02-17 NOTE — TELEPHONE ENCOUNTER
"Per 10/12/22 virtual visit:    \"Increase your metformin dose to:  500 mg in AM (with breakfast) and 1,000 mg (2 tablets) in PM (with dinner).    Monitor your blood sugars on that dose for a couple weeks.  If you are still getting high readings (especially if greater than 200) and NOT getting any low readings (less than 100), you can increase the metformin dose to:  1,000 mg (2 tablets) in AM (with breakfast) and 1,000 mg (2 tablets) in PM (with dinner).     Send me an update in a few weeks on your final dose and blood sugar readings so we can re-send your metformin prescription to your pharmacy with the updated dose.\"    I called patient to see what he is currently taking    He currently takes 500mg in the morning, and 1000mg with dinner. He says his readings are over 200 every morning, this morning reading was 210.     Based on recommendations above from previous visit, patient should go up to taking 1000mg in AM and 1000mg in PM. I told him this is likely what will be sent in and to watch for low blood sugars.    Pended this dose.    He wants this to be sent in 500mg tabs so he can adjust back to 1000mg in AM and 500mg in PM if experiences hypoglycemia.    .MC Charles RN  [facility]    "

## 2023-03-02 DIAGNOSIS — I10 BENIGN ESSENTIAL HYPERTENSION: ICD-10-CM

## 2023-03-02 DIAGNOSIS — I25.10 ASCVD (ARTERIOSCLEROTIC CARDIOVASCULAR DISEASE): ICD-10-CM

## 2023-03-02 DIAGNOSIS — I25.5 ISCHEMIC CARDIOMYOPATHY: ICD-10-CM

## 2023-03-02 DIAGNOSIS — I25.10 CORONARY ARTERY DISEASE INVOLVING NATIVE CORONARY ARTERY OF NATIVE HEART WITHOUT ANGINA PECTORIS: ICD-10-CM

## 2023-03-06 ENCOUNTER — MYC REFILL (OUTPATIENT)
Dept: FAMILY MEDICINE | Facility: CLINIC | Age: 83
End: 2023-03-06
Payer: COMMERCIAL

## 2023-03-06 DIAGNOSIS — E63.9 NUTRITIONAL DEFICIENCY: Primary | ICD-10-CM

## 2023-03-06 RX ORDER — LOSARTAN POTASSIUM 50 MG/1
TABLET ORAL
Qty: 90 TABLET | Refills: 1 | Status: SHIPPED | OUTPATIENT
Start: 2023-03-06 | End: 2023-09-01

## 2023-03-06 NOTE — TELEPHONE ENCOUNTER
Prescription approved per [facility] Refill Protocol.    Rosangela Beckwith, BSN RN  [facility]

## 2023-03-07 RX ORDER — MULTIPLE VITAMINS W/ MINERALS TAB 9MG-400MCG
1 TAB ORAL DAILY
Qty: 90 TABLET | Refills: 4 | Status: SHIPPED | OUTPATIENT
Start: 2023-03-07

## 2023-03-08 NOTE — TELEPHONE ENCOUNTER
I will approve additional refills of losartan at next office visit.  He should have enough to last Sept 2023.

## 2023-03-26 ENCOUNTER — HEALTH MAINTENANCE LETTER (OUTPATIENT)
Age: 83
End: 2023-03-26

## 2023-03-27 ASSESSMENT — ENCOUNTER SYMPTOMS
PARESTHESIAS: 0
HEARTBURN: 0
DYSURIA: 0
ARTHRALGIAS: 1
FEVER: 0
FREQUENCY: 0
MYALGIAS: 1
CONSTIPATION: 0
PALPITATIONS: 0
DIZZINESS: 0
EYE PAIN: 0
JOINT SWELLING: 0
HEADACHES: 0
WEAKNESS: 1
SHORTNESS OF BREATH: 0
COUGH: 0
CHILLS: 0
HEMATOCHEZIA: 0
HEMATURIA: 0
ABDOMINAL PAIN: 0
DIARRHEA: 0
NERVOUS/ANXIOUS: 0
NAUSEA: 0
SORE THROAT: 0

## 2023-03-27 ASSESSMENT — ACTIVITIES OF DAILY LIVING (ADL): CURRENT_FUNCTION: NO ASSISTANCE NEEDED

## 2023-04-01 NOTE — PATIENT INSTRUCTIONS
1) I recommend that you schedule a follow-up appointment with Dr. Gutierrez (cardiology).  She had wanted to see you annually but it has been over 2 years since your last appointment with her.    2) I recommend that patients 50 and over get the Shingrix vaccine at a pharmacy.  One in 3 adults in the [place] will get shingles and the risk increases with age.  Shingles can cause debilitating and persistent nerve pain and can increase your risk for stroke.      The pharmacist will let you know beforehand if there is a copay and can administer the vaccine.  The Shingrix vaccination is a 2-shot series.  The second dose is given 2-6 months after the first.  (It cannot be given sooner than 2 months after the first dose - at the earliest.)  You should be aware that patients are reporting feeling a bit under the weather after the injection, including fatigue, malaise, and low-grade fever that may last a couple days.  Rarely patients can get a mild, shingles-like rash.   But these symptoms are much better than the Shingles disease.     If you have MyChart:  1) I kindly request that you check your MyChart prior to all appointments with me and complete any assigned questionnaires ahead of time.    2) You may receive auto-released results from our system before I have the opportunity to review and comment.  Be assured I will review and comment on all of your results as soon as I can.      FYI:  My schedule has been booking out very far in advance (2 months).  I apologize for the lack of timely access.  If you need to be seen for a chronic condition or preventive (wellness) visit, please be sure to schedule that appointment 2-3 months in advance.  If you have a concern that you feel cannot wait until my next available appointment (such as a hospital follow-up or new symptom of concern) please ask to speak to one of the [place] nurses who may be able to access a sooner appointment.    I do ask that all patients who are taking  chronic medications for conditions that I am managing schedule an in-person visit with me at least once a year.       Patient Education   Personalized Prevention Plan  You are due for the preventive services outlined below.  Your care team is available to assist you in scheduling these services.  If you have already completed any of these items, please share that information with your care team to update in your medical record.  Health Maintenance Due   Topic Date Due     Diabetic Foot Exam  03/10/2017     ANNUAL REVIEW OF HM ORDERS  08/03/2022     Zoster (Shingles) Vaccine (3 of 3) 10/26/2022       Understanding USDA MyPlate  The USDA has guidelines to help you make healthy food choices. These are called MyPlate. MyPlate shows the food groups that make up healthy meals using the image of a place setting. Before you eat, think about the healthiest choices for what to put on your plate or in your cup or bowl. To learn more about building a healthy plate, visit www.choosemyplate.gov.     The food groups    Fruits. Any fruit or 100% fruit juice counts as part of the Fruit Group. Fruits may be fresh, canned, frozen, or dried, and may be whole, cut-up, or pureed. Make 1/2 of your plate fruits and vegetables.    Vegetables. Any vegetable or 100% vegetable juice counts as a member of the Vegetable Group. Vegetables may be fresh, frozen, canned, or dried. They can be served raw or cooked and may be whole, cut-up, or mashed. Make 1/2 of your plate fruits and vegetables.    Grains. All foods made from grains are part of the Grains Group. These include wheat, rice, oats, cornmeal, and barley. Grains are often used to make foods such as bread, pasta, oatmeal, cereal, tortillas, and grits. Grains should be no more than 1/4 of your plate. At least half of your grains should be whole grains.    Protein. This group includes meat, poultry, seafood, beans and peas, eggs, processed soy products (such as tofu), nuts (including nut  butters), and seeds. Make protein choices no more than 1/4 of your plate. Meat and poultry choices should be lean or low fat.    Dairy. The Dairy Group includes all fluid milk products and foods made from milk that contain calcium, such as yogurt and cheese. (Foods that have little calcium, such as cream, butter, and cream cheese, are not part of this group.) Most dairy choices should be low-fat or fat-free.    Oils. Oils aren't a food group, but they do contain essential nutrients. However it's important to watch your intake of oils. These are fats that are liquid at room temperature. They include canola, corn, olive, soybean, vegetable, and sunflower oil. Foods that are mainly oil include mayonnaise, certain salad dressings, and soft margarines. You likely already get your daily oil allowance from the foods you eat.  Things to limit  Eating healthy also means limiting these things in your diet:    Salt (sodium). Many processed foods have a lot of sodium. To keep sodium intake down, eat fresh vegetables, meats, poultry, and seafood when possible. Purchase low-sodium, reduced-sodium, or no-salt-added food products at the store. And don't add salt to your meals at home. Instead, season them with herbs and spices such as dill, oregano, cumin, and paprika. Or try adding flavor with lemon or lime zest and juice.    Saturated fat. Saturated fats are most often found in animal products such as beef, pork, and chicken. They are often solid at room temperature, such as butter. To reduce your saturated fat intake, choose leaner cuts of meat and poultry. And try healthier cooking methods such as grilling, broiling, roasting, or baking. For a simple lower-fat swap, use plain nonfat yogurt instead of mayonnaise when making potato salad or macaroni salad.    Added sugars. These are sugars added to foods. They are in foods such as ice cream, candy, soda, fruit drinks, sports drinks, energy drinks, cookies, pastries, jams, and  syrups. Cut down on added sugars by sharing sweet treats with a family member or friend. You can also choose fruit for dessert, and drink water or other unsweetened beverages.  [employer] ([employer]) last reviewed this educational content on 6/1/2020 2000-2022 [employer]. All rights reserved. This information is not intended as a substitute for professional medical care. Always follow your healthcare professional's instructions.          Signs of Hearing Loss  Hearing loss is a problem shared by many people. In fact, it's one of the most common health problems, particularly as people age. Most people aged 65 and older have some hearing loss. By age 80, almost everyone does. Hearing loss often occurs slowly over the years. So, you may not realize your hearing has gotten worse.   When sudden hearing loss occurs, it's important to contact your healthcare provider right away. Your provider will do a medical exam and a hearing exam as soon as possible. This is to help find the cause and type of your sudden hearing loss. Based on your diagnosis, your healthcare provider will discuss possible treatments.      Hearing much better with one ear can be a sign of hearing loss.     Have your hearing checked  Call your healthcare provider if you:     Have to strain to hear normal conversation    Have to watch other people s faces very carefully to follow what they re saying    Need to ask people to repeat what they ve said    Often misunderstand what people are saying    Turn the volume of the television or radio up so high that others complain    Feel that people are mumbling when they re talking to you    Find that the effort to hear leaves you feeling tired and irritated    Notice, when using the phone, that you hear better with one ear than the other  [employer] ([employer]) last reviewed this educational content on 6/1/2022 2000-2022 [employer]. All rights reserved. This information is not intended as a substitute for  professional medical care. Always follow your healthcare professional's instructions.          Urinary Incontinence (Male)  We understand that gender is a spectrum. We may use gendered terms to talk about anatomy and health risk. Please use this sheet in a way that works best for you and your provider as you talk about your care.     Urinary incontinence means not being able to control the release of urine from the bladder.   Causes  Common causes of urinary incontinence in men include:    Infection    Certain medicines    Aging    Poor pelvic muscle tone    Bladder spasms    Obesity    Enlarged prostate    Trouble urinating and fully emptying the bladder (urinary retention)  Other things that can cause incontinence are:     Nervous system diseases    Diabetes    Sleep apnea    Urinary tract infections    Prostate surgery    Pelvic injury  Constipation and smoking have also been identified as risk factors.   Symptoms    Urge incontinence (overactive bladder). This is a sudden urge to urinate. It occurs even though there may not be much urine in the bladder. The need to urinate often during the night is common. It's due to overactive bladder muscles.    Stress incontinence. This is urine leakage that you can't control. It can occur with sneezing, coughing, and other actions that put stress on the bladder.    Treatment  Treatment depends on what is causing the condition. Bladder infections are treated with antibiotics. Urinary retention is treated with a bladder catheter.   Home care  Follow these guidelines when caring for yourself at home:    Don't have any foods and drinks that may irritate the bladder. This includes:  ? Chocolate  ? Alcohol  ? Caffeine  ? Carbonated drinks  ? Acidic fruits and juices    Limit fluids to 6 to 8 cups a day.    Lose weight if you are overweight. This may reduce your symptoms.    If advised, do regular pelvic muscle-strengthening exercises such as Kegel exercises.    If needed, wear  absorbent pads to catch urine. Change the pads often. This is for good hygiene and to prevent skin and bladder infections.    Bathe daily for good hygiene.    If an antibiotic was prescribed to treat a bladder infection, take it until it's finished. Keep taking it even if you are feeling better. This is to make sure your infection has cleared.    If a catheter was left in place, keep bacteria from getting into the collection bag. Don't disconnect the catheter from the collection bag.    Use a leg band to secure the catheter drainage tube, so it does not pull on the catheter. Drain the collection bag when it becomes full. To do this, use the drain spout at the bottom of the bag. Don't disconnect the bag from the catheter.    Don't pull on or try to remove a catheter. The catheter must be removed by a healthcare provider.    If you smoke, stop. Ask your provider for help if you can't do this on your own.  Follow-up care  Follow up with your healthcare provider, or as advised.  When to get medical advice  Call your healthcare provider right away if any of these occur:     Fever over 100.4 F (38 C), or as directed by your provider    Bladder pain or fullness    Belly swelling, nausea, or vomiting    Back pain    Weakness, dizziness, or fainting    If a catheter was left in place, return if:  ? The catheter falls out  ? The catheter stops draining for 6 hours  ? Your urine gets cloudy or smells bad  [employer] last reviewed this educational content on 6/1/2022 2000-2022 [employer]. All rights reserved. This information is not intended as a substitute for professional medical care. Always follow your healthcare professional's instructions.

## 2023-04-03 ENCOUNTER — OFFICE VISIT (OUTPATIENT)
Dept: FAMILY MEDICINE | Facility: CLINIC | Age: 83
End: 2023-04-03
Payer: COMMERCIAL

## 2023-04-03 VITALS
TEMPERATURE: 97.2 F | SYSTOLIC BLOOD PRESSURE: 119 MMHG | DIASTOLIC BLOOD PRESSURE: 82 MMHG | HEIGHT: 73 IN | RESPIRATION RATE: 21 BRPM | WEIGHT: 256 LBS | BODY MASS INDEX: 33.93 KG/M2 | OXYGEN SATURATION: 96 % | HEART RATE: 76 BPM

## 2023-04-03 DIAGNOSIS — E11.42 TYPE 2 DIABETES MELLITUS WITH DIABETIC POLYNEUROPATHY, WITHOUT LONG-TERM CURRENT USE OF INSULIN (H): ICD-10-CM

## 2023-04-03 DIAGNOSIS — E78.00 PURE HYPERCHOLESTEROLEMIA: ICD-10-CM

## 2023-04-03 DIAGNOSIS — Z00.00 ENCOUNTER FOR MEDICARE ANNUAL WELLNESS EXAM: Primary | ICD-10-CM

## 2023-04-03 DIAGNOSIS — I71.40 ABDOMINAL AORTIC ANEURYSM (AAA) WITHOUT RUPTURE, UNSPECIFIED PART (H): ICD-10-CM

## 2023-04-03 PROBLEM — E66.01 SEVERE OBESITY (BMI 35.0-39.9) WITH COMORBIDITY (H): Status: RESOLVED | Noted: 2022-07-11 | Resolved: 2023-04-03

## 2023-04-03 LAB
ALBUMIN SERPL BCG-MCNC: 4.7 G/DL (ref 3.5–5.2)
ALP SERPL-CCNC: 77 U/L (ref 40–129)
ALT SERPL W P-5'-P-CCNC: 38 U/L (ref 10–50)
ANION GAP SERPL CALCULATED.3IONS-SCNC: 15 MMOL/L (ref 7–15)
AST SERPL W P-5'-P-CCNC: 33 U/L (ref 10–50)
BILIRUB SERPL-MCNC: 0.6 MG/DL
BUN SERPL-MCNC: 22.4 MG/DL (ref 8–23)
CALCIUM SERPL-MCNC: 10.1 MG/DL (ref 8.8–10.2)
CHLORIDE SERPL-SCNC: 101 MMOL/L (ref 98–107)
CHOLEST SERPL-MCNC: 163 MG/DL
CREAT SERPL-MCNC: 1.08 MG/DL (ref 0.67–1.17)
CREAT UR-MCNC: 122 MG/DL
DEPRECATED HCO3 PLAS-SCNC: 23 MMOL/L (ref 22–29)
GFR SERPL CREATININE-BSD FRML MDRD: 68 ML/MIN/1.73M2
GLUCOSE SERPL-MCNC: 191 MG/DL (ref 70–99)
HBA1C MFR BLD: 7.9 % (ref 0–5.6)
HDLC SERPL-MCNC: 58 MG/DL
LDLC SERPL CALC-MCNC: 77 MG/DL
MICROALBUMIN UR-MCNC: <12 MG/L
MICROALBUMIN/CREAT UR: NORMAL MG/G{CREAT}
NONHDLC SERPL-MCNC: 105 MG/DL
POTASSIUM SERPL-SCNC: 4.7 MMOL/L (ref 3.4–5.3)
PROT SERPL-MCNC: 7.2 G/DL (ref 6.4–8.3)
SODIUM SERPL-SCNC: 139 MMOL/L (ref 136–145)
TRIGL SERPL-MCNC: 141 MG/DL

## 2023-04-03 PROCEDURE — 99214 OFFICE O/P EST MOD 30 MIN: CPT | Mod: 25 | Performed by: FAMILY MEDICINE

## 2023-04-03 PROCEDURE — G0439 PPPS, SUBSEQ VISIT: HCPCS | Performed by: FAMILY MEDICINE

## 2023-04-03 PROCEDURE — 82570 ASSAY OF URINE CREATININE: CPT | Performed by: FAMILY MEDICINE

## 2023-04-03 PROCEDURE — 80061 LIPID PANEL: CPT | Performed by: FAMILY MEDICINE

## 2023-04-03 PROCEDURE — 80053 COMPREHEN METABOLIC PANEL: CPT | Performed by: FAMILY MEDICINE

## 2023-04-03 PROCEDURE — 83036 HEMOGLOBIN GLYCOSYLATED A1C: CPT | Performed by: FAMILY MEDICINE

## 2023-04-03 PROCEDURE — 82043 UR ALBUMIN QUANTITATIVE: CPT | Performed by: FAMILY MEDICINE

## 2023-04-03 PROCEDURE — 36415 COLL VENOUS BLD VENIPUNCTURE: CPT | Performed by: FAMILY MEDICINE

## 2023-04-03 RX ORDER — LANCETS
EACH MISCELLANEOUS
Qty: 100 EACH | Refills: 6 | Status: SHIPPED | OUTPATIENT
Start: 2023-04-03 | End: 2024-04-03

## 2023-04-03 ASSESSMENT — ENCOUNTER SYMPTOMS
COUGH: 0
DIARRHEA: 0
HEMATURIA: 0
MYALGIAS: 1
CONSTIPATION: 0
DYSURIA: 0
NERVOUS/ANXIOUS: 0
SHORTNESS OF BREATH: 0
JOINT SWELLING: 0
ABDOMINAL PAIN: 0
EYE PAIN: 0
WEAKNESS: 1
ARTHRALGIAS: 1
SORE THROAT: 0
PARESTHESIAS: 0
PALPITATIONS: 0
NAUSEA: 0
FEVER: 0
HEADACHES: 0
CHILLS: 0
DIZZINESS: 0
HEMATOCHEZIA: 0
HEARTBURN: 0
FREQUENCY: 0

## 2023-04-03 ASSESSMENT — ACTIVITIES OF DAILY LIVING (ADL): CURRENT_FUNCTION: NO ASSISTANCE NEEDED

## 2023-04-03 ASSESSMENT — PAIN SCALES - GENERAL: PAINLEVEL: NO PAIN (0)

## 2023-04-03 NOTE — PROGRESS NOTES
"SUBJECTIVE:   David is a 83 year old who presents for Preventive Visit.      4/3/2023     8:20 AM   Additional Questions   Roomed by adolfo her   Accompanied by self     Forms 4/3/2023   Any forms needing to be completed Yes   Patient has been advised of split billing requirements and indicates understanding: Yes  Are you in the first 12 months of your Medicare coverage?  No    Healthy Habits:     In general, how would you rate your overall health?  Good    Frequency of exercise:  2-3 days/week    Duration of exercise:  Less than 15 minutes    Do you usually eat at least 4 servings of fruit and vegetables a day, include whole grains    & fiber and avoid regularly eating high fat or \"junk\" foods?  No    Medication side effects:  None    Ability to successfully perform activities of daily living:  No assistance needed    Home Safety:  No safety concerns identified    Hearing Impairment:  Difficulty following a conversation in a noisy restaurant or crowded room, difficulty following dialogue in the theater, need to ask people to speak up or repeat themselves and difficulty understanding soft or whispered speech    In the past 6 months, have you been bothered by leaking of urine? Yes    In general, how would you rate your overall mental or emotional health?  Good      PHQ-2 Total Score: 1    Diabetes Follow-up    How often are you checking your blood sugar? Once daily, fasting 150-200.    What concerns do you have today about your diabetes? None     Do you have any of these symptoms? (Select all that apply)  Numbness in feet - chronic neuropathy      BP Readings from Last 2 Encounters:   04/03/23 119/82   07/11/22 124/76     Hemoglobin A1C (%)   Date Value   04/03/2023 7.9 (H)   07/11/2022 7.8 (H)   05/09/2021 6.9 (H)   02/22/2021 6.9 (H)     LDL Cholesterol Calculated (mg/dL)   Date Value   07/11/2022 57   02/10/2021 62   11/20/2019 66         Have you ever done Advance Care Planning? (For example, a Health Directive, " POLST, or a discussion with a medical provider or your loved ones about your wishes): No, advance care planning information given to patient to review.  Patient declined advance care planning discussion at this time.       Fall risk  Fallen 2 or more times in the past year?: Yes  Any fall with injury in the past year?: Yes    Cognitive Screening   1) Repeat 3 items (Leader, Season, Table)    2) Clock draw: NORMAL  3) 3 item recall: Recalls 3 objects  Results: 3 items recalled: COGNITIVE IMPAIRMENT LESS LIKELY    Mini-CogTM Copyright S Jeff. Licensed by the author for use in [facility]; reprinted with permission (shira@[facility]). All rights reserved.      Do you have sleep apnea, excessive snoring or daytime drowsiness?: no    Reviewed and updated as needed this visit by clinical staff   Tobacco  Allergies  Meds  Problems             Reviewed and updated as needed this visit by Provider    Allergies  Meds  Problems            Social History     Tobacco Use     Smoking status: Former     Packs/day: 3.50     Years: 19.00     Pack years: 66.50     Types: Cigarettes     Quit date: 1980     Years since quittin.9     Smokeless tobacco: Never   Vaping Use     Vaping status: Never Used   Substance Use Topics     Alcohol use: Yes     Comment: beer 1-2x/week           4/3/2023     8:21 AM   Alcohol Use   Prescreen: >3 drinks/day or >7 drinks/week? No          View : No data to display.              Do you have a current opioid prescription? No  Do you use any other controlled substances or medications that are not prescribed by a provider? None      Current providers sharing in care for this patient include:   Patient Care Team:  Beatriz Betancur MD as PCP - General  Beatriz Betancur MD as Assigned PCP    The following health maintenance items are reviewed in Epic and correct as of today:  Health Maintenance   Topic Date Due     DIABETIC FOOT EXAM  03/10/2017     ANNUAL REVIEW OF HM ORDERS   08/03/2022     ZOSTER IMMUNIZATION (3 of 3) 10/26/2022     MEDICARE ANNUAL WELLNESS VISIT  07/11/2023     BMP  07/11/2023     CMP  07/11/2023     LIPID  07/11/2023     MICROALBUMIN  07/11/2023     A1C  10/03/2023     EYE EXAM  10/18/2023     FALL RISK ASSESSMENT  04/03/2024     ADVANCE CARE PLANNING  04/03/2028     DTAP/TDAP/TD IMMUNIZATION (2 - Td or Tdap) 02/22/2031     PHQ-2 (once per calendar year)  Completed     INFLUENZA VACCINE  Completed     Pneumococcal Vaccine: 65+ Years  Completed     IPV IMMUNIZATION  Aged Out     MENINGITIS IMMUNIZATION  Aged Out     COLORECTAL CANCER SCREENING  Discontinued     COVID-19 Vaccine  Discontinued     BP Readings from Last 3 Encounters:   04/03/23 119/82   07/11/22 124/76   11/08/21 139/77    Wt Readings from Last 3 Encounters:   04/03/23 116.1 kg (256 lb)   07/11/22 122 kg (269 lb)   10/31/21 113.4 kg (250 lb)              Review of Systems   Constitutional: Negative for chills and fever.   HENT: Positive for congestion and hearing loss. Negative for ear pain and sore throat.    Eyes: Negative for pain and visual disturbance.   Respiratory: Negative for cough and shortness of breath.    Cardiovascular: Negative for chest pain, palpitations and peripheral edema.   Gastrointestinal: Negative for abdominal pain, constipation, diarrhea, heartburn, hematochezia and nausea.   Genitourinary: Positive for impotence and urgency. Negative for dysuria, frequency, genital sores, hematuria and penile discharge.   Musculoskeletal: Positive for arthralgias and myalgias. Negative for joint swelling.   Skin: Negative for rash.   Neurological: Positive for weakness. Negative for dizziness, headaches and paresthesias.   Psychiatric/Behavioral: Negative for mood changes. The patient is not nervous/anxious.      Looser stools for past few months.  No melena or blood in stool.  Previously had been more constipated.  Nocturia 2-3 times/night    OBJECTIVE:   /82 (BP Location: Left arm,  "Patient Position: Sitting, Cuff Size: Adult Large)   Pulse 76   Temp 97.2  F (36.2  C) (Temporal)   Resp 21   Ht 1.854 m (6' 1\")   Wt 116.1 kg (256 lb)   SpO2 96%   BMI 33.78 kg/m   Estimated body mass index is 33.78 kg/m  as calculated from the following:    Height as of this encounter: 1.854 m (6' 1\").    Weight as of this encounter: 116.1 kg (256 lb).  Physical Exam  GENERAL: healthy, alert and no distress  EYES: Eyes grossly normal to inspection, conjunctivae and sclerae normal  HENT: ear canals and TM's normal  NECK: no adenopathy, no asymmetry, masses, or scars and thyroid normal to palpation  RESP: lungs clear to auscultation - no rales, rhonchi or wheezes  CV: regular rate and rhythm, normal S1 S2, no S3 or S4, no murmur, click or rub  ABDOMEN: soft, nontender, no hepatosplenomegaly, no masses  MS: no gross musculoskeletal defects noted, no edema  SKIN: no suspicious lesions or rashes  NEURO: marked lower extremity weakness, he ambulates with a standard walker, mentation intact and speech normal  PSYCH: mentation appears normal, affect normal/bright         ASSESSMENT / PLAN:     Encounter for Medicare annual wellness exam  Reviewed/updated Health Maintenance     Type 2 diabetes mellitus with diabetic polyneuropathy, without long-term current use of insulin (H)  stable/adequately controlled - Continue current medication regimen.   - Comprehensive metabolic panel (BMP + Alb, Alk Phos, ALT, AST, Total. Bili, TP)  - Albumin Random Urine Quantitative with Creat Ratio  - Hemoglobin A1c  - blood glucose monitoring (NO BRAND SPECIFIED) meter device kit  Dispense: 1 kit; Refill: 0  - blood glucose (NO BRAND SPECIFIED) test strip  Dispense: 50 strip; Refill: 6  - thin (NO BRAND SPECIFIED) lancets  Dispense: 100 each; Refill: 6    Pure hypercholesterolemia  Continue statin.  Awaiting lipid results to determine if dose change is indicated.     - Lipid panel reflex to direct LDL Fasting    Abdominal aortic " "aneurysm (AAA) without rupture, unspecified part (H)  Plan for surveillance US in March 2024       COUNSELING:  Reviewed preventive health counseling, as reflected in patient instructions      BMI:   Estimated body mass index is 33.78 kg/m  as calculated from the following:    Height as of this encounter: 1.854 m (6' 1\").    Weight as of this encounter: 116.1 kg (256 lb).         He reports that he quit smoking about 42 years ago. His smoking use included cigarettes. He has a 66.50 pack-year smoking history. He has never used smokeless tobacco.      Appropriate preventive services were discussed with this patient, including applicable screening as appropriate for cardiovascular disease, diabetes, osteopenia/osteoporosis, and glaucoma.  As appropriate for age/gender, discussed screening for colorectal cancer, prostate cancer, breast cancer, and cervical cancer. Checklist reviewing preventive services available has been given to the patient.    Reviewed patients plan of care and provided an AVS. The Basic Care Plan (routine screening as documented in Health Maintenance) for David meets the Care Plan requirement. This Care Plan has been established and reviewed with the Patient.      Beatriz Betancur MD  [facility]    Identified Health Risks:    I have reviewed Opioid Use Disorder and Substance Use Disorder risk factors and made any needed referrals.     The patient was counseled and encouraged to consider modifying their diet and eating habits. He was provided with information on recommended healthy diet options.  The patient was provided with written information regarding signs of hearing loss.  Information on urinary incontinence and treatment options given to patient.  "

## 2023-04-04 RX ORDER — ATORVASTATIN CALCIUM 80 MG/1
80 TABLET, FILM COATED ORAL DAILY
Qty: 90 TABLET | Refills: 3 | Status: SHIPPED | OUTPATIENT
Start: 2023-04-04 | End: 2024-04-22

## 2023-04-04 NOTE — RESULT ENCOUNTER NOTE
Brent Hernandez,  Your labs were within acceptable limits.  This includes urine albumin (protein) level, lipid panel (cholesterol) results, comprehensive metabolic panel (liver function, kidney function, blood sugar, and blood salts) and Hemoglobin A1C (a test assessing overall blood sugar control for the last 3 months).  Minor highs/lows were noted which are not clinically significant.       I sent authorization to your pharmacy to fill the atorvastatin at the same dose for another year.    Beatriz Betancur MD 
show

## 2023-05-13 DIAGNOSIS — E11.42 TYPE 2 DIABETES MELLITUS WITH DIABETIC POLYNEUROPATHY, WITHOUT LONG-TERM CURRENT USE OF INSULIN (H): ICD-10-CM

## 2023-05-14 NOTE — TELEPHONE ENCOUNTER
"Last Written Prescription Date:  2/17/23  Last Fill Quantity: 360,  # refills: 0   Last office visit provider:  4/3/23     Requested Prescriptions   Pending Prescriptions Disp Refills     metFORMIN (GLUCOPHAGE) 500 MG tablet [Pharmacy Med Name: METFORMIN  MG TABLET] 360 tablet 0     Sig: TAKE 2 TABLETS BY MOUTH 2 TIMES DAILY WITH MEALS       Biguanide Agents Passed - 5/13/2023  3:58 PM        Passed - Patient is age 10 or older        Passed - Patient has documented A1c within the specified period of time.     If HgbA1C is 8 or greater, it needs to be on file within the past 3 months.  If less than 8, must be on file within the past 6 months.     Recent Labs   Lab Test 04/03/23  0829   A1C 7.9*             Passed - Patient's CR is NOT>1.4 OR Patient's EGFR is NOT<45 within past 12 mos.     Recent Labs   Lab Test 04/03/23  0829 10/31/21  1600 06/22/21  1425   GFRESTIMATED 68   < > 62   GFRESTBLACK  --   --  72    < > = values in this interval not displayed.       Recent Labs   Lab Test 04/03/23  0829   CR 1.08             Passed - Patient does NOT have a diagnosis of CHF.        Passed - Medication is active on med list        Passed - Recent (6 mo) or future (30 days) visit within the authorizing provider's specialty     Patient had office visit in the last 6 months or has a visit in the next 30 days with authorizing provider or within the authorizing provider's specialty.  See \"Patient Info\" tab in inbasket, or \"Choose Columns\" in Meds & Orders section of the refill encounter.                 Rosangela Parker RN 05/14/23 12:15 PM  "

## 2023-07-07 DIAGNOSIS — I25.5 ISCHEMIC CARDIOMYOPATHY: ICD-10-CM

## 2023-07-07 DIAGNOSIS — I25.10 ASCVD (ARTERIOSCLEROTIC CARDIOVASCULAR DISEASE): ICD-10-CM

## 2023-07-07 DIAGNOSIS — I25.10 CORONARY ARTERY DISEASE INVOLVING NATIVE CORONARY ARTERY OF NATIVE HEART WITHOUT ANGINA PECTORIS: ICD-10-CM

## 2023-07-07 DIAGNOSIS — I10 BENIGN ESSENTIAL HYPERTENSION: ICD-10-CM

## 2023-07-09 RX ORDER — METOPROLOL TARTRATE 50 MG
TABLET ORAL
Qty: 90 TABLET | Refills: 2 | Status: SHIPPED | OUTPATIENT
Start: 2023-07-09

## 2023-07-09 NOTE — TELEPHONE ENCOUNTER
"Last Written Prescription Date:  7/11/22  Last Fill Quantity: 90,  # refills: 3   Last office visit provider:  4/3/23     Requested Prescriptions   Pending Prescriptions Disp Refills     metoprolol tartrate (LOPRESSOR) 50 MG tablet [Pharmacy Med Name: METOPROLOL TARTRATE 50 MG TAB] 90 tablet 3     Sig: TAKE 1/2 TABLET BY MOUTH TWICE A DAY       Beta-Blockers Protocol Passed - 7/7/2023  7:22 AM        Passed - Blood pressure under 140/90 in past 12 months     BP Readings from Last 3 Encounters:   04/03/23 119/82   07/11/22 124/76   11/08/21 139/77                 Passed - Patient is age 6 or older        Passed - Recent (12 mo) or future (30 days) visit within the authorizing provider's specialty     Patient has had an office visit with the authorizing provider or a provider within the authorizing providers department within the previous 12 mos or has a future within next 30 days. See \"Patient Info\" tab in inbasket, or \"Choose Columns\" in Meds & Orders section of the refill encounter.              Passed - Medication is active on med list             Rosangela Parker RN 07/09/23 6:27 PM  "

## 2023-08-18 DIAGNOSIS — I25.5 ISCHEMIC CARDIOMYOPATHY: ICD-10-CM

## 2023-08-18 DIAGNOSIS — I25.10 ASCVD (ARTERIOSCLEROTIC CARDIOVASCULAR DISEASE): ICD-10-CM

## 2023-08-18 DIAGNOSIS — I25.10 CORONARY ARTERY DISEASE INVOLVING NATIVE CORONARY ARTERY OF NATIVE HEART WITHOUT ANGINA PECTORIS: ICD-10-CM

## 2023-08-18 RX ORDER — ISOSORBIDE MONONITRATE 30 MG/1
30 TABLET, EXTENDED RELEASE ORAL EVERY MORNING
Qty: 90 TABLET | Refills: 0 | Status: SHIPPED | OUTPATIENT
Start: 2023-08-18 | End: 2023-10-03

## 2023-08-18 NOTE — TELEPHONE ENCOUNTER
Prescription approved per [facility] Refill Protocol.  Clau SWIFT RN  [facility]

## 2023-08-31 DIAGNOSIS — I25.5 ISCHEMIC CARDIOMYOPATHY: ICD-10-CM

## 2023-08-31 DIAGNOSIS — I25.10 CORONARY ARTERY DISEASE INVOLVING NATIVE CORONARY ARTERY OF NATIVE HEART WITHOUT ANGINA PECTORIS: ICD-10-CM

## 2023-08-31 DIAGNOSIS — I10 BENIGN ESSENTIAL HYPERTENSION: ICD-10-CM

## 2023-08-31 DIAGNOSIS — I25.10 ASCVD (ARTERIOSCLEROTIC CARDIOVASCULAR DISEASE): ICD-10-CM

## 2023-09-01 RX ORDER — LOSARTAN POTASSIUM 50 MG/1
TABLET ORAL
Qty: 90 TABLET | Refills: 0 | Status: SHIPPED | OUTPATIENT
Start: 2023-09-01 | End: 2023-10-03

## 2023-09-01 NOTE — TELEPHONE ENCOUNTER
Prescription approved per [facility] Refill Protocol.  Clau SWIFT RN  [facility]

## 2023-10-03 ENCOUNTER — OFFICE VISIT (OUTPATIENT)
Dept: FAMILY MEDICINE | Facility: CLINIC | Age: 83
End: 2023-10-03
Payer: COMMERCIAL

## 2023-10-03 VITALS
HEART RATE: 60 BPM | SYSTOLIC BLOOD PRESSURE: 112 MMHG | OXYGEN SATURATION: 97 % | DIASTOLIC BLOOD PRESSURE: 66 MMHG | TEMPERATURE: 97.2 F | WEIGHT: 214.5 LBS | BODY MASS INDEX: 28.3 KG/M2 | RESPIRATION RATE: 20 BRPM

## 2023-10-03 DIAGNOSIS — I25.10 CORONARY ARTERY DISEASE INVOLVING NATIVE CORONARY ARTERY OF NATIVE HEART WITHOUT ANGINA PECTORIS: ICD-10-CM

## 2023-10-03 DIAGNOSIS — E11.42 TYPE 2 DIABETES MELLITUS WITH DIABETIC POLYNEUROPATHY, WITHOUT LONG-TERM CURRENT USE OF INSULIN (H): Primary | ICD-10-CM

## 2023-10-03 DIAGNOSIS — I25.5 ISCHEMIC CARDIOMYOPATHY: ICD-10-CM

## 2023-10-03 DIAGNOSIS — I10 BENIGN ESSENTIAL HYPERTENSION: ICD-10-CM

## 2023-10-03 DIAGNOSIS — E53.8 B12 DEFICIENCY: ICD-10-CM

## 2023-10-03 DIAGNOSIS — K21.9 GASTROESOPHAGEAL REFLUX DISEASE WITHOUT ESOPHAGITIS: ICD-10-CM

## 2023-10-03 LAB
HBA1C MFR BLD: 6.1 % (ref 0–5.6)
VIT B12 SERPL-MCNC: 477 PG/ML (ref 232–1245)

## 2023-10-03 PROCEDURE — 36415 COLL VENOUS BLD VENIPUNCTURE: CPT | Performed by: FAMILY MEDICINE

## 2023-10-03 PROCEDURE — 83036 HEMOGLOBIN GLYCOSYLATED A1C: CPT | Performed by: FAMILY MEDICINE

## 2023-10-03 PROCEDURE — 82607 VITAMIN B-12: CPT | Performed by: FAMILY MEDICINE

## 2023-10-03 PROCEDURE — 99214 OFFICE O/P EST MOD 30 MIN: CPT | Performed by: FAMILY MEDICINE

## 2023-10-03 RX ORDER — OMEPRAZOLE 10 MG/1
10 CAPSULE, DELAYED RELEASE ORAL DAILY
Qty: 90 CAPSULE | Refills: 3 | Status: SHIPPED | OUTPATIENT
Start: 2023-10-03

## 2023-10-03 RX ORDER — ISOSORBIDE MONONITRATE 30 MG/1
30 TABLET, EXTENDED RELEASE ORAL EVERY MORNING
Qty: 90 TABLET | Refills: 3 | Status: SHIPPED | OUTPATIENT
Start: 2023-10-03

## 2023-10-03 RX ORDER — LOSARTAN POTASSIUM 50 MG/1
50 TABLET ORAL DAILY
Qty: 90 TABLET | Refills: 3 | Status: SHIPPED | OUTPATIENT
Start: 2023-10-03

## 2023-10-03 NOTE — PROGRESS NOTES
Assessment & Plan     Type 2 diabetes mellitus with diabetic polyneuropathy, without long-term current use of insulin (H)  Excellent control.  Discussed that he can continue on the lower dose of metformin but I'd like him to clarify with his wife how much he is taking. He thinks it is just once daily.  - Hemoglobin A1c  - metFORMIN (GLUCOPHAGE) 500 MG tablet  Dispense: 360 tablet; Refill: 1    B12 deficiency  - Vitamin B12    Benign essential hypertension  stable/well controlled - Continue current medication regimen.   - losartan (COZAAR) 50 MG tablet  Dispense: 90 tablet; Refill: 3    Coronary artery disease involving native coronary artery of native heart without angina pectoris  Ischemic cardiomyopathy  stable/well controlled - Continue current medication regimen.   - losartan (COZAAR) 50 MG tablet  Dispense: 90 tablet; Refill: 3  - isosorbide mononitrate (IMDUR) 30 MG 24 hr tablet  Dispense: 90 tablet; Refill: 3    Gastroesophageal reflux disease without esophagitis  stable/well controlled - Continue current medication regimen.   - omeprazole (PRILOSEC) 10 MG DR capsule  Dispense: 90 capsule; Refill: 3    Patient Instructions   You can cut back on the metformin.  I wrote for the old dose but you can cut that in half and just take 2 tablets once daily    Beatriz Betancur MD  [facility]        Walter Hernandez is a 83 year old, presenting for the following health issues:  Diabetes        10/3/2023     8:00 AM   Additional Questions   Roomed by SUMIT TAPIA       History of Present Illness       Diabetes:   He presents for follow up of diabetes.  He is checking home blood glucose one time daily.   He checks blood glucose before meals.  Blood glucose is never over 200 and never under 70. He is aware of hypoglycemia symptoms including none.    He has no concerns regarding his diabetes at this time.  He is having numbness in feet.  The patient has had a diabetic eye exam in the last 12  months. Eye exam performed on 6months. Location of last eye exam [facility].        He eats 0-1 servings of fruits and vegetables daily.He consumes 1 sweetened beverage(s) daily.He exercises with enough effort to increase his heart rate 9 or less minutes per day.  He exercises with enough effort to increase his heart rate 3 or less days per week.   He is taking medications regularly.     David has been doing an intermittent fasting diet to lose weight.  On his fasting days he drinks yogurt/fruit smoothies.  He eats a full diet on the other days and uses protein powder.    His wife manages his meds and he states that she cut back on his metformin due to GI side effects but he is not sure how much he is taking.  His AM BG's have ranged from 99 to 180 but are usually in the 130's.        BP Readings from Last 2 Encounters:   10/03/23 112/66   04/03/23 119/82     Hemoglobin A1C (%)   Date Value   10/03/2023 6.1 (H)   04/03/2023 7.9 (H)   05/09/2021 6.9 (H)   02/22/2021 6.9 (H)     LDL Cholesterol Calculated (mg/dL)   Date Value   04/03/2023 77   07/11/2022 57   02/10/2021 62   11/20/2019 66         Review of Systems         Objective    /66 (BP Location: Right arm, Patient Position: Sitting, Cuff Size: Adult Regular)   Pulse 60   Temp 97.2  F (36.2  C) (Temporal)   Resp 20   Wt 97.3 kg (214 lb 8 oz)   SpO2 97%   BMI 28.30 kg/m    Body mass index is 28.3 kg/m .  Physical Exam   GEN:  no apparent distress  LUNGS:  normal respiratory effort, and lungs clear to auscultation bilaterally - no rales, rhonchi or wheezes  CV: regular rate and rhythm, normal S1 S2, no S3 or S4, and no murmur, click or rub   NEURO: bilateral lower extremity weakness

## 2023-10-03 NOTE — PATIENT INSTRUCTIONS
You can cut back on the metformin.  I wrote for the old dose but you can cut that in half and just take 2 tablets once daily

## 2024-01-11 ENCOUNTER — VIRTUAL VISIT (OUTPATIENT)
Dept: FAMILY MEDICINE | Facility: CLINIC | Age: 84
End: 2024-01-11
Payer: COMMERCIAL

## 2024-01-11 ENCOUNTER — NURSE TRIAGE (OUTPATIENT)
Dept: NURSING | Facility: CLINIC | Age: 84
End: 2024-01-11

## 2024-01-11 DIAGNOSIS — I71.40 ABDOMINAL AORTIC ANEURYSM (AAA) WITHOUT RUPTURE, UNSPECIFIED PART (H): ICD-10-CM

## 2024-01-11 DIAGNOSIS — U07.1 SARS-COV-2 POSITIVE: Primary | ICD-10-CM

## 2024-01-11 DIAGNOSIS — E11.42 TYPE 2 DIABETES MELLITUS WITH DIABETIC POLYNEUROPATHY, WITHOUT LONG-TERM CURRENT USE OF INSULIN (H): ICD-10-CM

## 2024-01-11 PROCEDURE — G2012 BRIEF CHECK IN BY MD/QHP: HCPCS | Mod: 93 | Performed by: PHYSICIAN ASSISTANT

## 2024-01-11 NOTE — PROGRESS NOTES
"David is a 83 year old who is being evaluated via a billable video visit.    [phone] is the patients telephone  How would you like to obtain your AVS? Mail a copy  If the video visit is dropped, the invitation should be resent by:   Will anyone else be joining your video visit? No          Assessment & Plan     SARS-CoV-2 positive  Positive testing today  - nirmatrelvir and ritonavir (PAXLOVID) 300 mg/100 mg therapy pack; Take 3 tablets by mouth 2 times daily for 5 days (Take 2 Nirmatrelvir tablets and 1 Ritonavir tablet twice daily for 5 days)  Discussed risks and benefits of treatment, patient agrees.  expected course of disease discussed with patient.  Side effects of medications reviewed    Will discontinue rosuvastatin for 1 week then ok to resume.     Diabetes type 2  Blood sugars well controlled below 140 since being sick. Continue to monitor        BMI:   Estimated body mass index is 28.3 kg/m  as calculated from the following:    Height as of 4/3/23: 1.854 m (6' 1\").    Weight as of 10/3/23: 97.3 kg (214 lb 8 oz).   Weight management plan: Discussed healthy diet and exercise guidelines    Follow up if worsening     JOSE CLARK PA-C  [facility]    Subjective   David is a 83 year old, presenting for the following health issues:  No chief complaint on file.      History of Present Illness       Reason for visit:  Tested positive for covid    He eats 0-1 servings of fruits and vegetables daily.He consumes 0 sweetened beverage(s) daily.He exercises with enough effort to increase his heart rate 9 or less minutes per day.  He exercises with enough effort to increase his heart rate 3 or less days per week.   He is taking medications regularly.     Just tested positive for Covid this morning    COVID-19 Symptom Review  How many days ago did these symptoms start? 1/8/24      Are any of the following symptoms significant for you?  New or worsening difficulty breathing? No  Worsening " cough? Yes, I am coughing up mucus.  Fever or chills? Yes, the highest temperature was 99.2  Headache: No  Sore throat: YES  Chest pain: No  Diarrhea: No  Body aches? YES      What treatments has patient tried? Cough syrup   Does patient live in a nursing home, group home, or shelter? No  Does patient have a way to get food/medications during quarantined? Yes, I have a friend or family member who can help me.          Review of Systems   Constitutional, HEENT, cardiovascular, pulmonary, GI, , musculoskeletal, neuro, skin, endocrine and psych systems are negative, except as otherwise noted.        Objective           Vitals:  No vitals were obtained today due to virtual visit.    Physical Exam   GENERAL: Healthy, alert and no distress  EYES: Eyes grossly normal to inspection.  No discharge or erythema, or obvious scleral/conjunctival abnormalities.  RESP: No audible wheeze, cough, or visible cyanosis.  No visible retractions or increased work of breathing.    SKIN: Visible skin clear. No significant rash, abnormal pigmentation or lesions.  NEURO: Cranial nerves grossly intact.  Mentation and speech appropriate for age.  PSYCH: Mentation appears normal, affect normal/bright, judgement and insight intact, normal speech and appearance well-groomed.            Video-Visit Details    Type of service:  telephone   Video Start Time:  8:08  Video End Time:8:19 AM    Originating Location (pt. Location): Home    Distant Location (provider location):  On-site  Telphone

## 2024-01-11 NOTE — TELEPHONE ENCOUNTER
Nurse Triage SBAR    Is this a 2nd Level Triage? NO    Situation: URI symptoms for 3 days- would like to see someone  Consent: not needed    Background: Currently testing for COVID     Assessment:   sore throat, cough, fatigue    3 days feeling worse- started off as a head cold  Indicates going into his lungs- cough  Indicates trouble swallowing related to his sore throat-able to swallow fluids   No issues with breathing or any chest pain  No fever  Indicates his nose is always running  Indicates he is feeling pretty awful   Has taken OTC medication and no relief     Protocol Recommended Disposition:       Recommendation: Advised to have a visit today- reviewed additional care advice with patient and wife and they verbalize understanding. Assisted with connectin with scheduling      Scheduling.     Does the patient meet one of the following criteria for ADS visit consideration? 16+ years old, with an [facility] PCP     TIP  Providers, please consider if this condition is appropriate for management at one of our Acute and Diagnostic Services sites.     If patient is a good candidate, please use dotphrase <dot>triageresponse and select Refer to ADS to document.      Sissy Lyle RN 7:19 AM 1/11/2024  Reason for Disposition   Patient wants to be seen    Additional Information   Negative: SEVERE difficulty breathing (e.g., struggling for each breath, speaks in single words)   Negative: Very weak (can't stand)   Negative: Sounds like a life-threatening emergency to the triager   Negative: Difficulty breathing and not from stuffy nose (e.g., not relieved by cleaning out the nose)   Negative: Runny nose is caused by pollen or other allergies   Negative: Cough is main symptom   Negative: Sore throat is main symptom   Negative: Patient sounds very sick or weak to the triager   Negative: Fever > 103 F (39.4 C)   Negative: Fever > 101 F (38.3 C) and over 60 years of age   Negative: Fever > 100.0 F (37.8 C) and has diabetes  mellitus or a weak immune system (e.g., HIV positive, cancer chemotherapy, organ transplant, splenectomy, chronic steroids)   Negative: Fever > 100.0 F (37.8 C) and bedridden (e.g., CVA, chronic illness, recovering from surgery)   Negative: Fever present > 3 days (72 hours)   Negative: Fever returns after gone for over 24 hours and symptoms worse or not improved   Negative: Sinus pain (not just congestion) and fever   Negative: Earache   Negative: Sinus congestion (pressure, fullness) present > 10 days   Negative: Nasal discharge present > 10 days   Negative: Using nasal washes and pain medicine > 24 hours and sinus pain (lower forehead, cheekbone, or eye) persists    Protocols used: Common Cold-A-[place]

## 2024-02-16 ENCOUNTER — TELEPHONE (OUTPATIENT)
Dept: OTHER | Facility: CLINIC | Age: 84
End: 2024-02-16
Payer: COMMERCIAL

## 2024-02-16 NOTE — TELEPHONE ENCOUNTER
LVM to schedule patient for     US Aorta/Ivc/Iliac Duplex Complete per Dr Verdin    Patient will be called with results.

## 2024-03-04 ENCOUNTER — PATIENT OUTREACH (OUTPATIENT)
Dept: CARE COORDINATION | Facility: CLINIC | Age: 84
End: 2024-03-04
Payer: COMMERCIAL

## 2024-03-06 ENCOUNTER — HOSPITAL ENCOUNTER (OUTPATIENT)
Dept: ULTRASOUND IMAGING | Facility: CLINIC | Age: 84
Discharge: HOME | End: 2024-03-06
Attending: RADIOLOGY | Admitting: RADIOLOGY
Payer: COMMERCIAL

## 2024-03-06 DIAGNOSIS — I71.40 AAA (ABDOMINAL AORTIC ANEURYSM) WITHOUT RUPTURE (H): ICD-10-CM

## 2024-03-06 PROCEDURE — 93978 VASCULAR STUDY: CPT

## 2024-03-07 DIAGNOSIS — I71.40 AAA (ABDOMINAL AORTIC ANEURYSM) WITHOUT RUPTURE (H): Primary | ICD-10-CM

## 2024-03-07 NOTE — RESULT ENCOUNTER NOTE
Called patient with results.  Recommend 1 year follow up.  Reviewed with Dr. Verdin.  Meena Cheung RN  IR nurse clinician  [phone]

## 2024-03-18 ENCOUNTER — PATIENT OUTREACH (OUTPATIENT)
Dept: CARE COORDINATION | Facility: CLINIC | Age: 84
End: 2024-03-18
Payer: COMMERCIAL

## 2024-04-02 ENCOUNTER — TELEPHONE (OUTPATIENT)
Dept: FAMILY MEDICINE | Facility: CLINIC | Age: 84
End: 2024-04-02
Payer: COMMERCIAL

## 2024-04-02 DIAGNOSIS — E11.42 TYPE 2 DIABETES MELLITUS WITH DIABETIC POLYNEUROPATHY, WITHOUT LONG-TERM CURRENT USE OF INSULIN (H): ICD-10-CM

## 2024-04-03 RX ORDER — LANCETS 33 GAUGE
EACH MISCELLANEOUS
Qty: 100 EACH | Refills: 0 | Status: SHIPPED | OUTPATIENT
Start: 2024-04-03

## 2024-04-04 NOTE — TELEPHONE ENCOUNTER
OneTouch Delica Lancets 33G MISC 100 each 0 4/3/2024 -- No   Sig: USE TO TEST BLOOD SUGAR ONE TIME DAILY   Sent to pharmacy as: OneTouch Delica Lancets 33G   Class: E-Prescribe   Notes to Pharmacy: SCRIPT , NEEDS REFILLS   Order: [id number]   E-Prescribing Status: Transmission to pharmacy failed (4/3/2024 10:57 AM CDT)       Unable to pend medication below to resend. Please call pharmacy and provide verbal order for this request.     Zulema Garvin BSN, RN

## 2024-04-04 NOTE — TELEPHONE ENCOUNTER
Please see note below:  Please call pharmacy and provide verbal order for this request.      Teresa Lee, KEYN, RN   [facility]

## 2024-04-04 NOTE — TELEPHONE ENCOUNTER
Writer called [facility] pharmacy and spoke with [facility] pharmacist. Gave verbal orders for script below.   Pharmacist did state that patient never picked up needles from script written last year.     Rosangela Beckwith, BSN RN  [facility]

## 2024-04-11 ENCOUNTER — APPOINTMENT (OUTPATIENT)
Dept: URBAN - METROPOLITAN AREA CLINIC 255 | Age: 84
Setting detail: DERMATOLOGY
End: 2024-04-11

## 2024-04-11 VITALS — HEIGHT: 70 IN | WEIGHT: 215 LBS

## 2024-04-11 DIAGNOSIS — L21.8 OTHER SEBORRHEIC DERMATITIS: ICD-10-CM

## 2024-04-11 DIAGNOSIS — D49.2 NEOPLASM OF UNSPECIFIED BEHAVIOR OF BONE, SOFT TISSUE, AND SKIN: ICD-10-CM

## 2024-04-11 PROCEDURE — OTHER COUNSELING: OTHER

## 2024-04-11 PROCEDURE — OTHER BIOPSY BY SHAVE METHOD: OTHER

## 2024-04-11 PROCEDURE — OTHER PRESCRIPTION MEDICATION MANAGEMENT: OTHER

## 2024-04-11 PROCEDURE — 99204 OFFICE O/P NEW MOD 45 MIN: CPT | Mod: 25

## 2024-04-11 PROCEDURE — OTHER MIPS QUALITY: OTHER

## 2024-04-11 PROCEDURE — OTHER PRESCRIPTION: OTHER

## 2024-04-11 PROCEDURE — OTHER ADDITIONAL NOTES: OTHER

## 2024-04-11 PROCEDURE — 11102 TANGNTL BX SKIN SINGLE LES: CPT

## 2024-04-11 RX ORDER — KETOCONAZOLE 20 MG/G
CREAM TOPICAL
Qty: 30 | Refills: 2 | Status: ERX | COMMUNITY
Start: 2024-04-11

## 2024-04-11 ASSESSMENT — LOCATION DETAILED DESCRIPTION DERM
LOCATION DETAILED: RIGHT LOWER CUTANEOUS LIP
LOCATION DETAILED: LEFT ANTERIOR DISTAL THIGH
LOCATION DETAILED: LEFT LOWER CUTANEOUS LIP

## 2024-04-11 ASSESSMENT — LOCATION SIMPLE DESCRIPTION DERM
LOCATION SIMPLE: LEFT LIP
LOCATION SIMPLE: RIGHT LIP
LOCATION SIMPLE: LEFT THIGH

## 2024-04-11 ASSESSMENT — LOCATION ZONE DERM
LOCATION ZONE: LEG
LOCATION ZONE: LIP

## 2024-04-11 NOTE — PROCEDURE: BIOPSY BY SHAVE METHOD
Consent: Written consent was obtained and risks were reviewed including but not limited to scarring, infection, bleeding, scabbing, incomplete removal, nerve damage and allergy to anesthesia.
Validate That Anesthesia Is Not 0: No
Biopsy Method: double edge Personna blade
Detail Level: Detailed
Silver Nitrate Text: The wound bed was treated with silver nitrate after the biopsy was performed.
Wound Care: Petrolatum
Type Of Destruction Used: Curettage
X Size Of Lesion In Cm: 1
Billing Type: Third-Party Bill
Depth Of Biopsy: dermis
Additional Anesthesia Volume In Cc (Will Not Render If 0): 0
Curettage Text: The wound bed was treated with curettage after the biopsy was performed.
Dressing: bandage
Post-Care Instructions: I reviewed with the patient in detail post-care instructions. Patient is to keep the biopsy site dry overnight, and then apply bacitracin twice daily until healed. Patient may apply hydrogen peroxide soaks to remove any crusting.
Was A Bandage Applied: Yes
Path Notes Override (Will Replace All Of The Above Text): Partial biopsy
Cryotherapy Text: The wound bed was treated with cryotherapy after the biopsy was performed.
Electrodesiccation Text: The wound bed was treated with electrodesiccation after the biopsy was performed.
Notification Instructions: Patient will be notified of biopsy results. However, patient instructed to call the office if not contacted within 2 weeks.
Information: Selecting Yes will display possible errors in your note based on the variables you have selected. This validation is only offered as a suggestion for you. PLEASE NOTE THAT THE VALIDATION TEXT WILL BE REMOVED WHEN YOU FINALIZE YOUR NOTE. IF YOU WANT TO FAX A PRELIMINARY NOTE YOU WILL NEED TO TOGGLE THIS TO 'NO' IF YOU DO NOT WANT IT IN YOUR FAXED NOTE.
Lab: [id number]
Biopsy Type: H and E
Size Of Lesion In Cm: 2.9
Anesthesia Type: 1% lidocaine with epinephrine and a 1:10 solution of 8.4% sodium bicarbonate
Electrodesiccation And Curettage Text: The wound bed was treated with electrodesiccation and curettage after the biopsy was performed.
Hemostasis: Drysol

## 2024-04-11 NOTE — PROCEDURE: PRESCRIPTION MEDICATION MANAGEMENT
Render In Strict Bullet Format?: No
Initiate Treatment: Ketoconozole 2% cream BID
Detail Level: Zone

## 2024-04-11 NOTE — PROCEDURE: ADDITIONAL NOTES
Detail Level: Simple
Render Risk Assessment In Note?: no
Additional Notes: Recommend patient RTC for a FBSE.

## 2024-04-12 DIAGNOSIS — E11.42 TYPE 2 DIABETES MELLITUS WITH DIABETIC POLYNEUROPATHY, WITHOUT LONG-TERM CURRENT USE OF INSULIN (H): ICD-10-CM

## 2024-05-03 DIAGNOSIS — E11.42 TYPE 2 DIABETES MELLITUS WITH DIABETIC POLYNEUROPATHY, WITHOUT LONG-TERM CURRENT USE OF INSULIN (H): ICD-10-CM

## 2024-05-21 ENCOUNTER — APPOINTMENT (OUTPATIENT)
Dept: URBAN - METROPOLITAN AREA CLINIC 255 | Age: 84
Setting detail: DERMATOLOGY
End: 2024-05-22

## 2024-05-21 PROBLEM — C44.729 SQUAMOUS CELL CARCINOMA OF SKIN OF LEFT LOWER LIMB, INCLUDING HIP: Status: ACTIVE | Noted: 2024-05-21

## 2024-05-21 PROCEDURE — OTHER MIPS QUALITY: OTHER

## 2024-05-21 PROCEDURE — 17313 MOHS 1 STAGE T/A/L: CPT

## 2024-05-21 PROCEDURE — 12032 INTMD RPR S/A/T/EXT 2.6-7.5: CPT

## 2024-05-21 PROCEDURE — OTHER MOHS SURGERY: OTHER

## 2024-05-21 NOTE — PROCEDURE: MOHS SURGERY
Body Location Override (Optional - Billing Will Still Be Based On Selected Body Map Location If Applicable): Left Anterior Distal Thigh
Mohs Case Number: [id number]
Referring Physician (Optional): Jazmyne Gomez, DNP
Consent Type: Consent 2
Eye Shield Used: No
Initial Size Of Lesion: 3
X Size Of Lesion In Cm (Optional): 1.5
Number Of Stages: 1
Primary Defect Length In Cm (Final Defect Size - Required For Flaps/Grafts): 3.5
Primary Defect Width In Cm (Final Defect Size - Required For Flaps/Grafts): 2.2
Primary Defect Depth In Cm (Optional But Required For Some Insurers): 0
Repair Type: Intermediate Layered Repair
Which Instrument Did You Use For Dermabrasion?: Wire Brush
Which Eyelid Repair Cpt Are You Using?: 37344
Oculoplastic Surgeon Procedure Text (A): After obtaining clear surgical margins the patient was sent to oculoplastics for surgical repair.  The patient understands they will receive post-surgical care and follow-up from the referring physician's office.
Otolaryngologist Procedure Text (A): After obtaining clear surgical margins the patient was sent to otolaryngology for surgical repair.  The patient understands they will receive post-surgical care and follow-up from the referring physician's office.
Plastic Surgeon Procedure Text (A): After obtaining clear surgical margins the patient was sent to plastics for surgical repair.  The patient understands they will receive post-surgical care and follow-up from the referring physician's office.
Mid-Level Procedure Text (A): After obtaining clear surgical margins the patient was sent to a mid-level provider for surgical repair.  The patient understands they will receive post-surgical care and follow-up from the mid-level provider.
Provider Procedure Text (A): After obtaining clear surgical margins the defect was repaired by another provider.
Asc Procedure Text (A): After obtaining clear surgical margins the patient was sent to an ASC for surgical repair.  The patient understands they will receive post-surgical care and follow-up from the ASC physician.
Simple / Intermediate / Complex Repair - Final Wound Length In Cm: 6
Suturegard Retention Suture: 2-0 Nylon
Retention Suture Bite Size: 3 mm
Length To Time In Minutes Device Was In Place: 10
Undermining Type: Entire Wound
Debridement Text: The wound edges were debrided prior to proceeding with the closure to facilitate wound healing.
Helical Rim Text: The closure involved the helical rim.
Vermilion Border Text: The closure involved the vermilion border.
Nostril Rim Text: The closure involved the nostril rim.
Retention Suture Text: Retention sutures were placed to support the closure and prevent dehiscence.
Include Size Of Lesion In Location Indication Statement: Yes
Area H Indication Text: Tumors in this location are included in Area H (eyelids, eyebrows, nose, lips, chin, ear, pre-auricular, post-auricular, temple, genitalia, hands, feet, ankles and areola).  Tissue conservation is critical in these anatomic locations.
Area M Indication Text: Tumors in this location are included in Area M (cheek, forehead, scalp, neck, jawline and pretibial skin).  Mohs surgery is indicated for tumors in these anatomic locations.
Area L Indication Text: Tumors in this location are included in Area L (trunk and extremities).  Mohs surgery is indicated for larger tumors, or tumors with aggressive histologic features, in these anatomic locations.
Stage 1: Number Of Blocks?: 2
Special Stains Stage 1 - Results: Base On Clearance Noted Above
Stage 2: Additional Anesthesia Type: 1% lidocaine with epinephrine
Staging Info: By selecting yes to the question above you will include information on AJCC 8 tumor staging in your Mohs note. Information on tumor staging will be automatically added for SCCs on the head and neck. AJCC 8 includes tumor size, tumor depth, perineural involvement and bone invasion.
Tumor Depth: Less than 6mm from granular layer and no invasion beyond the subcutaneous fat
Why Was The Change Made?: Please Select the Appropriate Response
Medical Necessity Statement: Based on my medical judgement, Mohs surgery is the most appropriate treatment for this cancer compared to other treatments.
Alternatives Discussed Intro (Do Not Add Period): I discussed alternative treatments to Mohs surgery and specifically discussed the risks and benefits of
Consent 1/Introductory Paragraph: The rationale for Mohs was explained to the patient and consent was obtained. The risks, benefits and alternatives to therapy were discussed in detail. Specifically, the risks of infection, scarring, bleeding, prolonged wound healing, incomplete removal, allergy to anesthesia, nerve injury and recurrence were addressed. Prior to the procedure, the treatment site was clearly identified and confirmed by the patient. All components of Universal Protocol/PAUSE Rule completed.
Consent 2/Introductory Paragraph: Mohs surgery was explained to the patient and consent was obtained. The risks, benefits and alternatives to therapy were discussed in detail. Specifically, the risks of infection, scarring, bleeding, prolonged wound healing, incomplete removal, allergy to anesthesia, nerve injury and recurrence were addressed. Prior to the procedure, the treatment site was clearly identified and confirmed by the patient. All components of Universal Protocol/PAUSE Rule completed.
Consent 3/Introductory Paragraph: I gave the patient a chance to ask questions they had about the procedure.  Following this I explained the Mohs procedure and consent was obtained. The risks, benefits and alternatives to therapy were discussed in detail. Specifically, the risks of infection, scarring, bleeding, prolonged wound healing, incomplete removal, allergy to anesthesia, nerve injury and recurrence were addressed. Prior to the procedure, the treatment site was clearly identified and confirmed by the patient. All components of Universal Protocol/PAUSE Rule completed.
Consent (Temporal Branch)/Introductory Paragraph: The rationale for Mohs was explained to the patient and consent was obtained. The risks, benefits and alternatives to therapy were discussed in detail. Specifically, the risks of damage to the temporal branch of the facial nerve, infection, scarring, bleeding, prolonged wound healing, incomplete removal, allergy to anesthesia, and recurrence were addressed. Prior to the procedure, the treatment site was clearly identified and confirmed by the patient. All components of Universal Protocol/PAUSE Rule completed.
Consent (Marginal Mandibular)/Introductory Paragraph: The rationale for Mohs was explained to the patient and consent was obtained. The risks, benefits and alternatives to therapy were discussed in detail. Specifically, the risks of damage to the marginal mandibular branch of the facial nerve, infection, scarring, bleeding, prolonged wound healing, incomplete removal, allergy to anesthesia, and recurrence were addressed. Prior to the procedure, the treatment site was clearly identified and confirmed by the patient. All components of Universal Protocol/PAUSE Rule completed.
Consent (Spinal Accessory)/Introductory Paragraph: The rationale for Mohs was explained to the patient and consent was obtained. The risks, benefits and alternatives to therapy were discussed in detail. Specifically, the risks of damage to the spinal accessory nerve, infection, scarring, bleeding, prolonged wound healing, incomplete removal, allergy to anesthesia, and recurrence were addressed. Prior to the procedure, the treatment site was clearly identified and confirmed by the patient. All components of Universal Protocol/PAUSE Rule completed.
Consent (Near Eyelid Margin)/Introductory Paragraph: The rationale for Mohs was explained to the patient and consent was obtained. The risks, benefits and alternatives to therapy were discussed in detail. Specifically, the risks of ectropion or eyelid deformity, infection, scarring, bleeding, prolonged wound healing, incomplete removal, allergy to anesthesia, nerve injury and recurrence were addressed. Prior to the procedure, the treatment site was clearly identified and confirmed by the patient. All components of Universal Protocol/PAUSE Rule completed.
Consent (Ear)/Introductory Paragraph: The rationale for Mohs was explained to the patient and consent was obtained. The risks, benefits and alternatives to therapy were discussed in detail. Specifically, the risks of ear deformity, infection, scarring, bleeding, prolonged wound healing, incomplete removal, allergy to anesthesia, nerve injury and recurrence were addressed. Prior to the procedure, the treatment site was clearly identified and confirmed by the patient. All components of Universal Protocol/PAUSE Rule completed.
Consent (Nose)/Introductory Paragraph: The rationale for Mohs was explained to the patient and consent was obtained. The risks, benefits and alternatives to therapy were discussed in detail. Specifically, the risks of nasal deformity, changes in the flow of air through the nose, infection, scarring, bleeding, prolonged wound healing, incomplete removal, allergy to anesthesia, nerve injury and recurrence were addressed. Prior to the procedure, the treatment site was clearly identified and confirmed by the patient. All components of Universal Protocol/PAUSE Rule completed.
Consent (Lip)/Introductory Paragraph: The rationale for Mohs was explained to the patient and consent was obtained. The risks, benefits and alternatives to therapy were discussed in detail. Specifically, the risks of lip deformity, changes in the oral aperture, infection, scarring, bleeding, prolonged wound healing, incomplete removal, allergy to anesthesia, nerve injury and recurrence were addressed. Prior to the procedure, the treatment site was clearly identified and confirmed by the patient. All components of Universal Protocol/PAUSE Rule completed.
Consent (Scalp)/Introductory Paragraph: The rationale for Mohs was explained to the patient and consent was obtained. The risks, benefits and alternatives to therapy were discussed in detail. Specifically, the risks of changes in hair growth pattern secondary to repair, infection, scarring, bleeding, prolonged wound healing, incomplete removal, allergy to anesthesia, nerve injury and recurrence were addressed. Prior to the procedure, the treatment site was clearly identified and confirmed by the patient. All components of Universal Protocol/PAUSE Rule completed.
Detail Level: Detailed
Postop Diagnosis: same
Surgeon: Roula Ring MPhil, MD
Anesthesia Type: 1% lidocaine with epinephrine and a 1:10 solution of 8.4% sodium bicarbonate
Anesthesia Volume In Cc: 4
Hemostasis: Electrofulguration
Estimated Blood Loss (Cc): minimal
Brow Lift Text: A midfrontal incision was made medially to the defect to allow access to the tissues just superior to the left eyebrow. Following careful dissection inferiorly in a supraperiosteal plane to the level of the left eyebrow, several 3-0 monocryl sutures were used to resuspend the eyebrow orbicularis oculi muscular unit to the superior frontal bone periosteum. This resulted in an appropriate reapproximation of static eyebrow symmetry and correction of the left brow ptosis.
Deep Sutures: 5-0 PGLC
Epidermal Sutures: 6-0 Polypropylene
Epidermal Closure: running and interrupted
Suturegard Intro: Intraoperative tissue expansion was performed, utilizing the SUTUREGARD device, in order to reduce wound tension.
Suturegard Body: The suture ends were repeatedly re-tightened and re-clamped to achieve the desired tissue expansion.
Hemigard Intro: Due to skin fragility and wound tension, it was decided to use HEMIGARD adhesive retention suture devices to permit a linear closure. The skin was cleaned and dried for a 6cm distance away from the wound. Excessive hair, if present, was removed to allow for adhesion.
Hemigard Postcare Instructions: The HEMIGARD strips are to remain completely dry for at least 5-7 days.
Donor Site Anesthesia Type: same as repair anesthesia
Epidermal Closure Graft Donor Site (Optional): simple interrupted
Graft Donor Site Bandage (Optional-Leave Blank If You Don't Want In Note): Steri-strips and a pressure bandage were applied to the donor site.
Closure 2 Information: This tab is for additional flaps and grafts, including complex repair and grafts and complex repair and flaps. You can also specify a different location for the additional defect, if the location is the same you do not need to select a new one. We will insert the automated text for the repair you select below just as we do for solitary flaps and grafts. Please note that at this time if you select a location with a different insurance zone you will need to override the ICD10 and CPT if appropriate.
Closure 3 Information: This tab is for additional flaps and grafts above and beyond our usual structured repairs.  Please note if you enter information here it will not currently bill and you will need to add the billing information manually.
Wound Care: Petrolatum
Dressing: dry sterile dressing
Suture Removal: 7 days
Unna Boot Text: An Unna boot was placed to help immobilize the limb and facilitate more rapid healing.
Home Suture Removal Text: Patient was provided instructions on removing sutures and will remove their sutures at home.  If they have any questions or difficulties they will call the office.
Post-Care Instructions: I reviewed with the patient in detail post-care instructions. Patient is not to engage in any heavy lifting, exercise, or swimming for the next 14 days. Should the patient develop any fevers, chills, bleeding, severe pain patient will contact the office immediately.
Pain Refusal Text: I offered to prescribe pain medication but the patient refused to take this medication.
Mauc Instructions: By selecting yes to the question below the MAUC number will be added into the note.  This will be calculated automatically based on the diagnosis chosen, the size entered, the body zone selected (H,M,L) and the specific indications you chose. You will also have the option to override the Mohs AUC if you disagree with the automatically calculated number and this option is found in the Case Summary tab.
Where Do You Want The Question To Include Opioid Counseling Located?: Case Summary Tab
Eye Protection Verbiage: Before proceeding with the stage, a plastic scleral shield was inserted. The globe was anesthetized with a few drops of 1% lidocaine with 1:100,000 epinephrine. Then, an appropriate sized scleral shield was chosen and coated with lacrilube ointment. The shield was gently inserted and left in place for the duration of each stage. After the stage was completed, the shield was gently removed.
Mohs Method Verbiage: An incision at a 45 degree angle following the standard Mohs approach was done and the specimen was harvested as a microscopic controlled layer.
Surgeon/Pathologist Verbiage (Will Incorporate Name Of Surgeon From Intro If Not Blank): operated in two distinct and integrated capacities as the surgeon and pathologist.
Mohs Histo Method Verbiage: Each section was then chromacoded and processed in the Mohs lab using the Mohs protocol and submitted for frozen section, utilizing hematoxylin and eosin histochemical stains.
Subsequent Stages Histo Method Verbiage: Using a similar technique to that described above, a thin layer of tissue was removed from all areas where tumor was visible on the previous stage.  The tissue was again oriented, mapped, dyed, and processed as above.
Mohs Rapid Report Verbiage: The area of clinically evident tumor was marked with skin marking ink and appropriately hatched.  The initial incision was made following the Mohs approach through the skin.  The specimen was taken to the lab, divided into the necessary number of pieces, chromacoded and processed according to the Mohs protocol.  This was repeated in successive stages until a tumor free defect was achieved.
Complex Repair Preamble Text (Leave Blank If You Do Not Want): Extensive wide and differential undermining were performed.  During reconstruction, hemostasis was performed using electrofulguration.  Initial buried interrupted vertical mattress suture(s) defined redundant cutaneous columns (Burow's triangles) which were removed to the level of the adipose tissue using a #15 blade scalpel and the triangulation technique.  Hemostasis was obtained and the resulting defect was repaired with the same suture material and techniques.  The epidermis was then carefully apposed using polypropylene suture (running).  The wound was stressed in various directions to insure the adequacy of closure and of hemostasis.  The wound edges were pink and well perfused.  Reconstruction was considered complete.
Intermediate Repair Preamble Text (Leave Blank If You Do Not Want): Undermining was performed with blunt dissection.
Crescentic Complex Repair Preamble Text (Leave Blank If You Do Not Want): Extensive wide undermining was performed.
Non-Graft Cartilage Fenestration Text: The cartilage was fenestrated with a 2mm punch biopsy to help facilitate healing.
Graft Cartilage Fenestration Text: The cartilage was fenestrated with a 2mm punch biopsy to help facilitate graft survival and healing.
Secondary Intention Text (Leave Blank If You Do Not Want): The defect will heal with secondary intention.
No Repair - Repaired With Adjacent Surgical Defect Text (Leave Blank If You Do Not Want): After obtaining clear surgical margins the defect was repaired concurrently with another surgical defect which was in close approximation.
Adjacent Tissue Transfer Text: The defect edges were debeveled with a #15 scalpel blade. Given the location of the defect and the proximity to free margins an adjacent tissue transfer was deemed most appropriate. Using a sterile surgical marker, an appropriate flap was drawn incorporating the defect and placing the expected incisions within the relaxed skin tension lines where possible. The area thus outlined was incised deep to adipose tissue with a #15 scalpel blade. The skin margins were undermined to an appropriate distance in all directions utilizing iris scissors and carried over to close the primary defect.
Advancement Flap (Single) Text: In order to preserve normal anatomic and functional relationships, a single advancement flap was deemed the most appropriate reconstructive procedure.  The beveled edges of the Mohs defect were excised with a #15 scalpel blade.    The flap was designed to fall along relaxed skin tension lines and/or free margins, incised, and elevated using blunt curved tissue scissors.  Hemostasis was achieved.  Interrupted buried vertical mattress sutures were employed to carry over and secure the flap in place.  Redundant cutaneous columns defined by the flap's movement were removed to the adipose layer using the triangulation technique and repaired in similar fashion.  Final epidermal approximation along the length of the flap was achieved using epidermal sutures.  The wound was stressed in various directions to ensure the adequacy of the closure and of hemostasis.  The flap edges appeared pink and well perfused.  Reconstruction was considered complete.
Advancement Flap (Double) Text: In order to preserve normal anatomic and functional relationships, a double advancement flap was deemed the most appropriate reconstructive procedure.  The beveled edges of the Mohs defect were excised with a #15 scalpel blade.    The flaps were designed to fall along relaxed skin tension lines and/or free margins, incised, and elevated using blunt curved tissue scissors.  Hemostasis was achieved.  Interrupted buried vertical mattress sutures were employed to carry over and secure the flaps in place.  Redundant cutaneous columns defined by the flaps' movements were removed to the adipose layer using the triangulation technique and repaired in similar fashion.  Final epidermal approximation along the length of the flap was achieved using epidermal sutures.  The wound was stressed in various directions to ensure the adequacy of the closure and of hemostasis.  The flap edges appeared pink and well perfused.  Reconstruction was considered complete.
Burow's Advancement Flap Text: The defect edges were debeveled with a #15 scalpel blade. Given the location of the defect and the proximity to free margins a Burow's advancement flap was deemed most appropriate. Using a sterile surgical marker, the appropriate advancement flap was drawn incorporating the defect and placing the expected incisions within the relaxed skin tension lines where possible. The area thus outlined was incised deep to adipose tissue with a #15 scalpel blade. The skin margins were undermined to an appropriate distance in all directions utilizing iris scissors. Following this, the designed flap was advanced and carried over into the primary defect and sutured into place.
Chonodrocutaneous Helical Advancement Flap Text: The defect edges were debeveled with a #15 scalpel blade. Given the location of the defect and the proximity to free margins a chondrocutaneous helical advancement flap was deemed most appropriate. Using a sterile surgical marker, the appropriate advancement flap was drawn incorporating the defect and placing the expected incisions within the relaxed skin tension lines where possible. The area thus outlined was incised deep to adipose tissue with a #15 scalpel blade. The skin margins were undermined to an appropriate distance in all directions utilizing iris scissors. Following this, the designed flap was advanced and carried over into the primary defect and sutured into place.
Crescentic Advancement Flap Text: The defect edges were debeveled with a #15 scalpel blade. Given the location of the defect and the proximity to free margins a crescentic advancement flap was deemed most appropriate. Using a sterile surgical marker, the appropriate advancement flap was drawn incorporating the defect and placing the expected incisions within the relaxed skin tension lines where possible. The area thus outlined was incised deep to adipose tissue with a #15 scalpel blade. The skin margins were undermined to an appropriate distance in all directions utilizing iris scissors. Following this, the designed flap was advanced and carried over into the primary defect and sutured into place.
A-T Advancement Flap Text: The defect edges were debeveled with a #15 scalpel blade. Given the location of the defect, shape of the defect and the proximity to free margins an A-T advancement flap was deemed most appropriate. Using a sterile surgical marker, an appropriate advancement flap was drawn incorporating the defect and placing the expected incisions within the relaxed skin tension lines where possible. The area thus outlined was incised deep to adipose tissue with a #15 scalpel blade. The skin margins were undermined to an appropriate distance in all directions utilizing iris scissors. Following this, the designed flap was advanced and carried over into the primary defect and sutured into place.
O-T Advancement Flap Text: In order to preserve normal anatomic and functional relationships, an O-T advancement flap was deemed the most appropriate reconstructive procedure.  The beveled edges of the Mohs defect were excised with a #15 scalpel blade.    The flap was designed to fall along relaxed skin tension lines and/or free margins, incised, and elevated using blunt curved tissue scissors.  Hemostasis was achieved.  Interrupted buried vertical mattress sutures were employed to carry over and secure the flap in place.  Redundant cutaneous columns defined by the flap's movement were removed to the adipose layer using the triangulation technique and repaired in similar fashion.  Final epidermal approximation along the length of the flap was achieved using epidermal sutures.  The wound was stressed in various directions to ensure the adequacy of the closure and of hemostasis.  The flap edges appeared pink and well perfused.  Reconstruction was considered complete.
O-L Flap Text: The defect edges were debeveled with a #15 scalpel blade. Given the location of the defect, shape of the defect and the proximity to free margins an O-L flap was deemed most appropriate. Using a sterile surgical marker, an appropriate advancement flap was drawn incorporating the defect and placing the expected incisions within the relaxed skin tension lines where possible. The area thus outlined was incised deep to adipose tissue with a #15 scalpel blade. The skin margins were undermined to an appropriate distance in all directions utilizing iris scissors. Following this, the designed flap was advanced and carried over into the primary defect and sutured into place.
O-Z Flap Text: The defect edges were debeveled with a #15 scalpel blade. Given the location of the defect, shape of the defect and the proximity to free margins an O-Z flap was deemed most appropriate. Using a sterile surgical marker, an appropriate transposition flap was drawn incorporating the defect and placing the expected incisions within the relaxed skin tension lines where possible. The area thus outlined was incised deep to adipose tissue with a #15 scalpel blade. The skin margins were undermined to an appropriate distance in all directions utilizing iris scissors. Following this, the designed flap was carried over into the primary defect and sutured into place.
Double O-Z Flap Text: The defect edges were debeveled with a #15 scalpel blade. Given the location of the defect, shape of the defect and the proximity to free margins a Double O-Z flap was deemed most appropriate. Using a sterile surgical marker, an appropriate transposition flap was drawn incorporating the defect and placing the expected incisions within the relaxed skin tension lines where possible. The area thus outlined was incised deep to adipose tissue with a #15 scalpel blade. The skin margins were undermined to an appropriate distance in all directions utilizing iris scissors. Following this, the designed flap was carried over into the primary defect and sutured into place.
V-Y Flap Text: The defect edges were debeveled with a #15 scalpel blade. Given the location of the defect, shape of the defect and the proximity to free margins a V-Y flap was deemed most appropriate. Using a sterile surgical marker, an appropriate advancement flap was drawn incorporating the defect and placing the expected incisions within the relaxed skin tension lines where possible. The area thus outlined was incised deep to adipose tissue with a #15 scalpel blade. The skin margins were undermined to an appropriate distance in all directions utilizing iris scissors. Following this, the designed flap was advanced and carried over into the primary defect and sutured into place.
Advancement-Rotation Flap Text: The defect edges were debeveled with a #15 scalpel blade. Given the location of the defect, shape of the defect and the proximity to free margins an advancement-rotation flap was deemed most appropriate. Using a sterile surgical marker, an appropriate flap was drawn incorporating the defect and placing the expected incisions within the relaxed skin tension lines where possible. The area thus outlined was incised deep to adipose tissue with a #15 scalpel blade. The skin margins were undermined to an appropriate distance in all directions utilizing iris scissors. Following this, the designed flap was carried over into the primary defect and sutured into place.
Mercedes Flap Text: The defect edges were debeveled with a #15 scalpel blade. Given the location of the defect, shape of the defect and the proximity to free margins a Mercedes flap was deemed most appropriate. Using a sterile surgical marker, an appropriate advancement flap was drawn incorporating the defect and placing the expected incisions within the relaxed skin tension lines where possible. The area thus outlined was incised deep to adipose tissue with a #15 scalpel blade. The skin margins were undermined to an appropriate distance in all directions utilizing iris scissors. Following this, the designed flap was advanced and carried over into the primary defect and sutured into place.
Modified Advancement Flap Text: The defect edges were debeveled with a #15 scalpel blade. Given the location of the defect, shape of the defect and the proximity to free margins a modified advancement flap was deemed most appropriate. Using a sterile surgical marker, an appropriate advancement flap was drawn incorporating the defect and placing the expected incisions within the relaxed skin tension lines where possible. The area thus outlined was incised deep to adipose tissue with a #15 scalpel blade. The skin margins were undermined to an appropriate distance in all directions utilizing iris scissors. Following this, the designed flap was advanced and carried over into the primary defect and sutured into place.
Mucosal Advancement Flap Text: Given the location of the defect, shape of the defect and the proximity to free margins a mucosal advancement flap was deemed most appropriate. Incisions were made with a 15 blade scalpel in the appropriate fashion along the cutaneous vermilion border and the mucosal lip. The remaining actinically damaged mucosal tissue was excised.  The mucosal advancement flap was then elevated to the gingival sulcus with care taken to preserve the neurovascular structures and advanced into the primary defect. Care was taken to ensure that precise realignment of the vermilion border was achieved.
Peng Advancement Flap Text: The defect edges were debeveled with a #15 scalpel blade. Given the location of the defect, shape of the defect and the proximity to free margins a Peng advancement flap was deemed most appropriate. Using a sterile surgical marker, an appropriate advancement flap was drawn incorporating the defect and placing the expected incisions within the relaxed skin tension lines where possible. The area thus outlined was incised deep to adipose tissue with a #15 scalpel blade. The skin margins were undermined to an appropriate distance in all directions utilizing iris scissors. Following this, the designed flap was advanced and carried over into the primary defect and sutured into place.
Hatchet Flap Text: The defect edges were debeveled with a #15 scalpel blade. Given the location of the defect, shape of the defect and the proximity to free margins a hatchet flap was deemed most appropriate. Using a sterile surgical marker, an appropriate hatchet flap was drawn incorporating the defect and placing the expected incisions within the relaxed skin tension lines where possible. The area thus outlined was incised deep to adipose tissue with a #15 scalpel blade. The skin margins were undermined to an appropriate distance in all directions utilizing iris scissors. Following this, the designed flap was carried over into the primary defect and sutured into place.
Rotation Flap Text: The defect edges were debeveled with a #15 scalpel blade. Given the location of the defect, shape of the defect and the proximity to free margins a rotation flap was deemed most appropriate. Using a sterile surgical marker, an appropriate rotation flap was drawn incorporating the defect and placing the expected incisions within the relaxed skin tension lines where possible. The area thus outlined was incised deep to adipose tissue with a #15 scalpel blade. The skin margins were undermined to an appropriate distance in all directions utilizing iris scissors. Following this, the designed flap was carried over into the primary defect and sutured into place.
Bilateral Rotation Flap Text: The defect edges were debeveled with a #15 scalpel blade. Given the location of the defect, shape of the defect and the proximity to free margins a bilateral rotation flap was deemed most appropriate. Using a sterile surgical marker, an appropriate rotation flap was drawn incorporating the defect and placing the expected incisions within the relaxed skin tension lines where possible. The area thus outlined was incised deep to adipose tissue with a #15 scalpel blade. The skin margins were undermined to an appropriate distance in all directions utilizing iris scissors. Following this, the designed flap was carried over into the primary defect and sutured into place.
Spiral Flap Text: The defect edges were debeveled with a #15 scalpel blade. Given the location of the defect, shape of the defect and the proximity to free margins a spiral flap was deemed most appropriate. Using a sterile surgical marker, an appropriate rotation flap was drawn incorporating the defect and placing the expected incisions within the relaxed skin tension lines where possible. The area thus outlined was incised deep to adipose tissue with a #15 scalpel blade. The skin margins were undermined to an appropriate distance in all directions utilizing iris scissors. Following this, the designed flap was carried over into the primary defect and sutured into place.
Staged Advancement Flap Text: The defect edges were debeveled with a #15 scalpel blade. Given the location of the defect, shape of the defect and the proximity to free margins a staged advancement flap was deemed most appropriate. Using a sterile surgical marker, an appropriate advancement flap was drawn incorporating the defect and placing the expected incisions within the relaxed skin tension lines where possible. The area thus outlined was incised deep to adipose tissue with a #15 scalpel blade. The skin margins were undermined to an appropriate distance in all directions utilizing iris scissors. Following this, the designed flap was carried over into the primary defect and sutured into place.
Star Wedge Flap Text: The defect edges were debeveled with a #15 scalpel blade. Given the location of the defect, shape of the defect and the proximity to free margins a star wedge flap was deemed most appropriate. Using a sterile surgical marker, an appropriate rotation flap was drawn incorporating the defect and placing the expected incisions within the relaxed skin tension lines where possible. The area thus outlined was incised deep to adipose tissue with a #15 scalpel blade. The skin margins were undermined to an appropriate distance in all directions utilizing iris scissors. Following this, the designed flap was carried over into the primary defect and sutured into place.
Transposition Flap Text: The defect edges were debeveled with a #15 scalpel blade. Given the location of the defect and the proximity to free margins a transposition flap was deemed most appropriate. Using a sterile surgical marker, an appropriate transposition flap was drawn incorporating the defect. The area thus outlined was incised deep to adipose tissue with a #15 scalpel blade. The skin margins were undermined to an appropriate distance in all directions utilizing iris scissors. Following this, the designed flap was carried over into the primary defect and sutured into place.
Muscle Hinge Flap Text: The defect edges were debeveled with a #15 scalpel blade.  Given the size, depth and location of the defect and the proximity to free margins a muscle hinge flap was deemed most appropriate. Using a sterile surgical marker, an appropriate hinge flap was drawn incorporating the defect. The area thus outlined was incised with a #15 scalpel blade. The skin margins were undermined to an appropriate distance in all directions utilizing iris scissors. Following this, the designed flap was carried into the primary defect and sutured into place.
Mustarde Flap Text: The defect edges were debeveled with a #15 scalpel blade.  Given the size, depth and location of the defect and the proximity to free margins a Mustarde flap was deemed most appropriate. Using a sterile surgical marker, an appropriate flap was drawn incorporating the defect. The area thus outlined was incised with a #15 scalpel blade. The skin margins were undermined to an appropriate distance in all directions utilizing iris scissors. Following this, the designed flap was carried into the primary defect and sutured into place.
Nasal Turnover Hinge Flap Text: The defect edges were debeveled with a #15 scalpel blade.  Given the size, depth, location of the defect and the defect being full thickness a nasal turnover hinge flap was deemed most appropriate. Using a sterile surgical marker, an appropriate hinge flap was drawn incorporating the defect. The area thus outlined was incised with a #15 scalpel blade. The flap was designed to recreate the nasal mucosal lining and the alar rim. The skin margins were undermined to an appropriate distance in all directions utilizing iris scissors. Following this, the designed flap was carried over into the primary defect and sutured into place
Nasalis-Muscle-Based Myocutaneous Island Pedicle Flap Text: Using a #15 blade, an incision was made around the donor flap to the level of the nasalis muscle. Wide lateral undermining was then performed in both the subcutaneous plane above the nasalis muscle, and in a submuscular plane just above periosteum. This allowed the formation of a free nasalis muscle axial pedicle (based on the angular artery) which was still attached to the actual cutaneous flap, increasing its mobility and vascular viability. Hemostasis was obtained with pinpoint electrocoagulation. The flap was mobilized into position and the pivotal anchor points positioned and stabilized with buried interrupted sutures. Subcutaneous and dermal tissues were closed in a multilayered fashion with sutures. Tissue redundancies were excised, and the epidermal edges were apposed without significant tension and sutured with sutures.
Nasalis Myocutaneous Flap Text: Using a #15 blade, an incision was made around the donor flap to the level of the nasalis muscle. Wide lateral undermining was then performed in both the subcutaneous plane above the nasalis muscle, and in a submuscular plane just above periosteum. This allowed the formation of a free nasalis muscle axial pedicle which was still attached to the actual cutaneous flap, increasing its mobility and vascular viability. Hemostasis was obtained with pinpoint electrocoagulation. The flap was mobilized into position and the pivotal anchor points positioned and stabilized with buried interrupted sutures. Subcutaneous and dermal tissues were closed in a multilayered fashion with sutures. Tissue redundancies were excised, and the epidermal edges were apposed without significant tension and sutured with sutures.
Orbicularis Oris Muscle Flap Text: The defect edges were debeveled with a #15 scalpel blade.  Given that the defect affected the competency of the oral sphincter an orbicularis oris muscle flap was deemed most appropriate to restore this competency and normal muscle function.  Using a sterile surgical marker, an appropriate flap was drawn incorporating the defect. The area thus outlined was incised with a #15 scalpel blade. Following this, the designed flap was carried over into the primary defect and sutured into place.
Melolabial Transposition Flap Text: In order to preserve normal anatomic and functional relationships, a melolabial transposition flap was deemed the most appropriate reconstructive procedure.  The beveled edges of the Mohs defect were excised with a #15 scalpel blade.    The flap was designed to fall along relaxed skin tension lines and/or free margins, incised, and elevated using blunt curved tissue scissors.  Hemostasis was achieved.  Interrupted buried vertical mattress sutures were employed to carry over (transpose) and secure the flap in place.  Redundant cutaneous columns defined by the flap's movement were removed to the adipose layer using the triangulation technique and repaired in similar fashion.  Final epidermal approximation along the length of the flap was achieved using epidermal sutures.  The wound was stressed in various directions to ensure the adequacy of the closure and of hemostasis.  The flap edges appeared pink and well perfused.  Reconstruction was considered complete.
Rectangular Flap Text: The defect edges were debeveled with a #15 scalpel blade. Given the location of the defect and the proximity to free margins a rectangular flap was deemed most appropriate. Using a sterile surgical marker, an appropriate rectangular flap was drawn incorporating the defect. The area thus outlined was incised deep to adipose tissue with a #15 scalpel blade. The skin margins were undermined to an appropriate distance in all directions utilizing iris scissors. Following this, the designed flap was carried over into the primary defect and sutured into place.
Rhombic Flap Text: In order to preserve normal anatomic and functional relationships, a rhombic flap was deemed the most appropriate reconstructive procedure.  The beveled edges of the Mohs defect were excised with a #15 scalpel blade.    The flap was designed to fall along relaxed skin tension lines and/or free margins, incised, and elevated using blunt curved tissue scissors.  Hemostasis was achieved.  Interrupted buried vertical mattress sutures were employed to carry over (transpose) and secure the flap in place.  Redundant cutaneous columns defined by the flap's movement were removed to the adipose layer using the triangulation technique and repaired in similar fashion.  Final epidermal approximation along the length of the flap was achieved using epidermal sutures.  The wound was stressed in various directions to ensure the adequacy of the closure and of hemostasis.  The flap edges appeared pink and well perfused.  Reconstruction was considered complete.
Rhomboid Transposition Flap Text: The defect edges were debeveled with a #15 scalpel blade. Given the location of the defect and the proximity to free margins a rhomboid transposition flap was deemed most appropriate. Using a sterile surgical marker, an appropriate rhomboid flap was drawn incorporating the defect. The area thus outlined was incised deep to adipose tissue with a #15 scalpel blade. The skin margins were undermined to an appropriate distance in all directions utilizing iris scissors. Following this, the designed flap was carried over into the primary defect and sutured into place.
Bi-Rhombic Flap Text: The defect edges were debeveled with a #15 scalpel blade. Given the location of the defect and the proximity to free margins a bi-rhombic flap was deemed most appropriate. Using a sterile surgical marker, an appropriate rhombic flap was drawn incorporating the defect. The area thus outlined was incised deep to adipose tissue with a #15 scalpel blade. The skin margins were undermined to an appropriate distance in all directions utilizing iris scissors. Following this, the designed flap was carried over into the primary defect and sutured into place.
Helical Rim Advancement Flap Text: The defect edges were debeveled with a #15 blade scalpel.  Given the location of the defect and the proximity to free margins (helical rim) a double helical rim advancement flap was deemed most appropriate. Using a sterile surgical marker, the appropriate advancement flaps were drawn incorporating the defect and placing the expected incisions between the helical rim and antihelix where possible.  The area thus outlined was incised through and through with a #15 scalpel blade.  With a skin hook and iris scissors, the flaps were gently and sharply undermined and freed up. Folllowing this, the designed flaps were carried over into the primary defect and sutured into place.
Bilateral Helical Rim Advancement Flap Text: The defect edges were debeveled with a #15 blade scalpel.  Given the location of the defect and the proximity to free margins (helical rim) a bilateral helical rim advancement flap was deemed most appropriate. Using a sterile surgical marker, the appropriate advancement flaps were drawn incorporating the defect and placing the expected incisions between the helical rim and antihelix where possible.  The area thus outlined was incised through and through with a #15 scalpel blade.  With a skin hook and iris scissors, the flaps were gently and sharply undermined and freed up. Following this, the designed flaps were placed into the primary defect and sutured into place.
Ear Star Wedge Flap Text: The defect edges were debeveled with a #15 blade scalpel.  Given the location of the defect and the proximity to free margins (helical rim) an ear star wedge flap was deemed most appropriate. Using a sterile surgical marker, the appropriate flap was drawn incorporating the defect and placing the expected incisions between the helical rim and antihelix where possible.  The area thus outlined was incised through and through with a #15 scalpel blade. Following this, the designed flap was carried over into the primary defect and sutured into place.
Banner Transposition Flap Text: The defect edges were debeveled with a #15 scalpel blade. Given the location of the defect and the proximity to free margins a Banner transposition flap was deemed most appropriate. Using a sterile surgical marker, an appropriate flap was drawn around the defect. The area thus outlined was incised deep to adipose tissue with a #15 scalpel blade. The skin margins were undermined to an appropriate distance in all directions utilizing iris scissors. Following this, the designed flap was carried into the primary defect and sutured into place.
Bilobed Flap Text: The defect edges were debeveled with a #15 scalpel blade. Given the location of the defect and the proximity to free margins a bilobe flap was deemed most appropriate. Using a sterile surgical marker, an appropriate bilobe flap drawn around the defect. The area thus outlined was incised deep to adipose tissue with a #15 scalpel blade. The skin margins were undermined to an appropriate distance in all directions utilizing iris scissors. Following this, the designed flap was carried over into the primary defect and sutured into place.
Bilobed Transposition Flap Text: The defect edges were debeveled with a #15 scalpel blade. Given the location of the defect and the proximity to free margins a bilobed transposition flap was deemed most appropriate. Using a sterile surgical marker, an appropriate bilobe flap drawn around the defect. The area thus outlined was incised deep to adipose tissue with a #15 scalpel blade. The skin margins were undermined to an appropriate distance in all directions utilizing iris scissors. Following this, the designed flap was carried over into the primary defect and sutured into place.
Trilobed Flap Text: The defect edges were debeveled with a #15 scalpel blade. Given the location of the defect and the proximity to free margins a trilobed flap was deemed most appropriate. Using a sterile surgical marker, an appropriate trilobed flap was drawn around the defect. The area thus outlined was incised deep to adipose tissue with a #15 scalpel blade. The skin margins were undermined to an appropriate distance in all directions utilizing iris scissors. Following this, the designed flap was carried into the primary defect and sutured into place.
Dorsal Nasal Flap Text: The defect edges were debeveled with a #15 scalpel blade. Given the location of the defect and the proximity to free margins a dorsal nasal flap was deemed most appropriate. Using a sterile surgical marker, an appropriate dorsal nasal flap was drawn around the defect. The area thus outlined was incised deep to adipose tissue with a #15 scalpel blade. The skin margins were undermined to an appropriate distance in all directions utilizing iris scissors. Following this, the designed flap was carried into the primary defect and sutured into place.
Island Pedicle Flap Text: The defect edges were debeveled with a #15 scalpel blade. Given the location, size, and shape of the defect, an island pedicle advancement flap was deemed the most appropriate reconstructive option. An appropriate advancement flap was created incorporating the defect, outlining the appropriate donor tissue and placing the expected incisions within the relaxed skin tension lines where possible. The area thus outlined was incised deep to adipose tissue with a #15 scalpel blade. The skin margins were undermined to an appropriate distance in all directions around the primary defect and laterally outward around the island pedicle utilizing Ragnell scissors.  An appropriate pedicle of adipose and muscular tissue was created toward the advancing flap edge. The flap was carried over into the primary defect and sutured into place, with the secondary defect closed in V-Y fashion.  The flap was then stressed in various directions to assess the adequacy of closure and of hemostasis.  The flap was pink and well perfused.  Reconstruction was considered complete.
Island Pedicle Flap With Canthal Suspension Text: The defect edges were debeveled with a #15 scalpel blade. Given the location of the defect, shape of the defect and the proximity to free margins an island pedicle advancement flap was deemed most appropriate. Using a sterile surgical marker, an appropriate advancement flap was drawn incorporating the defect, outlining the appropriate donor tissue and placing the expected incisions within the relaxed skin tension lines where possible. The area thus outlined was incised deep to adipose tissue with a #15 scalpel blade. The skin margins were undermined to an appropriate distance in all directions around the primary defect and laterally outward around the island pedicle utilizing iris scissors.  There was minimal undermining beneath the pedicle flap. A suspension suture was placed in the canthal tendon to prevent tension and prevent ectropion. Following this, the designed flap was placed into the primary defect and sutured into place.
Alar Island Pedicle Flap Text: The defect edges were debeveled with a #15 scalpel blade. Given the location of the defect, shape of the defect and the proximity to the alar rim an island pedicle advancement flap was deemed most appropriate. Using a sterile surgical marker, an appropriate advancement flap was drawn incorporating the defect, outlining the appropriate donor tissue and placing the expected incisions within the nasal ala running parallel to the alar rim. The area thus outlined was incised with a #15 scalpel blade. The skin margins were undermined minimally to an appropriate distance in all directions around the primary defect and laterally outward around the island pedicle utilizing iris scissors.  There was minimal undermining beneath the pedicle flap. Following this, the designed flap was carried over into the primary defect and sutured into place.
Double Island Pedicle Flap Text: The defect edges were debeveled with a #15 scalpel blade. Given the location of the defect, shape of the defect and the proximity to free margins a double island pedicle advancement flap was deemed most appropriate. Using a sterile surgical marker, an appropriate advancement flap was drawn incorporating the defect, outlining the appropriate donor tissue and placing the expected incisions within the relaxed skin tension lines where possible. The area thus outlined was incised deep to adipose tissue with a #15 scalpel blade. The skin margins were undermined to an appropriate distance in all directions around the primary defect and laterally outward around the island pedicle utilizing iris scissors.  There was minimal undermining beneath the pedicle flap. Following this, the flap was carried over into the primary defect and sutured into place.
Island Pedicle Flap-Requiring Vessel Identification Text: The defect edges were debeveled with a #15 scalpel blade. Given the location of the defect, shape of the defect and the proximity to free margins an island pedicle advancement flap was deemed most appropriate. Using a sterile surgical marker, an appropriate advancement flap was drawn, based on the axial vessel mentioned above, incorporating the defect, outlining the appropriate donor tissue and placing the expected incisions within the relaxed skin tension lines where possible. The area thus outlined was incised deep to adipose tissue with a #15 scalpel blade. The skin margins were undermined to an appropriate distance in all directions around the primary defect and laterally outward around the island pedicle utilizing iris scissors.  There was minimal undermining beneath the pedicle flap. Following this, the designed flap was carried over into the primary defect and sutured into place.
Keystone Flap Text: The defect edges were debeveled with a #15 scalpel blade. Given the location of the defect, shape of the defect a keystone flap was deemed most appropriate. Using a sterile surgical marker, an appropriate keystone flap was drawn incorporating the defect, outlining the appropriate donor tissue and placing the expected incisions within the relaxed skin tension lines where possible. The area thus outlined was incised deep to adipose tissue with a #15 scalpel blade. The skin margins were undermined to an appropriate distance in all directions around the primary defect and laterally outward around the flap utilizing iris scissors. Following this, the designed flap was carried into the primary defect and sutured into place.
O-T Plasty Text: The defect edges were debeveled with a #15 scalpel blade. Given the location of the defect, shape of the defect and the proximity to free margins an O-T plasty was deemed most appropriate. Using a sterile surgical marker, an appropriate O-T plasty was drawn incorporating the defect and placing the expected incisions within the relaxed skin tension lines where possible. The area thus outlined was incised deep to adipose tissue with a #15 scalpel blade. The skin margins were undermined to an appropriate distance in all directions utilizing iris scissors. Following this, the designed flap was carried over into the primary defect and sutured into place.
O-Z Plasty Text: The defect edges were debeveled with a #15 scalpel blade. Given the location of the defect, shape of the defect and the proximity to free margins an O-Z plasty (double transposition flap) was deemed most appropriate. Using a sterile surgical marker, the appropriate transposition flaps were drawn incorporating the defect and placing the expected incisions within the relaxed skin tension lines where possible. The area thus outlined was incised deep to adipose tissue with a #15 scalpel blade. The skin margins were undermined to an appropriate distance in all directions utilizing iris scissors. Hemostasis was achieved with electrocautery. The flaps were then transposed and carried over into place, one clockwise and the other counterclockwise, and anchored with interrupted buried subcutaneous sutures.
Double O-Z Plasty Text: The defect edges were debeveled with a #15 scalpel blade. Given the location of the defect, shape of the defect and the proximity to free margins a Double O-Z plasty (double transposition flap) was deemed most appropriate. Using a sterile surgical marker, the appropriate transposition flaps were drawn incorporating the defect and placing the expected incisions within the relaxed skin tension lines where possible. The area thus outlined was incised deep to adipose tissue with a #15 scalpel blade. The skin margins were undermined to an appropriate distance in all directions utilizing iris scissors. Hemostasis was achieved with electrocautery. The flaps were then transposed and carried over into place, one clockwise and the other counterclockwise, and anchored with interrupted buried subcutaneous sutures.
V-Y Plasty Text: The defect edges were debeveled with a #15 scalpel blade. Given the location of the defect, shape of the defect and the proximity to free margins an V-Y advancement flap was deemed most appropriate. Using a sterile surgical marker, an appropriate advancement flap was drawn incorporating the defect and placing the expected incisions within the relaxed skin tension lines where possible. The area thus outlined was incised deep to adipose tissue with a #15 scalpel blade. The skin margins were undermined to an appropriate distance in all directions utilizing iris scissors. Following this, the designed flap was advanced and carried over into the primary defect and sutured into place.
H Plasty Text: Given the location of the defect, shape of the defect and the proximity to free margins a H-plasty was deemed most appropriate for repair. Using a sterile surgical marker, the appropriate advancement arms of the H-plasty were drawn incorporating the defect and placing the expected incisions within the relaxed skin tension lines where possible. The area thus outlined was incised deep to adipose tissue with a #15 scalpel blade. The skin margins were undermined to an appropriate distance in all directions utilizing iris scissors.  The opposing advancement arms were then advanced and carried over into place in opposite direction and anchored with interrupted buried subcutaneous sutures.
W Plasty Text: The lesion was extirpated to the level of the fat with a #15 scalpel blade. Given the location of the defect, shape of the defect and the proximity to free margins a W-plasty was deemed most appropriate for repair. Using a sterile surgical marker, the appropriate transposition arms of the W-plasty were drawn incorporating the defect and placing the expected incisions within the relaxed skin tension lines where possible. The area thus outlined was incised deep to adipose tissue with a #15 scalpel blade. The skin margins were undermined to an appropriate distance in all directions utilizing iris scissors. The opposing transposition arms were then transposed and carried over into place in opposite direction and anchored with interrupted buried subcutaneous sutures.
Z Plasty Text: The lesion was extirpated to the level of the fat with a #15 scalpel blade. Given the location of the defect, shape of the defect and the proximity to free margins a Z-plasty was deemed most appropriate for repair. Using a sterile surgical marker, the appropriate transposition arms of the Z-plasty were drawn incorporating the defect and placing the expected incisions within the relaxed skin tension lines where possible. The area thus outlined was incised deep to adipose tissue with a #15 scalpel blade. The skin margins were undermined to an appropriate distance in all directions utilizing iris scissors. The opposing transposition arms were then transposed and carried over into place in opposite direction and anchored with interrupted buried subcutaneous sutures.
Double Z Plasty Text: The lesion was extirpated to the level of the fat with a #15 scalpel blade. Given the location of the defect, shape of the defect and the proximity to free margins a double Z-plasty was deemed most appropriate for repair. Using a sterile surgical marker, the appropriate transposition arms of the double Z-plasty were drawn incorporating the defect and placing the expected incisions within the relaxed skin tension lines where possible. The area thus outlined was incised deep to adipose tissue with a #15 scalpel blade. The skin margins were undermined to an appropriate distance in all directions utilizing iris scissors. The opposing transposition arms were then transposed and carried over into place in opposite direction and anchored with interrupted buried subcutaneous sutures.
Zygomaticofacial Flap Text: Given the location of the defect, shape of the defect and the proximity to free margins a zygomaticofacial flap was deemed most appropriate for repair. Using a sterile surgical marker, the appropriate flap was drawn incorporating the defect and placing the expected incisions within the relaxed skin tension lines where possible. The area thus outlined was incised deep to adipose tissue with a #15 scalpel blade with preservation of a vascular pedicle.  The skin margins were undermined to an appropriate distance in all directions utilizing iris scissors. The flap was then carried over into the defect and anchored with interrupted buried subcutaneous sutures.
Cheek Interpolation Flap Text: A decision was made to reconstruct the defect utilizing an interpolation axial flap and a staged reconstruction.  A telfa template was made of the defect.  This telfa template was then used to outline the Cheek Interpolation flap.  The donor area for the pedicle flap was then injected with anesthesia.  The flap was excised through the skin and subcutaneous tissue down to the layer of the underlying musculature.  The interpolation flap was carefully excised within this deep plane to maintain its blood supply.  The edges of the donor site were undermined.   The donor site was closed in a primary fashion.  The pedicle was then rotated into position and sutured.  Once the tube was sutured into place, adequate blood supply was confirmed with blanching and refill.  The pedicle was then wrapped with xeroform gauze and dressed appropriately with a telfa and gauze bandage to ensure continued blood supply and protect the attached pedicle.
Cheek-To-Nose Interpolation Flap Text: A decision was made to reconstruct the defect utilizing an interpolation axial flap and a staged reconstruction.  A telfa template was made of the defect.  This telfa template was then used to outline the Cheek-To-Nose Interpolation flap.  The donor area for the pedicle flap was then injected with anesthesia.  The flap was excised through the skin and subcutaneous tissue down to the layer of the underlying musculature.  The interpolation flap was carefully excised within this deep plane to maintain its blood supply.  The edges of the donor site were undermined.   The donor site was closed in a primary fashion.  The pedicle was then rotated into position and sutured.  Once the tube was sutured into place, adequate blood supply was confirmed with blanching and refill.  The pedicle was then wrapped with xeroform gauze and dressed appropriately with a telfa and gauze bandage to ensure continued blood supply and protect the attached pedicle.
Interpolation Flap Text: A decision was made to reconstruct the defect utilizing an interpolation axial flap and a staged reconstruction.  A telfa template was made of the defect.  This telfa template was then used to outline the interpolation flap.  The donor area for the pedicle flap was then injected with anesthesia.  The flap was excised through the skin and subcutaneous tissue down to the layer of the underlying musculature.  The interpolation flap was carefully excised within this deep plane to maintain its blood supply.  The edges of the donor site were undermined.   The donor site was closed in a primary fashion.  The pedicle was then rotated into position and sutured.  Once the tube was sutured into place, adequate blood supply was confirmed with blanching and refill.  The pedicle was then wrapped with xeroform gauze and dressed appropriately with a telfa and gauze bandage to ensure continued blood supply and protect the attached pedicle.
Melolabial Interpolation Flap Text: A decision was made to reconstruct the defect utilizing an interpolation axial flap and a staged reconstruction.  A telfa template was made of the defect.  This telfa template was then used to outline the melolabial interpolation flap.  The donor area for the pedicle flap was then injected with anesthesia.  The flap was excised through the skin and subcutaneous tissue down to the layer of the underlying musculature.  The pedicle flap was carefully excised within this deep plane to maintain its blood supply.  The edges of the donor site were undermined.   The donor site was closed in a primary fashion.  The pedicle was then rotated into position and sutured.  Once the tube was sutured into place, adequate blood supply was confirmed with blanching and refill.  The pedicle was then wrapped with xeroform gauze and dressed appropriately with a telfa and gauze bandage to ensure continued blood supply and protect the attached pedicle.
Mastoid Interpolation Flap Text: A decision was made to reconstruct the defect utilizing an interpolation axial flap and a staged reconstruction.  A telfa template was made of the defect.  This telfa template was then used to outline the mastoid interpolation flap.  The donor area for the pedicle flap was then injected with anesthesia.  The flap was excised through the skin and subcutaneous tissue down to the layer of the underlying musculature.  The pedicle flap was carefully excised within this deep plane to maintain its blood supply.  The edges of the donor site were undermined.   The donor site was closed in a primary fashion.  The pedicle was then rotated into position and sutured.  Once the tube was sutured into place, adequate blood supply was confirmed with blanching and refill.  The pedicle was then wrapped with xeroform gauze and dressed appropriately with a telfa and gauze bandage to ensure continued blood supply and protect the attached pedicle.
Posterior Auricular Interpolation Flap Text: A decision was made to reconstruct the defect utilizing an interpolation axial flap and a staged reconstruction.  A telfa template was made of the defect.  This telfa template was then used to outline the posterior auricular interpolation flap.  The donor area for the pedicle flap was then injected with anesthesia.  The flap was excised through the skin and subcutaneous tissue down to the layer of the underlying musculature.  The pedicle flap was carefully excised within this deep plane to maintain its blood supply.  The edges of the donor site were undermined.   The donor site was closed in a primary fashion.  The pedicle was then rotated into position and sutured.  Once the tube was sutured into place, adequate blood supply was confirmed with blanching and refill.  The pedicle was then wrapped with xeroform gauze and dressed appropriately with a telfa and gauze bandage to ensure continued blood supply and protect the attached pedicle.
Paramedian Forehead Flap Text: A decision was made to reconstruct the defect utilizing an interpolation axial flap and a staged reconstruction.  A telfa template was made of the defect.  This telfa template was then used to outline the paramedian forehead pedicle flap.  The donor area for the pedicle flap was then injected with anesthesia.  The flap was excised through the skin and subcutaneous tissue down to the layer of the underlying musculature.  The pedicle flap was carefully excised within this deep plane to maintain its blood supply.  The edges of the donor site were undermined.   The donor site was closed in a primary fashion.  The pedicle was then rotated into position and sutured.  Once the tube was sutured into place, adequate blood supply was confirmed with blanching and refill.  The pedicle was then wrapped with xeroform gauze and dressed appropriately with a telfa and gauze bandage to ensure continued blood supply and protect the attached pedicle.
Abbe Flap (Upper To Lower Lip) Text: The defect of the lower lip was assessed and measured.  Given the location and size of the defect, an Abbe flap was deemed most appropriate. Using a sterile surgical marker, an appropriate Abbe flap was measured and drawn on the upper lip. Local anesthesia was then infiltrated.  A scalpel was then used to incise the upper lip through and through the skin, vermilion, muscle and mucosa, leaving the flap pedicled on the opposite side.  The flap was then rotated and transferred to the lower lip defect.  The flap was then sutured into place with a three layer technique, closing the orbicularis oris muscle layer with subcutaneous buried sutures, followed by a mucosal layer and an epidermal layer.
Abbe Flap (Lower To Upper Lip) Text: The defect of the upper lip was assessed and measured.  Given the location and size of the defect, an Abbe flap was deemed most appropriate. Using a sterile surgical marker, an appropriate Abbe flap was measured and drawn on the lower lip. Local anesthesia was then infiltrated. A scalpel was then used to incise the upper lip through and through the skin, vermilion, muscle and mucosa, leaving the flap pedicled on the opposite side.  The flap was then rotated and transferred to the lower lip defect.  The flap was then sutured into place with a three layer technique, closing the orbicularis oris muscle layer with subcutaneous buried sutures, followed by a mucosal layer and an epidermal layer.
Estlander Flap (Upper To Lower Lip) Text: The defect of the lower lip was assessed and measured.  Given the location and size of the defect, an Estlander flap was deemed most appropriate. Using a sterile surgical marker, an appropriate Estlander flap was measured and drawn on the upper lip. Local anesthesia was then infiltrated. A scalpel was then used to incise the lateral aspect of the flap, through skin, muscle and mucosa, leaving the flap pedicled medially.  The flap was then rotated and positioned to fill the lower lip defect.  The flap was then sutured into place with a three layer technique, closing the orbicularis oris muscle layer with subcutaneous buried sutures, followed by a mucosal layer and an epidermal layer.
Cheiloplasty (Less Than 50%) Text: A decision was made to reconstruct the defect with a  cheiloplasty.  The defect was undermined extensively.  Additional orbicularis oris muscle was excised with a 15 blade scalpel.  The defect was converted into a full thickness wedge, of less than 50% of the vertical height of the lip, to facilite a better cosmetic result.  Small vessels were then tied off with 5-0 monocyrl. The orbicularis oris, superficial fascia, adipose and dermis were then reapproximated.  After the deeper layers were approximated the epidermis was reapproximated with particular care given to realign the vermilion border.
Cheiloplasty (Complex) Text: A decision was made to reconstruct the defect with a  cheiloplasty.  The defect was undermined extensively.  Additional orbicularis oris muscle was excised with a 15 blade scalpel.  The defect was converted into a full thickness wedge to facilite a better cosmetic result.  Small vessels were then tied off with 5-0 monocyrl. The orbicularis oris, superficial fascia, adipose and dermis were then reapproximated.  After the deeper layers were approximated the epidermis was reapproximated with particular care given to realign the vermilion border.
Ear Wedge Repair Text: A wedge excision was completed by carrying down an excision through the full thickness of the ear and cartilage with an inward facing Burow's triangle. The wound was then closed in a layered fashion.
Full Thickness Lip Wedge Repair (Flap) Text: Given the location of the defect and the proximity to free margins a full thickness wedge repair was deemed most appropriate. Using a sterile surgical marker, the appropriate repair was drawn incorporating the defect and placing the expected incisions perpendicular to the vermilion border.  The vermilion border was also meticulously outlined to ensure appropriate reapproximation during the repair.  The area thus outlined was incised through and through with a #15 scalpel blade.  The muscularis and dermis were reaproximated with deep sutures following hemostasis. Care was taken to realign the vermilion border before proceeding with the superficial closure.  Once the vermilion was realigned the superfical and mucosal closure was finished.
Ftsg Text: The defect edges were debeveled with a #15 scalpel blade. Given the location of the defect, shape of the defect and the proximity to free margins a full thickness skin graft was deemed most appropriate. Using a sterile surgical marker, the primary defect shape was transferred to the donor site. The area thus outlined was incised deep to adipose tissue with a #15 scalpel blade.  The harvested graft was then trimmed of adipose tissue until only dermis and epidermis was left.  The skin margins of the secondary defect were undermined to an appropriate distance in all directions utilizing iris scissors.  The secondary defect was closed with interrupted buried subcutaneous sutures.  The skin edges were then re-apposed with running  sutures.  The skin graft was then placed in the primary defect and oriented appropriately.
Split-Thickness Skin Graft Text: The defect edges were debeveled with a #15 scalpel blade. Given the location of the defect, shape of the defect and the proximity to free margins a split thickness skin graft was deemed most appropriate. Using a sterile surgical marker, the primary defect shape was transferred to the donor site. The split thickness graft was then harvested.  The skin graft was then placed in the primary defect and oriented appropriately.
Pinch Graft Text: The defect edges were debeveled with a #15 scalpel blade. Given the location of the defect, shape of the defect and the proximity to free margins a pinch graft was deemed most appropriate. Using a sterile surgical marker, the primary defect shape was transferred to the donor site. The area thus outlined was incised deep to adipose tissue with a #15 scalpel blade.  The harvested graft was then trimmed of adipose tissue until only dermis and epidermis was left. The skin margins of the secondary defect were undermined to an appropriate distance in all directions utilizing iris scissors.  The secondary defect was closed with interrupted buried subcutaneous sutures.  The skin edges were then re-apposed with running  sutures.  The skin graft was then placed in the primary defect and oriented appropriately.
Burow's Graft Text: The defect edges were debeveled with a #15 scalpel blade. Given the location of the defect, shape of the defect, the proximity to free margins and the presence of a standing cone deformity a Burow's skin graft was deemed most appropriate. The standing cone was removed and this tissue was then trimmed to the shape of the primary defect. The adipose tissue was also removed until only dermis and epidermis were left.  The skin margins of the secondary defect were undermined to an appropriate distance in all directions utilizing iris scissors.  The secondary defect was closed with interrupted buried subcutaneous sutures.  The skin edges were then re-apposed with running  sutures.  The skin graft was then placed in the primary defect and oriented appropriately.
Cartilage Graft Text: The defect edges were debeveled with a #15 scalpel blade. Given the location of the defect, shape of the defect, the fact the defect involved a full thickness cartilage defect a cartilage graft was deemed most appropriate.  An appropriate donor site was identified, cleansed, and anesthetized. The cartilage graft was then harvested and transferred to the recipient site, oriented appropriately and then sutured into place.  The secondary defect was then repaired using a primary closure.
Composite Graft Text: The defect edges were debeveled with a #15 scalpel blade. Given the location of the defect, shape of the defect, the proximity to free margins and the fact the defect was full thickness a composite graft was deemed most appropriate.  The defect was outline and then transferred to the donor site.  A full thickness graft was then excised from the donor site. The graft was then placed in the primary defect, oriented appropriately and then sutured into place.  The secondary defect was then repaired using a primary closure.
Epidermal Autograft Text: The defect edges were debeveled with a #15 scalpel blade. Given the location of the defect, shape of the defect and the proximity to free margins an epidermal autograft was deemed most appropriate. Using a sterile surgical marker, the primary defect shape was transferred to the donor site. The epidermal graft was then harvested.  The skin graft was then placed in the primary defect and oriented appropriately.
Dermal Autograft Text: The defect edges were debeveled with a #15 scalpel blade. Given the location of the defect, shape of the defect and the proximity to free margins a dermal autograft was deemed most appropriate. Using a sterile surgical marker, the primary defect shape was transferred to the donor site. The area thus outlined was incised deep to adipose tissue with a #15 scalpel blade.  The harvested graft was then trimmed of adipose and epidermal tissue until only dermis was left.  The skin graft was then placed in the primary defect and oriented appropriately.
Skin Substitute Text: The defect edges were debeveled with a #15 scalpel blade. Given the location of the defect, shape of the defect and the proximity to free margins a skin substitute graft was deemed most appropriate.  The graft material was trimmed to fit the size of the defect. The graft was then placed in the primary defect and oriented appropriately.
Tissue Cultured Epidermal Autograft Text: The defect edges were debeveled with a #15 scalpel blade. Given the location of the defect, shape of the defect and the proximity to free margins a tissue cultured epidermal autograft was deemed most appropriate.  The graft was then trimmed to fit the size of the defect.  The graft was then placed in the primary defect and oriented appropriately.
Xenograft Text: The defect edges were debeveled with a #15 scalpel blade. Given the location of the defect, shape of the defect and the proximity to free margins a xenograft was deemed most appropriate.  The graft was then trimmed to fit the size of the defect.  The graft was then placed in the primary defect and oriented appropriately.
Purse String (Simple) Text: Given the location of the defect and the characteristics of the surrounding skin a purse string closure was deemed most appropriate.  Undermining was performed circumferentially around the surgical defect.  A purse string suture was then placed and tightened.
Purse String (Intermediate) Text: Given the location of the defect and the characteristics of the surrounding skin a purse string intermediate closure was deemed most appropriate.  Undermining was performed circumferentially around the surgical defect.  A purse string suture was then placed and tightened.
Partial Purse String (Simple) Text: Given the location of the defect and the characteristics of the surrounding skin a simple purse string closure was deemed most appropriate.  Undermining was performed circumferentially around the surgical defect.  A purse string suture was then placed and tightened. Wound tension only allowed a partial closure of the circular defect.
Partial Purse String (Intermediate) Text: Given the location of the defect and the characteristics of the surrounding skin an intermediate purse string closure was deemed most appropriate.  Undermining was performed circumferentially around the surgical defect.  A purse string suture was then placed and tightened. Wound tension only allowed a partial closure of the circular defect.
Localized Dermabrasion With Wire Brush Text: The patient was draped in routine manner.  Localized dermabrasion using 3 x 17 mm wire brush was performed in routine manner to papillary dermis. This spot dermabrasion is being performed to complete skin cancer reconstruction. It also will eliminate the other sun damaged precancerous cells that are known to be part of the regional effect of a lifetime's worth of sun exposure. This localized dermabrasion is therapeutic and should not be considered cosmetic in any regard.
Localized Dermabrasion With Sand Papertext: The patient was draped in routine manner.  Localized dermabrasion using sterile sand paper was performed in routine manner to papillary dermis. This spot dermabrasion is being performed to complete skin cancer reconstruction. It also will eliminate the other sun damaged precancerous cells that are known to be part of the regional effect of a lifetime's worth of sun exposure. This localized dermabrasion is therapeutic and should not be considered cosmetic in any regard.
Localized Dermabrasion With 15 Blade Text: The patient was draped in routine manner.  Localized dermabrasion using a 15 blade was performed in routine manner to papillary dermis. This spot dermabrasion is being performed to complete skin cancer reconstruction. It also will eliminate the other sun damaged precancerous cells that are known to be part of the regional effect of a lifetime's worth of sun exposure. This localized dermabrasion is therapeutic and should not be considered cosmetic in any regard.
Tarsorrhaphy Text: A tarsorrhaphy was performed using Frost sutures.
Intermediate Repair And Flap Additional Text (Will Appearing After The Standard Complex Repair Text): The intermediate repair was not sufficient to completely close the primary defect. The remaining additional defect was repaired with the flap mentioned below.
Intermediate Repair And Graft Additional Text (Will Appearing After The Standard Complex Repair Text): The intermediate repair was not sufficient to completely close the primary defect. The remaining additional defect was repaired with the graft mentioned below.
Complex Repair And Flap Additional Text (Will Appearing After The Standard Complex Repair Text): The complex repair was not sufficient to completely close the primary defect. The remaining additional defect was repaired with the flap mentioned below.
Complex Repair And Graft Additional Text (Will Appearing After The Standard Complex Repair Text): The complex repair was not sufficient to completely close the primary defect. The remaining additional defect was repaired with the graft mentioned below.
Eyelid Full Thickness Repair - 62671: The eyelid defect was full thickness which required a wedge repair of the eyelid. Special care was taken to ensure that the eyelid margin was realligned when placing sutures.
Eyelid Partial Thickness Repair - 44350: The eyelid defect was partial thickness which required a wedge repair of the eyelid. Special care was taken to ensure that the eyelid margin was realligned when placing sutures.
Manual Repair Warning Statement: We plan on removing the manually selected variable below in favor of our much easier automatic structured text blocks found in the previous tab. We decided to do this to help make the flow better and give you the full power of structured data. Manual selection is never going to be ideal in our platform and I would encourage you to avoid using manual selection from this point on, especially since I will be sunsetting this feature. It is important that you do one of two things with the customized text below. First, you can save all of the text in a word file so you can have it for future reference. Second, transfer the text to the appropriate area in the Library tab. Lastly, if there is a flap or graft type which we do not have you need to let us know right away so I can add it in before the variable is hidden. No need to panic, we plan to give you roughly 6 months to make the change.
Same Histology In Subsequent Stages Text: The pattern and morphology of the tumor is as described in the first stage.
No Residual Tumor Seen Histology Text: There were no malignant cells seen in the sections examined.
Inflammation Suggestive Of Cancer Camouflage Histology Text: There was a dense lymphocytic infiltrate which prevented adequate histologic evaluation of adjacent structures.
Bcc Histology Text: There were numerous aggregates of basaloid cells.
Bcc Infiltrative Histology Text: There were numerous aggregates of basaloid cells demonstrating an infiltrative pattern.
Mart-1 - Positive Histology Text: MART-1 staining demonstrates areas of higher density and clustering of melanocytes with Pagetoid spread upwards within the epidermis. The surgical margins are positive for tumor cells.
Mart-1 - Negative Histology Text: MART-1 staining demonstrates a normal density and pattern of melanocytes along the dermal-epidermal junction. The surgical margins are negative for tumor cells.
Information: Selecting Yes will display possible errors in your note based on the variables you have selected. This validation is only offered as a suggestion for you. PLEASE NOTE THAT THE VALIDATION TEXT WILL BE REMOVED WHEN YOU FINALIZE YOUR NOTE. IF YOU WANT TO FAX A PRELIMINARY NOTE YOU WILL NEED TO TOGGLE THIS TO 'NO' IF YOU DO NOT WANT IT IN YOUR FAXED NOTE.
Bill 59 Modifier?: No - Continue to Bill 79 Modifier

## 2024-05-24 DIAGNOSIS — E11.42 TYPE 2 DIABETES MELLITUS WITH DIABETIC POLYNEUROPATHY, WITHOUT LONG-TERM CURRENT USE OF INSULIN (H): ICD-10-CM

## 2024-05-25 RX ORDER — BLOOD-GLUCOSE METER
EACH MISCELLANEOUS
Qty: 1 KIT | Refills: 0 | Status: SHIPPED | OUTPATIENT
Start: 2024-05-25

## 2024-05-26 ENCOUNTER — HEALTH MAINTENANCE LETTER (OUTPATIENT)
Age: 84
End: 2024-05-26

## 2024-06-04 ENCOUNTER — APPOINTMENT (OUTPATIENT)
Dept: URBAN - METROPOLITAN AREA CLINIC 255 | Age: 84
Setting detail: DERMATOLOGY
End: 2024-06-06

## 2024-06-04 DIAGNOSIS — Z48.02 ENCOUNTER FOR REMOVAL OF SUTURES: ICD-10-CM

## 2024-06-04 PROCEDURE — OTHER RETURN TO REFERRING PROVIDER: OTHER

## 2024-06-04 PROCEDURE — OTHER COUNSELING: OTHER

## 2024-06-04 PROCEDURE — OTHER SUTURE REMOVAL (GLOBAL PERIOD): OTHER

## 2024-06-04 PROCEDURE — 99024 POSTOP FOLLOW-UP VISIT: CPT

## 2024-06-04 PROCEDURE — OTHER MIPS QUALITY: OTHER

## 2024-06-04 PROCEDURE — OTHER DIAGNOSIS COMMENT: OTHER

## 2024-06-04 ASSESSMENT — LOCATION DETAILED DESCRIPTION DERM: LOCATION DETAILED: LEFT MEDIAL FOREHEAD

## 2024-06-04 ASSESSMENT — LOCATION ZONE DERM: LOCATION ZONE: FACE

## 2024-06-04 ASSESSMENT — LOCATION SIMPLE DESCRIPTION DERM: LOCATION SIMPLE: LEFT FOREHEAD

## 2024-06-04 NOTE — PROCEDURE: DIAGNOSIS COMMENT
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
Detail Level: Simple
Render Risk Assessment In Note?: no

## 2024-06-04 NOTE — PROCEDURE: SUTURE REMOVAL (GLOBAL PERIOD)
Body Location Override (Optional - Billing Will Still Be Based On Selected Body Map Location If Applicable): Left Anterior Distal Thigh
Detail Level: Detailed
Add 97195 Cpt? (Important Note: In 2017 The Use Of 29985 Is Being Tracked By Cms To Determine Future Global Period Reimbursement For Global Periods): no

## 2024-06-20 ENCOUNTER — APPOINTMENT (OUTPATIENT)
Dept: URBAN - METROPOLITAN AREA CLINIC 255 | Age: 84
Setting detail: DERMATOLOGY
End: 2024-06-22

## 2024-06-20 DIAGNOSIS — T81.31X: ICD-10-CM

## 2024-06-20 PROBLEM — T81.31XA DISRUPTION OF EXTERNAL OPERATION (SURGICAL) WOUND, NOT ELSEWHERE CLASSIFIED, INITIAL ENCOUNTER: Status: ACTIVE | Noted: 2024-06-20

## 2024-06-20 PROCEDURE — OTHER REPAIR NOTE: OTHER

## 2024-06-20 PROCEDURE — 12032 INTMD RPR S/A/T/EXT 2.6-7.5: CPT

## 2024-06-20 PROCEDURE — OTHER COUNSELING: OTHER

## 2024-06-20 PROCEDURE — OTHER MIPS QUALITY: OTHER

## 2024-06-20 ASSESSMENT — LOCATION ZONE DERM: LOCATION ZONE: LEG

## 2024-06-20 ASSESSMENT — LOCATION DETAILED DESCRIPTION DERM: LOCATION DETAILED: LEFT ANTERIOR DISTAL THIGH

## 2024-06-20 ASSESSMENT — LOCATION SIMPLE DESCRIPTION DERM: LOCATION SIMPLE: LEFT THIGH

## 2024-06-20 NOTE — PROCEDURE: REPAIR NOTE
Body Location Override (Optional - Billing Will Still Be Based On Selected Body Map Location If Applicable): Left Anterior Distal Thigh
Anesthesia Volume In Cc: 6
Did You Provide Opioid Counseling: No
Repair Type: Intermediate Layered Repair
Which Eyelid Repair Cpt Are You Using?: 82538
Suturegard Retention Suture: 2-0 Nylon
Retention Suture Bite Size: 3 mm
Length To Time In Minutes Device Was In Place: 10
Number Of Hemigard Strips Per Side: 1
Simple / Intermediate / Complex Repair - Final Wound Length In Cm: 5.5
Undermining Type: Entire Wound
Debridement Text: The wound edges were debrided prior to proceeding with the closure to facilitate wound healing.
Helical Rim Text: The closure involved the helical rim.
Vermilion Border Text: The closure involved the vermilion border.
Nostril Rim Text: The closure involved the nostril rim.
Retention Suture Text: Retention sutures were placed to support the closure and prevent dehiscence.
Primary Defect Length (In Cm): 3.3
Primary Defect Width (In Cm): 1.4
Primary Defect Depth (In Cm): 0
Skin Substitute: EpiFix Micronized
Suture Removal: 21 days
Type Of Previous Surgery (Optional- Ie Mohs Surgery): Mohs
Detail Level: Detailed
Hemostasis: Electrocautery
Estimated Blood Loss (Cc): minimal
Brow Lift Text: A midfrontal incision was made medially to the defect to allow access to the tissues just superior to the left eyebrow. Following careful dissection inferiorly in a supraperiosteal plane to the level of the left eyebrow, several 3-0 monocryl sutures were used to resuspend the eyebrow orbicularis oculi muscular unit to the superior frontal bone periosteum. This resulted in an appropriate reapproximation of static eyebrow symmetry and correction of the left brow ptosis.
Deep Sutures: 4-0 PGCL
Epidermal Sutures: 3-0 Polypropylene
Epidermal Closure: running
Wound Care: Petrolatum
Dressing: pressure dressing with telfa
Unna Boot Text: An Unna boot was placed to help immobilize the limb and facilitate more rapid healing.
Suturegard Intro: Intraoperative tissue expansion was performed, utilizing the SUTUREGARD device, in order to reduce wound tension.
Suturegard Body: The suture ends were repeatedly re-tightened and re-clamped to achieve the desired tissue expansion.
Hemigard Intro: Due to skin fragility and wound tension, it was decided to use HEMIGARD adhesive retention suture devices to permit a linear closure. The skin was cleaned and dried for a 6cm distance away from the wound. Excessive hair, if present, was removed to allow for adhesion.
Hemigard Postcare Instructions: The HEMIGARD strips are to remain completely dry for at least 5-7 days.
Epidermal Closure Graft Donor Site (Optional): simple interrupted
Graft Donor Site Bandage (Optional-Leave Blank If You Don't Want In Note): Steri-strips and a pressure bandage were applied to the donor site.
Closure 2 Information: This tab is for additional flaps and grafts, including complex repair and grafts and complex repair and flaps. You can also specify a different location for the additional defect, if the location is the same you do not need to select a new one. We will insert the automated text for the repair you select below just as we do for solitary flaps and grafts. Please note that at this time if you select a location with a different insurance zone you will need to override the ICD10 and CPT if appropriate.
Closure 3 Information: This tab is for additional flaps and grafts above and beyond our usual structured repairs.  Please note if you enter information here it will not currently bill and you will need to add the billing information manually.
Complex Repair Preamble Text (Leave Blank If You Do Not Want): Extensive wide undermining was performed.
Intermediate Repair Preamble Text (Leave Blank If You Do Not Want): Undermining was performed with blunt dissection.
Flap Thinning Complex Repair Preamble Text (Leave Blank If You Do Not Want): An incision was made along the previous flap suture line. Undermining was performed beneath the flap and redundant tissue was removed to restore the normal contour of the skin.
Non-Graft Cartilage Fenestration Text: The cartilage was fenestrated with a 2mm punch biopsy to help facilitate healing.
Graft Cartilage Fenestration Text: The cartilage was fenestrated with a 2mm punch biopsy to help facilitate graft survival and healing.
Preparation Of Recipient Site - Flap: The eschar was removed surgically with sharp dissection to facilitate appropriate wound healing of the following adjacent tissue rearrangement.
Preparation Of Recipient Site - Graft: The eschar was removed surgically with sharp dissection to facilitate appropriate survival of the following graft.
Preparation Of Recipient Site - Flap Takedown: The eschar and granulation tissue was removed surgically with sharp dissection to facilitate appropriate healing after division and inset of the proximal and distal interpolation flap.
Secondary Intention Text (Leave Blank If You Do Not Want): The defect will heal with secondary intention.
No Repair - Repaired With Adjacent Surgical Defect Text (Leave Blank If You Do Not Want): After obtaining clear surgical margins the defect was repaired concurrently with another surgical defect which was in close approximation.
Referred To Oculoplastics For Closure Text (Leave Blank If You Do Not Want): After obtaining clear surgical margins the patient was sent to oculoplastics for surgical repair.  The patient understands they will receive post-surgical care and follow-up from the referring physician's office.
Referred To Otolaryngology For Closure Text (Leave Blank If You Do Not Want): After obtaining clear surgical margins the patient was sent to otolaryngology for surgical repair.  The patient understands they will receive post-surgical care and follow-up from the referring physician's office.
Referred To Plastics For Closure Text (Leave Blank If You Do Not Want): After obtaining clear surgical margins the patient was sent to plastics for surgical repair.  The patient understands they will receive post-surgical care and follow-up from the referring physician's office.
Referred To Asc For Closure Text (Leave Blank If You Do Not Want): After obtaining clear surgical margins the patient was sent to an ASC for surgical repair.  The patient understands they will receive post-surgical care and follow-up from the ASC physician.
Referred To Mid-Level For Closure Text (Leave Blank If You Do Not Want): After obtaining clear surgical margins the patient was sent to a mid-level provider for surgical repair.  The patient understands they will receive post-surgical care and follow-up from the mid-level provider.
Repair Performed By Another Provider Text (Leave Blank If You Do Not Want): After obtaining clear surgical margins the defect was repaired by another provider.
Adjacent Tissue Transfer Text: The defect edges were debeveled with a #15 scalpel blade. Given the location of the defect and the proximity to free margins an adjacent tissue transfer was deemed most appropriate. Using a sterile surgical marker, an appropriate flap was drawn incorporating the defect and placing the expected incisions within the relaxed skin tension lines where possible. The area thus outlined was incised deep to adipose tissue with a #15 scalpel blade. The skin margins were undermined to an appropriate distance in all directions utilizing iris scissors and carried over to close the primary defect.
Advancement Flap (Single) Text: The defect edges were debeveled with a #15 scalpel blade. Given the location of the defect and the proximity to free margins a single advancement flap was deemed most appropriate. Using a sterile surgical marker, an appropriate advancement flap was drawn incorporating the defect and placing the expected incisions within the relaxed skin tension lines where possible. The area thus outlined was incised deep to adipose tissue with a #15 scalpel blade. The skin margins were undermined to an appropriate distance in all directions utilizing iris scissors. Following this, the designed flap was advanced and carried over into the primary defect and sutured into place.
Advancement Flap (Double) Text: The defect edges were debeveled with a #15 scalpel blade. Given the location of the defect and the proximity to free margins a double advancement flap was deemed most appropriate. Using a sterile surgical marker, the appropriate advancement flaps were drawn incorporating the defect and placing the expected incisions within the relaxed skin tension lines where possible. The area thus outlined was incised deep to adipose tissue with a #15 scalpel blade. The skin margins were undermined to an appropriate distance in all directions utilizing iris scissors. Following this, the designed flaps were advanced and carried over into the primary defect and sutured into place.
Burow's Advancement Flap Text: The defect edges were debeveled with a #15 scalpel blade. Given the location of the defect and the proximity to free margins a Burow's advancement flap was deemed most appropriate. Using a sterile surgical marker, the appropriate advancement flap was drawn incorporating the defect and placing the expected incisions within the relaxed skin tension lines where possible. The area thus outlined was incised deep to adipose tissue with a #15 scalpel blade. The skin margins were undermined to an appropriate distance in all directions utilizing iris scissors. Following this, the designed flap was advanced and carried over into the primary defect and sutured into place.
Chonodrocutaneous Helical Advancement Flap Text: The defect edges were debeveled with a #15 scalpel blade. Given the location of the defect and the proximity to free margins a chondrocutaneous helical advancement flap was deemed most appropriate. Using a sterile surgical marker, the appropriate advancement flap was drawn incorporating the defect and placing the expected incisions within the relaxed skin tension lines where possible. The area thus outlined was incised deep to adipose tissue with a #15 scalpel blade. The skin margins were undermined to an appropriate distance in all directions utilizing iris scissors. Following this, the designed flap was advanced and carried over into the primary defect and sutured into place.
Crescentic Advancement Flap Text: The defect edges were debeveled with a #15 scalpel blade. Given the location of the defect and the proximity to free margins a crescentic advancement flap was deemed most appropriate. Using a sterile surgical marker, the appropriate advancement flap was drawn incorporating the defect and placing the expected incisions within the relaxed skin tension lines where possible. The area thus outlined was incised deep to adipose tissue with a #15 scalpel blade. The skin margins were undermined to an appropriate distance in all directions utilizing iris scissors. Following this, the designed flap was advanced and carried over into the primary defect and sutured into place.
A-T Advancement Flap Text: The defect edges were debeveled with a #15 scalpel blade. Given the location of the defect, shape of the defect and the proximity to free margins an A-T advancement flap was deemed most appropriate. Using a sterile surgical marker, an appropriate advancement flap was drawn incorporating the defect and placing the expected incisions within the relaxed skin tension lines where possible. The area thus outlined was incised deep to adipose tissue with a #15 scalpel blade. The skin margins were undermined to an appropriate distance in all directions utilizing iris scissors. Following this, the designed flap was advanced and carried over into the primary defect and sutured into place.
O-T Advancement Flap Text: The defect edges were debeveled with a #15 scalpel blade. Given the location of the defect, shape of the defect and the proximity to free margins an O-T advancement flap was deemed most appropriate. Using a sterile surgical marker, an appropriate advancement flap was drawn incorporating the defect and placing the expected incisions within the relaxed skin tension lines where possible. The area thus outlined was incised deep to adipose tissue with a #15 scalpel blade. The skin margins were undermined to an appropriate distance in all directions utilizing iris scissors. Following this, the designed flap was advanced and carried over into the primary defect and sutured into place.
O-L Flap Text: The defect edges were debeveled with a #15 scalpel blade. Given the location of the defect, shape of the defect and the proximity to free margins an O-L flap was deemed most appropriate. Using a sterile surgical marker, an appropriate advancement flap was drawn incorporating the defect and placing the expected incisions within the relaxed skin tension lines where possible. The area thus outlined was incised deep to adipose tissue with a #15 scalpel blade. The skin margins were undermined to an appropriate distance in all directions utilizing iris scissors. Following this, the designed flap was advanced and carried over into the primary defect and sutured into place.
O-Z Flap Text: The defect edges were debeveled with a #15 scalpel blade. Given the location of the defect, shape of the defect and the proximity to free margins an O-Z flap was deemed most appropriate. Using a sterile surgical marker, an appropriate transposition flap was drawn incorporating the defect and placing the expected incisions within the relaxed skin tension lines where possible. The area thus outlined was incised deep to adipose tissue with a #15 scalpel blade. The skin margins were undermined to an appropriate distance in all directions utilizing iris scissors. Following this, the designed flap was carried over into the primary defect and sutured into place.
Double O-Z Flap Text: The defect edges were debeveled with a #15 scalpel blade. Given the location of the defect, shape of the defect and the proximity to free margins a Double O-Z flap was deemed most appropriate. Using a sterile surgical marker, an appropriate transposition flap was drawn incorporating the defect and placing the expected incisions within the relaxed skin tension lines where possible. The area thus outlined was incised deep to adipose tissue with a #15 scalpel blade. The skin margins were undermined to an appropriate distance in all directions utilizing iris scissors. Following this, the designed flap was carried over into the primary defect and sutured into place.
V-Y Flap Text: The defect edges were debeveled with a #15 scalpel blade. Given the location of the defect, shape of the defect and the proximity to free margins a V-Y flap was deemed most appropriate. Using a sterile surgical marker, an appropriate advancement flap was drawn incorporating the defect and placing the expected incisions within the relaxed skin tension lines where possible. The area thus outlined was incised deep to adipose tissue with a #15 scalpel blade. The skin margins were undermined to an appropriate distance in all directions utilizing iris scissors. Following this, the designed flap was advanced and carried over into the primary defect and sutured into place.
Advancement-Rotation Flap Text: The defect edges were debeveled with a #15 scalpel blade. Given the location of the defect, shape of the defect and the proximity to free margins an advancement-rotation flap was deemed most appropriate. Using a sterile surgical marker, an appropriate flap was drawn incorporating the defect and placing the expected incisions within the relaxed skin tension lines where possible. The area thus outlined was incised deep to adipose tissue with a #15 scalpel blade. The skin margins were undermined to an appropriate distance in all directions utilizing iris scissors. Following this, the designed flap was carried over into the primary defect and sutured into place.
Mercedes Flap Text: The defect edges were debeveled with a #15 scalpel blade. Given the location of the defect, shape of the defect and the proximity to free margins a Mercedes flap was deemed most appropriate. Using a sterile surgical marker, an appropriate advancement flap was drawn incorporating the defect and placing the expected incisions within the relaxed skin tension lines where possible. The area thus outlined was incised deep to adipose tissue with a #15 scalpel blade. The skin margins were undermined to an appropriate distance in all directions utilizing iris scissors. Following this, the designed flap was advanced and carried over into the primary defect and sutured into place.
Modified Advancement Flap Text: The defect edges were debeveled with a #15 scalpel blade. Given the location of the defect, shape of the defect and the proximity to free margins a modified advancement flap was deemed most appropriate. Using a sterile surgical marker, an appropriate advancement flap was drawn incorporating the defect and placing the expected incisions within the relaxed skin tension lines where possible. The area thus outlined was incised deep to adipose tissue with a #15 scalpel blade. The skin margins were undermined to an appropriate distance in all directions utilizing iris scissors. Following this, the designed flap was advanced and carried over into the primary defect and sutured into place.
Mucosal Advancement Flap Text: Given the location of the defect, shape of the defect and the proximity to free margins a mucosal advancement flap was deemed most appropriate. Incisions were made with a 15 blade scalpel in the appropriate fashion along the cutaneous vermilion border and the mucosal lip. The remaining actinically damaged mucosal tissue was excised.  The mucosal advancement flap was then elevated to the gingival sulcus with care taken to preserve the neurovascular structures and advanced into the primary defect. Care was taken to ensure that precise realignment of the vermilion border was achieved.
Peng Advancement Flap Text: The defect edges were debeveled with a #15 scalpel blade. Given the location of the defect, shape of the defect and the proximity to free margins a Peng advancement flap was deemed most appropriate. Using a sterile surgical marker, an appropriate advancement flap was drawn incorporating the defect and placing the expected incisions within the relaxed skin tension lines where possible. The area thus outlined was incised deep to adipose tissue with a #15 scalpel blade. The skin margins were undermined to an appropriate distance in all directions utilizing iris scissors. Following this, the designed flap was advanced and carried over into the primary defect and sutured into place.
Hatchet Flap Text: The defect edges were debeveled with a #15 scalpel blade. Given the location of the defect, shape of the defect and the proximity to free margins a hatchet flap was deemed most appropriate. Using a sterile surgical marker, an appropriate hatchet flap was drawn incorporating the defect and placing the expected incisions within the relaxed skin tension lines where possible. The area thus outlined was incised deep to adipose tissue with a #15 scalpel blade. The skin margins were undermined to an appropriate distance in all directions utilizing iris scissors. Following this, the designed flap was carried over into the primary defect and sutured into place.
Rotation Flap Text: The defect edges were debeveled with a #15 scalpel blade. Given the location of the defect, shape of the defect and the proximity to free margins a rotation flap was deemed most appropriate. Using a sterile surgical marker, an appropriate rotation flap was drawn incorporating the defect and placing the expected incisions within the relaxed skin tension lines where possible. The area thus outlined was incised deep to adipose tissue with a #15 scalpel blade. The skin margins were undermined to an appropriate distance in all directions utilizing iris scissors. Following this, the designed flap was carried over into the primary defect and sutured into place.
Bilateral Rotation Flap Text: The defect edges were debeveled with a #15 scalpel blade. Given the location of the defect, shape of the defect and the proximity to free margins a bilateral rotation flap was deemed most appropriate. Using a sterile surgical marker, an appropriate rotation flap was drawn incorporating the defect and placing the expected incisions within the relaxed skin tension lines where possible. The area thus outlined was incised deep to adipose tissue with a #15 scalpel blade. The skin margins were undermined to an appropriate distance in all directions utilizing iris scissors. Following this, the designed flap was carried over into the primary defect and sutured into place.
Spiral Flap Text: The defect edges were debeveled with a #15 scalpel blade. Given the location of the defect, shape of the defect and the proximity to free margins a spiral flap was deemed most appropriate. Using a sterile surgical marker, an appropriate rotation flap was drawn incorporating the defect and placing the expected incisions within the relaxed skin tension lines where possible. The area thus outlined was incised deep to adipose tissue with a #15 scalpel blade. The skin margins were undermined to an appropriate distance in all directions utilizing iris scissors. Following this, the designed flap was carried over into the primary defect and sutured into place.
Staged Advancement Flap Text: The defect edges were debeveled with a #15 scalpel blade. Given the location of the defect, shape of the defect and the proximity to free margins a staged advancement flap was deemed most appropriate. Using a sterile surgical marker, an appropriate advancement flap was drawn incorporating the defect and placing the expected incisions within the relaxed skin tension lines where possible. The area thus outlined was incised deep to adipose tissue with a #15 scalpel blade. The skin margins were undermined to an appropriate distance in all directions utilizing iris scissors. Following this, the designed flap was carried over into the primary defect and sutured into place.
Star Wedge Flap Text: The defect edges were debeveled with a #15 scalpel blade. Given the location of the defect, shape of the defect and the proximity to free margins a star wedge flap was deemed most appropriate. Using a sterile surgical marker, an appropriate rotation flap was drawn incorporating the defect and placing the expected incisions within the relaxed skin tension lines where possible. The area thus outlined was incised deep to adipose tissue with a #15 scalpel blade. The skin margins were undermined to an appropriate distance in all directions utilizing iris scissors. Following this, the designed flap was carried over into the primary defect and sutured into place.
Transposition Flap Text: The defect edges were debeveled with a #15 scalpel blade. Given the location of the defect and the proximity to free margins a transposition flap was deemed most appropriate. Using a sterile surgical marker, an appropriate transposition flap was drawn incorporating the defect. The area thus outlined was incised deep to adipose tissue with a #15 scalpel blade. The skin margins were undermined to an appropriate distance in all directions utilizing iris scissors. Following this, the designed flap was carried over into the primary defect and sutured into place.
Muscle Hinge Flap Text: The defect edges were debeveled with a #15 scalpel blade.  Given the size, depth and location of the defect and the proximity to free margins a muscle hinge flap was deemed most appropriate. Using a sterile surgical marker, an appropriate hinge flap was drawn incorporating the defect. The area thus outlined was incised with a #15 scalpel blade. The skin margins were undermined to an appropriate distance in all directions utilizing iris scissors. Following this, the designed flap was carried into the primary defect and sutured into place.
Mustarde Flap Text: The defect edges were debeveled with a #15 scalpel blade.  Given the size, depth and location of the defect and the proximity to free margins a Mustarde flap was deemed most appropriate. Using a sterile surgical marker, an appropriate flap was drawn incorporating the defect. The area thus outlined was incised with a #15 scalpel blade. The skin margins were undermined to an appropriate distance in all directions utilizing iris scissors. Following this, the designed flap was carried into the primary defect and sutured into place.
Nasal Turnover Hinge Flap Text: The defect edges were debeveled with a #15 scalpel blade.  Given the size, depth, location of the defect and the defect being full thickness a nasal turnover hinge flap was deemed most appropriate. Using a sterile surgical marker, an appropriate hinge flap was drawn incorporating the defect. The area thus outlined was incised with a #15 scalpel blade. The flap was designed to recreate the nasal mucosal lining and the alar rim. The skin margins were undermined to an appropriate distance in all directions utilizing iris scissors. Following this, the designed flap was carried over into the primary defect and sutured into place
Nasalis-Muscle-Based Myocutaneous Island Pedicle Flap Text: Using a #15 blade, an incision was made around the donor flap to the level of the nasalis muscle. Wide lateral undermining was then performed in both the subcutaneous plane above the nasalis muscle, and in a submuscular plane just above periosteum. This allowed the formation of a free nasalis muscle axial pedicle (based on the angular artery) which was still attached to the actual cutaneous flap, increasing its mobility and vascular viability. Hemostasis was obtained with pinpoint electrocoagulation. The flap was mobilized into position and the pivotal anchor points positioned and stabilized with buried interrupted sutures. Subcutaneous and dermal tissues were closed in a multilayered fashion with sutures. Tissue redundancies were excised, and the epidermal edges were apposed without significant tension and sutured with sutures.
Nasalis Myocutaneous Flap Text: Using a #15 blade, an incision was made around the donor flap to the level of the nasalis muscle. Wide lateral undermining was then performed in both the subcutaneous plane above the nasalis muscle, and in a submuscular plane just above periosteum. This allowed the formation of a free nasalis muscle axial pedicle which was still attached to the actual cutaneous flap, increasing its mobility and vascular viability. Hemostasis was obtained with pinpoint electrocoagulation. The flap was mobilized into position and the pivotal anchor points positioned and stabilized with buried interrupted sutures. Subcutaneous and dermal tissues were closed in a multilayered fashion with sutures. Tissue redundancies were excised, and the epidermal edges were apposed without significant tension and sutured with sutures.
Nasolabial Transposition Flap Text: The defect edges were debeveled with a #15 scalpel blade.  Given the size, depth and location of the defect and the proximity to free margins a nasolabial transposition flap was deemed most appropriate. Using a sterile surgical marker, an appropriate flap was drawn incorporating the defect. The area thus outlined was incised with a #15 scalpel blade. The skin margins were undermined to an appropriate distance in all directions utilizing iris scissors. Following this, the designed flap was carried into the primary defect and sutured into place.
Orbicularis Oris Muscle Flap Text: The defect edges were debeveled with a #15 scalpel blade.  Given that the defect affected the competency of the oral sphincter an orbicularis oris muscle flap was deemed most appropriate to restore this competency and normal muscle function.  Using a sterile surgical marker, an appropriate flap was drawn incorporating the defect. The area thus outlined was incised with a #15 scalpel blade. Following this, the designed flap was carried over into the primary defect and sutured into place.
Melolabial Transposition Flap Text: The defect edges were debeveled with a #15 scalpel blade. Given the location of the defect and the proximity to free margins a melolabial flap was deemed most appropriate. Using a sterile surgical marker, an appropriate melolabial transposition flap was drawn incorporating the defect. The area thus outlined was incised deep to adipose tissue with a #15 scalpel blade. The skin margins were undermined to an appropriate distance in all directions utilizing iris scissors. Following this, the designed flap was carried over into the primary defect and sutured into place.
Rectangular Flap Text: The defect edges were debeveled with a #15 scalpel blade. Given the location of the defect and the proximity to free margins a rectangular flap was deemed most appropriate. Using a sterile surgical marker, an appropriate rectangular flap was drawn incorporating the defect. The area thus outlined was incised deep to adipose tissue with a #15 scalpel blade. The skin margins were undermined to an appropriate distance in all directions utilizing iris scissors. Following this, the designed flap was carried over into the primary defect and sutured into place.
Rhombic Flap Text: The defect edges were debeveled with a #15 scalpel blade. Given the location of the defect and the proximity to free margins a rhombic flap was deemed most appropriate. Using a sterile surgical marker, an appropriate rhombic flap was drawn incorporating the defect. The area thus outlined was incised deep to adipose tissue with a #15 scalpel blade. The skin margins were undermined to an appropriate distance in all directions utilizing iris scissors. Following this, the designed flap was carried over into the primary defect and sutured into place.
Rhomboid Transposition Flap Text: The defect edges were debeveled with a #15 scalpel blade. Given the location of the defect and the proximity to free margins a rhomboid transposition flap was deemed most appropriate. Using a sterile surgical marker, an appropriate rhomboid flap was drawn incorporating the defect. The area thus outlined was incised deep to adipose tissue with a #15 scalpel blade. The skin margins were undermined to an appropriate distance in all directions utilizing iris scissors. Following this, the designed flap was carried over into the primary defect and sutured into place.
Bi-Rhombic Flap Text: The defect edges were debeveled with a #15 scalpel blade. Given the location of the defect and the proximity to free margins a bi-rhombic flap was deemed most appropriate. Using a sterile surgical marker, an appropriate rhombic flap was drawn incorporating the defect. The area thus outlined was incised deep to adipose tissue with a #15 scalpel blade. The skin margins were undermined to an appropriate distance in all directions utilizing iris scissors. Following this, the designed flap was carried over into the primary defect and sutured into place.
Helical Rim Advancement Flap Text: The defect edges were debeveled with a #15 blade scalpel.  Given the location of the defect and the proximity to free margins (helical rim) a double helical rim advancement flap was deemed most appropriate. Using a sterile surgical marker, the appropriate advancement flaps were drawn incorporating the defect and placing the expected incisions between the helical rim and antihelix where possible.  The area thus outlined was incised through and through with a #15 scalpel blade.  With a skin hook and iris scissors, the flaps were gently and sharply undermined and freed up. Folllowing this, the designed flaps were carried over into the primary defect and sutured into place.
Bilateral Helical Rim Advancement Flap Text: The defect edges were debeveled with a #15 blade scalpel.  Given the location of the defect and the proximity to free margins (helical rim) a bilateral helical rim advancement flap was deemed most appropriate. Using a sterile surgical marker, the appropriate advancement flaps were drawn incorporating the defect and placing the expected incisions between the helical rim and antihelix where possible.  The area thus outlined was incised through and through with a #15 scalpel blade.  With a skin hook and iris scissors, the flaps were gently and sharply undermined and freed up. Following this, the designed flaps were placed into the primary defect and sutured into place.
Ear Star Wedge Flap Text: The defect edges were debeveled with a #15 blade scalpel.  Given the location of the defect and the proximity to free margins (helical rim) an ear star wedge flap was deemed most appropriate. Using a sterile surgical marker, the appropriate flap was drawn incorporating the defect and placing the expected incisions between the helical rim and antihelix where possible.  The area thus outlined was incised through and through with a #15 scalpel blade. Following this, the designed flap was carried over into the primary defect and sutured into place.
Banner Transposition Flap Text: The defect edges were debeveled with a #15 scalpel blade. Given the location of the defect and the proximity to free margins a Banner transposition flap was deemed most appropriate. Using a sterile surgical marker, an appropriate flap was drawn around the defect. The area thus outlined was incised deep to adipose tissue with a #15 scalpel blade. The skin margins were undermined to an appropriate distance in all directions utilizing iris scissors. Following this, the designed flap was carried into the primary defect and sutured into place.
Bilobed Flap Text: The defect edges were debeveled with a #15 scalpel blade. Given the location of the defect and the proximity to free margins a bilobe flap was deemed most appropriate. Using a sterile surgical marker, an appropriate bilobe flap drawn around the defect. The area thus outlined was incised deep to adipose tissue with a #15 scalpel blade. The skin margins were undermined to an appropriate distance in all directions utilizing iris scissors. Following this, the designed flap was carried over into the primary defect and sutured into place.
Bilobed Transposition Flap Text: The defect edges were debeveled with a #15 scalpel blade. Given the location of the defect and the proximity to free margins a bilobed transposition flap was deemed most appropriate. Using a sterile surgical marker, an appropriate bilobe flap drawn around the defect. The area thus outlined was incised deep to adipose tissue with a #15 scalpel blade. The skin margins were undermined to an appropriate distance in all directions utilizing iris scissors. Following this, the designed flap was carried over into the primary defect and sutured into place.
Trilobed Flap Text: The defect edges were debeveled with a #15 scalpel blade. Given the location of the defect and the proximity to free margins a trilobed flap was deemed most appropriate. Using a sterile surgical marker, an appropriate trilobed flap was drawn around the defect. The area thus outlined was incised deep to adipose tissue with a #15 scalpel blade. The skin margins were undermined to an appropriate distance in all directions utilizing iris scissors. Following this, the designed flap was carried into the primary defect and sutured into place.
Dorsal Nasal Flap Text: The defect edges were debeveled with a #15 scalpel blade. Given the location of the defect and the proximity to free margins a dorsal nasal flap was deemed most appropriate. Using a sterile surgical marker, an appropriate dorsal nasal flap was drawn around the defect. The area thus outlined was incised deep to adipose tissue with a #15 scalpel blade. The skin margins were undermined to an appropriate distance in all directions utilizing iris scissors. Following this, the designed flap was carried into the primary defect and sutured into place.
Island Pedicle Flap Text: The defect edges were debeveled with a #15 scalpel blade. Given the location of the defect, shape of the defect and the proximity to free margins an island pedicle advancement flap was deemed most appropriate. Using a sterile surgical marker, an appropriate advancement flap was drawn incorporating the defect, outlining the appropriate donor tissue and placing the expected incisions within the relaxed skin tension lines where possible. The area thus outlined was incised deep to adipose tissue with a #15 scalpel blade. The skin margins were undermined to an appropriate distance in all directions around the primary defect and laterally outward around the island pedicle utilizing iris scissors.  There was minimal undermining beneath the pedicle flap. Following this, the flap was carried over into the primary defect and sutured into place.
Island Pedicle Flap With Canthal Suspension Text: The defect edges were debeveled with a #15 scalpel blade. Given the location of the defect, shape of the defect and the proximity to free margins an island pedicle advancement flap was deemed most appropriate. Using a sterile surgical marker, an appropriate advancement flap was drawn incorporating the defect, outlining the appropriate donor tissue and placing the expected incisions within the relaxed skin tension lines where possible. The area thus outlined was incised deep to adipose tissue with a #15 scalpel blade. The skin margins were undermined to an appropriate distance in all directions around the primary defect and laterally outward around the island pedicle utilizing iris scissors.  There was minimal undermining beneath the pedicle flap. A suspension suture was placed in the canthal tendon to prevent tension and prevent ectropion. Following this, the designed flap was placed into the primary defect and sutured into place.
Alar Island Pedicle Flap Text: The defect edges were debeveled with a #15 scalpel blade. Given the location of the defect, shape of the defect and the proximity to the alar rim an island pedicle advancement flap was deemed most appropriate. Using a sterile surgical marker, an appropriate advancement flap was drawn incorporating the defect, outlining the appropriate donor tissue and placing the expected incisions within the nasal ala running parallel to the alar rim. The area thus outlined was incised with a #15 scalpel blade. The skin margins were undermined minimally to an appropriate distance in all directions around the primary defect and laterally outward around the island pedicle utilizing iris scissors.  There was minimal undermining beneath the pedicle flap. Following this, the designed flap was carried over into the primary defect and sutured into place.
Double Island Pedicle Flap Text: The defect edges were debeveled with a #15 scalpel blade. Given the location of the defect, shape of the defect and the proximity to free margins a double island pedicle advancement flap was deemed most appropriate. Using a sterile surgical marker, an appropriate advancement flap was drawn incorporating the defect, outlining the appropriate donor tissue and placing the expected incisions within the relaxed skin tension lines where possible. The area thus outlined was incised deep to adipose tissue with a #15 scalpel blade. The skin margins were undermined to an appropriate distance in all directions around the primary defect and laterally outward around the island pedicle utilizing iris scissors.  There was minimal undermining beneath the pedicle flap. Following this, the flap was carried over into the primary defect and sutured into place.
Island Pedicle Flap-Requiring Vessel Identification Text: The defect edges were debeveled with a #15 scalpel blade. Given the location of the defect, shape of the defect and the proximity to free margins an island pedicle advancement flap was deemed most appropriate. Using a sterile surgical marker, an appropriate advancement flap was drawn, based on the axial vessel mentioned above, incorporating the defect, outlining the appropriate donor tissue and placing the expected incisions within the relaxed skin tension lines where possible. The area thus outlined was incised deep to adipose tissue with a #15 scalpel blade. The skin margins were undermined to an appropriate distance in all directions around the primary defect and laterally outward around the island pedicle utilizing iris scissors.  There was minimal undermining beneath the pedicle flap. Following this, the designed flap was carried over into the primary defect and sutured into place.
Keystone Flap Text: The defect edges were debeveled with a #15 scalpel blade. Given the location of the defect, shape of the defect a keystone flap was deemed most appropriate. Using a sterile surgical marker, an appropriate keystone flap was drawn incorporating the defect, outlining the appropriate donor tissue and placing the expected incisions within the relaxed skin tension lines where possible. The area thus outlined was incised deep to adipose tissue with a #15 scalpel blade. The skin margins were undermined to an appropriate distance in all directions around the primary defect and laterally outward around the flap utilizing iris scissors. Following this, the designed flap was carried into the primary defect and sutured into place.
O-T Plasty Text: The defect edges were debeveled with a #15 scalpel blade. Given the location of the defect, shape of the defect and the proximity to free margins an O-T plasty was deemed most appropriate. Using a sterile surgical marker, an appropriate O-T plasty was drawn incorporating the defect and placing the expected incisions within the relaxed skin tension lines where possible. The area thus outlined was incised deep to adipose tissue with a #15 scalpel blade. The skin margins were undermined to an appropriate distance in all directions utilizing iris scissors. Following this, the designed flap was carried over into the primary defect and sutured into place.
O-Z Plasty Text: The defect edges were debeveled with a #15 scalpel blade. Given the location of the defect, shape of the defect and the proximity to free margins an O-Z plasty (double transposition flap) was deemed most appropriate. Using a sterile surgical marker, the appropriate transposition flaps were drawn incorporating the defect and placing the expected incisions within the relaxed skin tension lines where possible. The area thus outlined was incised deep to adipose tissue with a #15 scalpel blade. The skin margins were undermined to an appropriate distance in all directions utilizing iris scissors. Hemostasis was achieved with electrocautery. The flaps were then transposed and carried over into place, one clockwise and the other counterclockwise, and anchored with interrupted buried subcutaneous sutures.
Double O-Z Plasty Text: The defect edges were debeveled with a #15 scalpel blade. Given the location of the defect, shape of the defect and the proximity to free margins a Double O-Z plasty (double transposition flap) was deemed most appropriate. Using a sterile surgical marker, the appropriate transposition flaps were drawn incorporating the defect and placing the expected incisions within the relaxed skin tension lines where possible. The area thus outlined was incised deep to adipose tissue with a #15 scalpel blade. The skin margins were undermined to an appropriate distance in all directions utilizing iris scissors. Hemostasis was achieved with electrocautery. The flaps were then transposed and carried over into place, one clockwise and the other counterclockwise, and anchored with interrupted buried subcutaneous sutures.
V-Y Plasty Text: The defect edges were debeveled with a #15 scalpel blade. Given the location of the defect, shape of the defect and the proximity to free margins an V-Y advancement flap was deemed most appropriate. Using a sterile surgical marker, an appropriate advancement flap was drawn incorporating the defect and placing the expected incisions within the relaxed skin tension lines where possible. The area thus outlined was incised deep to adipose tissue with a #15 scalpel blade. The skin margins were undermined to an appropriate distance in all directions utilizing iris scissors. Following this, the designed flap was advanced and carried over into the primary defect and sutured into place.
H Plasty Text: Given the location of the defect, shape of the defect and the proximity to free margins a H-plasty was deemed most appropriate for repair. Using a sterile surgical marker, the appropriate advancement arms of the H-plasty were drawn incorporating the defect and placing the expected incisions within the relaxed skin tension lines where possible. The area thus outlined was incised deep to adipose tissue with a #15 scalpel blade. The skin margins were undermined to an appropriate distance in all directions utilizing iris scissors.  The opposing advancement arms were then advanced and carried over into place in opposite direction and anchored with interrupted buried subcutaneous sutures.
W Plasty Text: The lesion was extirpated to the level of the fat with a #15 scalpel blade. Given the location of the defect, shape of the defect and the proximity to free margins a W-plasty was deemed most appropriate for repair. Using a sterile surgical marker, the appropriate transposition arms of the W-plasty were drawn incorporating the defect and placing the expected incisions within the relaxed skin tension lines where possible. The area thus outlined was incised deep to adipose tissue with a #15 scalpel blade. The skin margins were undermined to an appropriate distance in all directions utilizing iris scissors. The opposing transposition arms were then transposed and carried over into place in opposite direction and anchored with interrupted buried subcutaneous sutures.
Z Plasty Text: The lesion was extirpated to the level of the fat with a #15 scalpel blade. Given the location of the defect, shape of the defect and the proximity to free margins a Z-plasty was deemed most appropriate for repair. Using a sterile surgical marker, the appropriate transposition arms of the Z-plasty were drawn incorporating the defect and placing the expected incisions within the relaxed skin tension lines where possible. The area thus outlined was incised deep to adipose tissue with a #15 scalpel blade. The skin margins were undermined to an appropriate distance in all directions utilizing iris scissors. The opposing transposition arms were then transposed and carried over into place in opposite direction and anchored with interrupted buried subcutaneous sutures.
Double Z Plasty Text: The lesion was extirpated to the level of the fat with a #15 scalpel blade. Given the location of the defect, shape of the defect and the proximity to free margins a double Z-plasty was deemed most appropriate for repair. Using a sterile surgical marker, the appropriate transposition arms of the double Z-plasty were drawn incorporating the defect and placing the expected incisions within the relaxed skin tension lines where possible. The area thus outlined was incised deep to adipose tissue with a #15 scalpel blade. The skin margins were undermined to an appropriate distance in all directions utilizing iris scissors. The opposing transposition arms were then transposed and carried over into place in opposite direction and anchored with interrupted buried subcutaneous sutures.
Zygomaticofacial Flap Text: Given the location of the defect, shape of the defect and the proximity to free margins a zygomaticofacial flap was deemed most appropriate for repair. Using a sterile surgical marker, the appropriate flap was drawn incorporating the defect and placing the expected incisions within the relaxed skin tension lines where possible. The area thus outlined was incised deep to adipose tissue with a #15 scalpel blade with preservation of a vascular pedicle.  The skin margins were undermined to an appropriate distance in all directions utilizing iris scissors. The flap was then carried over into the defect and anchored with interrupted buried subcutaneous sutures.
Cheek Interpolation Flap Text: A decision was made to reconstruct the defect utilizing an interpolation axial flap and a staged reconstruction.  A telfa template was made of the defect.  This telfa template was then used to outline the Cheek Interpolation flap.  The donor area for the pedicle flap was then injected with anesthesia.  The flap was excised through the skin and subcutaneous tissue down to the layer of the underlying musculature.  The interpolation flap was carefully excised within this deep plane to maintain its blood supply.  The edges of the donor site were undermined.   The donor site was closed in a primary fashion.  The pedicle was then rotated into position and sutured.  Once the tube was sutured into place, adequate blood supply was confirmed with blanching and refill.  The pedicle was then wrapped with xeroform gauze and dressed appropriately with a telfa and gauze bandage to ensure continued blood supply and protect the attached pedicle.
Cheek-To-Nose Interpolation Flap Text: A decision was made to reconstruct the defect utilizing an interpolation axial flap and a staged reconstruction.  A telfa template was made of the defect.  This telfa template was then used to outline the Cheek-To-Nose Interpolation flap.  The donor area for the pedicle flap was then injected with anesthesia.  The flap was excised through the skin and subcutaneous tissue down to the layer of the underlying musculature.  The interpolation flap was carefully excised within this deep plane to maintain its blood supply.  The edges of the donor site were undermined.   The donor site was closed in a primary fashion.  The pedicle was then rotated into position and sutured.  Once the tube was sutured into place, adequate blood supply was confirmed with blanching and refill.  The pedicle was then wrapped with xeroform gauze and dressed appropriately with a telfa and gauze bandage to ensure continued blood supply and protect the attached pedicle.
Interpolation Flap Text: A decision was made to reconstruct the defect utilizing an interpolation axial flap and a staged reconstruction.  A telfa template was made of the defect.  This telfa template was then used to outline the interpolation flap.  The donor area for the pedicle flap was then injected with anesthesia.  The flap was excised through the skin and subcutaneous tissue down to the layer of the underlying musculature.  The interpolation flap was carefully excised within this deep plane to maintain its blood supply.  The edges of the donor site were undermined.   The donor site was closed in a primary fashion.  The pedicle was then rotated into position and sutured.  Once the tube was sutured into place, adequate blood supply was confirmed with blanching and refill.  The pedicle was then wrapped with xeroform gauze and dressed appropriately with a telfa and gauze bandage to ensure continued blood supply and protect the attached pedicle.
Melolabial Interpolation Flap Text: A decision was made to reconstruct the defect utilizing an interpolation axial flap and a staged reconstruction.  A telfa template was made of the defect.  This telfa template was then used to outline the melolabial interpolation flap.  The donor area for the pedicle flap was then injected with anesthesia.  The flap was excised through the skin and subcutaneous tissue down to the layer of the underlying musculature.  The pedicle flap was carefully excised within this deep plane to maintain its blood supply.  The edges of the donor site were undermined.   The donor site was closed in a primary fashion.  The pedicle was then rotated into position and sutured.  Once the tube was sutured into place, adequate blood supply was confirmed with blanching and refill.  The pedicle was then wrapped with xeroform gauze and dressed appropriately with a telfa and gauze bandage to ensure continued blood supply and protect the attached pedicle.
Mastoid Interpolation Flap Text: A decision was made to reconstruct the defect utilizing an interpolation axial flap and a staged reconstruction.  A telfa template was made of the defect.  This telfa template was then used to outline the mastoid interpolation flap.  The donor area for the pedicle flap was then injected with anesthesia.  The flap was excised through the skin and subcutaneous tissue down to the layer of the underlying musculature.  The pedicle flap was carefully excised within this deep plane to maintain its blood supply.  The edges of the donor site were undermined.   The donor site was closed in a primary fashion.  The pedicle was then rotated into position and sutured.  Once the tube was sutured into place, adequate blood supply was confirmed with blanching and refill.  The pedicle was then wrapped with xeroform gauze and dressed appropriately with a telfa and gauze bandage to ensure continued blood supply and protect the attached pedicle.
Posterior Auricular Interpolation Flap Text: A decision was made to reconstruct the defect utilizing an interpolation axial flap and a staged reconstruction.  A telfa template was made of the defect.  This telfa template was then used to outline the posterior auricular interpolation flap.  The donor area for the pedicle flap was then injected with anesthesia.  The flap was excised through the skin and subcutaneous tissue down to the layer of the underlying musculature.  The pedicle flap was carefully excised within this deep plane to maintain its blood supply.  The edges of the donor site were undermined.   The donor site was closed in a primary fashion.  The pedicle was then rotated into position and sutured.  Once the tube was sutured into place, adequate blood supply was confirmed with blanching and refill.  The pedicle was then wrapped with xeroform gauze and dressed appropriately with a telfa and gauze bandage to ensure continued blood supply and protect the attached pedicle.
Paramedian Forehead Flap Text: A decision was made to reconstruct the defect utilizing an interpolation axial flap and a staged reconstruction.  A telfa template was made of the defect.  This telfa template was then used to outline the paramedian forehead pedicle flap.  The donor area for the pedicle flap was then injected with anesthesia.  The flap was excised through the skin and subcutaneous tissue down to the layer of the underlying musculature.  The pedicle flap was carefully excised within this deep plane to maintain its blood supply.  The edges of the donor site were undermined.   The donor site was closed in a primary fashion.  The pedicle was then rotated into position and sutured.  Once the tube was sutured into place, adequate blood supply was confirmed with blanching and refill.  The pedicle was then wrapped with xeroform gauze and dressed appropriately with a telfa and gauze bandage to ensure continued blood supply and protect the attached pedicle.
Abbe Flap (Upper To Lower Lip) Text: The defect of the lower lip was assessed and measured.  Given the location and size of the defect, an Abbe flap was deemed most appropriate. Using a sterile surgical marker, an appropriate Abbe flap was measured and drawn on the upper lip. Local anesthesia was then infiltrated.  A scalpel was then used to incise the upper lip through and through the skin, vermilion, muscle and mucosa, leaving the flap pedicled on the opposite side.  The flap was then rotated and transferred to the lower lip defect.  The flap was then sutured into place with a three layer technique, closing the orbicularis oris muscle layer with subcutaneous buried sutures, followed by a mucosal layer and an epidermal layer.
Abbe Flap (Lower To Upper Lip) Text: The defect of the upper lip was assessed and measured.  Given the location and size of the defect, an Abbe flap was deemed most appropriate. Using a sterile surgical marker, an appropriate Abbe flap was measured and drawn on the lower lip. Local anesthesia was then infiltrated. A scalpel was then used to incise the upper lip through and through the skin, vermilion, muscle and mucosa, leaving the flap pedicled on the opposite side.  The flap was then rotated and transferred to the lower lip defect.  The flap was then sutured into place with a three layer technique, closing the orbicularis oris muscle layer with subcutaneous buried sutures, followed by a mucosal layer and an epidermal layer.
Estlander Flap (Upper To Lower Lip) Text: The defect of the lower lip was assessed and measured.  Given the location and size of the defect, an Estlander flap was deemed most appropriate. Using a sterile surgical marker, an appropriate Estlander flap was measured and drawn on the upper lip. Local anesthesia was then infiltrated. A scalpel was then used to incise the lateral aspect of the flap, through skin, muscle and mucosa, leaving the flap pedicled medially.  The flap was then rotated and positioned to fill the lower lip defect.  The flap was then sutured into place with a three layer technique, closing the orbicularis oris muscle layer with subcutaneous buried sutures, followed by a mucosal layer and an epidermal layer.
Cheiloplasty (Less Than 50%) Text: A decision was made to reconstruct the defect with a  cheiloplasty.  The defect was undermined extensively.  Additional orbicularis oris muscle was excised with a 15 blade scalpel.  The defect was converted into a full thickness wedge, of less than 50% of the vertical height of the lip, to facilite a better cosmetic result.  Small vessels were then tied off with 5-0 monocyrl. The orbicularis oris, superficial fascia, adipose and dermis were then reapproximated.  After the deeper layers were approximated the epidermis was reapproximated with particular care given to realign the vermilion border.
Cheiloplasty (Complex) Text: A decision was made to reconstruct the defect with a  cheiloplasty.  The defect was undermined extensively.  Additional orbicularis oris muscle was excised with a 15 blade scalpel.  The defect was converted into a full thickness wedge to facilite a better cosmetic result.  Small vessels were then tied off with 5-0 monocyrl. The orbicularis oris, superficial fascia, adipose and dermis were then reapproximated.  After the deeper layers were approximated the epidermis was reapproximated with particular care given to realign the vermilion border.
Ear Wedge Repair Text: A wedge excision was completed by carrying down an excision through the full thickness of the ear and cartilage with an inward facing Burow's triangle. The wound was then closed in a layered fashion.
Full Thickness Lip Wedge Repair (Flap) Text: Given the location of the defect and the proximity to free margins a full thickness wedge repair was deemed most appropriate. Using a sterile surgical marker, the appropriate repair was drawn incorporating the defect and placing the expected incisions perpendicular to the vermilion border.  The vermilion border was also meticulously outlined to ensure appropriate reapproximation during the repair.  The area thus outlined was incised through and through with a #15 scalpel blade.  The muscularis and dermis were reaproximated with deep sutures following hemostasis. Care was taken to realign the vermilion border before proceeding with the superficial closure.  Once the vermilion was realigned the superfical and mucosal closure was finished.
Ftsg Text: The defect edges were debeveled with a #15 scalpel blade. Given the location of the defect, shape of the defect and the proximity to free margins a full thickness skin graft was deemed most appropriate. Using a sterile surgical marker, the primary defect shape was transferred to the donor site. The area thus outlined was incised deep to adipose tissue with a #15 scalpel blade.  The harvested graft was then trimmed of adipose tissue until only dermis and epidermis was left.  The skin margins of the secondary defect were undermined to an appropriate distance in all directions utilizing iris scissors.  The secondary defect was closed with interrupted buried subcutaneous sutures.  The skin edges were then re-apposed with running  sutures.  The skin graft was then placed in the primary defect and oriented appropriately.
Split-Thickness Skin Graft Text: The defect edges were debeveled with a #15 scalpel blade. Given the location of the defect, shape of the defect and the proximity to free margins a split thickness skin graft was deemed most appropriate. Using a sterile surgical marker, the primary defect shape was transferred to the donor site. The split thickness graft was then harvested.  The skin graft was then placed in the primary defect and oriented appropriately.
Pinch Graft Text: The defect edges were debeveled with a #15 scalpel blade. Given the location of the defect, shape of the defect and the proximity to free margins a pinch graft was deemed most appropriate. Using a sterile surgical marker, the primary defect shape was transferred to the donor site. The area thus outlined was incised deep to adipose tissue with a #15 scalpel blade.  The harvested graft was then trimmed of adipose tissue until only dermis and epidermis was left. The skin margins of the secondary defect were undermined to an appropriate distance in all directions utilizing iris scissors.  The secondary defect was closed with interrupted buried subcutaneous sutures.  The skin edges were then re-apposed with running  sutures.  The skin graft was then placed in the primary defect and oriented appropriately.
Burow's Graft Text: The defect edges were debeveled with a #15 scalpel blade. Given the location of the defect, shape of the defect, the proximity to free margins and the presence of a standing cone deformity a Burow's skin graft was deemed most appropriate. The standing cone was removed and this tissue was then trimmed to the shape of the primary defect. The adipose tissue was also removed until only dermis and epidermis were left.  The skin margins of the secondary defect were undermined to an appropriate distance in all directions utilizing iris scissors.  The secondary defect was closed with interrupted buried subcutaneous sutures.  The skin edges were then re-apposed with running  sutures.  The skin graft was then placed in the primary defect and oriented appropriately.
Cartilage Graft Text: The defect edges were debeveled with a #15 scalpel blade. Given the location of the defect, shape of the defect, the fact the defect involved a full thickness cartilage defect a cartilage graft was deemed most appropriate.  An appropriate donor site was identified, cleansed, and anesthetized. The cartilage graft was then harvested and transferred to the recipient site, oriented appropriately and then sutured into place.  The secondary defect was then repaired using a primary closure.
Composite Graft Text: The defect edges were debeveled with a #15 scalpel blade. Given the location of the defect, shape of the defect, the proximity to free margins and the fact the defect was full thickness a composite graft was deemed most appropriate.  The defect was outline and then transferred to the donor site.  A full thickness graft was then excised from the donor site. The graft was then placed in the primary defect, oriented appropriately and then sutured into place.  The secondary defect was then repaired using a primary closure.
Epidermal Autograft Text: The defect edges were debeveled with a #15 scalpel blade. Given the location of the defect, shape of the defect and the proximity to free margins an epidermal autograft was deemed most appropriate. Using a sterile surgical marker, the primary defect shape was transferred to the donor site. The epidermal graft was then harvested.  The skin graft was then placed in the primary defect and oriented appropriately.
Dermal Autograft Text: The defect edges were debeveled with a #15 scalpel blade. Given the location of the defect, shape of the defect and the proximity to free margins a dermal autograft was deemed most appropriate. Using a sterile surgical marker, the primary defect shape was transferred to the donor site. The area thus outlined was incised deep to adipose tissue with a #15 scalpel blade.  The harvested graft was then trimmed of adipose and epidermal tissue until only dermis was left.  The skin graft was then placed in the primary defect and oriented appropriately.
Skin Substitute Text: The defect edges were debeveled with a #15 scalpel blade. Given the location of the defect, shape of the defect and the proximity to free margins a skin substitute graft was deemed most appropriate.  The graft material was trimmed to fit the size of the defect. The graft was then placed in the primary defect and oriented appropriately.
Skin Substitute Paste Text: The defect edges were debeveled with a #15 scalpel blade. Given the location of the defect, shape of the defect and the proximity to free margins a skin substitute micronized graft was deemed most appropriate.  The entire vial contents were admixed with 0.5ccs of sterile saline, formed into a paste and then evenly spread over the entire wound bed.
Skin Substitute Injection Text: The defect edges were debeveled with a #15 scalpel blade. Given the location of the defect, shape of the defect and the proximity to free margins a skin substitute micronized graft was deemed most appropriate.  The entire vial contents were admixed with 3.0ccs of sterile saline and then injected subcutaneously throughout the entire wound bed.
Tissue Cultured Epidermal Autograft Text: The defect edges were debeveled with a #15 scalpel blade. Given the location of the defect, shape of the defect and the proximity to free margins a tissue cultured epidermal autograft was deemed most appropriate.  The graft was then trimmed to fit the size of the defect.  The graft was then placed in the primary defect and oriented appropriately.
Xenograft Text: The defect edges were debeveled with a #15 scalpel blade. Given the location of the defect, shape of the defect and the proximity to free margins a xenograft was deemed most appropriate.  The graft was then trimmed to fit the size of the defect.  The graft was then placed in the primary defect and oriented appropriately.
Purse String (Simple) Text: Given the location of the defect and the characteristics of the surrounding skin a purse string closure was deemed most appropriate.  Undermining was performed circumferentially around the surgical defect.  A purse string suture was then placed and tightened.
Purse String (Intermediate) Text: Given the location of the defect and the characteristics of the surrounding skin a purse string intermediate closure was deemed most appropriate.  Undermining was performed circumferentially around the surgical defect.  A purse string suture was then placed and tightened.
Partial Purse String (Simple) Text: Given the location of the defect and the characteristics of the surrounding skin a simple purse string closure was deemed most appropriate.  Undermining was performed circumferentially around the surgical defect.  A purse string suture was then placed and tightened. Wound tension only allowed a partial closure of the circular defect.
Partial Purse String (Intermediate) Text: Given the location of the defect and the characteristics of the surrounding skin an intermediate purse string closure was deemed most appropriate.  Undermining was performed circumferentially around the surgical defect.  A purse string suture was then placed and tightened. Wound tension only allowed a partial closure of the circular defect.
Localized Dermabrasion With Wire Brush Text: The patient was draped in routine manner.  Localized dermabrasion using 3 x 17 mm wire brush was performed in routine manner to papillary dermis. This spot dermabrasion is being performed to complete skin cancer reconstruction. It also will eliminate the other sun damaged precancerous cells that are known to be part of the regional effect of a lifetime's worth of sun exposure. This localized dermabrasion is therapeutic and should not be considered cosmetic in any regard.
Localized Dermabrasion With Sand Papertext: The patient was draped in routine manner.  Localized dermabrasion using sterile sand paper was performed in routine manner to papillary dermis. This spot dermabrasion is being performed to complete skin cancer reconstruction. It also will eliminate the other sun damaged precancerous cells that are known to be part of the regional effect of a lifetime's worth of sun exposure. This localized dermabrasion is therapeutic and should not be considered cosmetic in any regard.
Localized Dermabrasion With 15 Blade Text: The patient was draped in routine manner.  Localized dermabrasion using a 15 blade was performed in routine manner to papillary dermis. This spot dermabrasion is being performed to complete skin cancer reconstruction. It also will eliminate the other sun damaged precancerous cells that are known to be part of the regional effect of a lifetime's worth of sun exposure. This localized dermabrasion is therapeutic and should not be considered cosmetic in any regard.
Tarsorrhaphy Text: A tarsorrhaphy was performed using Frost sutures.
Intermediate Repair And Flap Additional Text (Will Appearing After The Standard Complex Repair Text): The intermediate repair was not sufficient to completely close the primary defect. The remaining additional defect was repaired with the flap mentioned below.
Intermediate Repair And Graft Additional Text (Will Appearing After The Standard Complex Repair Text): The intermediate repair was not sufficient to completely close the primary defect. The remaining additional defect was repaired with the graft mentioned below.
Complex Repair And Flap Additional Text (Will Appearing After The Standard Complex Repair Text): The complex repair was not sufficient to completely close the primary defect. The remaining additional defect was repaired with the flap mentioned below.
Complex Repair And Graft Additional Text (Will Appearing After The Standard Complex Repair Text): The complex repair was not sufficient to completely close the primary defect. The remaining additional defect was repaired with the graft mentioned below.
Eyelid Full Thickness Repair - 23635: The eyelid defect was full thickness which required a wedge repair of the eyelid. Special care was taken to ensure that the eyelid margin was realligned when placing sutures.
Eyelid Partial Thickness Repair - 67512: The eyelid defect was partial thickness which required a wedge repair of the eyelid. Special care was taken to ensure that the eyelid margin was realligned when placing sutures.
Cheek Interpolation Flap Division And Inset Text: Division and inset of the cheek interpolation flap was performed to achieve optimal aesthetic result, restore normal anatomic appearance and avoid distortion of normal anatomy, expedite and facilitate wound healing, achieve optimal functional result and because linear closure either not possible or would produce suboptimal result. The patient was prepped and draped in the usual manner. The pedicle was infiltrated with local anesthesia. The pedicle was sectioned with a #15 blade. The pedicle was de-bulked and trimmed to match the shape of the defect. Hemostasis was achieved. The flap donor site and free margin of the flap were secured with deep buried sutures and the wound edges were re-approximated.
Cheek To Nose Interpolation Flap Division And Inset Text: Division and inset of the cheek to nose interpolation flap was performed to achieve optimal aesthetic result, restore normal anatomic appearance and avoid distortion of normal anatomy, expedite and facilitate wound healing, achieve optimal functional result and because linear closure either not possible or would produce suboptimal result. The patient was prepped and draped in the usual manner. The pedicle was infiltrated with local anesthesia. The pedicle was sectioned with a #15 blade. The pedicle was de-bulked and trimmed to match the shape of the defect. Hemostasis was achieved. The flap donor site and free margin of the flap were secured with deep buried sutures and the wound edges were re-approximated.
Melolabial Interpolation Flap Division And Inset Text: Division and inset of the melolabial interpolation flap was performed to achieve optimal aesthetic result, restore normal anatomic appearance and avoid distortion of normal anatomy, expedite and facilitate wound healing, achieve optimal functional result and because linear closure either not possible or would produce suboptimal result. The patient was prepped and draped in the usual manner. The pedicle was infiltrated with local anesthesia. The pedicle was sectioned with a #15 blade. The pedicle was de-bulked and trimmed to match the shape of the defect. Hemostasis was achieved. The flap donor site and free margin of the flap were secured with deep buried sutures and the wound edges were re-approximated.
Mastoid Interpolation Flap Division And Inset Text: Division and inset of the mastoid interpolation flap was performed to achieve optimal aesthetic result, restore normal anatomic appearance and avoid distortion of normal anatomy, expedite and facilitate wound healing, achieve optimal functional result and because linear closure either not possible or would produce suboptimal result. The patient was prepped and draped in the usual manner. The pedicle was infiltrated with local anesthesia. The pedicle was sectioned with a #15 blade. The pedicle was de-bulked and trimmed to match the shape of the defect. Hemostasis was achieved. The flap donor site and free margin of the flap were secured with deep buried sutures and the wound edges were re-approximated.
Paramedian Forehead Flap Division And Inset Text: Division and inset of the paramedian forehead flap was performed to achieve optimal aesthetic result, restore normal anatomic appearance and avoid distortion of normal anatomy, expedite and facilitate wound healing, achieve optimal functional result and because linear closure either not possible or would produce suboptimal result. The patient was prepped and draped in the usual manner. The pedicle was infiltrated with local anesthesia. The pedicle was sectioned with a #15 blade. The pedicle was de-bulked and trimmed to match the shape of the defect. Hemostasis was achieved. The flap donor site and free margin of the flap were secured with deep buried sutures and the wound edges were re-approximated.
Posterior Auricular Interpolation Flap Division And Inset Text: Division and inset of the posterior auricular interpolation flap was performed to achieve optimal aesthetic result, restore normal anatomic appearance and avoid distortion of normal anatomy, expedite and facilitate wound healing, achieve optimal functional result and because linear closure either not possible or would produce suboptimal result. The patient was prepped and draped in the usual manner. The pedicle was infiltrated with local anesthesia. The pedicle was sectioned with a #15 blade. The pedicle was de-bulked and trimmed to match the shape of the defect. Hemostasis was achieved. The flap donor site and free margin of the flap were secured with deep buried sutures and the wound edges were re-approximated.
Manual Repair Warning Statement: We plan on removing the manually selected variable below in favor of our much easier automatic structured text blocks found in the previous tab. We decided to do this to help make the flow better and give you the full power of structured data. Manual selection is never going to be ideal in our platform and I would encourage you to avoid using manual selection from this point on, especially since I will be sunsetting this feature. It is important that you do one of two things with the customized text below. First, you can save all of the text in a word file so you can have it for future reference. Second, transfer the text to the appropriate area in the Library tab. Lastly, if there is a flap or graft type which we do not have you need to let us know right away so I can add it in before the variable is hidden. No need to panic, we plan to give you roughly 6 months to make the change.
Consent: The rationale for the repair was explained to the patient and consent was obtained. The risks, benefits and alternatives to therapy were discussed in detail. Specifically, the risks of infection, scarring, bleeding, prolonged wound healing, incomplete removal, allergy to anesthesia, nerve injury and recurrence were addressed. Prior to the procedure, the treatment site was clearly identified and confirmed by the patient. All components of Universal Protocol/PAUSE Rule completed.
Post-Care Instructions: I reviewed with the patient in detail post-care instructions. Patient is not to engage in any heavy lifting, exercise, or swimming for the next 14 days. Should the patient develop any fevers, chills, bleeding, severe pain patient will contact the office immediately.
Pain Refusal Text: I offered to prescribe pain medication but the patient refused to take this medication.
Show Asc Variables: Yes
Where Do You Want The Question To Include Opioid Counseling Located?: Case Summary Tab
Information: Selecting Yes will display possible errors in your note based on the variables you have selected. This validation is only offered as a suggestion for you. PLEASE NOTE THAT THE VALIDATION TEXT WILL BE REMOVED WHEN YOU FINALIZE YOUR NOTE. IF YOU WANT TO FAX A PRELIMINARY NOTE YOU WILL NEED TO TOGGLE THIS TO 'NO' IF YOU DO NOT WANT IT IN YOUR FAXED NOTE.

## 2024-07-11 ENCOUNTER — APPOINTMENT (OUTPATIENT)
Dept: URBAN - METROPOLITAN AREA CLINIC 255 | Age: 84
Setting detail: DERMATOLOGY
End: 2024-07-22

## 2024-07-11 DIAGNOSIS — L23.1 ALLERGIC CONTACT DERMATITIS DUE TO ADHESIVES: ICD-10-CM

## 2024-07-11 DIAGNOSIS — Z48.02 ENCOUNTER FOR REMOVAL OF SUTURES: ICD-10-CM

## 2024-07-11 PROCEDURE — OTHER PRESCRIPTION: OTHER

## 2024-07-11 PROCEDURE — OTHER SUTURE REMOVAL (GLOBAL PERIOD): OTHER

## 2024-07-11 PROCEDURE — OTHER COUNSELING: OTHER

## 2024-07-11 PROCEDURE — OTHER DIAGNOSIS COMMENT: OTHER

## 2024-07-11 PROCEDURE — 99213 OFFICE O/P EST LOW 20 MIN: CPT

## 2024-07-11 PROCEDURE — OTHER RETURN TO REFERRING PROVIDER: OTHER

## 2024-07-11 PROCEDURE — OTHER MIPS QUALITY: OTHER

## 2024-07-11 RX ORDER — TRIAMCINOLONE ACETONIDE 1 MG/G
CREAM TOPICAL
Qty: 15 | Refills: 0 | Status: ERX | COMMUNITY
Start: 2024-07-11

## 2024-07-11 ASSESSMENT — LOCATION DETAILED DESCRIPTION DERM: LOCATION DETAILED: LEFT ANTERIOR DISTAL THIGH

## 2024-07-11 ASSESSMENT — LOCATION ZONE DERM: LOCATION ZONE: LEG

## 2024-07-11 ASSESSMENT — ITCH NUMERIC RATING SCALE: HOW SEVERE IS YOUR ITCHING?: 6

## 2024-07-11 ASSESSMENT — LOCATION SIMPLE DESCRIPTION DERM: LOCATION SIMPLE: LEFT THIGH

## 2024-07-11 ASSESSMENT — BSA RASH: BSA RASH: 1

## 2024-07-11 ASSESSMENT — PAIN INTENSITY VAS: HOW INTENSE IS YOUR PAIN 0 BEING NO PAIN, 10 BEING THE MOST SEVERE PAIN POSSIBLE?: NO PAIN

## 2024-07-11 NOTE — PROCEDURE: SUTURE REMOVAL (GLOBAL PERIOD)
Detail Level: Detailed
Add 04930 Cpt? (Important Note: In 2017 The Use Of 08817 Is Being Tracked By Cms To Determine Future Global Period Reimbursement For Global Periods): no

## 2024-07-11 NOTE — PROCEDURE: DIAGNOSIS COMMENT
Detail Level: Simple
Comment: S/P dehiscence repair on (6/20/2024)
Render Risk Assessment In Note?: no

## 2024-08-11 DIAGNOSIS — K21.9 GASTROESOPHAGEAL REFLUX DISEASE WITHOUT ESOPHAGITIS: ICD-10-CM

## 2024-08-12 RX ORDER — OMEPRAZOLE 10 MG/1
10 CAPSULE, DELAYED RELEASE ORAL DAILY
Qty: 90 CAPSULE | Refills: 3 | OUTPATIENT
Start: 2024-08-12

## 2024-11-04 ENCOUNTER — APPOINTMENT (OUTPATIENT)
Dept: URBAN - METROPOLITAN AREA CLINIC 255 | Age: 84
Setting detail: DERMATOLOGY
End: 2024-11-04

## 2024-11-04 VITALS — WEIGHT: 210 LBS | HEIGHT: 72 IN

## 2024-11-04 DIAGNOSIS — L81.8 OTHER SPECIFIED DISORDERS OF PIGMENTATION: ICD-10-CM

## 2024-11-04 DIAGNOSIS — D18.0 HEMANGIOMA: ICD-10-CM

## 2024-11-04 DIAGNOSIS — D22 MELANOCYTIC NEVI: ICD-10-CM

## 2024-11-04 DIAGNOSIS — Z71.89 OTHER SPECIFIED COUNSELING: ICD-10-CM

## 2024-11-04 DIAGNOSIS — L82.1 OTHER SEBORRHEIC KERATOSIS: ICD-10-CM

## 2024-11-04 DIAGNOSIS — L70.8 OTHER ACNE: ICD-10-CM

## 2024-11-04 DIAGNOSIS — L57.8 OTHER SKIN CHANGES DUE TO CHRONIC EXPOSURE TO NONIONIZING RADIATION: ICD-10-CM

## 2024-11-04 DIAGNOSIS — Z85.828 PERSONAL HISTORY OF OTHER MALIGNANT NEOPLASM OF SKIN: ICD-10-CM

## 2024-11-04 DIAGNOSIS — L01.01 NON-BULLOUS IMPETIGO: ICD-10-CM

## 2024-11-04 DIAGNOSIS — D49.2 NEOPLASM OF UNSPECIFIED BEHAVIOR OF BONE, SOFT TISSUE, AND SKIN: ICD-10-CM

## 2024-11-04 PROBLEM — D22.5 MELANOCYTIC NEVI OF TRUNK: Status: ACTIVE | Noted: 2024-11-04

## 2024-11-04 PROBLEM — D22.72 MELANOCYTIC NEVI OF LEFT LOWER LIMB, INCLUDING HIP: Status: ACTIVE | Noted: 2024-11-04

## 2024-11-04 PROBLEM — D22.62 MELANOCYTIC NEVI OF LEFT UPPER LIMB, INCLUDING SHOULDER: Status: ACTIVE | Noted: 2024-11-04

## 2024-11-04 PROBLEM — D22.71 MELANOCYTIC NEVI OF RIGHT LOWER LIMB, INCLUDING HIP: Status: ACTIVE | Noted: 2024-11-04

## 2024-11-04 PROBLEM — D22.61 MELANOCYTIC NEVI OF RIGHT UPPER LIMB, INCLUDING SHOULDER: Status: ACTIVE | Noted: 2024-11-04

## 2024-11-04 PROBLEM — D18.01 HEMANGIOMA OF SKIN AND SUBCUTANEOUS TISSUE: Status: ACTIVE | Noted: 2024-11-04

## 2024-11-04 PROCEDURE — OTHER PRESCRIPTION MEDICATION MANAGEMENT: OTHER

## 2024-11-04 PROCEDURE — OTHER PATIENT SPECIFIC COUNSELING: OTHER

## 2024-11-04 PROCEDURE — 11102 TANGNTL BX SKIN SINGLE LES: CPT

## 2024-11-04 PROCEDURE — OTHER SUNSCREEN RECOMMENDATIONS: OTHER

## 2024-11-04 PROCEDURE — OTHER PHOTO-DOCUMENTATION: OTHER

## 2024-11-04 PROCEDURE — OTHER PRESCRIPTION: OTHER

## 2024-11-04 PROCEDURE — OTHER MIPS QUALITY: OTHER

## 2024-11-04 PROCEDURE — OTHER BIOPSY BY SHAVE METHOD: OTHER

## 2024-11-04 PROCEDURE — 99213 OFFICE O/P EST LOW 20 MIN: CPT | Mod: 25

## 2024-11-04 PROCEDURE — OTHER COUNSELING: OTHER

## 2024-11-04 RX ORDER — MUPIROCIN 20 MG/G
OINTMENT TOPICAL BID
Qty: 22 | Refills: 0 | Status: ERX | COMMUNITY
Start: 2024-11-04

## 2024-11-04 ASSESSMENT — LOCATION DETAILED DESCRIPTION DERM
LOCATION DETAILED: RIGHT ANTERIOR PROXIMAL THIGH
LOCATION DETAILED: RIGHT ANTERIOR DISTAL THIGH
LOCATION DETAILED: LEFT VENTRAL PROXIMAL FOREARM
LOCATION DETAILED: LEFT ANTERIOR DISTAL THIGH
LOCATION DETAILED: LEFT DISTAL DORSAL FOREARM
LOCATION DETAILED: RIGHT PROXIMAL PRETIBIAL REGION
LOCATION DETAILED: LEFT INFERIOR CENTRAL MALAR CHEEK
LOCATION DETAILED: LEFT INFERIOR LATERAL MIDBACK
LOCATION DETAILED: INFERIOR THORACIC SPINE
LOCATION DETAILED: LEFT SUPERIOR UPPER BACK
LOCATION DETAILED: RIGHT PROXIMAL DORSAL FOREARM
LOCATION DETAILED: RIGHT SUPERIOR UPPER BACK
LOCATION DETAILED: LEFT ANTERIOR PROXIMAL THIGH
LOCATION DETAILED: RIGHT VENTRAL PROXIMAL FOREARM
LOCATION DETAILED: MIDDLE STERNUM
LOCATION DETAILED: LEFT ANTECUBITAL SKIN
LOCATION DETAILED: LEFT PROXIMAL PRETIBIAL REGION
LOCATION DETAILED: RIGHT VENTRAL DISTAL FOREARM
LOCATION DETAILED: LEFT MEDIAL UPPER BACK
LOCATION DETAILED: LEFT VENTRAL DISTAL FOREARM
LOCATION DETAILED: EPIGASTRIC SKIN
LOCATION DETAILED: LEFT POSTERIOR SHOULDER

## 2024-11-04 ASSESSMENT — LOCATION ZONE DERM
LOCATION ZONE: TRUNK
LOCATION ZONE: LEG
LOCATION ZONE: FACE
LOCATION ZONE: ARM

## 2024-11-04 ASSESSMENT — LOCATION SIMPLE DESCRIPTION DERM
LOCATION SIMPLE: LEFT UPPER BACK
LOCATION SIMPLE: LEFT SHOULDER
LOCATION SIMPLE: LEFT LOWER BACK
LOCATION SIMPLE: RIGHT THIGH
LOCATION SIMPLE: LEFT CHEEK
LOCATION SIMPLE: RIGHT UPPER BACK
LOCATION SIMPLE: RIGHT FOREARM
LOCATION SIMPLE: UPPER BACK
LOCATION SIMPLE: ABDOMEN
LOCATION SIMPLE: LEFT PRETIBIAL REGION
LOCATION SIMPLE: CHEST
LOCATION SIMPLE: RIGHT PRETIBIAL REGION
LOCATION SIMPLE: LEFT THIGH
LOCATION SIMPLE: LEFT FOREARM
LOCATION SIMPLE: LEFT UPPER ARM

## 2024-11-04 NOTE — PROCEDURE: PRESCRIPTION MEDICATION MANAGEMENT
Detail Level: Zone
Plan: RTC in 2 weeks if not resolved, sooner if worsening. 
Render In Strict Bullet Format?: No
Initiate Treatment: Apply mupirocin 2% ointment to affected area on lip BID for 2 weeks.

## 2024-11-04 NOTE — PROCEDURE: BIOPSY BY SHAVE METHOD
Lab: [id number]
Hide Additional Size Dimension?: No
Silver Nitrate Text: The wound bed was treated with silver nitrate after the biopsy was performed.
Dressing: bandage
Notification Instructions: Patient will be notified of biopsy results. However, patient instructed to call the office if not contacted within 2 weeks.
Type Of Destruction Used: Curettage
Lab Facility: [id number]
Electrodesiccation And Curettage Text: The wound bed was treated with electrodesiccation and curettage after the biopsy was performed.
Hemostasis: Drysol
Post-Care Instructions: I reviewed with the patient in detail post-care instructions. Patient is to keep the biopsy site dry overnight, and then apply bacitracin twice daily until healed. Patient may apply hydrogen peroxide soaks to remove any crusting.
Was A Bandage Applied: Yes
Biopsy Method: double edge Personna blade
Electrodesiccation Text: The wound bed was treated with electrodesiccation after the biopsy was performed.
Wound Care: Petrolatum
Information: Selecting Yes will display possible errors in your note based on the variables you have selected. This validation is only offered as a suggestion for you. PLEASE NOTE THAT THE VALIDATION TEXT WILL BE REMOVED WHEN YOU FINALIZE YOUR NOTE. IF YOU WANT TO FAX A PRELIMINARY NOTE YOU WILL NEED TO TOGGLE THIS TO 'NO' IF YOU DO NOT WANT IT IN YOUR FAXED NOTE.
Anesthesia Volume In Cc: 0.3
Billing Type: Third-Party Bill
Cryotherapy Text: The wound bed was treated with cryotherapy after the biopsy was performed.
Path Notes (To The Dermatopathologist): Please check margins.
Biopsy Type: H and E
Depth Of Biopsy: dermis
Consent: Written consent was obtained and risks were reviewed including but not limited to scarring, infection, bleeding, scabbing, incomplete removal, nerve damage and allergy to anesthesia.
Curettage Text: The wound bed was treated with curettage after the biopsy was performed.
Detail Level: Detailed
Anesthesia Type: 1% lidocaine with epinephrine and a 1:10 solution of 8.4% sodium bicarbonate

## 2024-11-04 NOTE — HPI: FULL BODY SKIN EXAMINATION
What Is The Reason For Today's Visit?: Full Body Skin Examination with No Concerns
What Is The Reason For Today's Visit? (Being Monitored For X): concerning skin lesions on an annual basis
no

## 2024-11-04 NOTE — PROCEDURE: PHOTO-DOCUMENTATION
Photo Preface (Leave Blank If You Do Not Want): Photographs were obtained today
Detail Level: Zone
Attending Attestation (For Attendings USE Only)...

## 2024-11-18 ENCOUNTER — APPOINTMENT (OUTPATIENT)
Dept: URBAN - METROPOLITAN AREA CLINIC 255 | Age: 84
Setting detail: DERMATOLOGY
End: 2024-11-18

## 2024-11-18 VITALS — HEIGHT: 74 IN | WEIGHT: 210 LBS

## 2024-11-18 PROBLEM — C44.619 BASAL CELL CARCINOMA OF SKIN OF LEFT UPPER LIMB, INCLUDING SHOULDER: Status: ACTIVE | Noted: 2024-11-18

## 2024-11-18 PROCEDURE — OTHER COUNSELING: OTHER

## 2024-11-18 PROCEDURE — OTHER CURETTAGE AND DESTRUCTION: OTHER

## 2024-11-18 PROCEDURE — 17262 DSTRJ MAL LES T/A/L 1.1-2.0: CPT

## 2024-11-18 PROCEDURE — OTHER MIPS QUALITY: OTHER

## 2024-11-18 NOTE — PROCEDURE: CURETTAGE AND DESTRUCTION
Detail Level: Detailed
Biopsy Photograph Reviewed: Yes
Accession # (Optional): [id number]
Number Of Curettages: 3
Size Of Lesion After Curettage: 1.4
Add Intralesional Injection: No
Concentration (Mg/Ml Or Millions Of Plaque Forming Units/Cc): 0.01
Total Volume (Ccs): 1
Anesthesia Type: 1% lidocaine with epinephrine and a 1:10 solution of 8.4% sodium bicarbonate
Cautery Type: electrodesiccation
What Was Performed First?: Curettage
Final Size Statement: The size of the lesion after curettage was
Additional Information: (Optional): The wound was cleaned, and a pressure dressing was applied.  The patient received detailed post-op instructions.
Consent was obtained from the patient. The risks, benefits and alternatives to therapy were discussed in detail. Specifically, the risks of infection, scarring, bleeding, prolonged wound healing, nerve injury, incomplete removal, allergy to anesthesia and recurrence were addressed. Alternatives to ED&C, such as: surgical removal and XRT were also discussed.  Prior to the procedure, the treatment site was clearly identified and confirmed by the patient. All components of Universal Protocol/PAUSE Rule completed.
Post-Care Instructions: I reviewed with the patient in detail post-care instructions. Patient is to keep the area dry for 48 hours, and not to engage in any swimming until the area is healed. Should the patient develop any fevers, chills, bleeding, severe pain patient will contact the office immediately.
Bill As A Line Item Or As Units: Line Item

## 2024-12-21 ENCOUNTER — HEALTH MAINTENANCE LETTER (OUTPATIENT)
Age: 84
End: 2024-12-21

## 2025-06-14 ENCOUNTER — HEALTH MAINTENANCE LETTER (OUTPATIENT)
Age: 85
End: 2025-06-14

## 2025-07-05 ENCOUNTER — HEALTH MAINTENANCE LETTER (OUTPATIENT)
Age: 85
End: 2025-07-05

## 2025-07-15 ENCOUNTER — MEDICAL CORRESPONDENCE (OUTPATIENT)
Dept: HEALTH INFORMATION MANAGEMENT | Facility: CLINIC | Age: 85
End: 2025-07-15
Payer: COMMERCIAL

## 2025-07-15 ENCOUNTER — TRANSFERRED RECORDS (OUTPATIENT)
Dept: HEALTH INFORMATION MANAGEMENT | Facility: CLINIC | Age: 85
End: 2025-07-15
Payer: COMMERCIAL

## 2025-07-16 DIAGNOSIS — Z95.5 HISTORY OF HEART ARTERY STENT: ICD-10-CM

## 2025-07-16 DIAGNOSIS — I25.10 CAD (CORONARY ARTERY DISEASE): Primary | ICD-10-CM

## 2025-07-17 ENCOUNTER — PATIENT OUTREACH (OUTPATIENT)
Dept: CARE COORDINATION | Facility: CLINIC | Age: 85
End: 2025-07-17
Payer: COMMERCIAL

## 2025-08-05 ENCOUNTER — PATIENT OUTREACH (OUTPATIENT)
Dept: CARE COORDINATION | Facility: CLINIC | Age: 85
End: 2025-08-05
Payer: COMMERCIAL

## 2025-08-19 ENCOUNTER — OFFICE VISIT (OUTPATIENT)
Dept: CARDIOLOGY | Facility: CLINIC | Age: 85
End: 2025-08-19
Payer: COMMERCIAL

## 2025-08-19 ENCOUNTER — HOSPITAL ENCOUNTER (OUTPATIENT)
Dept: CARDIOLOGY | Facility: CLINIC | Age: 85
Discharge: HOME | End: 2025-08-19
Attending: INTERNAL MEDICINE
Payer: COMMERCIAL

## 2025-08-19 ENCOUNTER — APPOINTMENT (OUTPATIENT)
Age: 85
Setting detail: DERMATOLOGY
End: 2025-08-19

## 2025-08-19 ENCOUNTER — LAB (OUTPATIENT)
Dept: LAB | Facility: CLINIC | Age: 85
End: 2025-08-19
Payer: COMMERCIAL

## 2025-08-19 VITALS
HEART RATE: 64 BPM | SYSTOLIC BLOOD PRESSURE: 142 MMHG | BODY MASS INDEX: 31.29 KG/M2 | OXYGEN SATURATION: 98 % | HEIGHT: 72 IN | DIASTOLIC BLOOD PRESSURE: 86 MMHG | WEIGHT: 231 LBS

## 2025-08-19 VITALS — WEIGHT: 225 LBS | HEIGHT: 74 IN

## 2025-08-19 DIAGNOSIS — I25.5 ISCHEMIC CARDIOMYOPATHY: ICD-10-CM

## 2025-08-19 DIAGNOSIS — I10 BENIGN ESSENTIAL HYPERTENSION: ICD-10-CM

## 2025-08-19 DIAGNOSIS — R06.02 SOB (SHORTNESS OF BREATH): ICD-10-CM

## 2025-08-19 DIAGNOSIS — E11.42 TYPE 2 DIABETES MELLITUS WITH DIABETIC POLYNEUROPATHY, WITHOUT LONG-TERM CURRENT USE OF INSULIN (H): ICD-10-CM

## 2025-08-19 DIAGNOSIS — I25.10 CORONARY ARTERY DISEASE INVOLVING NATIVE HEART WITHOUT ANGINA PECTORIS, UNSPECIFIED VESSEL OR LESION TYPE: ICD-10-CM

## 2025-08-19 DIAGNOSIS — I25.10 CORONARY ARTERY DISEASE INVOLVING NATIVE CORONARY ARTERY OF NATIVE HEART WITHOUT ANGINA PECTORIS: ICD-10-CM

## 2025-08-19 DIAGNOSIS — Z71.89 OTHER SPECIFIED COUNSELING: ICD-10-CM

## 2025-08-19 DIAGNOSIS — Z95.5 HISTORY OF HEART ARTERY STENT: ICD-10-CM

## 2025-08-19 DIAGNOSIS — R53.83 OTHER FATIGUE: ICD-10-CM

## 2025-08-19 DIAGNOSIS — R06.02 SOB (SHORTNESS OF BREATH): Primary | ICD-10-CM

## 2025-08-19 DIAGNOSIS — L73.9 FOLLICULAR DISORDER, UNSPECIFIED: ICD-10-CM

## 2025-08-19 LAB
ALBUMIN SERPL BCG-MCNC: 4.3 G/DL (ref 3.5–5.2)
ALP SERPL-CCNC: 85 U/L (ref 40–150)
ALT SERPL W P-5'-P-CCNC: 16 U/L (ref 0–70)
ANION GAP SERPL CALCULATED.3IONS-SCNC: 13 MMOL/L (ref 7–15)
AST SERPL W P-5'-P-CCNC: 22 U/L (ref 0–45)
BILIRUB SERPL-MCNC: 0.5 MG/DL
BUN SERPL-MCNC: 30.6 MG/DL (ref 8–23)
CALCIUM SERPL-MCNC: 9.7 MG/DL (ref 8.8–10.4)
CHLORIDE SERPL-SCNC: 104 MMOL/L (ref 98–107)
CHOLEST SERPL-MCNC: 137 MG/DL
CREAT SERPL-MCNC: 1.13 MG/DL (ref 0.67–1.17)
EGFRCR SERPLBLD CKD-EPI 2021: 63 ML/MIN/1.73M2
ERYTHROCYTE [DISTWIDTH] IN BLOOD BY AUTOMATED COUNT: 14.1 % (ref 10–15)
EST. AVERAGE GLUCOSE BLD GHB EST-MCNC: 140 MG/DL
FASTING STATUS PATIENT QL REPORTED: YES
GLUCOSE SERPL-MCNC: 164 MG/DL (ref 70–99)
HBA1C MFR BLD: 6.5 %
HCO3 SERPL-SCNC: 23 MMOL/L (ref 22–29)
HCT VFR BLD AUTO: 42.5 % (ref 40–53)
HDLC SERPL-MCNC: 56 MG/DL
HGB BLD-MCNC: 14 G/DL (ref 13.3–17.7)
LDLC SERPL CALC-MCNC: 64 MG/DL
MCH RBC QN AUTO: 32.6 PG (ref 26.5–33)
MCHC RBC AUTO-ENTMCNC: 32.9 G/DL (ref 31.5–36.5)
MCV RBC AUTO: 98.8 FL (ref 78–100)
NONHDLC SERPL-MCNC: 81 MG/DL
NT-PROBNP SERPL-MCNC: 301 PG/ML (ref 0–852)
PLATELET # BLD AUTO: 190 10E3/UL (ref 150–450)
POTASSIUM SERPL-SCNC: 4.9 MMOL/L (ref 3.4–5.3)
PROT SERPL-MCNC: 6.9 G/DL (ref 6.4–8.3)
RBC # BLD AUTO: 4.3 10E6/UL (ref 4.4–5.9)
SODIUM SERPL-SCNC: 140 MMOL/L (ref 135–145)
TRIGL SERPL-MCNC: 86 MG/DL
TSH SERPL DL<=0.005 MIU/L-ACNC: 3.21 UIU/ML (ref 0.3–4.2)
WBC # BLD AUTO: 9.1 10E3/UL (ref 4–11)

## 2025-08-19 PROCEDURE — OTHER REASON FOR TELEMEDICINE VISIT: OTHER

## 2025-08-19 PROCEDURE — 80061 LIPID PANEL: CPT | Performed by: INTERNAL MEDICINE

## 2025-08-19 PROCEDURE — 3077F SYST BP >= 140 MM HG: CPT | Performed by: INTERNAL MEDICINE

## 2025-08-19 PROCEDURE — 99204 OFFICE O/P NEW MOD 45 MIN: CPT | Mod: 25 | Performed by: INTERNAL MEDICINE

## 2025-08-19 PROCEDURE — OTHER COUNSELING: OTHER

## 2025-08-19 PROCEDURE — 98005 SYNCH AUDIO-VIDEO EST LOW 20: CPT

## 2025-08-19 PROCEDURE — OTHER PRESCRIPTION: OTHER

## 2025-08-19 PROCEDURE — 36415 COLL VENOUS BLD VENIPUNCTURE: CPT | Performed by: INTERNAL MEDICINE

## 2025-08-19 PROCEDURE — 3079F DIAST BP 80-89 MM HG: CPT | Performed by: INTERNAL MEDICINE

## 2025-08-19 PROCEDURE — 255N000002 HC RX 255 OP 636: Performed by: INTERNAL MEDICINE

## 2025-08-19 PROCEDURE — 85027 COMPLETE CBC AUTOMATED: CPT | Performed by: INTERNAL MEDICINE

## 2025-08-19 PROCEDURE — OTHER CONSENT FOR TELEMEDICINE VISIT OBTAINED: OTHER

## 2025-08-19 PROCEDURE — OTHER ADDITIONAL NOTES: OTHER

## 2025-08-19 PROCEDURE — 83880 ASSAY OF NATRIURETIC PEPTIDE: CPT | Performed by: INTERNAL MEDICINE

## 2025-08-19 PROCEDURE — 84443 ASSAY THYROID STIM HORMONE: CPT | Performed by: INTERNAL MEDICINE

## 2025-08-19 PROCEDURE — 93306 TTE W/DOPPLER COMPLETE: CPT | Mod: 26 | Performed by: INTERNAL MEDICINE

## 2025-08-19 PROCEDURE — 83036 HEMOGLOBIN GLYCOSYLATED A1C: CPT | Performed by: INTERNAL MEDICINE

## 2025-08-19 PROCEDURE — 93000 ELECTROCARDIOGRAM COMPLETE: CPT | Mod: XU | Performed by: INTERNAL MEDICINE

## 2025-08-19 PROCEDURE — 999N000208 ECHOCARDIOGRAM COMPLETE

## 2025-08-19 PROCEDURE — 80053 COMPREHEN METABOLIC PANEL: CPT | Performed by: INTERNAL MEDICINE

## 2025-08-19 RX ORDER — MUPIROCIN 20 MG/G
OINTMENT TOPICAL
Qty: 22 | Refills: 0 | Status: ERX

## 2025-08-19 RX ORDER — LOSARTAN POTASSIUM 50 MG/1
75 TABLET ORAL DAILY
Qty: 90 TABLET | Refills: 3 | Status: SHIPPED | OUTPATIENT
Start: 2025-08-19

## 2025-08-19 RX ADMIN — HUMAN ALBUMIN MICROSPHERES AND PERFLUTREN 9 ML (DILUTED): 10; .22 INJECTION, SOLUTION INTRAVENOUS at 13:32

## 2025-08-19 ASSESSMENT — LOCATION SIMPLE DESCRIPTION DERM
LOCATION SIMPLE: LEFT LIP
LOCATION SIMPLE: RIGHT LIP

## 2025-08-19 ASSESSMENT — LOCATION DETAILED DESCRIPTION DERM
LOCATION DETAILED: LEFT LOWER CUTANEOUS LIP
LOCATION DETAILED: RIGHT UPPER CUTANEOUS LIP
LOCATION DETAILED: LEFT UPPER CUTANEOUS LIP
LOCATION DETAILED: RIGHT LOWER CUTANEOUS LIP

## 2025-08-19 ASSESSMENT — LOCATION ZONE DERM: LOCATION ZONE: LIP

## (undated) RX ORDER — REGADENOSON 0.08 MG/ML
INJECTION, SOLUTION INTRAVENOUS
Status: DISPENSED
Start: 2018-06-05